# Patient Record
Sex: FEMALE | Race: WHITE | NOT HISPANIC OR LATINO | Employment: OTHER | URBAN - METROPOLITAN AREA
[De-identification: names, ages, dates, MRNs, and addresses within clinical notes are randomized per-mention and may not be internally consistent; named-entity substitution may affect disease eponyms.]

---

## 2017-05-23 ENCOUNTER — HOSPITAL ENCOUNTER (OUTPATIENT)
Dept: PHYSICAL THERAPY | Facility: HOSPITAL | Age: 77
Setting detail: RECURRING SERIES
Discharge: HOME | End: 2017-07-26
Attending: HOSPITALIST | Admitting: HOSPITALIST

## 2017-12-07 ENCOUNTER — HOSPITAL ENCOUNTER (OUTPATIENT)
Dept: FAMILY MEDICINE CLINIC | Facility: CLINIC | Age: 77
Setting detail: SPECIMEN
Discharge: HOME | End: 2017-12-07
Attending: INTERNAL MEDICINE | Admitting: INTERNAL MEDICINE

## 2017-12-07 LAB
ALBUMIN SERPL-MCNC: 3.9 G/DL (ref 3.5–4.8)
ALBUMIN/GLOB SERPL: 1.2 {RATIO} (ref 1–1.7)
ALP SERPL-CCNC: 67 IU/L (ref 32–91)
ALT SERPL-CCNC: 12 IU/L (ref 14–54)
ANION GAP SERPL CALC-SCNC: 14.9 MMOL/L (ref 10–20)
AST SERPL-CCNC: 17 IU/L (ref 15–41)
BASOPHILS # BLD AUTO: 0.1 10*3/UL (ref 0–0.2)
BASOPHILS NFR BLD AUTO: 1 % (ref 0–2)
BILIRUB SERPL-MCNC: 0.6 MG/DL (ref 0.3–1.2)
BUN SERPL-MCNC: 29 MG/DL (ref 8–20)
BUN/CREAT SERPL: 24.2 (ref 5.4–26.2)
CALCIUM SERPL-MCNC: 9.5 MG/DL (ref 8.9–10.3)
CHLORIDE SERPL-SCNC: 106 MMOL/L (ref 101–111)
CONV CO2: 22 MMOL/L (ref 22–32)
CONV TOTAL PROTEIN: 7.2 G/DL (ref 6.1–7.9)
CREAT UR-MCNC: 1.2 MG/DL (ref 0.4–1)
DIFFERENTIAL METHOD BLD: (no result)
EOSINOPHIL # BLD AUTO: 0.2 10*3/UL (ref 0–0.3)
EOSINOPHIL # BLD AUTO: 4 % (ref 0–3)
ERYTHROCYTE [DISTWIDTH] IN BLOOD BY AUTOMATED COUNT: 13.1 % (ref 11.5–14.5)
GLOBULIN UR ELPH-MCNC: 3.3 G/DL (ref 2.5–3.8)
GLUCOSE SERPL-MCNC: 93 MG/DL (ref 65–99)
HCT VFR BLD AUTO: 35.3 % (ref 35–49)
HGB BLD-MCNC: 11.8 G/DL (ref 12–15)
LYMPHOCYTES # BLD AUTO: 0.9 10*3/UL (ref 0.8–4.8)
LYMPHOCYTES NFR BLD AUTO: 19 % (ref 18–42)
MCH RBC QN AUTO: 30.3 PG (ref 26–32)
MCHC RBC AUTO-ENTMCNC: 33.4 G/DL (ref 32–36)
MCV RBC AUTO: 90.6 FL (ref 80–94)
MONOCYTES # BLD AUTO: 0.5 10*3/UL (ref 0.1–1.3)
MONOCYTES NFR BLD AUTO: 9 % (ref 2–11)
NEUTROPHILS # BLD AUTO: 3.4 10*3/UL (ref 2.3–8.6)
NEUTROPHILS NFR BLD AUTO: 67 % (ref 50–75)
NRBC BLD AUTO-RTO: 0 /100{WBCS}
NRBC/RBC NFR BLD MANUAL: 0 10*3/UL
PLATELET # BLD AUTO: 182 10*3/UL (ref 150–450)
PMV BLD AUTO: 8.3 FL (ref 7.4–10.4)
POTASSIUM SERPL-SCNC: 4.9 MMOL/L (ref 3.6–5.1)
RBC # BLD AUTO: 3.89 10*6/UL (ref 4–5.4)
SODIUM SERPL-SCNC: 138 MMOL/L (ref 136–144)
TSH SERPL-ACNC: 0.64 UIU/ML (ref 0.34–5.6)
WBC # BLD AUTO: 5 10*3/UL (ref 4.5–11.5)

## 2018-01-25 ENCOUNTER — HOSPITAL ENCOUNTER (OUTPATIENT)
Dept: FAMILY MEDICINE CLINIC | Facility: CLINIC | Age: 78
Setting detail: SPECIMEN
Discharge: HOME | End: 2018-01-25
Attending: HOSPITALIST | Admitting: HOSPITALIST

## 2018-01-25 LAB
ALBUMIN SERPL-MCNC: 4.1 G/DL (ref 3.5–4.8)
ALBUMIN/GLOB SERPL: 1.4 {RATIO} (ref 1–1.7)
ALP SERPL-CCNC: 63 IU/L (ref 32–91)
ALT SERPL-CCNC: 13 IU/L (ref 14–54)
ANION GAP SERPL CALC-SCNC: 14.2 MMOL/L (ref 10–20)
AST SERPL-CCNC: 21 IU/L (ref 15–41)
BILIRUB SERPL-MCNC: 1.2 MG/DL (ref 0.3–1.2)
BUN SERPL-MCNC: 21 MG/DL (ref 8–20)
BUN/CREAT SERPL: 21 (ref 5.4–26.2)
CALCIUM SERPL-MCNC: 9.7 MG/DL (ref 8.9–10.3)
CHLORIDE SERPL-SCNC: 103 MMOL/L (ref 101–111)
CHOLEST SERPL-MCNC: 211 MG/DL
CHOLEST/HDLC SERPL: 3.2 {RATIO}
CONV CO2: 23 MMOL/L (ref 22–32)
CONV LDL CHOLESTEROL DIRECT: 125 MG/DL (ref 0–100)
CONV TOTAL PROTEIN: 7.1 G/DL (ref 6.1–7.9)
CREAT UR-MCNC: 1 MG/DL (ref 0.4–1)
GLOBULIN UR ELPH-MCNC: 3 G/DL (ref 2.5–3.8)
GLUCOSE SERPL-MCNC: 105 MG/DL (ref 65–99)
HDLC SERPL-MCNC: 66 MG/DL
LDLC/HDLC SERPL: 1.9 {RATIO}
LIPID INTERPRETATION: ABNORMAL
POTASSIUM SERPL-SCNC: 5.2 MMOL/L (ref 3.6–5.1)
SODIUM SERPL-SCNC: 135 MMOL/L (ref 136–144)
TRIGL SERPL-MCNC: 61 MG/DL
VLDLC SERPL CALC-MCNC: 20.2 MG/DL

## 2018-01-31 ENCOUNTER — HOSPITAL ENCOUNTER (OUTPATIENT)
Dept: CARDIOLOGY | Facility: HOSPITAL | Age: 78
Discharge: HOME | End: 2018-01-31
Attending: HOSPITALIST | Admitting: HOSPITALIST

## 2018-06-12 ENCOUNTER — HOSPITAL ENCOUNTER (OUTPATIENT)
Dept: FAMILY MEDICINE CLINIC | Facility: CLINIC | Age: 78
Setting detail: SPECIMEN
Discharge: HOME | End: 2018-06-12
Attending: INTERNAL MEDICINE | Admitting: INTERNAL MEDICINE

## 2018-06-12 LAB
ALBUMIN SERPL-MCNC: 3.8 G/DL (ref 3.5–4.8)
ALBUMIN/GLOB SERPL: 1.1 {RATIO} (ref 1–1.7)
ALP SERPL-CCNC: 61 IU/L (ref 32–91)
ALT SERPL-CCNC: 11 IU/L (ref 14–54)
ANION GAP SERPL CALC-SCNC: 15.4 MMOL/L (ref 10–20)
AST SERPL-CCNC: 16 IU/L (ref 15–41)
BASOPHILS # BLD AUTO: 0.1 10*3/UL (ref 0–0.2)
BASOPHILS NFR BLD AUTO: 1 % (ref 0–2)
BILIRUB SERPL-MCNC: 1 MG/DL (ref 0.3–1.2)
BUN SERPL-MCNC: 20 MG/DL (ref 8–20)
BUN/CREAT SERPL: 22.2 (ref 5.4–26.2)
CALCIUM SERPL-MCNC: 9.2 MG/DL (ref 8.9–10.3)
CHLORIDE SERPL-SCNC: 103 MMOL/L (ref 101–111)
CONV CO2: 23 MMOL/L (ref 22–32)
CONV TOTAL PROTEIN: 7.2 G/DL (ref 6.1–7.9)
CREAT UR-MCNC: 0.9 MG/DL (ref 0.4–1)
DIFFERENTIAL METHOD BLD: (no result)
EOSINOPHIL # BLD AUTO: 0.2 10*3/UL (ref 0–0.3)
EOSINOPHIL # BLD AUTO: 6 % (ref 0–3)
ERYTHROCYTE [DISTWIDTH] IN BLOOD BY AUTOMATED COUNT: 13.3 % (ref 11.5–14.5)
GLOBULIN UR ELPH-MCNC: 3.4 G/DL (ref 2.5–3.8)
GLUCOSE SERPL-MCNC: 95 MG/DL (ref 65–99)
HCT VFR BLD AUTO: 37 % (ref 35–49)
HGB BLD-MCNC: 12.2 G/DL (ref 12–15)
IRON SATN MFR SERPL: 24 % (ref 15–50)
IRON SERPL-MCNC: 80 UG/DL (ref 28–170)
LYMPHOCYTES # BLD AUTO: 0.5 10*3/UL (ref 0.8–4.8)
LYMPHOCYTES NFR BLD AUTO: 13 % (ref 18–42)
MCH RBC QN AUTO: 29.3 PG (ref 26–32)
MCHC RBC AUTO-ENTMCNC: 33.1 G/DL (ref 32–36)
MCV RBC AUTO: 88.5 FL (ref 80–94)
MONOCYTES # BLD AUTO: 0.5 10*3/UL (ref 0.1–1.3)
MONOCYTES NFR BLD AUTO: 12 % (ref 2–11)
NEUTROPHILS # BLD AUTO: 2.8 10*3/UL (ref 2.3–8.6)
NEUTROPHILS NFR BLD AUTO: 68 % (ref 50–75)
NRBC BLD AUTO-RTO: 0 /100{WBCS}
NRBC/RBC NFR BLD MANUAL: 0 10*3/UL
PLATELET # BLD AUTO: 191 10*3/UL (ref 150–450)
PMV BLD AUTO: 8 FL (ref 7.4–10.4)
POTASSIUM SERPL-SCNC: 4.4 MMOL/L (ref 3.6–5.1)
RBC # BLD AUTO: 4.18 10*6/UL (ref 4–5.4)
SODIUM SERPL-SCNC: 137 MMOL/L (ref 136–144)
TIBC SERPL-MCNC: 337 UG/DL (ref 228–428)
WBC # BLD AUTO: 4.1 10*3/UL (ref 4.5–11.5)

## 2018-08-29 ENCOUNTER — HOSPITAL ENCOUNTER (OUTPATIENT)
Dept: NUCLEAR MEDICINE | Facility: HOSPITAL | Age: 78
Discharge: HOME | End: 2018-08-29
Attending: INTERNAL MEDICINE | Admitting: INTERNAL MEDICINE

## 2018-09-04 ENCOUNTER — HOSPITAL ENCOUNTER (OUTPATIENT)
Dept: CARDIOLOGY | Facility: HOSPITAL | Age: 78
Discharge: HOME | End: 2018-09-04
Attending: EMERGENCY MEDICINE | Admitting: EMERGENCY MEDICINE

## 2018-09-05 ENCOUNTER — HOSPITAL ENCOUNTER (OUTPATIENT)
Dept: FAMILY MEDICINE CLINIC | Facility: CLINIC | Age: 78
Setting detail: SPECIMEN
Discharge: HOME | End: 2018-09-05
Attending: HOSPITALIST | Admitting: HOSPITALIST

## 2018-09-05 LAB
ALBUMIN SERPL-MCNC: 3.7 G/DL (ref 3.5–4.8)
ALBUMIN/GLOB SERPL: 1.2 {RATIO} (ref 1–1.7)
ALP SERPL-CCNC: 59 IU/L (ref 32–91)
ALT SERPL-CCNC: 10 IU/L (ref 14–54)
ANION GAP SERPL CALC-SCNC: 14.6 MMOL/L (ref 10–20)
AST SERPL-CCNC: 16 IU/L (ref 15–41)
BILIRUB SERPL-MCNC: 0.6 MG/DL (ref 0.3–1.2)
BUN SERPL-MCNC: 26 MG/DL (ref 8–20)
BUN/CREAT SERPL: 23.6 (ref 5.4–26.2)
CALCIUM SERPL-MCNC: 8.8 MG/DL (ref 8.9–10.3)
CHLORIDE SERPL-SCNC: 105 MMOL/L (ref 101–111)
CONV CO2: 24 MMOL/L (ref 22–32)
CONV TOTAL PROTEIN: 6.7 G/DL (ref 6.1–7.9)
CREAT UR-MCNC: 1.1 MG/DL (ref 0.4–1)
GLOBULIN UR ELPH-MCNC: 3 G/DL (ref 2.5–3.8)
GLUCOSE SERPL-MCNC: 108 MG/DL (ref 65–99)
POTASSIUM SERPL-SCNC: 4.6 MMOL/L (ref 3.6–5.1)
SODIUM SERPL-SCNC: 139 MMOL/L (ref 136–144)

## 2018-10-08 ENCOUNTER — HOSPITAL ENCOUNTER (OUTPATIENT)
Dept: RESPIRATORY THERAPY | Facility: HOSPITAL | Age: 78
Discharge: HOME | End: 2018-10-08
Attending: HOSPITALIST | Admitting: HOSPITALIST

## 2019-02-22 ENCOUNTER — HOSPITAL ENCOUNTER (OUTPATIENT)
Dept: FAMILY MEDICINE CLINIC | Facility: CLINIC | Age: 79
Setting detail: SPECIMEN
Discharge: HOME | End: 2019-02-22
Attending: HOSPITALIST | Admitting: HOSPITALIST

## 2019-02-22 LAB
AZTREONAM SUSC ISLT: NORMAL
BACTERIA ISLT: NORMAL
BACTERIA SPEC AEROBE CULT: NORMAL
CEFEPIME SUSC ISLT: NORMAL
CEFTAZIDIME SUSC ISLT: NORMAL
CEFTRIAXONE SUSC ISLT: NORMAL
CIPROFLOXACIN SUSC ISLT: NORMAL
COLONY COUNT: NORMAL
ERTAPENEM SUSC ISLT: NORMAL
LEVOFLOXACIN SUSC ISLT: NORMAL
Lab: NORMAL
MEROPENEM SUSC ISLT: NORMAL
MICRO REPORT STATUS: NORMAL
NITROFURANTOIN SUSC ISLT: NORMAL
PIP+TAZO SUSC ISLT: NORMAL
SPECIMEN SOURCE: NORMAL
SUSC METH SPEC: NORMAL
TETRACYCLINE SUSC ISLT: NORMAL
TOBRAMYCIN SUSC ISLT: NORMAL
TRIMETHOPRIM/SULFA: NORMAL

## 2019-03-07 ENCOUNTER — HOSPITAL ENCOUNTER (OUTPATIENT)
Dept: FAMILY MEDICINE CLINIC | Facility: CLINIC | Age: 79
Setting detail: SPECIMEN
Discharge: HOME | End: 2019-03-07
Attending: HOSPITALIST | Admitting: HOSPITALIST

## 2019-03-07 LAB
BILIRUB UR QL STRIP: NEGATIVE MG/DL
CASTS URNS QL MICRO: ABNORMAL /[LPF]
COLOR UR: YELLOW
CONV BACTERIA IN URINE MICRO: NEGATIVE
CONV CLARITY OF URINE: ABNORMAL
CONV HYALINE CASTS IN URINE MICRO: 2 /[LPF] (ref 0–5)
CONV PROTEIN IN URINE BY AUTOMATED TEST STRIP: NEGATIVE MG/DL
CONV SMALL ROUND CELLS: ABNORMAL /[HPF]
CONV UROBILINOGEN IN URINE BY AUTOMATED TEST STRIP: 1 MG/DL
CULTURE INDICATED?: ABNORMAL
GLUCOSE UR QL: NEGATIVE MG/DL
HGB UR QL STRIP: NEGATIVE
KETONES UR QL STRIP: NEGATIVE MG/DL
LEUKOCYTE ESTERASE UR QL STRIP: ABNORMAL
NITRITE UR QL STRIP: NEGATIVE
PH UR STRIP.AUTO: 6.5 [PH] (ref 4.5–8)
RBC #/AREA URNS HPF: 2 /[HPF] (ref 0–3)
SP GR UR: 1.02 (ref 1–1.03)
SPERM URNS QL MICRO: ABNORMAL /[HPF]
SQUAMOUS SPT QL MICRO: 21 /[HPF] (ref 0–5)
UNIDENT CRYS URNS QL MICRO: ABNORMAL /[HPF]
WBC #/AREA URNS HPF: 2 /[HPF] (ref 0–5)
YEAST SPEC QL WET PREP: ABNORMAL /[HPF]

## 2019-03-19 ENCOUNTER — HOSPITAL ENCOUNTER (OUTPATIENT)
Dept: FAMILY MEDICINE CLINIC | Facility: CLINIC | Age: 79
Setting detail: SPECIMEN
Discharge: HOME | End: 2019-03-19
Attending: HOSPITALIST | Admitting: HOSPITALIST

## 2019-03-19 LAB
ALBUMIN SERPL-MCNC: 3.6 G/DL (ref 3.5–4.8)
ALBUMIN/GLOB SERPL: 1.3 {RATIO} (ref 1–1.7)
ALP SERPL-CCNC: 112 IU/L (ref 32–91)
ALT SERPL-CCNC: 21 IU/L (ref 14–54)
ANION GAP SERPL CALC-SCNC: 14.4 MMOL/L (ref 10–20)
AST SERPL-CCNC: 23 IU/L (ref 15–41)
BASOPHILS # BLD AUTO: 0 10*3/UL (ref 0–0.2)
BASOPHILS NFR BLD AUTO: 1 % (ref 0–2)
BILIRUB SERPL-MCNC: 0.9 MG/DL (ref 0.3–1.2)
BUN SERPL-MCNC: 24 MG/DL (ref 8–20)
BUN/CREAT SERPL: 20 (ref 5.4–26.2)
CALCIUM SERPL-MCNC: 8.6 MG/DL (ref 8.9–10.3)
CHLORIDE SERPL-SCNC: 101 MMOL/L (ref 101–111)
CONV CO2: 22 MMOL/L (ref 22–32)
CONV TOTAL PROTEIN: 6.4 G/DL (ref 6.1–7.9)
CREAT UR-MCNC: 1.2 MG/DL (ref 0.4–1)
DIFFERENTIAL METHOD BLD: (no result)
EOSINOPHIL # BLD AUTO: 0.2 10*3/UL (ref 0–0.3)
EOSINOPHIL # BLD AUTO: 6 % (ref 0–3)
ERYTHROCYTE [DISTWIDTH] IN BLOOD BY AUTOMATED COUNT: 17.4 % (ref 11.5–14.5)
GLOBULIN UR ELPH-MCNC: 2.8 G/DL (ref 2.5–3.8)
GLUCOSE SERPL-MCNC: 91 MG/DL (ref 65–99)
HCT VFR BLD AUTO: 30.4 % (ref 35–49)
HGB BLD-MCNC: 10.3 G/DL (ref 12–15)
LYMPHOCYTES # BLD AUTO: 0.5 10*3/UL (ref 0.8–4.8)
LYMPHOCYTES NFR BLD AUTO: 15 % (ref 18–42)
MCH RBC QN AUTO: 33.4 PG (ref 26–32)
MCHC RBC AUTO-ENTMCNC: 33.8 G/DL (ref 32–36)
MCV RBC AUTO: 99 FL (ref 80–94)
MONOCYTES # BLD AUTO: 0.3 10*3/UL (ref 0.1–1.3)
MONOCYTES NFR BLD AUTO: 9 % (ref 2–11)
NEUTROPHILS # BLD AUTO: 2.3 10*3/UL (ref 2.3–8.6)
NEUTROPHILS NFR BLD AUTO: 69 % (ref 50–75)
NRBC BLD AUTO-RTO: 0 /100{WBCS}
NRBC/RBC NFR BLD MANUAL: 0 10*3/UL
PLATELET # BLD AUTO: 227 10*3/UL (ref 150–450)
PMV BLD AUTO: 7.5 FL (ref 7.4–10.4)
POTASSIUM SERPL-SCNC: 4.4 MMOL/L (ref 3.6–5.1)
RBC # BLD AUTO: 3.07 10*6/UL (ref 4–5.4)
SODIUM SERPL-SCNC: 133 MMOL/L (ref 136–144)
WBC # BLD AUTO: 3.4 10*3/UL (ref 4.5–11.5)

## 2019-04-16 ENCOUNTER — HOSPITAL ENCOUNTER (OUTPATIENT)
Dept: FAMILY MEDICINE CLINIC | Facility: CLINIC | Age: 79
Setting detail: SPECIMEN
Discharge: HOME | End: 2019-04-16
Attending: HOSPITALIST | Admitting: HOSPITALIST

## 2019-04-16 LAB
ALBUMIN SERPL-MCNC: 3.9 G/DL (ref 3.5–4.8)
ALBUMIN/GLOB SERPL: 1.4 {RATIO} (ref 1–1.7)
ALP SERPL-CCNC: 65 IU/L (ref 32–91)
ALT SERPL-CCNC: 12 IU/L (ref 14–54)
ANION GAP SERPL CALC-SCNC: 16.5 MMOL/L (ref 10–20)
AST SERPL-CCNC: 20 IU/L (ref 15–41)
BASOPHILS # BLD AUTO: 0 10*3/UL (ref 0–0.2)
BASOPHILS NFR BLD AUTO: 0 % (ref 0–2)
BILIRUB SERPL-MCNC: 0.7 MG/DL (ref 0.3–1.2)
BUN SERPL-MCNC: 23 MG/DL (ref 8–20)
BUN/CREAT SERPL: 23 (ref 5.4–26.2)
CALCIUM SERPL-MCNC: 9.3 MG/DL (ref 8.9–10.3)
CHLORIDE SERPL-SCNC: 102 MMOL/L (ref 101–111)
CONV CO2: 21 MMOL/L (ref 22–32)
CONV TOTAL PROTEIN: 6.7 G/DL (ref 6.1–7.9)
CREAT UR-MCNC: 1 MG/DL (ref 0.4–1)
DIFFERENTIAL METHOD BLD: (no result)
EOSINOPHIL # BLD AUTO: 0.3 10*3/UL (ref 0–0.3)
EOSINOPHIL # BLD AUTO: 7 % (ref 0–3)
ERYTHROCYTE [DISTWIDTH] IN BLOOD BY AUTOMATED COUNT: 14.7 % (ref 11.5–14.5)
GLOBULIN UR ELPH-MCNC: 2.8 G/DL (ref 2.5–3.8)
GLUCOSE SERPL-MCNC: 98 MG/DL (ref 65–99)
HCT VFR BLD AUTO: 36.6 % (ref 35–49)
HGB BLD-MCNC: 11.9 G/DL (ref 12–15)
LYMPHOCYTES # BLD AUTO: 0.9 10*3/UL (ref 0.8–4.8)
LYMPHOCYTES NFR BLD AUTO: 20 % (ref 18–42)
MCH RBC QN AUTO: 31.9 PG (ref 26–32)
MCHC RBC AUTO-ENTMCNC: 32.5 G/DL (ref 32–36)
MCV RBC AUTO: 98.3 FL (ref 80–94)
MONOCYTES # BLD AUTO: 0.5 10*3/UL (ref 0.1–1.3)
MONOCYTES NFR BLD AUTO: 11 % (ref 2–11)
NEUTROPHILS # BLD AUTO: 2.7 10*3/UL (ref 2.3–8.6)
NEUTROPHILS NFR BLD AUTO: 62 % (ref 50–75)
NRBC BLD AUTO-RTO: 0 /100{WBCS}
NRBC/RBC NFR BLD MANUAL: 0 10*3/UL
PLATELET # BLD AUTO: 211 10*3/UL (ref 150–450)
PMV BLD AUTO: 8.7 FL (ref 7.4–10.4)
POTASSIUM SERPL-SCNC: 4.5 MMOL/L (ref 3.6–5.1)
RBC # BLD AUTO: (no result) 10*6/UL (ref 4–5.4)
SODIUM SERPL-SCNC: 135 MMOL/L (ref 136–144)
WBC # BLD AUTO: 4.4 10*3/UL (ref 4.5–11.5)

## 2019-06-04 ENCOUNTER — CONVERSION ENCOUNTER (OUTPATIENT)
Dept: FAMILY MEDICINE CLINIC | Facility: CLINIC | Age: 79
End: 2019-06-04

## 2019-06-04 ENCOUNTER — HOSPITAL ENCOUNTER (OUTPATIENT)
Dept: FAMILY MEDICINE CLINIC | Facility: CLINIC | Age: 79
Setting detail: SPECIMEN
Discharge: HOME | End: 2019-06-04
Attending: HOSPITALIST | Admitting: HOSPITALIST

## 2019-06-05 VITALS
DIASTOLIC BLOOD PRESSURE: 80 MMHG | BODY MASS INDEX: 20.6 KG/M2 | RESPIRATION RATE: 8 BRPM | SYSTOLIC BLOOD PRESSURE: 150 MMHG | HEART RATE: 80 BPM | WEIGHT: 120 LBS

## 2019-06-05 LAB
ALBUMIN SERPL-MCNC: 4 G/DL (ref 3.5–4.8)
ALBUMIN/GLOB SERPL: 1.4 {RATIO} (ref 1–1.7)
ALP SERPL-CCNC: 66 IU/L (ref 32–91)
ALT SERPL-CCNC: 15 IU/L (ref 14–54)
ANION GAP SERPL CALC-SCNC: 16.4 MMOL/L (ref 10–20)
AST SERPL-CCNC: 22 IU/L (ref 15–41)
BASOPHILS # BLD AUTO: 0.1 10*3/UL (ref 0–0.2)
BASOPHILS NFR BLD AUTO: 1 % (ref 0–2)
BILIRUB SERPL-MCNC: 0.6 MG/DL (ref 0.3–1.2)
BUN SERPL-MCNC: 27 MG/DL (ref 8–20)
BUN/CREAT SERPL: 27 (ref 5.4–26.2)
CALCIUM SERPL-MCNC: 9.2 MG/DL (ref 8.9–10.3)
CHLORIDE SERPL-SCNC: 104 MMOL/L (ref 101–111)
CONV ABS BANDS: 0.6 10*3/UL
CONV CO2: 21 MMOL/L (ref 22–32)
CONV TOTAL PROTEIN: 6.9 G/DL (ref 6.1–7.9)
CREAT UR-MCNC: 1 MG/DL (ref 0.4–1)
CRP SERPL-MCNC: 0.39 MG/DL (ref 0–0.7)
DIFFERENTIAL METHOD BLD: (no result)
EOSINOPHIL # BLD AUTO: 0.2 10*3/UL (ref 0–0.3)
EOSINOPHIL # BLD AUTO: 3 % (ref 0–3)
ERYTHROCYTE [DISTWIDTH] IN BLOOD BY AUTOMATED COUNT: 13.1 % (ref 11.5–14.5)
ERYTHROCYTE [SEDIMENTATION RATE] IN BLOOD BY WESTERGREN METHOD: 52 MM/HR (ref 0–30)
GLOBULIN UR ELPH-MCNC: 2.9 G/DL (ref 2.5–3.8)
GLUCOSE SERPL-MCNC: 101 MG/DL (ref 65–99)
HCT VFR BLD AUTO: 37.6 % (ref 35–49)
HGB BLD-MCNC: 12.3 G/DL (ref 12–15)
LYMPHOCYTES # BLD AUTO: 1.8 10*3/UL (ref 0.8–4.8)
LYMPHOCYTES NFR BLD AUTO: 35 % (ref 18–42)
MCH RBC QN AUTO: 30.5 PG (ref 26–32)
MCHC RBC AUTO-ENTMCNC: 32.7 G/DL (ref 32–36)
MCV RBC AUTO: 93.3 FL (ref 80–94)
MONOCYTES # BLD AUTO: 0.4 10*3/UL (ref 0.1–1.3)
MONOCYTES NFR BLD AUTO: 7 % (ref 2–11)
NEUTROPHILS # BLD AUTO: 2.1 10*3/UL (ref 2.3–8.6)
NEUTROPHILS NFR BLD AUTO: 42 % (ref 50–75)
NEUTS BAND NFR BLD MANUAL: 12 % (ref 0–5)
PLATELET # BLD AUTO: 206 10*3/UL (ref 150–450)
PMV BLD AUTO: 8.5 FL (ref 7.4–10.4)
POTASSIUM SERPL-SCNC: 4.4 MMOL/L (ref 3.6–5.1)
PREALB SERPL-MCNC: 25.1 MG/DL (ref 16–38)
RBC # BLD AUTO: 4.03 10*6/UL (ref 4–5.4)
SODIUM SERPL-SCNC: 137 MMOL/L (ref 136–144)
TSH SERPL-ACNC: 1.29 UIU/ML (ref 0.34–5.6)
VIT B12 SERPL-MCNC: 401 PG/ML (ref 180–914)
WBC # BLD AUTO: 5.2 10*3/UL (ref 4.5–11.5)

## 2019-06-06 NOTE — PROGRESS NOTES
Vital Signs:    Patient Profile:    78 Years Old Female  Height:     64 inches (162.56 cm)  Weight:     120 pounds  BMI:        20.60     Temp:       99.2 degrees F oral  Pulse rate: 80 / minute  Pulse rhythm:   regular  Resp:       8 per minute  BP Sittin / 80  (right arm)    Cuff size:  regular      Problems: Active problems were reviewed with the patient during this visit.  Medications: Medications were reviewed with the patient during this visit.  Allergies: Allergies were reviewed with the patient during this visit.        Vitals Entered By: [facility]      Visit Type:  Follow-up Visit  Referring Provider:  Shan Canseco MD  Primary Provider:  Shan Canseco MD      History of Present Illness:         The patient comes in today for a follow-up visit.  lots of fatigue and malaise.   Some chest pain and worse with exertion.  Been more frequent and nowwith more SOA.           When questioned about other issues, patient has concerns about nothing in particular.           The ROS is positive for chest pain, SOA and Dizzyness.  The ROS is negative for fever, chills, nausea and vomiting.        Past Medical History:     Reviewed history from 2019 and no changes required:        Health Maintenence: Colonoscopy (last done )        Hematologic / Lymphatic Hx: leukopenia        Degenerative disk disease        Degenerative joint disease        Hyperlipidemia        Hypothyroidism        Rheumatoid arthritis        Incontinence        White coat HTN        Staph infection                  [facility] 2018 PAF and HTN                 PAF  On bisoprolol and diltiazem  bradycardia symptomatic with dizziness.  Amiodarone alone 2018 and no episodes of erratic or fast heartbeat or lightheadedness.        HTN        hypertensive  cardiovascular disease        Rheumatoid arthritis        No DM or dyslipidemia        No cigarette or regular alcohol use        On thyroid replacement tsh 1.18  09/13/2018                Echo 09/04/2018  LVH EF 65%. RV OK.  LA probably mildly dilated.  AV OK.  MV OK.  Modest TR RVSP 26 or less        Nuclear MPI regadenoson 09/14/2018.  LV uniform myocardial uptake and wall motion EF 71%.         Holter 10/08/2018 SR 40-81 58. AF 2.7 hr episode  VR .  At termination AF 2 3.5-4.0 s pauses with patient possibly dizzy.  205 PAC 42 atrial pairs several SVT.  No ventricular ectopy.                Rheumatoid arthritis  Possible pulmonary  involvement        Creatinine 1.0  09/14/2018        Hgb 11.809/13/2018    Family History Summary:      Reviewed history Last on 04/16/2019 and no changes required:06/04/2019  Mother - Has FH Other Diseases - Osteoathritis - Entered On: 11/7/2015    General Comments - FH:  Family History of Rheumatoid disease      Social History:     Reviewed history from 08/29/2018 and no changes required:        Patient has never smoked.        Passive Smoke: N        Alcohol Use: Y        Drug Use: N        HIV/High Risk: N        Regular Exercise: N        Hx Domestic Abuse: N        Taoist Affecting Care: N        Passive Smoke: N        Pos flu vaccine 2017        Risk Factors:     Smoked Tobacco Use:  Never smoker  Smokeless Tobacco Use:  Never  Passive smoke exposure:  no  Drug use:  no  HIV high-risk behavior:  no  Caffeine use:  2 drinks per day  Alcohol use:  yes     Type:  wine     Drinks per day:  0  Exercise:  no  Seatbelt use:  100 %    Previous Tobacco Use: Signed On - 04/16/2019  Smoked Tobacco Use:  Never smoker  Smokeless Tobacco Use:  Never  Passive smoke exposure:  no  Drug use:  no  HIV high-risk behavior:  no  Caffeine use:  2 drinks per day    Previous Alcohol Use: Signed On - 04/16/2019  Alcohol use:  yes     Type:  wine     Drinks per day:  0  Exercise:  no  Seatbelt use:  100 %    Colonoscopy History:     Date of Last Colonoscopy:  01/08/2015    Mammogram History:     Date of Last Mammogram:  02/07/2018        Physical Exam    Weight:  121  BP:  150/80 mm HG    Medication List:  AVAPRO 150 MG ORAL TABLET (IRBESARTAN) take one daily  ELIQUIS 5 MG ORAL TABLET (APIXABAN) 1 tablet by mouth twice daily  SYNTHROID 125 MCG ORAL TABLET (LEVOTHYROXINE SODIUM) take one tablet daily  ARAVA 20 MG ORAL TABLET (LEFLUNOMIDE) take one tablet  daily      Surgical History   Partial hyster 1974  Appendectomy 1974  Back 2001, 2003,    Risk Factors  Tobacco Use: Never smoker  Passive smoke exposure: no  Exercise: no  Illicit Drug use: no    Advanced directives discussed:   yes  Advanced directives reviewed:  yes    Physical Examination   General Appearance   In no acute distress.  Alert & oriented.  Behavior and affect appropriate to situation  HEENT   PERRLA, EOMI, TM's normal.  Pharynx clear  Cardiovascular   Regular rate and rhythm  Lungs   Clear to auscultation        Impression & Recommendations:    Problem # 1:  Fatigue and malaise (ICD-780.79) (LJL82-X14.83)    Orders:  [facility] CBC W/DIFF; PATH REVIEW IF INDICATED (CBC)  [facility] COMPREHENSIVE METABOLIC PANEL (CMP) (MPC)  [facility] THYROID STIMULATING HORMONE (TSH) (TSH)  [facility] VITAMIN B12 (B12)  [facility] SEDIMENTATION RATE (ESR)  [facility] CRP C-REACTIVE PROTEIN INFLAMMATION (CRP)  [facility] SOFIA SCREEN W/REFL TO SOFIA PROFILE (SOFIA)      Problem # 2:  Lymphopenia (ICD-288.51) ([id number]-R81.810)    Orders:  [facility] CBC W/DIFF; PATH REVIEW IF INDICATED (CBC)  [facility] COMPREHENSIVE METABOLIC PANEL (CMP) (MPC)  [facility] THYROID STIMULATING HORMONE (TSH) (TSH)  [facility] VITAMIN B12 (B12)  [facility] SEDIMENTATION RATE (ESR)  [facility] CRP C-REACTIVE PROTEIN INFLAMMATION (CRP)  [facility] SOFIA SCREEN W/REFL TO SOFIA PROFILE (SOFIA)      Problem # 3:  Myalgia/myositis NOS (ICD-729.1) (QOO09-D92.9)    Orders:  [facility] CBC W/DIFF; PATH REVIEW IF INDICATED (CBC)  [facility] COMPREHENSIVE METABOLIC PANEL (CMP) (MPC)  [facility] THYROID STIMULATING HORMONE (TSH) (TSH)  [facility] VITAMIN B12 (B12)  [facility] SEDIMENTATION RATE (ESR)  [facility] CRP C-REACTIVE PROTEIN INFLAMMATION (CRP)  [facility] SOFIA SCREEN W/REFL TO SOFIA PROFILE  (SOFIA)      Other Orders:  [facility] PREALBUMIN (PAB)      Patient Instructions:  1)  During this office visit we discussed the pertinent aspects of the visit and the treatment recommendations.  Patient was given the opportunity to ask questions and discuss other concerns.  2)  Please schedule a follow-up appointment in 2 Months.                Medication Administration    Orders Added:  1)  Ofc Vst, Est Level III [69771]  2)  [facility] CBC W/DIFF; PATH REVIEW IF INDICATED [CBC]  3)  [facility] COMPREHENSIVE METABOLIC PANEL (CMP) [MPC]  4)  [facility] THYROID STIMULATING HORMONE (TSH) [TSH]  5)  [facility] VITAMIN B12 [B12]  6)  [facility] SEDIMENTATION RATE [ESR]  7)  [facility] CRP C-REACTIVE PROTEIN INFLAMMATION [CRP]  8)  [facility] SOFIA SCREEN W/REFL TO SOFIA PROFILE [SOFIA]  9)  [facility] PREALBUMIN [PAB]        Electronically signed by Shan Canseco MD on 06/04/2019 at 5:47 PM  ________________________________________________________________________       Disclaimer: Converted Note message may not contain all data elements that existed in the legacy source system. Please see [facility] for the original note details.

## 2019-06-07 LAB — ANA SER QL IA: NEGATIVE

## 2019-06-27 ENCOUNTER — TELEPHONE (OUTPATIENT)
Dept: CARDIOLOGY | Facility: CLINIC | Age: 79
End: 2019-06-27

## 2019-06-27 NOTE — TELEPHONE ENCOUNTER
Req for Eliquis 5 mg samples, takes twice daily.  Had been seeing Lasha, has not yet had appt with Sandella, has one in July.  Asking if she an  today.  Note also asking for samples for .

## 2019-07-11 ENCOUNTER — OFFICE VISIT (OUTPATIENT)
Dept: CARDIOLOGY | Facility: CLINIC | Age: 79
End: 2019-07-11

## 2019-07-11 VITALS
OXYGEN SATURATION: 96 % | DIASTOLIC BLOOD PRESSURE: 77 MMHG | WEIGHT: 120 LBS | HEIGHT: 63 IN | BODY MASS INDEX: 21.26 KG/M2 | SYSTOLIC BLOOD PRESSURE: 152 MMHG | HEART RATE: 79 BPM

## 2019-07-11 DIAGNOSIS — Z79.01 LONG TERM (CURRENT) USE OF ANTICOAGULANTS: ICD-10-CM

## 2019-07-11 DIAGNOSIS — R53.82 CHRONIC FATIGUE: ICD-10-CM

## 2019-07-11 DIAGNOSIS — I48.0 PAROXYSMAL ATRIAL FIBRILLATION (HCC): Primary | ICD-10-CM

## 2019-07-11 DIAGNOSIS — I10 ESSENTIAL HYPERTENSION: ICD-10-CM

## 2019-07-11 DIAGNOSIS — G47.33 OBSTRUCTIVE SLEEP APNEA: ICD-10-CM

## 2019-07-11 PROCEDURE — 93000 ELECTROCARDIOGRAM COMPLETE: CPT | Performed by: INTERNAL MEDICINE

## 2019-07-11 PROCEDURE — 99214 OFFICE O/P EST MOD 30 MIN: CPT | Performed by: INTERNAL MEDICINE

## 2019-07-11 RX ORDER — LEVOTHYROXINE SODIUM 0.12 MG/1
TABLET ORAL
COMMUNITY
Start: 2019-05-21 | End: 2020-03-09 | Stop reason: SDUPTHER

## 2019-07-11 RX ORDER — METOPROLOL SUCCINATE 25 MG/1
25 TABLET, EXTENDED RELEASE ORAL DAILY
Qty: 90 TABLET | Refills: 3 | Status: SHIPPED | OUTPATIENT
Start: 2019-07-11 | End: 2020-06-18

## 2019-07-11 RX ORDER — APIXABAN 5 MG/1
TABLET, FILM COATED ORAL 2 TIMES DAILY
COMMUNITY
Start: 2018-10-01 | End: 2019-08-13 | Stop reason: SDUPTHER

## 2019-07-11 RX ORDER — LEFLUNOMIDE 20 MG/1
TABLET ORAL
COMMUNITY
Start: 2019-05-20 | End: 2020-02-21 | Stop reason: SDUPTHER

## 2019-07-11 RX ORDER — LOSARTAN POTASSIUM 25 MG/1
TABLET ORAL
COMMUNITY
Start: 2019-07-03 | End: 2019-10-03 | Stop reason: SDUPTHER

## 2019-07-11 NOTE — PROGRESS NOTES
Subjective:     Encounter Date:07/11/2019      Patient ID: Jt Valdes is a 78 y.o. female.    Chief Complaint:  History of Present Illness      Past Medical History:  Past Medical History:   Diagnosis Date   • Degenerative joint disease    • H/O degenerative disc disease    • History of colonoscopy 2009    last done 2009   • History of echocardiogram 09/04/2018    LVH EF 65%. RV OK. LA probably mildly dilated. AV OK. MV OK. Modest TR RVSP 26 or less. Nuclear MPI regadenoson 9/14/2018. LV uniform myocardial uptake and wall motion EF 71%.    • History of Holter monitoring 10/08/2018    SR 40-81 58. AF 2.7 hr episode VR . At termination AF 2 3.5-4.0 s pauses with patient possibly dizzy. 205 PAC 42 atrial ashanti several SVT. No ventricular ectopy.    • HTN (hypertension)    • Hyperlipidemia    • Hypertensive cardiovascular disease    • Hypothyroidism    • Incontinence    • Leukopenia    • PAF (paroxysmal atrial fibrillation) (CMS/HCC) 09/2018    [facility] Sept 2018 PAF and HTN. PAFL On bisoprolol and diltiazem bradycardia symptomatic with dizziness. Amiodarone alone Oct 2018 and no episodes of erratic or fast heartbeat or lightheadedness.    • Rheumatoid arthritis (CMS/HCC)     Possible pulmonary involvement    • Staph infection    • White coat syndrome with hypertension      Past Surgical History:  Past Surgical History:   Procedure Laterality Date   • APPENDECTOMY  1974   • BACK SURGERY  2003 2001, 2003   • PARTIAL HYSTERECTOMY  1974      Allergies:  Allergies   Allergen Reactions   • Sulfa Antibiotics Unknown (See Comments)     Home Meds:  Current Meds:     Current Outpatient Medications:   •  apixaban (ELIQUIS) 5 MG tablet tablet, 2 (Two) Times a Day., Disp: , Rfl:   •  leflunomide (ARAVA) 20 MG tablet, , Disp: , Rfl:   •  levothyroxine (SYNTHROID, LEVOTHROID) 125 MCG tablet, , Disp: , Rfl:   •  losartan (COZAAR) 25 MG tablet, , Disp: , Rfl:   Social History:   Social History     Tobacco Use   •  "Smoking status: Never Smoker   • Smokeless tobacco: Never Used   Substance Use Topics   • Alcohol use: Yes      Family History:  Family History   Problem Relation Age of Onset   • Osteoarthritis Mother    • Other Other         Rheumatoid disease         The following portions of the patient's history were reviewed and updated as appropriate: allergies, current medications, past family history, past medical history, past social history, past surgical history and problem list.    Review of Systems   Constitution: Positive for weakness.   Cardiovascular: Negative for chest pain, leg swelling and palpitations.   Neurological: Negative for dizziness and numbness.       Procedures       Objective:     Physical Exam   /77 (BP Location: Left arm, Patient Position: Sitting, Cuff Size: Adult)   Pulse 79   Ht 160 cm (63\")   Wt 54.4 kg (120 lb)   SpO2 96%   BMI 21.26 kg/m²   General:  Appears in no acute distress  Eyes: Sclera is anicteric,  conjunctiva is clear   HEENT:  No JVD. Thyroid not visibly enlarged. No mucosal pallor or cyanosis  Respiratory: Respirations regular and unlabored at rest.  Bilaterally good breath sounds, with good air entry in all fields. No crackles, rubs or wheezes auscultated  Cardiovascular: S1,S2 Regular rate and rhythm. No murmur, rub or gallop auscultated. No carotid bruits. DP/PT pulses    . No pretibial pitting edema  Gastrointestinal: Abdomen soft, flat, non tender. Bowel sounds present. No hepatosplenomegaly. No ascites  Musculoskeletal:  No abnormal movements  Extremities: No digital clubbing or cyanosis  Skin: Color pink. Skin warm and dry to touch. No rashes  No xanthoma  Neuro: Alert and awake, no lateralizing deficits appreciated    Lab Review:       Assessment:         No diagnosis found.       Plan:                "

## 2019-07-11 NOTE — PROGRESS NOTES
Cardiology Progress Note    Patient Identification:  Name: Jt Valdes  Age: 78 y.o.  Sex: female  :  1940  MRN: [id number]                 Follow Up / Chief Complaint: Follow-up for A. fib  Chief Complaint   Patient presents with   • Fatigue   • Rapid Heart Rate       Interval History: Patient has SILVERIO but is noncompliant with CPAP.  Used to follow Dr. Colby wants to establish care with me.       Subjective: Is complaining of fatigue.  Denies any chest pain or shortness of breath      Objective: see below    Past Medical History:  Past Medical History:   Diagnosis Date   • Degenerative joint disease    • H/O degenerative disc disease    • History of colonoscopy 2009    last done    • History of echocardiogram 2018    LVH EF 65%. RV OK. LA probably mildly dilated. AV OK. MV OK. Modest TR RVSP 26 or less. Nuclear MPI regadenoson 2018. LV uniform myocardial uptake and wall motion EF 71%.    • History of Holter monitoring 10/08/2018    SR 40-81 58. AF 2.7 hr episode VR . At termination AF 2 3.5-4.0 s pauses with patient possibly dizzy. 205 PAC 42 atrial ashanti several SVT. No ventricular ectopy.    • HTN (hypertension)    • Hyperlipidemia    • Hypertensive cardiovascular disease    • Hypothyroidism    • Incontinence    • Leukopenia    • PAF (paroxysmal atrial fibrillation) (CMS/HCC) 2018    [facility] 2018 PAF and HTN. PAFL On bisoprolol and diltiazem bradycardia symptomatic with dizziness. Amiodarone alone Oct 2018 and no episodes of erratic or fast heartbeat or lightheadedness.    • Rheumatoid arthritis (CMS/HCC)     Possible pulmonary involvement    • Staph infection    • White coat syndrome with hypertension      Past Surgical History:  Past Surgical History:   Procedure Laterality Date   • APPENDECTOMY     • BACK SURGERY  2003,    • PARTIAL HYSTERECTOMY          Social History:   Social History     Tobacco Use   • Smoking status: Never Smoker   • Smokeless  "tobacco: Never Used   Substance Use Topics   • Alcohol use: Yes      Family History:  Family History   Problem Relation Age of Onset   • Osteoarthritis Mother    • Other Other         Rheumatoid disease           Allergies: NKDA  Scheduled Meds: Per mar  Eliquis, losartan, levothyroxine and leflunomide          INTAKE AND OUTPUT:  [unfilled]    Review of Systems:   Constitutional: C/Ofatigue, No fever, rigors, chills   Eyes: No vision changes, eye pain   ENT/oropharynx: No difficulty swallowing, sore throat, epistaxis, changes in hearing   Cardiovascular: No chest pain, chest tightness, palpitations, paroxysmal nocturnal dyspnea, orthopnea, diaphoresis, dizziness / syncopal episode   Respiratory: No shortness of breath, dyspnea on exertion, cough, wheezing hemoptysis   Gastrointestinal: No abdominal pain, nausea, vomiting, diarrhea, bloody stools   Genitourinary: No hematuria, dysuria   Neurological: No headache, tremors, numbness,  one-sided weakness    Musculoskeletal: No cramps, myalgias,  joint pain, joint swelling   Integument: No rash, edema         Constitutional:       Physical Exam   /77 (BP Location: Left arm, Patient Position: Sitting, Cuff Size: Adult)   Pulse 79   Ht 160 cm (63\")   Wt 54.4 kg (120 lb)   SpO2 96%   BMI 21.26 kg/m²   General:  Appears in no acute distress  Eyes: Sclera is anicteric,  conjunctiva is clear   HEENT:  No JVD. Thyroid not visibly enlarged. No mucosal pallor or cyanosis  Respiratory: Respirations regular and unlabored at rest.  Bilaterally good breath sounds, with good air entry in all fields. No crackles, rubs or wheezes auscultated  Cardiovascular: S1,S2 Regular rate and rhythm. No murmur, rub or gallop auscultated. No carotid bruits. DP/PT pulses    . No pretibial pitting edema  Gastrointestinal: Abdomen soft, flat, non tender. Bowel sounds present. No hepatosplenomegaly. No ascites  Musculoskeletal:  No abnormal movements  Extremities: No digital clubbing or " cyanosis  Skin: Color pink. Skin warm and dry to touch. No rashes  No xanthoma  Neuro: Alert and awake, no lateralizing deficits appreciated        Cardiographics  Telemetry:     ECG:     ECG 12 Lead  Date/Time: 2019 1:36 PM  Performed by: Jose Patiño MD  Authorized by: Jose Patiño MD   Comparison: compared with previous ECG   Comparison to previous ECG: EKG done today reviewed by me shows sinus rhythm with occasional PACs with LVH            I have personally reviewed EKG    Echocardiogram:   Results for orders placed during the hospital encounter of 18   Adult Transthoracic Echo Complete W/ Cont if Necessary Per Protocol    Narrative                           Adult Echocardiogram Report          [facility]  Cardiology Department  [address] [place] [address]      Name: Darren SEVILLA Date: 2018 03:01 PM           BP: 150/72 mmHg  MRN: [id number]       Patient Location:   : 1940      Gender: Female                            Height: 63 in  Age: 77 yrs          Account#: [id number]                     Weight: 125 lb  Reason For Study: PALPITATIONS, HYPERTENSION                   BSA: 1.6 m2  Ordering Physician:  SHAILESH FARRELL  Referring Physician:  TANA LOUIS  Performed By: SAUL      M-Mode/2-D Measurements:  LVIDd: 3.8 cm       (3.7-5.7) LVPWd: 1.2 cm        (0.8-1.2)  LVIDs: 3.0 cm       (2.3-3.9)  ACS: 1.9 cm         (1.6-3.7) IVSd: 1.2 cm         (0.7-1.2)  LA dimension: 3.2 cm(1.9-4.0) RVDd: 2.6 cm         (0.7-2.4)  FS: 20.1 %          (21-40%)  Ao root diam: 3.5 cm (2.0-3.7)    Comments  Normal LV systolic function EF 65%  RV Function is normal  Left atrium right atrium and aortic root diameter is normal  Diastolic LV dysfunction noted  Mitral aortic and tricuspid leaflets structurally appears normal except mild  thickening and sclerosis  Mild mitral annular calcification noted  Mild mitral and tricuspid  insufficiency estimate RV systolic pressure 35 mm Hg  Interatrial septum is intact  No intracardiac thrombus noted  No significant pericardial effusion noted  Pulmonary leaflets were not seen clearly.      Interpretation  Normal LV systolic function EF 65%  Diastolic LV dysfunction  No significant Doppler abnormalities    MMode/2D Measurements & Calculations  ESV(Teich): 35.7 ml  EF(Teich): 41.9 %                       Ao root area: 9.7 cm2  Asc Aorta Diam: 3.5 cm                  LVOT diam: 2.3 cm    EDV(MOD-sp4): 31.9 ml  ESV(MOD-sp4): 11.5 ml  EF(MOD-sp4): 63.9 %    Doppler Measurements & Calculations  MV E max bonnie: 65.3 cm/sec                 MV max PG: 3.6 mmHg  MV A max bonnie: 78.3 cm/sec                 MV mean P.3 mmHg  MV E/A: 0.83  MV dec slope: 269.0 cm/sec2               Ao V2 max: 114.6 cm/sec  MV dec time: 0.24 sec                     Ao max P.3 mmHg                                            Ao V2 mean: 84.2 cm/sec                                            Ao mean PG: 3.0 mmHg                                            Ao V2 VTI: 23.7 cm                                            RIGOBERTO(I,D): 3.8 cm2                                              RIGOBERTO(V,D): 4.0 cm2  LV V1 max P.5 mmHg                    PA max PG: 3.4 mmHg  LV V1 mean P.2 mmHg  LV V1 max: 106.6 cm/sec  LV V1 mean: 69.5 cm/sec  LV V1 VTI: 21.2 cm  TR max bonnie: 238.8 cm/sec  TR max P.9 mmHg  RVSP(TR): 32.9 mmHg    _______________________________________________________________________________      Electronically signed by: Amos To MD  on 2018 06:26 PM         Lab Review   I have reviewed the labs      RADIOLOGY:  Imaging Results (last 24 hours)     ** No results found for the last 24 hours. **          HPI :    This is a 78-year-old previous Dr. Colby patient with past medical history of    Hypertension/evidence cardia vascular disease  Paroxysmal atrial fibrillation on long-term Eliquis  Hyperlipidemia,  "hypothyroidism  Whitecoat hypertension  History of leukopenia, staph infection  Rheumatoid arthritis, possible pulmonary involved ,DJD, DDD  Partial hysterectomy, appendectomy, back surgery  Allergies/intolerance to sulfa  Non-smoker    Here to establish care.  Patient is to see Dr. Yannick Colby in the past.  Patient had history of sleep apnea is noncompliant with CPAP.  Is complaining of fatigue.  Denies any chest pain or shortness of breath.. EKG reveals sinus rhythm.  Tolerating anticoagulation without bleeding.  Had labs done which showed normal CBC CMP and TSH from 6/4/19         Reviewed previous records:            Echo 09/04/2018  LVH EF 65%. RV OK.  LA probably mildly dilated.  AV OK.  MV OK.  Modest TR RVSP 26 or less        Nuclear MPI regadenoson 09/14/2018.  LV uniform myocardial uptake and wall motion EF 71%.         Holter 10/08/2018 SR 40-81 58. AF 2.7 hr episode  VR .  At termination AF 2 3.5-4.0 s pauses with patient possibly dizzy.  205 PAC 42 atrial pairs several SVT.  No ventricular ectopy.        Creatinine 1.0  09/14/2018        Hgb 11.809/13/2018       Assessment:  Fatigue  Paroxysmal atrial fibrillation on long-term anti-coagulation  SILVERIO  Hypertension, hyper lipidemia    Plan:  Patient could be having intermittent A. fib with RVR we will start her on low-dose beta-blocker  Send her back to pulmonologist Dr. rebolledo for sleep apnea evaluation and treatment  Will continue medical management with Eliquis,  Check lipid profile before next visit        )7/22/2019  Jose Patiño MD      EMR MyTrainer/Transcription:   \"Dictated utilizing Dragon dictation\".   "

## 2019-08-13 ENCOUNTER — TELEPHONE (OUTPATIENT)
Dept: CARDIOLOGY | Facility: CLINIC | Age: 79
End: 2019-08-13

## 2019-08-27 ENCOUNTER — OFFICE VISIT (OUTPATIENT)
Dept: FAMILY MEDICINE CLINIC | Facility: CLINIC | Age: 79
End: 2019-08-27

## 2019-08-27 VITALS
TEMPERATURE: 97.2 F | HEIGHT: 63 IN | DIASTOLIC BLOOD PRESSURE: 82 MMHG | SYSTOLIC BLOOD PRESSURE: 154 MMHG | HEART RATE: 71 BPM | BODY MASS INDEX: 21.55 KG/M2 | OXYGEN SATURATION: 97 % | RESPIRATION RATE: 16 BRPM | WEIGHT: 121.6 LBS

## 2019-08-27 DIAGNOSIS — M05.79 RHEUMATOID ARTHRITIS INVOLVING MULTIPLE SITES WITH POSITIVE RHEUMATOID FACTOR (HCC): ICD-10-CM

## 2019-08-27 DIAGNOSIS — I48.0 PAROXYSMAL ATRIAL FIBRILLATION (HCC): Primary | ICD-10-CM

## 2019-08-27 DIAGNOSIS — E55.9 VITAMIN D DEFICIENCY: ICD-10-CM

## 2019-08-27 PROCEDURE — 99214 OFFICE O/P EST MOD 30 MIN: CPT | Performed by: INTERNAL MEDICINE

## 2019-09-08 PROBLEM — E78.5 HYPERLIPIDEMIA: Status: ACTIVE | Noted: 2019-09-08

## 2019-09-08 PROBLEM — M53.80 OTHER SPECIFIED DORSOPATHIES, SITE UNSPECIFIED: Status: ACTIVE | Noted: 2019-09-08

## 2019-09-08 PROBLEM — I11.9 HYPERTENSIVE CARDIOVASCULAR DISEASE: Status: ACTIVE | Noted: 2018-10-09

## 2019-09-08 PROBLEM — S16.1XXA CERVICAL STRAIN: Status: ACTIVE | Noted: 2019-09-08

## 2019-09-08 PROBLEM — E03.9 HYPOTHYROIDISM: Status: ACTIVE | Noted: 2019-09-08

## 2019-09-08 PROBLEM — I65.29 OCCLUSION AND STENOSIS OF UNSPECIFIED CAROTID ARTERY: Status: ACTIVE | Noted: 2018-01-25

## 2019-09-08 PROBLEM — M06.9 RHEUMATOID ARTHRITIS (HCC): Status: ACTIVE | Noted: 2019-09-08

## 2019-09-08 PROBLEM — I48.0 PAROXYSMAL ATRIAL FIBRILLATION: Status: ACTIVE | Noted: 2018-09-05

## 2019-10-03 RX ORDER — LOSARTAN POTASSIUM 25 MG/1
25 TABLET ORAL DAILY
Qty: 90 TABLET | Refills: 3 | Status: SHIPPED | OUTPATIENT
Start: 2019-10-03 | End: 2020-10-01 | Stop reason: SDUPTHER

## 2019-11-02 PROCEDURE — 87086 URINE CULTURE/COLONY COUNT: CPT | Performed by: FAMILY MEDICINE

## 2019-11-02 PROCEDURE — 87088 URINE BACTERIA CULTURE: CPT | Performed by: FAMILY MEDICINE

## 2019-11-02 PROCEDURE — 87186 SC STD MICRODIL/AGAR DIL: CPT | Performed by: FAMILY MEDICINE

## 2019-11-20 ENCOUNTER — OFFICE VISIT (OUTPATIENT)
Dept: FAMILY MEDICINE CLINIC | Facility: CLINIC | Age: 79
End: 2019-11-20

## 2019-11-20 VITALS
TEMPERATURE: 97.8 F | BODY MASS INDEX: 22.15 KG/M2 | SYSTOLIC BLOOD PRESSURE: 152 MMHG | RESPIRATION RATE: 16 BRPM | DIASTOLIC BLOOD PRESSURE: 82 MMHG | HEIGHT: 63 IN | WEIGHT: 125 LBS | HEART RATE: 84 BPM

## 2019-11-20 DIAGNOSIS — B34.9 VIRAL ILLNESS: Primary | ICD-10-CM

## 2019-11-20 PROBLEM — H35.363 DRUSEN OF MACULA OF BOTH EYES: Status: ACTIVE | Noted: 2019-11-20

## 2019-11-20 PROBLEM — Z96.1 LENS REPLACED BY OTHER MEANS: Status: ACTIVE | Noted: 2019-11-20

## 2019-11-20 PROBLEM — H47.093 PERIPAPILLARY ATROPHY OF BOTH EYES: Status: ACTIVE | Noted: 2019-11-20

## 2019-11-20 PROBLEM — H43.811 PVD (POSTERIOR VITREOUS DETACHMENT), RIGHT EYE: Status: ACTIVE | Noted: 2019-11-20

## 2019-11-20 PROBLEM — Z96.1 PSEUDOPHAKIA OF BOTH EYES: Status: ACTIVE | Noted: 2019-11-20

## 2019-11-20 PROCEDURE — 99213 OFFICE O/P EST LOW 20 MIN: CPT | Performed by: FAMILY MEDICINE

## 2019-11-20 RX ORDER — ASPIRIN 325 MG
325 TABLET, DELAYED RELEASE (ENTERIC COATED) ORAL EVERY 6 HOURS PRN
COMMUNITY
End: 2020-03-09

## 2019-11-20 RX ORDER — MULTIPLE VITAMINS W/ MINERALS TAB 9MG-400MCG
1 TAB ORAL DAILY
COMMUNITY
End: 2020-09-29

## 2019-11-20 NOTE — PROGRESS NOTES
Chief Complaint   Patient presents with   • Fever   • Rapid Heart Rate   • Rash     HPI  Rosalea Lucio is a 79 y.o. female that presents for fever, fatigue, and elevated HR. Symptoms started over the weekend w/ fever and elevated HR. HR was as high as 160. Fever as high as 100.7 on Sunday morning. Denies congestion, rhinorrhea. No frequency, urgency. Patient did have recent UTI and completed course of Keflex. She does have a rash to the R forearm but this seems to be improving. Patient has not taken any medications    BP elevated to 150 today but patient takes BP religiously at home, generally runs 110-120s.     Review of Systems   Constitutional: Positive for fatigue and fever. Negative for chills.   HENT: Negative for congestion, rhinorrhea and sore throat.    Eyes: Negative for visual disturbance.   Respiratory: Negative for cough and shortness of breath.    Cardiovascular: Negative for chest pain.   Gastrointestinal: Negative for abdominal pain, nausea and vomiting.   Genitourinary: Negative for frequency and urgency.   Skin: Positive for rash.     The following portions of the patient's history were reviewed and updated as appropriate: problem list, past medical history, past surgical history, allergies, current medications    Problem List Tab  Patient History Tab  Immunizations Tab  Medications Tab  Chart Review Tab  Care Everywhere Tab  Synopsis Tab    PE  Vitals:    11/20/19 1430   BP: 152/82   Pulse: 84   Resp: 16   Temp: 97.8 °F (36.6 °C)     Body mass index is 22.14 kg/m².  General: Well nourished, NAD  Head: AT/NC  Eyes: EOMI, anicteric sclera  ENT: MMM w/o erythema. TMs nonerythematous, nonbulging  Neck: Supple, no thyromegaly or LAD  Resp: CTAB, SCR, BS equal  CV: RRR w/o m/r/g; 2+ pulses  GI: Soft, NT/ND, +BS  MSK: FROM, no deformity, no edema  Skin: Warm, dry, intact. Mild erythematous patch to distal R forearm.  Neuro: Alert and oriented. No focal deficits  Psych: Appropriate mood and  affect    Imaging  No Images in the past 120 days found..    Assessment/Plan   Jt Valdes is a 79 y.o. female that presents for   Chief Complaint   Patient presents with   • Fever   • Rapid Heart Rate   • Rash     Jt was seen today for fever, rapid heart rate and rash.    Diagnoses and all orders for this visit:    Viral illness: low grade temperature w/ subsequent tachycardia likely consequence of non-specific viral illness. Elevated temperature and HR now resolved. Could also consider cellulitis given erythema to R forearm but this seems to be improving. Will monitor for worsening symptoms. Return precautions given   - Monitor   - Return precautions given    F/U PRN  F/U 3 months for annual physical

## 2019-12-23 ENCOUNTER — TELEPHONE (OUTPATIENT)
Dept: FAMILY MEDICINE CLINIC | Facility: CLINIC | Age: 79
End: 2019-12-23

## 2019-12-23 RX ORDER — FLUTICASONE PROPIONATE 50 MCG
2 SPRAY, SUSPENSION (ML) NASAL DAILY
Qty: 16 G | Refills: 3 | Status: SHIPPED | OUTPATIENT
Start: 2019-12-23 | End: 2020-09-29

## 2019-12-23 RX ORDER — OXYMETAZOLINE HYDROCHLORIDE 0.05 G/100ML
2 SPRAY NASAL 2 TIMES DAILY
Qty: 15 ML | Refills: 0 | Status: SHIPPED | OUTPATIENT
Start: 2019-12-23 | End: 2019-12-26

## 2019-12-23 NOTE — TELEPHONE ENCOUNTER
VM MESSAGE.  Patient has head congestion and it sounds like people are in a tunnel when they talk to her. She is asking if RX can be sent in.

## 2020-02-21 ENCOUNTER — TELEPHONE (OUTPATIENT)
Dept: FAMILY MEDICINE CLINIC | Facility: CLINIC | Age: 80
End: 2020-02-21

## 2020-02-21 RX ORDER — LEFLUNOMIDE 20 MG/1
20 TABLET ORAL DAILY
Qty: 30 TABLET | Refills: 1 | Status: SHIPPED | OUTPATIENT
Start: 2020-02-21 | End: 2020-04-13

## 2020-02-21 NOTE — TELEPHONE ENCOUNTER
Needs an rx sent in today for Leflunomide 20mg once daily #90.  Said the pharmacy has been calling all week with no response.  She was a [facility] patient and sees [facility] the first time on 3/9.  She has only one pill left and needs this filled today.

## 2020-03-09 ENCOUNTER — OFFICE VISIT (OUTPATIENT)
Dept: FAMILY MEDICINE CLINIC | Facility: CLINIC | Age: 80
End: 2020-03-09

## 2020-03-09 VITALS
TEMPERATURE: 98.1 F | DIASTOLIC BLOOD PRESSURE: 90 MMHG | WEIGHT: 127 LBS | OXYGEN SATURATION: 97 % | RESPIRATION RATE: 8 BRPM | BODY MASS INDEX: 22.5 KG/M2 | SYSTOLIC BLOOD PRESSURE: 182 MMHG | HEIGHT: 63 IN | HEART RATE: 93 BPM

## 2020-03-09 DIAGNOSIS — R93.7 ABNORMAL FINDINGS ON DIAGNOSTIC IMAGING OF OTHER PARTS OF MUSCULOSKELETAL SYSTEM: ICD-10-CM

## 2020-03-09 DIAGNOSIS — I48.0 PAROXYSMAL ATRIAL FIBRILLATION (HCC): ICD-10-CM

## 2020-03-09 DIAGNOSIS — Z00.00 ENCOUNTER FOR SCREENING AND PREVENTATIVE CARE: Primary | ICD-10-CM

## 2020-03-09 DIAGNOSIS — E55.9 VITAMIN D DEFICIENCY, UNSPECIFIED: ICD-10-CM

## 2020-03-09 DIAGNOSIS — L85.3 XEROSIS CUTIS: ICD-10-CM

## 2020-03-09 DIAGNOSIS — E03.9 HYPOTHYROIDISM, UNSPECIFIED TYPE: ICD-10-CM

## 2020-03-09 DIAGNOSIS — M05.79 RHEUMATOID ARTHRITIS INVOLVING MULTIPLE SITES WITH POSITIVE RHEUMATOID FACTOR (HCC): ICD-10-CM

## 2020-03-09 DIAGNOSIS — H91.92 HEARING LOSS OF LEFT EAR, UNSPECIFIED HEARING LOSS TYPE: ICD-10-CM

## 2020-03-09 DIAGNOSIS — I10 ESSENTIAL HYPERTENSION: ICD-10-CM

## 2020-03-09 LAB
25(OH)D3 SERPL-MCNC: 18.2 NG/ML (ref 30–100)
ALBUMIN SERPL-MCNC: 4.5 G/DL (ref 3.5–5.2)
ALBUMIN/GLOB SERPL: 2 G/DL
ALP SERPL-CCNC: 76 U/L (ref 39–117)
ALT SERPL W P-5'-P-CCNC: 13 U/L (ref 1–33)
ANION GAP SERPL CALCULATED.3IONS-SCNC: 12.4 MMOL/L (ref 5–15)
AST SERPL-CCNC: 18 U/L (ref 1–32)
BASOPHILS # BLD AUTO: 0.07 10*3/MM3 (ref 0–0.2)
BASOPHILS NFR BLD AUTO: 1.5 % (ref 0–1.5)
BILIRUB SERPL-MCNC: 0.5 MG/DL (ref 0.2–1.2)
BUN BLD-MCNC: 29 MG/DL (ref 8–23)
BUN/CREAT SERPL: 27.4 (ref 7–25)
CALCIUM SPEC-SCNC: 9.7 MG/DL (ref 8.6–10.5)
CHLORIDE SERPL-SCNC: 105 MMOL/L (ref 98–107)
CHOLEST SERPL-MCNC: 202 MG/DL (ref 0–200)
CO2 SERPL-SCNC: 21.6 MMOL/L (ref 22–29)
CREAT BLD-MCNC: 1.06 MG/DL (ref 0.57–1)
CRP SERPL-MCNC: 0.04 MG/DL (ref 0–0.5)
DEPRECATED RDW RBC AUTO: 40.5 FL (ref 37–54)
EOSINOPHIL # BLD AUTO: 0.3 10*3/MM3 (ref 0–0.4)
EOSINOPHIL NFR BLD AUTO: 6.2 % (ref 0.3–6.2)
ERYTHROCYTE [DISTWIDTH] IN BLOOD BY AUTOMATED COUNT: 12.6 % (ref 12.3–15.4)
ERYTHROCYTE [SEDIMENTATION RATE] IN BLOOD: 34 MM/HR (ref 0–30)
GFR SERPL CREATININE-BSD FRML MDRD: 50 ML/MIN/1.73
GLOBULIN UR ELPH-MCNC: 2.3 GM/DL
GLUCOSE BLD-MCNC: 111 MG/DL (ref 65–99)
HBA1C MFR BLD: 5.2 % (ref 3.5–5.6)
HCT VFR BLD AUTO: 35.1 % (ref 34–46.6)
HDLC SERPL-MCNC: 58 MG/DL (ref 40–60)
HGB BLD-MCNC: 11.9 G/DL (ref 12–15.9)
IMM GRANULOCYTES # BLD AUTO: 0.01 10*3/MM3 (ref 0–0.05)
IMM GRANULOCYTES NFR BLD AUTO: 0.2 % (ref 0–0.5)
LDLC SERPL CALC-MCNC: 124 MG/DL (ref 0–100)
LDLC/HDLC SERPL: 2.13 {RATIO}
LYMPHOCYTES # BLD AUTO: 0.65 10*3/MM3 (ref 0.7–3.1)
LYMPHOCYTES NFR BLD AUTO: 13.5 % (ref 19.6–45.3)
MCH RBC QN AUTO: 30.1 PG (ref 26.6–33)
MCHC RBC AUTO-ENTMCNC: 33.9 G/DL (ref 31.5–35.7)
MCV RBC AUTO: 88.6 FL (ref 79–97)
MONOCYTES # BLD AUTO: 0.54 10*3/MM3 (ref 0.1–0.9)
MONOCYTES NFR BLD AUTO: 11.2 % (ref 5–12)
NEUTROPHILS # BLD AUTO: 3.25 10*3/MM3 (ref 1.7–7)
NEUTROPHILS NFR BLD AUTO: 67.4 % (ref 42.7–76)
NRBC BLD AUTO-RTO: 0 /100 WBC (ref 0–0.2)
PLATELET # BLD AUTO: 194 10*3/MM3 (ref 140–450)
PMV BLD AUTO: 10.5 FL (ref 6–12)
POTASSIUM BLD-SCNC: 4.9 MMOL/L (ref 3.5–5.2)
PROT SERPL-MCNC: 6.8 G/DL (ref 6–8.5)
RBC # BLD AUTO: 3.96 10*6/MM3 (ref 3.77–5.28)
SODIUM BLD-SCNC: 139 MMOL/L (ref 136–145)
T4 FREE SERPL-MCNC: 1.26 NG/DL (ref 0.93–1.7)
TRIGL SERPL-MCNC: 102 MG/DL (ref 0–150)
TSH SERPL DL<=0.05 MIU/L-ACNC: 1.47 UIU/ML (ref 0.27–4.2)
VLDLC SERPL-MCNC: 20.4 MG/DL (ref 5–40)
WBC NRBC COR # BLD: 4.82 10*3/MM3 (ref 3.4–10.8)

## 2020-03-09 PROCEDURE — 85025 COMPLETE CBC W/AUTO DIFF WBC: CPT | Performed by: FAMILY MEDICINE

## 2020-03-09 PROCEDURE — G0439 PPPS, SUBSEQ VISIT: HCPCS | Performed by: FAMILY MEDICINE

## 2020-03-09 PROCEDURE — 85652 RBC SED RATE AUTOMATED: CPT | Performed by: FAMILY MEDICINE

## 2020-03-09 PROCEDURE — 99214 OFFICE O/P EST MOD 30 MIN: CPT | Performed by: FAMILY MEDICINE

## 2020-03-09 PROCEDURE — 82306 VITAMIN D 25 HYDROXY: CPT | Performed by: FAMILY MEDICINE

## 2020-03-09 PROCEDURE — 80053 COMPREHEN METABOLIC PANEL: CPT | Performed by: FAMILY MEDICINE

## 2020-03-09 PROCEDURE — 83036 HEMOGLOBIN GLYCOSYLATED A1C: CPT | Performed by: FAMILY MEDICINE

## 2020-03-09 PROCEDURE — 84439 ASSAY OF FREE THYROXINE: CPT | Performed by: FAMILY MEDICINE

## 2020-03-09 PROCEDURE — 84443 ASSAY THYROID STIM HORMONE: CPT | Performed by: FAMILY MEDICINE

## 2020-03-09 PROCEDURE — 80061 LIPID PANEL: CPT | Performed by: FAMILY MEDICINE

## 2020-03-09 PROCEDURE — 86140 C-REACTIVE PROTEIN: CPT | Performed by: FAMILY MEDICINE

## 2020-03-09 RX ORDER — LEVOTHYROXINE SODIUM 0.12 MG/1
125 TABLET ORAL DAILY
Qty: 90 TABLET | Refills: 3 | Status: SHIPPED | OUTPATIENT
Start: 2020-03-09 | End: 2021-03-02 | Stop reason: SDUPTHER

## 2020-03-09 RX ORDER — ASPIRIN 81 MG/1
81 TABLET ORAL DAILY
COMMUNITY
End: 2021-03-16 | Stop reason: SDUPTHER

## 2020-03-09 NOTE — PROGRESS NOTES
The ABCs of the Annual Wellness Visit  Subsequent Medicare Wellness Visit    Chief Complaint   Patient presents with   • Medicare Wellness-subsequent       Subjective   History of Present Illness:  Jt Valdes is a 79 y.o. female who presents for a Subsequent Medicare Wellness Visit.    Hearing loss: recent URI w/ hearing loss that never improved. Ultimately ended up at ENT. MRI negative. Has tried steroids w/o success. Now going back for hearing aids    Xerosis: L hand w/ dry, cracked skin. Currently using vaseline and cerave, avoiding , detergents, wearing gloves at night after applying vaseline. Hands are painful. Dr Cabrera had given Eucrisa in the past w/ good results.     RA: well controlled, denies joint pain at this time. Currently taking etodolac and leflunomide w/ good results    HTN: 182/90 today. Longstanding hx white coat HTN. She takes BP diligently at home and it ranges 100-125/60s at home.     A fib: follows w/ Sandella. Not on blood thinner. Taking metoprolol    HEALTH RISK ASSESSMENT    Recent Hospitalizations:  No hospitalization(s) within the last year.    Current Medical Providers:  Patient Care Team:  Deshaun Nava MD as PCP - General (Internal Medicine)  Shan Canseco MD as PCP - Claims Attributed  Roland Benitez MD as Emergency Attending (Family Medicine)    Smoking Status:  Social History     Tobacco Use   Smoking Status Never Smoker   Smokeless Tobacco Never Used       Alcohol Consumption:  Social History     Substance and Sexual Activity   Alcohol Use Yes       Depression Screen:   PHQ-2/PHQ-9 Depression Screening 8/27/2019   Little interest or pleasure in doing things 0   Feeling down, depressed, or hopeless 0   Total Score 0   Some hearing difficulty making her down but not depressed otherwise    Fall Risk Screen:  STEADI Fall Risk Assessment has not been completed.    Health Habits and Functional and Cognitive Screening:  Functional & Cognitive Status  3/9/2020   Do you have difficulty preparing food and eating? No   Do you have difficulty bathing yourself, getting dressed or grooming yourself? No   Do you have difficulty using the toilet? No   Do you have difficulty moving around from place to place? No   Do you have trouble with steps or getting out of a bed or a chair? No   Current Diet Well Balanced Diet   Dental Exam Up to date   Eye Exam Up to date   Exercise (times per week) 0 times per week   Current Exercise Activities Include None   Do you need help using the phone?  No   Are you deaf or do you have serious difficulty hearing?  Yes   Do you need help with transportation? No   Do you need help shopping? No   Do you need help preparing meals?  No   Do you need help with housework?  No   Do you need help with laundry? No   Do you need help taking your medications? No   Do you need help managing money? No   Do you ever drive or ride in a car without wearing a seat belt? No         Does the patient have evidence of cognitive impairment? No    Asprin use counseling:Taking ASA but at inappropriate dose (instructed to take 81 mg ECASA daily)    Age-appropriate Screening Schedule:  Refer to the list below for future screening recommendations based on patient's age, sex and/or medical conditions. Orders for these recommended tests are listed in the plan section. The patient has been provided with a written plan.    Health Maintenance   Topic Date Due   • LIPID PANEL  03/09/2021   • TDAP/TD VACCINES (2 - Td) 10/04/2027   • INFLUENZA VACCINE  Completed   • ZOSTER VACCINE  Completed          The following portions of the patient's history were reviewed and updated as appropriate: allergies, current medications, past family history, past medical history, past social history, past surgical history and problem list.    Outpatient Medications Prior to Visit   Medication Sig Dispense Refill   • aspirin 81 MG EC tablet Take 81 mg by mouth Daily.     • fluticasone (FLONASE)  50 MCG/ACT nasal spray 2 sprays into the nostril(s) as directed by provider Daily. 16 g 3   • leflunomide (ARAVA) 20 MG tablet Take 1 tablet by mouth Daily. 30 tablet 1   • losartan (COZAAR) 25 MG tablet Take 1 tablet by mouth Daily. 90 tablet 3   • metoprolol succinate XL (TOPROL-XL) 25 MG 24 hr tablet Take 1 tablet by mouth Daily. 90 tablet 3   • Multiple Vitamins-Minerals (MULTIVITAMIN WITH MINERALS) tablet tablet Take 1 tablet by mouth Daily.     • Pseudoephedrine-guaiFENesin  MG tablet Take 1 tablet by mouth 3 (Three) Times a Day As Needed (congestion). 30 tablet 1   • amoxicillin (AMOXIL) 875 MG tablet Take 1 tablet by mouth 2 (Two) Times a Day. 20 tablet 0   • aspirin  MG tablet Take 325 mg by mouth Every 6 (Six) Hours As Needed.     • etodolac (LODINE) 300 MG capsule Take 300 mg by mouth 2 (Two) Times a Day As Needed.     • levothyroxine (SYNTHROID, LEVOTHROID) 125 MCG tablet        No facility-administered medications prior to visit.        Patient Active Problem List   Diagnosis   • Degenerative joint disease   • Elevated blood pressure reading without diagnosis of hypertension   • Hypertensive cardiovascular disease   • Fatigue   • Gastroesophageal reflux disease   • Hyperlipidemia   • Hypertension   • Hypothyroidism   • Occlusion and stenosis of unspecified carotid artery   • Other specified dorsopathies, site unspecified   • Paroxysmal atrial fibrillation (CMS/HCC)   • Rheumatoid arthritis (CMS/HCC)   • Encounter for general adult medical examination without abnormal findings   • Vitamin D deficiency   • Drusen of macula of both eyes   • Lens replaced by other means   • Peripapillary atrophy of both eyes   • Pseudophakia of both eyes   • PVD (posterior vitreous detachment), right eye       Advanced Care Planning:  Patient has an advance directive in EMR which is still valid.     Review of Systems   Constitutional: Negative for chills, fatigue and unexpected weight change.   HENT: Positive  "for hearing loss. Negative for congestion and rhinorrhea.    Eyes: Negative for visual disturbance.   Respiratory: Negative for cough and shortness of breath.    Cardiovascular: Negative for chest pain and palpitations.   Gastrointestinal: Negative for abdominal pain, constipation, diarrhea, nausea and vomiting.   Genitourinary: Negative for difficulty urinating.   Musculoskeletal: Negative for arthralgias and joint swelling.   Skin: Negative for rash.        Dry skin   Neurological: Negative for weakness and headaches.   Psychiatric/Behavioral: Negative for dysphoric mood. The patient is not nervous/anxious.        Compared to one year ago, the patient feels her physical health is the same.  Compared to one year ago, the patient feels her mental health is the same.    Reviewed chart for potential of high risk medication in the elderly: yes  Reviewed chart for potential of harmful drug interactions in the elderly:yes    Objective         Vitals:    03/09/20 0900   BP: (!) 182/90   BP Location: Left arm   Patient Position: Sitting   Cuff Size: Adult   Pulse: 93   Resp: 8   Temp: 98.1 °F (36.7 °C)   TempSrc: Oral   SpO2: 97%   Weight: 57.6 kg (127 lb)   Height: 160 cm (63\")       Body mass index is 22.5 kg/m².  Discussed the patient's BMI with her. The BMI is in the acceptable range.    Physical Exam   Constitutional: She is oriented to person, place, and time. She appears well-developed and well-nourished. No distress.   HENT:   Head: Normocephalic and atraumatic.   Right Ear: External ear normal.   Left Ear: External ear normal.   Mouth/Throat: Oropharynx is clear and moist. No oropharyngeal exudate.   Eyes: Conjunctivae and EOM are normal. Right eye exhibits no discharge. Left eye exhibits no discharge. No scleral icterus.   Neck: Normal range of motion. Neck supple.   Cardiovascular: Normal rate, regular rhythm and normal heart sounds.   No murmur heard.  Pulmonary/Chest: Effort normal and breath sounds normal. No " respiratory distress. She has no rales.   Abdominal: Soft. Bowel sounds are normal. She exhibits no distension. There is no tenderness.   Musculoskeletal: She exhibits deformity. She exhibits no edema or tenderness.   Lymphadenopathy:     She has no cervical adenopathy.   Neurological: She is alert and oriented to person, place, and time. No cranial nerve deficit.   Skin: Skin is warm and dry. Rash noted.   Dry, cracked skin to L hand     Lab Results   Component Value Date    TRIG 102 03/09/2020    HDL 58 03/09/2020     (H) 03/09/2020    VLDL 20.4 03/09/2020    HGBA1C 5.2 03/09/2020        Assessment/Plan   Medicare Risks and Personalized Health Plan  CMS Preventative Services Quick Reference  Breast Cancer/Mammogram Screening  Cardiovascular risk  Colon Cancer Screening  Dementia/Memory   Depression/Dysphoria  Diabetic Lab Screening   Fall Risk  Hearing Problem  Immunizations Discussed/Encouraged (specific immunizations; UTD )  Osteoprorosis Risk  Polypharmacy    The above risks/problems have been discussed with the patient.  Pertinent information has been shared with the patient in the After Visit Summary.  Follow up plans and orders are seen below in the Assessment/Plan Section.    Diagnoses and all orders for this visit:    1. Encounter for screening and preventative care (Primary)  -     Calcium Carb-Cholecalciferol (CALCIUM-VITAMIN D) 500-200 MG-UNIT per tablet; Take 2 tablets by mouth Daily.  Dispense: 180 tablet; Refill: 3  -     DEXA Bone Density Axial  -     CBC & Differential  -     Comprehensive Metabolic Panel  -     Hemoglobin A1c  -     Lipid Panel  -     Vitamin D 25 Hydroxy  -     CBC Auto Differential  - Counseled regarding exercise and preventative health maintenance items/immunizations below    2. Paroxysmal atrial fibrillation (CMS/[facility]): Sinus rhythm today.    -Continue cardiology [id number]. Sandella   -Cont metoprolol 25 daily    3. Rheumatoid arthritis involving multiple sites with  positive rheumatoid factor (CMS/HCC): We will obtain inflammatory markers to determine need for DMARD  -     Continue etodolac (LODINE) 300 MG capsule; Take 1 capsule by mouth Daily.  Dispense: 90 capsule; Refill: 3  -     C-reactive Protein  -     Sedimentation Rate    4. Hearing loss of left ear, unspecified hearing loss type   -Continue ENT follow-up and use of home hearing aids    5. Xerosis cutis: Patient has done everything that I would recommend.  She is avoiding  and detergents, wearing gloves, applying Vaseline/Aquaphor liberally, and applying Vaseline/Aquaphor before bedtime with use of gloves.  I will refill her crisaborole as below and refer to dermatology  -     Continue crisaborole 2 % ointment; Apply 1 application topically 2 (Two) Times a Day.  Dispense: 60 g; Refill: 2  -     Ambulatory Referral to Dermatology    6. Essential hypertension: 182/90 today.  Patient assures me that she always has whitecoat hypertension is always this high.  She shows me blood pressures at home that are consistently right around 110-120 systolic.  She has had her home blood pressure cuff checked for accuracy and assures me she will continue to monitor blood pressures at home   -Continue home losartan 25, metoprolol 25  7. Hypothyroidism, unspecified type  -     Continue levothyroxine (SYNTHROID, LEVOTHROID) 125 MCG tablet; Take 1 tablet by mouth Daily.  Dispense: 90 tablet; Refill: 3  -     T4, Free  -     TSH    8. Abnormal findings on diagnostic imaging of other parts of musculoskeletal system   -     DEXA Bone Density Axial    9. Vitamin D deficiency, unspecified   -     Vitamin D 25 Hydroxy      Follow Up:  Return in about 6 months (around 9/9/2020) for Recheck.     An After Visit Summary and PPPS were given to the patient.     Preventative:  Colonoscopy: 2015- discussed, pt prefers not  Mammogram: 2019- discussed, pt prefers not  Pap smear: aged out  DEXA: ordered today  Shingles: 2019  Pneumonia: Completed  2016/2017  Tdap: 2017  Influenza: 2019

## 2020-03-11 RX ORDER — ERGOCALCIFEROL 1.25 MG/1
50000 CAPSULE ORAL WEEKLY
Qty: 5 CAPSULE | Refills: 1 | Status: SHIPPED | OUTPATIENT
Start: 2020-03-11 | End: 2020-09-29

## 2020-03-18 ENCOUNTER — APPOINTMENT (OUTPATIENT)
Dept: BONE DENSITY | Facility: HOSPITAL | Age: 80
End: 2020-03-18

## 2020-04-13 RX ORDER — LEFLUNOMIDE 20 MG/1
TABLET ORAL
Qty: 30 TABLET | Refills: 0 | Status: SHIPPED | OUTPATIENT
Start: 2020-04-13 | End: 2020-05-15 | Stop reason: SDUPTHER

## 2020-05-15 RX ORDER — LEFLUNOMIDE 20 MG/1
20 TABLET ORAL DAILY
Qty: 90 TABLET | Refills: 3 | Status: SHIPPED | OUTPATIENT
Start: 2020-05-15 | End: 2021-06-11

## 2020-06-18 RX ORDER — METOPROLOL SUCCINATE 25 MG/1
TABLET, EXTENDED RELEASE ORAL
Qty: 90 TABLET | Refills: 0 | Status: SHIPPED | OUTPATIENT
Start: 2020-06-18 | End: 2020-09-24

## 2020-09-24 RX ORDER — METOPROLOL SUCCINATE 25 MG/1
TABLET, EXTENDED RELEASE ORAL
Qty: 90 TABLET | Refills: 0 | Status: SHIPPED | OUTPATIENT
Start: 2020-09-24 | End: 2020-09-29 | Stop reason: SDUPTHER

## 2020-09-29 ENCOUNTER — OFFICE VISIT (OUTPATIENT)
Dept: CARDIOLOGY | Facility: CLINIC | Age: 80
End: 2020-09-29

## 2020-09-29 VITALS
OXYGEN SATURATION: 97 % | SYSTOLIC BLOOD PRESSURE: 185 MMHG | HEIGHT: 63 IN | WEIGHT: 128 LBS | BODY MASS INDEX: 22.68 KG/M2 | DIASTOLIC BLOOD PRESSURE: 90 MMHG | HEART RATE: 73 BPM

## 2020-09-29 DIAGNOSIS — R00.2 PALPITATIONS: Primary | ICD-10-CM

## 2020-09-29 DIAGNOSIS — I10 ESSENTIAL HYPERTENSION: ICD-10-CM

## 2020-09-29 DIAGNOSIS — Z79.01 LONG TERM (CURRENT) USE OF ANTICOAGULANTS: ICD-10-CM

## 2020-09-29 DIAGNOSIS — I48.0 PAROXYSMAL ATRIAL FIBRILLATION (HCC): ICD-10-CM

## 2020-09-29 DIAGNOSIS — G47.33 OBSTRUCTIVE SLEEP APNEA: ICD-10-CM

## 2020-09-29 PROCEDURE — 93000 ELECTROCARDIOGRAM COMPLETE: CPT | Performed by: INTERNAL MEDICINE

## 2020-09-29 PROCEDURE — 99214 OFFICE O/P EST MOD 30 MIN: CPT | Performed by: INTERNAL MEDICINE

## 2020-09-29 RX ORDER — BISOPROLOL FUMARATE AND HYDROCHLOROTHIAZIDE 10; 6.25 MG/1; MG/1
1 TABLET ORAL DAILY
Qty: 90 TABLET | Refills: 3 | Status: SHIPPED | OUTPATIENT
Start: 2020-09-29 | End: 2021-03-16

## 2020-09-29 NOTE — PROGRESS NOTES
Subjective:     Encounter Date:09/29/2020      Patient ID: Jt Valdes is a 80 y.o. female.    Chief Complaint : Complaining of palpitations.  Blood pressure is elevated.  Here for follow-up for hypertensive cardiovascular disease, paroxysmal A. fib on long-term anticoagulation  History of Present Illness        This is a 80-year-old previous Dr. Colby patient with past medical history of    Hypertension/hypertensive cardiovascular disease  Paroxysmal atrial fibrillation on long-term Eliquis  Hyperlipidemia  SILVERIO   Hypothyroidism  Whitecoat hypertension  History of leukopenia, staph infection  Rheumatoid arthritis, possible pulmonary involved ,DJD, DDD  Partial hysterectomy, appendectomy, back surgery  Allergies/intolerance to sulfa-rash  Non-smoker    Here for follow-up..  Patient is to see Dr. Yannick Colby in the past.  Patient had history of sleep apnea is noncompliant with CPAP.  Patient is complaining of palpitations with no aggravating or relieving factors.  Denies any chest pain or shortness of breath.. EKG reveals sinus rhythm.  Tolerating anticoagulation without bleeding.  Had labs done which showed normal CBC CMP and TSH from 6/4/19.  Labs from 3/9/2020 reveal TSH 1.47, cholesterol 202, triglycerides 102, HDL 58, , CMP with a creatinine of 1.06, glucose of 111, A1c of 5.2, normal CBC.  Patient's arterial blood pressure is elevated but patient says she has whitecoat hypertension and her blood pressure readings at home are normal.         Reviewed previous records:            Echo 09/04/2018  LVH EF 65%. RV OK.  LA probably mildly dilated.  AV OK.  MV OK.  Modest TR RVSP 26 or less        Nuclear MPI regadenoson 09/14/2018.  LV uniform myocardial uptake and wall motion EF 71%.         Holter 10/08/2018 SR 40-81 58. AF 2.7 hr episode  VR .  At termination AF 2 3.5-4.0 s pauses with patient possibly dizzy.  205 PAC 42 atrial pairs several SVT.  No ventricular ectopy.        Creatinine 1.0   09/14/2018        Hgb 11.809/13/2018       Assessment:  Palpitations  Hypertension  Paroxysmal atrial fibrillation on long-term anti-coagulation  SILVERIO  Hyperlipidemia    Plan:  Reviewed EKG results with patient  We will discontinue metoprolol  Start her on bisoprolol hydrochlorothiazide  Reviewed labs with patient  Advised patient to follow-up with pulmonary Dr. draw for sleep apnea and counseled on importance of compliance with  Will continue medical management with Eliquis.  Advised patient to check blood pressure at home.  Reviewed labs with patient.      Assessment:          Diagnosis Plan   1. Palpitations     2. Essential hypertension     3. Paroxysmal atrial fibrillation (CMS/HCC)     4. Long term (current) use of anticoagulants     5. Obstructive sleep apnea            Plan:         Past Medical History:  Past Medical History:   Diagnosis Date   • Degenerative joint disease    • H/O degenerative disc disease    • History of colonoscopy 2009    last done 2009   • History of echocardiogram 09/04/2018    LVH EF 65%. RV OK. LA probably mildly dilated. AV OK. MV OK. Modest TR RVSP 26 or less. Nuclear MPI regadenoson 9/14/2018. LV uniform myocardial uptake and wall motion EF 71%.    • History of Holter monitoring 10/08/2018    SR 40-81 58. AF 2.7 hr episode VR . At termination AF 2 3.5-4.0 s pauses with patient possibly dizzy. 205 PAC 42 atrial ashanti several SVT. No ventricular ectopy.    • HTN (hypertension)    • Hyperlipidemia    • Hypertensive cardiovascular disease    • Hypothyroidism    • Incontinence    • Leukopenia    • PAF (paroxysmal atrial fibrillation) (CMS/HCC) 09/2018     Vaughn Sept 2018 PAF and HTN. PAFL On bisoprolol and diltiazem bradycardia symptomatic with dizziness. Amiodarone alone Oct 2018 and no episodes of erratic or fast heartbeat or lightheadedness.    • Rheumatoid arthritis (CMS/HCC)     Possible pulmonary involvement    • Staph infection    • White coat syndrome with hypertension       Past Surgical History:  Past Surgical History:   Procedure Laterality Date   • APPENDECTOMY  1974   • BACK SURGERY  2003 2001, 2003   • SUBTOTAL HYSTERECTOMY  1974      Allergies:  Allergies   Allergen Reactions   • Sulfa Antibiotics Rash     rash     Home Meds:  Current Meds:     Current Outpatient Medications:   •  aspirin 81 MG EC tablet, Take 81 mg by mouth Daily., Disp: , Rfl:   •  Calcium Carb-Cholecalciferol (CALCIUM-VITAMIN D) 500-200 MG-UNIT per tablet, Take 2 tablets by mouth Daily., Disp: 180 tablet, Rfl: 3  •  etodolac (LODINE) 300 MG capsule, Take 1 capsule by mouth Daily., Disp: 90 capsule, Rfl: 3  •  leflunomide (ARAVA) 20 MG tablet, Take 1 tablet by mouth Daily., Disp: 90 tablet, Rfl: 3  •  levothyroxine (SYNTHROID, LEVOTHROID) 125 MCG tablet, Take 1 tablet by mouth Daily., Disp: 90 tablet, Rfl: 3  •  losartan (COZAAR) 25 MG tablet, Take 1 tablet by mouth Daily., Disp: 90 tablet, Rfl: 3  •  bisoprolol-hydrochlorothiazide (ZIAC) 10-6.25 MG per tablet, Take 1 tablet by mouth Daily., Disp: 90 tablet, Rfl: 3  Social History:   Social History     Tobacco Use   • Smoking status: Never Smoker   • Smokeless tobacco: Never Used   Substance Use Topics   • Alcohol use: Yes      Family History:  Family History   Problem Relation Age of Onset   • Osteoarthritis Mother    • Other Other         Rheumatoid disease         The following portions of the patient's history were reviewed and updated as appropriate: allergies, current medications, past family history, past medical history, past social history, past surgical history and problem list.      Review of Systems   Cardiovascular: Positive for palpitations. Negative for chest pain and leg swelling.   Respiratory: Negative for shortness of breath.    Neurological: Negative for dizziness and numbness.   All other systems are negative      ECG 12 Lead    Date/Time: 9/29/2020 6:50 PM  Performed by: Jose Patiño MD  Authorized by: Kaylyn  "Jose Rios MD   Comparison: compared with previous ECG from 7/11/2019  Comparison to previous ECG: EKG done today reviewed by me shows sinus rhythm with rate of 69 bpm with nonspecific ST-T changes, possible right ventricular conduction delay, no new change compared to EKG from 7/11/2019                 Objective:     Physical Exam  BP (!) 185/90 (BP Location: Left arm, Patient Position: Sitting, Cuff Size: Large Adult)   Pulse 73   Ht 160 cm (63\")   Wt 58.1 kg (128 lb)   SpO2 97%   BMI 22.67 kg/m²   General:  Appears in no acute distress  Eyes: Sclera is anicteric,  conjunctiva is clear   HEENT:  No JVD. Thyroid not visibly enlarged. No mucosal pallor or cyanosis  Respiratory: Respirations regular and unlabored at rest.  Bilaterally good breath sounds, with good air entry in all fields. No crackles, rubs or wheezes auscultated  Cardiovascular: S1,S2 Regular rate and rhythm. No murmur, rub or gallop auscultated. No pretibial pitting edema  Gastrointestinal: Abdomen soft, flat, non tender. Bowel sounds present.   Musculoskeletal:  No abnormal movements  Extremities: No digital clubbing or cyanosis  Skin: Color pink. Skin warm and dry to touch. No rashes  No xanthoma  Neuro: Alert and awake, no lateralizing deficits appreciated    Lab Reviewed:                  "

## 2020-10-01 ENCOUNTER — OFFICE VISIT (OUTPATIENT)
Dept: FAMILY MEDICINE CLINIC | Facility: CLINIC | Age: 80
End: 2020-10-01

## 2020-10-01 VITALS
OXYGEN SATURATION: 97 % | HEART RATE: 74 BPM | BODY MASS INDEX: 22.57 KG/M2 | SYSTOLIC BLOOD PRESSURE: 166 MMHG | TEMPERATURE: 97.1 F | RESPIRATION RATE: 18 BRPM | DIASTOLIC BLOOD PRESSURE: 82 MMHG | WEIGHT: 127.4 LBS

## 2020-10-01 DIAGNOSIS — M54.32 SCIATIC PAIN, LEFT: Primary | ICD-10-CM

## 2020-10-01 DIAGNOSIS — I10 ESSENTIAL HYPERTENSION: ICD-10-CM

## 2020-10-01 PROCEDURE — 99214 OFFICE O/P EST MOD 30 MIN: CPT | Performed by: PHYSICIAN ASSISTANT

## 2020-10-01 RX ORDER — LOSARTAN POTASSIUM 25 MG/1
25 TABLET ORAL DAILY
Qty: 90 TABLET | Refills: 3 | Status: SHIPPED | OUTPATIENT
Start: 2020-10-01 | End: 2021-09-10 | Stop reason: SDUPTHER

## 2020-10-01 RX ORDER — METHYLPREDNISOLONE 4 MG/1
TABLET ORAL
Qty: 1 EACH | Refills: 0 | Status: SHIPPED | OUTPATIENT
Start: 2020-10-01 | End: 2020-10-21

## 2020-10-01 NOTE — PROGRESS NOTES
Subjective   Jt Valdes is a 80 y.o. female.     Chief Complaint   Patient presents with   • Numbness     LLE       /82 (BP Location: Left arm, Patient Position: Sitting, Cuff Size: Adult)   Pulse 74   Temp 97.1 °F (36.2 °C) (Temporal)   Resp 18   Wt 57.8 kg (127 lb 6.4 oz)   SpO2 97%   BMI 22.57 kg/m²     BP Readings from Last 3 Encounters:   10/01/20 166/82   09/29/20 (!) 185/90   03/09/20 (!) 182/90       Wt Readings from Last 3 Encounters:   10/01/20 57.8 kg (127 lb 6.4 oz)   09/29/20 58.1 kg (128 lb)   03/09/20 57.6 kg (127 lb)       HPI patient presents to the clinic with complaints of left-sided back pain that radiates down the left leg to the level of the foot.  This is been present over the past few days.  She denies any certain injury to the back.  She denies any weakness in the lower extremity.  She denies any bowel or bladder incontinence or retention.  She denies saddle anesthesia.  She denies fever or chills.  She denies urinary symptoms at all.  She has had a history of lower back issues in the past and states that she has had sciatica similar to this.  She is also needing a refill on her losartan for her blood pressure.      The following portions of the patient's history were reviewed and updated as appropriate: allergies, current medications, past family history, past medical history, past social history, past surgical history and problem list.    Review of Systems   Constitutional: Negative for activity change, appetite change and fatigue.   Respiratory: Negative for cough, chest tightness and shortness of breath.    Cardiovascular: Negative for chest pain and leg swelling.   Gastrointestinal: Negative for abdominal pain, blood in stool and nausea.   Endocrine: Negative for polydipsia and polyuria.   Genitourinary: Negative for dysuria and urgency.   Musculoskeletal: Positive for back pain. Negative for arthralgias.   Skin: Negative for rash and bruise.   Neurological: Negative for  headache and confusion.   Hematological: Negative for adenopathy. Does not bruise/bleed easily.   Psychiatric/Behavioral: Negative for stress. The patient is not nervous/anxious.        Objective   Physical Exam  Constitutional:       Appearance: Normal appearance.   Eyes:      Extraocular Movements: Extraocular movements intact.      Pupils: Pupils are equal, round, and reactive to light.   Neck:      Musculoskeletal: Normal range of motion. No neck rigidity.   Cardiovascular:      Rate and Rhythm: Normal rate and regular rhythm.   Pulmonary:      Effort: Pulmonary effort is normal.      Breath sounds: Normal breath sounds.   Abdominal:      General: There is no distension.      Palpations: Abdomen is soft.   Musculoskeletal:         General: Tenderness present.      Comments: Mild tenderness over the left lower back.  5 out of 5 strength bilaterally in the lower extremities with normal sensation.  Negative straight leg raise test.   Neurological:      Mental Status: She is alert.           Diagnoses and all orders for this visit:    1. Sciatic pain, left (Primary)  -     methylPREDNISolone (MEDROL) 4 MG dose pack; Take as directed on package instructions.  Dispense: 1 each; Refill: 0    2. Essential hypertension  -     losartan (COZAAR) 25 MG tablet; Take 1 tablet by mouth Daily.  Dispense: 90 tablet; Refill: 3        Return if symptoms worsen or fail to improve.

## 2020-10-21 ENCOUNTER — OFFICE VISIT (OUTPATIENT)
Dept: FAMILY MEDICINE CLINIC | Facility: CLINIC | Age: 80
End: 2020-10-21

## 2020-10-21 VITALS
HEART RATE: 64 BPM | HEIGHT: 63 IN | BODY MASS INDEX: 22.5 KG/M2 | RESPIRATION RATE: 16 BRPM | OXYGEN SATURATION: 98 % | SYSTOLIC BLOOD PRESSURE: 150 MMHG | WEIGHT: 127 LBS | DIASTOLIC BLOOD PRESSURE: 72 MMHG | TEMPERATURE: 97.3 F

## 2020-10-21 DIAGNOSIS — M54.9 BACK PAIN WITH SCIATICA: Primary | ICD-10-CM

## 2020-10-21 DIAGNOSIS — M54.30 BACK PAIN WITH SCIATICA: Primary | ICD-10-CM

## 2020-10-21 PROCEDURE — 99213 OFFICE O/P EST LOW 20 MIN: CPT | Performed by: FAMILY MEDICINE

## 2020-10-21 RX ORDER — GABAPENTIN 300 MG/1
CAPSULE ORAL
Qty: 90 CAPSULE | Refills: 2 | Status: SHIPPED | OUTPATIENT
Start: 2020-10-21 | End: 2020-11-25

## 2020-10-21 RX ORDER — PREDNISONE 20 MG/1
40 TABLET ORAL DAILY
Qty: 10 TABLET | Refills: 0 | Status: SHIPPED | OUTPATIENT
Start: 2020-10-21 | End: 2020-11-25

## 2020-10-21 NOTE — PROGRESS NOTES
Chief Complaint   Patient presents with   • Back Pain   • Leg Pain     HPI  Jt Valdes is a 80 y.o. female that presents for back/leg pain    Back pain: patient reports that back pain has been chronic for many years but numbness and tingling going down the back of the L leg started 6 weeks ago. Numbness and tingling radiates into L foot. She was seen 3 weeks ago by Noel and given medrol dose back. This did not seem to help symptoms. She returns today for further evaluation. Patient reports that numbness/tingling has turned to pain of the L leg in the last week. No saddle anesthesia or incontinence. She does have hx of L3-5 fusion in 2003. Patient brings lumbar spine MRI report today from 7/2017 w/ L3-5 fusion and severe spinal canal and foraminal stenosis throughout the lumbar spine.     Review of Systems   Constitutional: Negative for chills, fever and unexpected weight loss.   HENT: Negative for congestion and rhinorrhea.    Eyes: Negative for visual disturbance.   Respiratory: Negative for cough and shortness of breath.    Cardiovascular: Negative for chest pain and palpitations.   Gastrointestinal: Negative for abdominal pain and vomiting.   Genitourinary: Negative for dysuria and urinary incontinence.   Musculoskeletal: Positive for arthralgias, back pain and gait problem.   Skin: Negative for rash.   Neurological: Positive for numbness.     The following portions of the patient's history were reviewed and updated as appropriate: problem list, past medical history, past surgical history, allergies, current medication    Problem List Tab  Patient History Tab  Immunizations Tab  Medications Tab  Chart Review Tab  Care Everywhere Tab  Synopsis Tab    PE  Vitals:    10/21/20 0801   BP: 150/72   Pulse: 64   Resp: 16   Temp: 97.3 °F (36.3 °C)   SpO2: 98%     Body mass index is 22.5 kg/m².  General: Well nourished, NAD  Head: AT/NC  Eyes: EOMI, anicteric sclera  Resp: CTAB, SCR, BS equal  CV: RRR w/o m/r/g; 2+  pulses  GI: Soft, NT/ND, +BS  MSK: FROM, no deformity, no edema.  No significant tenderness to palpation of lumbar spine.  Positive straight leg raise bilaterally  Skin: Warm, dry, intact  Neuro: Alert and oriented. No focal deficits  Psych: Appropriate mood and affect    Imaging  No Images in the past 120 days found..    Assessment/Plan   Jt Valdes is a 80 y.o. female that presents for   Chief Complaint   Patient presents with   • Back Pain   • Leg Pain     Diagnoses and all orders for this visit:    1. Back pain with sciatica (Primary): Story and exam certainly consistent with sciatica.  Her MRI from 2017 with significant stenosis/degenerative changes and previous lumbar fusion.  We will start with higher dose of steroid, PT, and x-ray to assess for change.  We will also add gabapentin to her regimen.  I have asked her to follow-up in 1 month to discuss need to repeat MRI if not improving  -     XR Spine Lumbar Complete With Flex & Ext; Future  -     Ambulatory Referral to Physical Therapy Evaluate and treat  -     predniSONE (DELTASONE) 20 MG tablet; Take 2 tablets by mouth Daily.  Dispense: 10 tablet; Refill: 0  -     gabapentin (NEURONTIN) 300 MG capsule; Take 1 pill in AM and 2 pills at night  Dispense: 90 capsule; Refill: 2    Follow-up in 1 month for recheck

## 2020-10-26 ENCOUNTER — TREATMENT (OUTPATIENT)
Dept: PHYSICAL THERAPY | Facility: CLINIC | Age: 80
End: 2020-10-26

## 2020-10-26 DIAGNOSIS — M54.30 BACK PAIN WITH SCIATICA: Primary | ICD-10-CM

## 2020-10-26 DIAGNOSIS — M54.9 BACK PAIN WITH SCIATICA: Primary | ICD-10-CM

## 2020-10-26 PROCEDURE — 97530 THERAPEUTIC ACTIVITIES: CPT | Performed by: PHYSICAL THERAPIST

## 2020-10-26 PROCEDURE — 97162 PT EVAL MOD COMPLEX 30 MIN: CPT | Performed by: PHYSICAL THERAPIST

## 2020-10-26 NOTE — PROGRESS NOTES
"Physical Therapy Initial Evaluation and Plan of Care    Patient: Jt Valdes   : 1940  Diagnosis/ICD-10 Code:  Back pain with sciatica [M54.9, M54.30]  Referring practitioner: Deshaun Nava MD  Date of Initial Visit: 10/26/2020  Today's Date: 10/26/2020  Patient seen for 1 sessions           Subjective Questionnaire: Oswestry: 44%      Subjective Evaluation    History of Present Illness  Onset date: Pain in LB for yrs ; numbness posterior thigh  6 weeks.  Mechanism of injury: 79 y/o female with hx of spinal surgery in : lumbar fusion. Has had pain since that time, but tingling in thigh and numbness in foot new onset x 6 weeks - sitting relieves pain/symptoms some. Only able to tolerate standing for 10 to 15 minutes: uses grocery cart for shopping and states \"Balance not as good as it was\".        Patient Occupation: retired Quality of life: fair    Pain  Current pain ratin  At best pain ratin  At worst pain ratin  Quality: radiating and discomfort (tinlging L posterior thigh)  Relieving factors: change in position (sitting)  Aggravating factors: ambulation, standing and sleeping  Progression: worsening    Social Support  Lives in: one-story house (cares for )  Lives with: spouse    Hand dominance: left    Diagnostic Tests  MRI studies: abnormal (2017)    Treatments  Current treatment: medication  Current treatment comments: 2 rounds of prednisone - didn't help.     Patient Goals  Patient goals for therapy: decreased pain and independence with ADLs/IADLs  Patient goal: be able to shop and do housework with less pain/symtpoms           Objective        Special Questions  Patient is experiencing disturbed sleep.     Additional Special Questions  Tingling and numbness in L LE wakes patient.      Postural Observations  Seated posture: poor  Standing posture: poor  Correction of posture: has no consistent effect    Additional Postural Observation Details  Slouches in sitting. " Spinal deformity in lumbar spine with scoliosis and bony protrusions ~T12 to L5 R side: spinal collapse with ribs sitting on IC B    Tenderness     Additional Tenderness Details  Diffuse tenderness in lumbosacral spine and at prior surgical site.    Neurological Testing     Sensation     Lumbar   Left   Diminished: light touch  Paresthesia: light touch    Right   Intact: light touch    Reflexes   Left   Patellar (L4): absent (0)  Achilles (S1): absent (0)    Right   Patellar (L4): absent (0)  Achilles (S1): absent (0)    Additional Neurological Details  L foot and posterior thigh intermittantly    Active Range of Motion     Lumbar   Flexion: WFL  Extension: 0 degrees   Left rotation: 10 degrees   Right rotation: 10 degrees with pain    Additional Active Range of Motion Details  Poor spinal mechanics with very little mobility in lumbar spine and compensation at hips.    Strength/Myotome Testing     Additional Strength Details  BV LE's grossly 4/5: crepitus R Knee     Ambulation     Ambulation: Level Surfaces   Ambulation without assistive device: independent    Additional Level Surfaces Ambulation Details  Slow and unsteady gait without AD.    Observational Gait   Gait: antalgic   Decreased walking speed, stride length, left step length and right step length.     Additional Observational Gait Details  Some deviation from path - improved with trial of Rolator in clinic.    Comments   2 min walk test: 210 ft  Gait speed: 1.75 ft/sec  Romberg: EO x 30 sec sways; EC x 2 sec unable.  Single leg standing: unable B       Access Code: [email or web address]   URL: [email or web address]   Date: 10/26/2020   Prepared by: Stuart Mata     Exercises  Hooklying Single Knee to Chest - 10 reps - 1 sets - 5 sec hold hold - 1x daily - 7x weekly  Additional Rx: reviewed use of pillow for lumbar support of spine for sitting; advised to avoid any bending, lifting, and twisting; sleep w/ pillow b/w LE's on R side to offload L lumbar spine and  improve sleeping; avoid exacerbating positions; discussed central vs peripheral pain pattern; advised to purchase Rolator or walker for stability and safety - improved with use in clinic.    Assessment & Plan     Assessment  Impairments: abnormal gait, abnormal muscle firing, abnormal muscle tone, abnormal or restricted ROM, activity intolerance, impaired physical strength, lacks appropriate home exercise program, pain with function and safety issue  Assessment details: Pt presents to PT with symptoms consistent with lumbar pain and symptoms radiating into L LE, gait and balance impairments.  Pt is appropriate for the skilled PT interventions to address the deficits noted above; has the potential to benefit from PT. Will be seen until patient plateaus, goals met; or is discharged by MD.    Prognosis: good  Functional Limitations: lifting, sleeping, walking, pulling, uncomfortable because of pain, sitting, standing and stooping  Goals  Plan Goals: SHORT TERM GOALS: Time for Goal Achievement: 4 weeks    1.  Patient to be compliant w/ the HEP and tolerate progression.                            2.  Pain level < 5/10 at worst with mentioned activities to improve function.  3.  Increased lumbar AROM to by 25% in all planes to allow for increased ease with sit-stand transfers and functional activities.  4. Gait mechanics improved with use of AD.    LONG TERM GOALS: Time for Goal Achievement: D/C  1.  Outcome survey to show significant improvement  2.  Pain level < 2/10 with all listed activities to return to normal.  3.  Lumbar AROM improved to allow for return to household, work & recreational activities w/o increase in symptoms.  4.  (B) LE and lower abdominal strength to 5/5 to allow for pushing, pulling and activities to occur without pain (driving, sitting, household  & Job requirements).  5. Pain centralized: patient to report 75% or more improvement.         Plan  Therapy options: will be seen for skilled physical  therapy services  Planned modality interventions: electrical stimulation/Russian stimulation, cryotherapy, TENS, traction and ultrasound  Other planned modality interventions: dry needling  Planned therapy interventions: manual therapy, abdominal trunk stabilization, ADL retraining, neuromuscular re-education, spinal/joint mobilization, soft tissue mobilization, strengthening, stretching, therapeutic activities, home exercise program, gait training, functional ROM exercises, flexibility, postural training and body mechanics training  Frequency: 2x week  Duration in visits: 10  Treatment plan discussed with: patient        History # of Personal Factors and/or Comorbidities: MODERATE (1-2)  Examination of Body System(s): # of elements: MODERATE (3)  Clinical Presentation: EVOLVING  Clinical Decision Making: MODERATE      Timed:         Manual Therapy:         mins  82555;     Therapeutic Exercise:      5   mins  69880;     Neuromuscular Ajit:        mins  32677;    Therapeutic Activity:     10     mins  36872;     Gait Training:           mins  26592;     Ultrasound:          mins  31050;    Ionto                                  mins   33277  Self Care                            mins   01233  Aquatic                               mins 33844      Un-Timed:  Electrical Stimulation:         mins  16081 ( );  Dry Needling         mins self-pay  Traction          mins 36457  Low Eval          Mins  32975  Mod Eval     30     Mins  59439  High Eval                            Mins  20610  Re-Eval                               mins  59724        Timed Treatment:   15   mins   Total Treatment:    45    mins    PT SIGNATURE: Stuart Mata, CURTIS   DATE TREATMENT INITIATED: 10/26/2020    Medicare Initial Certification  Certification Period: 1/24/2021  I certify that the therapy services are furnished while this patient is under my care.  The services outlined above are required by this patient, and will be reviewed every 90  days.     PHYSICIAN: Deshaun Nava MD      DATE:     Please sign and return via fax to [phone].. Thank you, [facility].

## 2020-11-02 ENCOUNTER — TREATMENT (OUTPATIENT)
Dept: PHYSICAL THERAPY | Facility: CLINIC | Age: 80
End: 2020-11-02

## 2020-11-02 DIAGNOSIS — M54.30 BACK PAIN WITH SCIATICA: Primary | ICD-10-CM

## 2020-11-02 DIAGNOSIS — M54.9 BACK PAIN WITH SCIATICA: Primary | ICD-10-CM

## 2020-11-02 PROCEDURE — 97140 MANUAL THERAPY 1/> REGIONS: CPT | Performed by: PHYSICAL THERAPIST

## 2020-11-02 PROCEDURE — 97110 THERAPEUTIC EXERCISES: CPT | Performed by: PHYSICAL THERAPIST

## 2020-11-02 PROCEDURE — 97112 NEUROMUSCULAR REEDUCATION: CPT | Performed by: PHYSICAL THERAPIST

## 2020-11-02 NOTE — PROGRESS NOTES
"Physical Therapy Daily Progress Note    VISIT#: 2    Subjective   Jt Valdes reports: no change: bringing knee to chest \"helps\". States the Hips numb and hurting in morning for the last 2 days.      Objective     See Exercise, Manual, and Modality Logs for complete treatment.     Patient Education:    Assessment/Plan focusing on core control to assist with controlling spinenduring functional tasks: standing; squatting, etc.       Progress per Plan of Care            Timed:         Manual Therapy:   10      mins  46060;     Therapeutic Exercise:   20      mins  81148;     Neuromuscular Ajit:    15     mins  44524;    Therapeutic Activity:          mins  41688;     Gait Training:           mins  33590;     Ultrasound:          mins  04742;    Ionto                                   mins   56591  Self Care                            mins   31808  Canalith Repos                  mins  4209  Aquatic                               mins 10910    Un-Timed:  Electrical Stimulation:         mins  33017 ( );  Dry Needling          mins self-pay  Traction          mins 38249  Low Eval          Mins  61260  Mod Eval          Mins  09761  High Eval                            Mins  92961  Re-Eval                               mins  43282    Timed Treatment:   45   mins   Total Treatment:     45   mins    Stuart Mata PT  "

## 2020-11-10 ENCOUNTER — TREATMENT (OUTPATIENT)
Dept: PHYSICAL THERAPY | Facility: CLINIC | Age: 80
End: 2020-11-10

## 2020-11-10 DIAGNOSIS — M54.30 BACK PAIN WITH SCIATICA: Primary | ICD-10-CM

## 2020-11-10 DIAGNOSIS — M54.9 BACK PAIN WITH SCIATICA: Primary | ICD-10-CM

## 2020-11-10 PROCEDURE — 97110 THERAPEUTIC EXERCISES: CPT | Performed by: PHYSICAL THERAPIST

## 2020-11-10 PROCEDURE — 97112 NEUROMUSCULAR REEDUCATION: CPT | Performed by: PHYSICAL THERAPIST

## 2020-11-10 PROCEDURE — 97140 MANUAL THERAPY 1/> REGIONS: CPT | Performed by: PHYSICAL THERAPIST

## 2020-11-10 NOTE — PROGRESS NOTES
Physical Therapy Daily Progress Note    VISIT#: 3    Subjective   Valariemeliton Valdes reports:  Less LE numbness but pain in hip same.       Objective     See Exercise, Manual, and Modality Logs for complete treatment.     Patient Education: reviewed log rolling - patient has habit of coming from supine to sit without rolling,.     Assessment/Plan - pain abolished with knee to chest and in supine; weight-bearing increases pain.    Progress per Plan of Care            Timed:         Manual Therapy:   10      mins  33117;     Therapeutic Exercise:   20      mins  74135;     Neuromuscular Ajit:    15     mins  77080;    Therapeutic Activity:          mins  35214;     Gait Training:           mins  52705;     Ultrasound:          mins  73812;    Ionto                                   mins   89259  Self Care                            mins   24304  Canalith Repos                  mins  4209  Aquatic                               mins 60282    Un-Timed:  Electrical Stimulation:         mins  22100 ( );  Dry Needling          mins self-pay  Traction          mins 59303  Low Eval          Mins  75022  Mod Eval          Mins  40346  High Eval                            Mins  79931  Re-Eval                               mins  77505    Timed Treatment:   45   mins   Total Treatment:     45   mins    Stuart Mata, PT

## 2020-11-12 ENCOUNTER — TREATMENT (OUTPATIENT)
Dept: PHYSICAL THERAPY | Facility: CLINIC | Age: 80
End: 2020-11-12

## 2020-11-12 DIAGNOSIS — M54.9 BACK PAIN WITH SCIATICA: Primary | ICD-10-CM

## 2020-11-12 DIAGNOSIS — M54.30 BACK PAIN WITH SCIATICA: Primary | ICD-10-CM

## 2020-11-12 PROCEDURE — 97112 NEUROMUSCULAR REEDUCATION: CPT | Performed by: PHYSICAL THERAPIST

## 2020-11-12 PROCEDURE — 97140 MANUAL THERAPY 1/> REGIONS: CPT | Performed by: PHYSICAL THERAPIST

## 2020-11-12 PROCEDURE — 97110 THERAPEUTIC EXERCISES: CPT | Performed by: PHYSICAL THERAPIST

## 2020-11-12 NOTE — PROGRESS NOTES
Physical Therapy Daily Progress Note    VISIT#: 4    Subjective   Jt Valdes reports: prolonged standing increases pain; less numbness down leg. Used Rolator for long distances with less pain.       Objective     See Exercise, Manual, and Modality Logs for complete treatment.     Patient Education: continued to emphasis TA contraction and core control    Assessment/Plan - pain abolished with exercises and manual. STG's progressing    Goals  Plan Goals: SHORT TERM GOALS: Time for Goal Achievement: 4 weeks    1.  Patient to be compliant w/ the HEP and tolerate progression.   MET                         2.  Pain level < 5/10 at worst with mentioned activities to improve function.  3.  Increased lumbar AROM to by 25% in all planes to allow for increased ease with sit-stand transfers and functional activities.  4. Gait mechanics improved with use of AD.    LONG TERM GOALS: Time for Goal Achievement: D/C  1.  Outcome survey to show significant improvement  2.  Pain level < 2/10 with all listed activities to return to normal.  3.  Lumbar AROM improved to allow for return to household, work & recreational activities w/o increase in symptoms.  4.  (B) LE and lower abdominal strength to 5/5 to allow for pushing, pulling and activities to occur without pain (driving, sitting, household  & Job requirements).  5. Pain centralized: patient to report 75% or more improvement.   Progress per Plan of Care            Timed:         Manual Therapy:   10      mins  41169;     Therapeutic Exercise:   20      mins  62041;     Neuromuscular Ajit:    15     mins  04560;    Therapeutic Activity:          mins  05855;     Gait Training:           mins  31365;     Ultrasound:          mins  41193;    Ionto                                   mins   39493  Self Care                            mins   07568  Canalith Repos                  mins  4209  Aquatic                               mins 43414    Un-Timed:  Electrical Stimulation:          mins  13081 ( );  Dry Needling          mins self-pay  Traction          mins 78071  Low Eval          Mins  27185  Mod Eval          Mins  34752  High Eval                            Mins  03356  Re-Eval                               mins  13906    Timed Treatment:   45   mins   Total Treatment:     45   mins    Stuart Mata, PT

## 2020-11-17 ENCOUNTER — TREATMENT (OUTPATIENT)
Dept: PHYSICAL THERAPY | Facility: CLINIC | Age: 80
End: 2020-11-17

## 2020-11-17 DIAGNOSIS — M54.9 BACK PAIN WITH SCIATICA: Primary | ICD-10-CM

## 2020-11-17 DIAGNOSIS — M54.30 BACK PAIN WITH SCIATICA: Primary | ICD-10-CM

## 2020-11-17 PROCEDURE — 97110 THERAPEUTIC EXERCISES: CPT | Performed by: PHYSICAL THERAPIST

## 2020-11-17 PROCEDURE — 97140 MANUAL THERAPY 1/> REGIONS: CPT | Performed by: PHYSICAL THERAPIST

## 2020-11-17 PROCEDURE — 97112 NEUROMUSCULAR REEDUCATION: CPT | Performed by: PHYSICAL THERAPIST

## 2020-11-17 NOTE — PROGRESS NOTES
Physical Therapy Daily Progress Note    VISIT#: 5    Subjective   Jt Valdes reports: Good day Sunday; painful yesterday and today. Less pain and numbness going down L Leg.    Objective     See Exercise, Manual, and Modality Logs for complete treatment.     Patient Education: continued to emphasis TA contraction and core control    Assessment/Plan - continued to emphasis core control; also added decompression in 90/90.    Goals  Plan Goals: SHORT TERM GOALS: Time for Goal Achievement: 4 weeks    1.  Patient to be compliant w/ the HEP and tolerate progression.   MET                         2.  Pain level < 5/10 at worst with mentioned activities to improve function.  3.  Increased lumbar AROM to by 25% in all planes to allow for increased ease with sit-stand transfers and functional activities.  4. Gait mechanics improved with use of AD.    LONG TERM GOALS: Time for Goal Achievement: D/C  1.  Outcome survey to show significant improvement  2.  Pain level < 2/10 with all listed activities to return to normal.  3.  Lumbar AROM improved to allow for return to household, work & recreational activities w/o increase in symptoms.  4.  (B) LE and lower abdominal strength to 5/5 to allow for pushing, pulling and activities to occur without pain (driving, sitting, household  & Job requirements).  5. Pain centralized: patient to report 75% or more improvement.     Progress per Plan of Care            Timed:         Manual Therapy:   10      mins  83319;     Therapeutic Exercise:   20      mins  67890;     Neuromuscular Ajit:    15     mins  77284;    Therapeutic Activity:          mins  66030;     Gait Training:           mins  92752;     Ultrasound:          mins  45208;    Ionto                                   mins   98384  Self Care                            mins   99903  Canalith Repos                  mins  4209  Aquatic                               mins 54088    Un-Timed:  Electrical Stimulation:         mins   88309 ( );  Dry Needling          mins self-pay  Traction          mins 63794  Low Eval          Mins  18443  Mod Eval          Mins  45984  High Eval                            Mins  80578  Re-Eval                               mins  61925    Timed Treatment:   45   mins   Total Treatment:     45   mins    Stuart Mata, PT

## 2020-11-20 ENCOUNTER — TREATMENT (OUTPATIENT)
Dept: PHYSICAL THERAPY | Facility: CLINIC | Age: 80
End: 2020-11-20

## 2020-11-20 DIAGNOSIS — M54.30 BACK PAIN WITH SCIATICA: Primary | ICD-10-CM

## 2020-11-20 DIAGNOSIS — M54.9 BACK PAIN WITH SCIATICA: Primary | ICD-10-CM

## 2020-11-20 PROCEDURE — 97110 THERAPEUTIC EXERCISES: CPT | Performed by: PHYSICAL THERAPIST

## 2020-11-20 PROCEDURE — G0283 ELEC STIM OTHER THAN WOUND: HCPCS | Performed by: PHYSICAL THERAPIST

## 2020-11-20 NOTE — PROGRESS NOTES
Physical Therapy Daily Progress Note    VISIT#: 6    Subjective   Jt Valdes reports: numbness less in LE, but pain has increased. To see Dr. Barakat's Wed. and discuss additional therapy and other options if necessary. States she was hurting more after leaving therapy on last visit.     Objective     See Exercise, Manual, and Modality Logs for complete treatment.     Patient Education: explained importance of symptoms centralizing.    Access Code: [email or web address]   URL: [email or web address]   Date: 11/20/2020   Prepared by: Roland Mata     Exercises  Hooklying Hamstring Stretch with Strap - 3 reps - 1 sets - 10 sec hold - 1x daily - 7x weekly  Supine Piriformis Stretch with Foot on Ground - 3 reps - 1 sets - 10 sec hold - 1x daily - 7x weekly    Assessment/Plan - Less pain after this visit: focused on stretching and alleviating LE pain/symtpoms. Pain in LE has increased slightly, but peripheral symptoms are less (not extending to foot).        Goals  Plan Goals: SHORT TERM GOALS: Time for Goal Achievement: 4 weeks    1.  Patient to be compliant w/ the HEP and tolerate progression.   MET                         2.  Pain level < 5/10 at worst with mentioned activities to improve function.  3.  Increased lumbar AROM to by 25% in all planes to allow for increased ease with sit-stand transfers and functional activities.  4. Gait mechanics improved with use of AD.    LONG TERM GOALS: Time for Goal Achievement: D/C  1.  Outcome survey to show significant improvement  2.  Pain level < 2/10 with all listed activities to return to normal.  3.  Lumbar AROM improved to allow for return to household, work & recreational activities w/o increase in symptoms.  4.  (B) LE and lower abdominal strength to 5/5 to allow for pushing, pulling and activities to occur without pain (driving, sitting, household  & Job requirements).  5. Pain centralized: patient to report 75% or more improvement.     Progress per Plan of Care - May  benefit from referral to pain management for assessment. Would recommend continuing PT (has 4 lest in POC): please advise. .              Timed:         Manual Therapy:         mins  28461;     Therapeutic Exercise:   30     mins  87659;     Neuromuscular Ajit:         mins  34459;    Therapeutic Activity:          mins  56111;     Gait Training:           mins  59910;     Ultrasound:          mins  03157;    Ionto                                   mins   01923  Self Care                            mins   31391  Canalith Repos                  mins  4209  Aquatic                               mins 22672    Un-Timed:  Electrical Stimulation:  15       mins  43758 ( );  Dry Needling          mins self-pay  Traction          mins 89950  Low Eval          Mins  55586  Mod Eval          Mins  75781  High Eval                            Mins  22618  Re-Eval                               mins  96209    Timed Treatment:   45   mins   Total Treatment:     45   mins    Stuart Mata PT

## 2020-11-25 ENCOUNTER — OFFICE VISIT (OUTPATIENT)
Dept: FAMILY MEDICINE CLINIC | Facility: CLINIC | Age: 80
End: 2020-11-25

## 2020-11-25 ENCOUNTER — TELEPHONE (OUTPATIENT)
Dept: FAMILY MEDICINE CLINIC | Facility: CLINIC | Age: 80
End: 2020-11-25

## 2020-11-25 VITALS
OXYGEN SATURATION: 97 % | HEIGHT: 63 IN | BODY MASS INDEX: 22.68 KG/M2 | RESPIRATION RATE: 16 BRPM | DIASTOLIC BLOOD PRESSURE: 62 MMHG | TEMPERATURE: 97.1 F | HEART RATE: 68 BPM | SYSTOLIC BLOOD PRESSURE: 122 MMHG | WEIGHT: 128 LBS

## 2020-11-25 DIAGNOSIS — M54.32 SCIATIC PAIN, LEFT: Primary | ICD-10-CM

## 2020-11-25 PROCEDURE — 99213 OFFICE O/P EST LOW 20 MIN: CPT | Performed by: FAMILY MEDICINE

## 2020-11-25 RX ORDER — GABAPENTIN 100 MG/1
CAPSULE ORAL
Qty: 90 CAPSULE | Refills: 2 | Status: SHIPPED | OUTPATIENT
Start: 2020-11-25 | End: 2021-03-02

## 2020-11-25 NOTE — PROGRESS NOTES
Chief Complaint   Patient presents with   • Back Pain     1mo     HPI  Jt Valdes is a 80 y.o. female that presents for   Chief Complaint   Patient presents with   • Back Pain     1mo     Back pain: patient was seen 10/21. Since that time, she took course of steroids, which did not help back pain. She has been seeing PT and has not gotten relief from that therapy. Patient  PT is recommending she use a rollator. Unable to tolerate gabapentin due to sedation. She has been using Tylenol w/ minimal relief.    Review of Systems   Constitutional: Negative for chills, fever and unexpected weight loss.   HENT: Negative for congestion and rhinorrhea.    Eyes: Negative for visual disturbance.   Respiratory: Negative for cough and shortness of breath.    Cardiovascular: Negative for chest pain and palpitations.   Gastrointestinal: Negative for abdominal pain.   Musculoskeletal: Positive for back pain, gait problem and myalgias.     The following portions of the patient's history were reviewed and updated as appropriate: problem list, past medical history, past surgical history, allergies, current medications    Problem List Tab  Patient History Tab  Immunizations Tab  Medications Tab  Chart Review Tab  Care Everywhere Tab  Synopsis Tab    PE  Vitals:    11/25/20 1122   BP: 122/62   Pulse: 68   Resp: 16   Temp: 97.1 °F (36.2 °C)   SpO2: 97%     Body mass index is 22.67 kg/m².  General: Well nourished, NAD  Head: AT/NC  Eyes: EOMI, anicteric sclera  Resp: CTAB, SCR, BS equal  CV: RRR w/o m/r/g; 2+ pulses  GI: Soft, NT/ND, +BS  MSK: FROM, no edema. Notable bony deformity of superior lumbar vertabrae  Skin: Warm, dry, intact  Neuro: Alert and oriented. No focal deficits  Psych: Appropriate mood and affect    Imaging  No Images in the past 120 days found..    Assessment/Plan   Jt Valdes is a 80 y.o. female that presents for   Chief Complaint   Patient presents with   • Back Pain     1mo     Diagnoses and all orders for  this visit:    1. Sciatic pain, left (Primary): Patient continues to complain of back pain that did not improve with prednisone and has not responded to physical therapy.  Back pain may certainly be multifactorial.  She has complaints consistent with sciatica but her exam raises concern for osteoporosis and scoliosis.  She also has history of spinal fusion.  Given this, would like to move forward with an MRI and referral to pain management.  I do not feel the patient is a good surgical candidate simply given her age but would be interested in what pain management may be able to offer with some good imaging.  -     gabapentin (NEURONTIN) 100 MG capsule; Take 1 in AM and 2 at night. Work up to this dose by starting w/ just 1 cap at night  Dispense: 90 capsule; Refill: 2  -     Ambulatory Referral to Pain Management  -     MRI Lumbar Spine Without Contrast; Future    Follow-up in 3 months for recheck

## 2020-11-25 NOTE — TELEPHONE ENCOUNTER
PT CALLED STATING SHE SPOKE TO THE RADIOLOGY OFFICE SHE WAS TOLD TO GO TO FOR HER BACK X-RAY AND WAS TOLD DR. BREWSTER HAS TO FAX OVER AN ORDER BEFORE SHE CAN GO IN.    PLEASE SEND OVER ASAP.    CALLBACK NUMBER: [phone]

## 2020-12-02 ENCOUNTER — TELEPHONE (OUTPATIENT)
Dept: FAMILY MEDICINE CLINIC | Facility: CLINIC | Age: 80
End: 2020-12-02

## 2020-12-07 ENCOUNTER — HOSPITAL ENCOUNTER (OUTPATIENT)
Dept: MRI IMAGING | Facility: HOSPITAL | Age: 80
Discharge: HOME | End: 2020-12-07
Admitting: FAMILY MEDICINE

## 2020-12-07 DIAGNOSIS — M54.32 SCIATIC PAIN, LEFT: ICD-10-CM

## 2020-12-07 PROCEDURE — 72148 MRI LUMBAR SPINE W/O DYE: CPT

## 2020-12-09 ENCOUNTER — OFFICE VISIT (OUTPATIENT)
Dept: PAIN MEDICINE | Facility: CLINIC | Age: 80
End: 2020-12-09

## 2020-12-09 VITALS
HEIGHT: 63 IN | TEMPERATURE: 97.1 F | DIASTOLIC BLOOD PRESSURE: 77 MMHG | OXYGEN SATURATION: 97 % | WEIGHT: 121 LBS | BODY MASS INDEX: 21.44 KG/M2 | SYSTOLIC BLOOD PRESSURE: 143 MMHG | RESPIRATION RATE: 16 BRPM | HEART RATE: 67 BPM

## 2020-12-09 DIAGNOSIS — G89.4 CHRONIC PAIN SYNDROME: ICD-10-CM

## 2020-12-09 DIAGNOSIS — M96.1 POSTLAMINECTOMY SYNDROME OF LUMBAR REGION: ICD-10-CM

## 2020-12-09 DIAGNOSIS — M47.817 LUMBOSACRAL SPONDYLOSIS WITHOUT MYELOPATHY: Primary | ICD-10-CM

## 2020-12-09 PROCEDURE — 99204 OFFICE O/P NEW MOD 45 MIN: CPT | Performed by: STUDENT IN AN ORGANIZED HEALTH CARE EDUCATION/TRAINING PROGRAM

## 2020-12-09 RX ORDER — TRAMADOL HYDROCHLORIDE 50 MG/1
50 TABLET ORAL 2 TIMES DAILY PRN
Qty: 60 TABLET | Refills: 0 | Status: SHIPPED | OUTPATIENT
Start: 2020-12-09 | End: 2021-03-02

## 2020-12-09 NOTE — PROGRESS NOTES
CHIEF COMPLAINT  Radicular low back pain    Subjective   History of Present Illness   Jt Valdes is a 80 y.o. female.   She presents to the office for evaluation of radicular low back pain. She was referred here by Deshaun Nava MD  .  She states that she had lumbar fusion approximately 20 years ago that was believed to be L3-L5.  She states that initially, the surgery resolved her pain she is doing very well.  She states that over the past several years, she has had a progressively worsening radicular type back pain with radiation into the anterior and posterior thighs bilaterally.  The pain is worse with prolonged walking and standing.  The pain is significant improved with rest.  She tried gabapentin 300 mg which is causing her to have significant side effects.  She is currently stable on gabapentin 100 mg.    Location: Low back with radiation into the legs bilaterally  Onset: Initially improved after surgery, now occurring  Duration: Progressively worsening  Timing: Constant throughout the day  Quality: Burning tingling numbness in the anterior thighs and sharp, stabbing in low back  Severity: Today: 8       Last Week: 8       Worst: 10  Modifying Factors: The pain is worse with walking and physical activity and movement.  The pain is significant improved with rest and sitting    Physical Therapy: yes      Current Outpatient Medications:   •  aspirin 81 MG EC tablet, Take 81 mg by mouth Daily., Disp: , Rfl:   •  bisoprolol-hydrochlorothiazide (ZIAC) 10-6.25 MG per tablet, Take 1 tablet by mouth Daily., Disp: 90 tablet, Rfl: 3  •  Calcium Carb-Cholecalciferol (CALCIUM-VITAMIN D) 500-200 MG-UNIT per tablet, Take 2 tablets by mouth Daily., Disp: 180 tablet, Rfl: 3  •  etodolac (LODINE) 300 MG capsule, Take 1 capsule by mouth Daily., Disp: 90 capsule, Rfl: 3  •  gabapentin (NEURONTIN) 100 MG capsule, Take 1 in AM and 2 at night. Work up to this dose by starting w/ just 1 cap at night, Disp: 90 capsule,  Rfl: 2  •  leflunomide (ARAVA) 20 MG tablet, Take 1 tablet by mouth Daily., Disp: 90 tablet, Rfl: 3  •  levothyroxine (SYNTHROID, LEVOTHROID) 125 MCG tablet, Take 1 tablet by mouth Daily., Disp: 90 tablet, Rfl: 3  •  losartan (COZAAR) 25 MG tablet, Take 1 tablet by mouth Daily., Disp: 90 tablet, Rfl: 3  •  traMADol (ULTRAM) 50 MG tablet, Take 1 tablet by mouth 2 (Two) Times a Day As Needed for Moderate Pain ., Disp: 60 tablet, Rfl: 0    The following portions of the patient's history were reviewed and updated as appropriate: allergies, current medications, past family history, past medical history, past social history, past surgical history and problem list.    Pain Medication Reviewed: yes      REVIEW OF PERTINENT MEDICAL DATA    Past Medical History:   Diagnosis Date   • Arthritis    • Degenerative joint disease    • Extremity pain     legs pain   • H/O degenerative disc disease    • History of colonoscopy 2009    last done 2009   • History of echocardiogram 09/04/2018    LVH EF 65%. RV OK. LA probably mildly dilated. AV OK. MV OK. Modest TR RVSP 26 or less. Nuclear MPI regadenoson 9/14/2018. LV uniform myocardial uptake and wall motion EF 71%.    • History of Holter monitoring 10/08/2018    SR 40-81 58. AF 2.7 hr episode VR . At termination AF 2 3.5-4.0 s pauses with patient possibly dizzy. 205 PAC 42 atrial ashanti several SVT. No ventricular ectopy.    • HTN (hypertension)    • Hyperlipidemia    • Hypertensive cardiovascular disease    • Hypothyroidism    • Incontinence    • Leukopenia    • Low back pain    • PAF (paroxysmal atrial fibrillation) (CMS/HCC) 09/2018     Vaughn Sept 2018 PAF and HTN. PAFL On bisoprolol and diltiazem bradycardia symptomatic with dizziness. Amiodarone alone Oct 2018 and no episodes of erratic or fast heartbeat or lightheadedness.    • Rheumatoid arthritis (CMS/HCC)     Possible pulmonary involvement    • Staph infection    • White coat syndrome with hypertension      Past  "Surgical History:   Procedure Laterality Date   • APPENDECTOMY  1974   • BACK SURGERY  2003 2001, 2003   • SUBTOTAL HYSTERECTOMY  1974     Family History   Problem Relation Age of Onset   • Osteoarthritis Mother    • Other Other         Rheumatoid disease      Social History     Socioeconomic History   • Marital status:      Spouse name: Not on file   • Number of children: Not on file   • Years of education: Not on file   • Highest education level: Not on file   Tobacco Use   • Smoking status: Never Smoker   • Smokeless tobacco: Never Used   Substance and Sexual Activity   • Alcohol use: Yes     Comment: Occ.   • Drug use: No   • Sexual activity: Defer     Allergies   Allergen Reactions   • Sulfa Antibiotics Rash           Review of Systems   Gastrointestinal: Negative for constipation.   Musculoskeletal: Positive for back pain and gait problem.   Neurological: Positive for weakness and numbness.   All other systems reviewed and are negative.      Objective   Vitals:    12/09/20 1500   BP: 143/77   Pulse: 67   Resp: 16   Temp: 97.1 °F (36.2 °C)   SpO2: 97%   Weight: 54.9 kg (121 lb)   Height: 160 cm (63\")   PainSc:   8     Physical Exam  Vitals signs and nursing note reviewed.   Constitutional:       General: She is not in acute distress.     Appearance: She is well-developed.   HENT:      Head: Normocephalic and atraumatic.   Eyes:      Conjunctiva/sclera: Conjunctivae normal.      Pupils: Pupils are equal, round, and reactive to light.   Neck:      Musculoskeletal: Normal range of motion and neck supple.      Trachea: No tracheal deviation.   Cardiovascular:      Comments: Well-Perfused  No Edema  Pulmonary:      Effort: Pulmonary effort is normal. No respiratory distress.   Abdominal:      Palpations: Abdomen is soft.      Tenderness: There is no abdominal tenderness. There is no guarding.   Musculoskeletal:      Comments: Lumbar Spine Exam:  Tender to palpation over the lumbar paraspinal musculature " No  Limited range of motion secondary to pain Yes  Facet loading positive: Weakly bilaterally  Facets tender to palpation: Negative  Straight leg raise test positive: left       Skin:     General: Skin is warm and dry.      Capillary Refill: Capillary refill takes less than 2 seconds.      Findings: No rash.   Neurological:      General: No focal deficit present.      Mental Status: She is oriented to person, place, and time.      Sensory: No sensory deficit.      Motor: Weakness present.      Comments: Weakness in the bilateral hip flexors   Psychiatric:         Behavior: Behavior normal.         Imaging:  Mri Lumbar Spine Without Contrast    Result Date: 12/7/2020  . IMPRESSION:  1. Postsurgical changes and degenerative changes of the lumbar spine with upper lumbar dextroscoliosis. Please refer to the body of the report for level by level findings.   Electronically Signed By-Alize Sanchez MD On:12/7/2020 2:01 PM This report was finalized on [id number] by  Alize Sanchez MD.       Assessment/Plan     Assessment: This is an 80-year-old female with a longstanding history of chronic low back pain in the distant history of lumbar fusion L3-L5.  She was initially pain-free following the surgery, however has began to develop recurrence of symptoms including radicular type features with prolonged movement suggestive of neurogenic claudication.  Her primary care started her on low-dose gabapentin and she was referred here for further interventional options.    Diagnosis/Plan:    1.  Postlaminectomy syndrome  2.  Lumbar degenerative disc disease  3.  Adjacent segment disease  4.  Lumbar spondylosis  5.  Neuroforaminal narrowing    PLAN:  1.  Her MRI shows significant degenerative changes as well as significant artifact from the implanted lumbar hardware.  While she likely has a component of adjacent segment disease and lumbar spondylosis, as she does also have radicular symptoms, I feel that the majority of her pain is  due to postlaminectomy syndrome as well as recurrence of stenosis and disc displacement.  2.  Given her worsening pain, I will start her on tramadol 50 mg twice daily as needed  3.  UDS and contract today  4.  I will schedule her next available for a caudal epidural steroid injection.  I explained to the patient that she may not get complete relief, but we may be able to somewhat decrease her radicular type pain.  5.  She likely also has adjacent segment disease, but medial branch block would be technically very difficult given her significant hardware.  If she does not get significant pain relief from the caudal epidural, will likely schedule lumbar medial branch blocks with sedation as the procedure would be expected to take longer than normal.  6.  Possibly be a candidate for spinal cord stim later in the future, however given her significant hardware, it would make access the epidural space difficult.  Can discuss this in the future.      --- Follow-up next available for caudal epidural steroid injection           INSPECT REPORT    As part of the patient's treatment plan, I may be prescribing controlled substances. The patient has been made aware of appropriate use of such medications, including potential risk of somnolence, limited ability to drive and/or work safely, and the potential for dependence or overdose. It has also been made clear that these medications are for use by this patient only, without concomitant use of alcohol or other substances unless prescribed.     Patient has completed prescribing agreement detailing terms of continued prescribing of controlled substances, including monitoring INSPECT reports, urine drug screening, and pill counts if necessary. The patient is aware that inappropriate use will results in cessation of prescribing such medications.    INSPECT report has been reviewed and scanned into the patient's chart.    As the clinician, I personally reviewed the INSPECT from  12/8/2020 while the patient was in the office today.    History and physical exam exhibit continued safe and appropriate use of controlled substances.         EMR Dragon/Transcription disclaimer:   Much of this encounter note is an electronic transcription/translation of spoken language to printed text. The electronic translation of spoken language may permit erroneous, or at times, nonsensical words or phrases to be inadvertently transcribed; Although I have reviewed the note for such errors, some may still exist.

## 2020-12-16 ENCOUNTER — HOSPITAL ENCOUNTER (OUTPATIENT)
Dept: PAIN MEDICINE | Facility: HOSPITAL | Age: 80
Discharge: HOME | End: 2020-12-16

## 2020-12-16 VITALS
HEART RATE: 58 BPM | DIASTOLIC BLOOD PRESSURE: 78 MMHG | RESPIRATION RATE: 16 BRPM | BODY MASS INDEX: 22.15 KG/M2 | SYSTOLIC BLOOD PRESSURE: 140 MMHG | WEIGHT: 125 LBS | TEMPERATURE: 97.1 F | HEIGHT: 63 IN | OXYGEN SATURATION: 97 %

## 2020-12-16 DIAGNOSIS — M96.1 POSTLAMINECTOMY SYNDROME OF LUMBAR REGION: Primary | ICD-10-CM

## 2020-12-16 DIAGNOSIS — R52 PAIN: ICD-10-CM

## 2020-12-16 PROCEDURE — 77003 FLUOROGUIDE FOR SPINE INJECT: CPT

## 2020-12-16 PROCEDURE — 62323 NJX INTERLAMINAR LMBR/SAC: CPT | Performed by: STUDENT IN AN ORGANIZED HEALTH CARE EDUCATION/TRAINING PROGRAM

## 2020-12-16 PROCEDURE — 0 IOPAMIDOL 41 % SOLUTION: Performed by: STUDENT IN AN ORGANIZED HEALTH CARE EDUCATION/TRAINING PROGRAM

## 2020-12-16 PROCEDURE — 25010000002 METHYLPREDNISOLONE PER 40 MG

## 2020-12-16 RX ORDER — METHYLPREDNISOLONE ACETATE 40 MG/ML
40 INJECTION, SUSPENSION INTRA-ARTICULAR; INTRALESIONAL; INTRAMUSCULAR; SOFT TISSUE ONCE
Status: COMPLETED | OUTPATIENT
Start: 2020-12-16 | End: 2020-12-16

## 2020-12-16 RX ORDER — BUPIVACAINE HYDROCHLORIDE 5 MG/ML
10 INJECTION, SOLUTION PERINEURAL ONCE
Status: COMPLETED | OUTPATIENT
Start: 2020-12-16 | End: 2020-12-16

## 2020-12-16 RX ORDER — BUPIVACAINE HYDROCHLORIDE 2.5 MG/ML
INJECTION, SOLUTION EPIDURAL; INFILTRATION; INTRACAUDAL
Status: DISPENSED
Start: 2020-12-16 | End: 2020-12-16

## 2020-12-16 RX ORDER — METHYLPREDNISOLONE ACETATE 40 MG/ML
INJECTION, SUSPENSION INTRA-ARTICULAR; INTRALESIONAL; INTRAMUSCULAR; SOFT TISSUE
Status: DISPENSED
Start: 2020-12-16 | End: 2020-12-16

## 2020-12-16 RX ADMIN — IOPAMIDOL 3 ML: 408 INJECTION, SOLUTION INTRATHECAL at 09:38

## 2020-12-16 RX ADMIN — METHYLPREDNISOLONE ACETATE 40 MG: 40 INJECTION, SUSPENSION INTRA-ARTICULAR; INTRALESIONAL; INTRAMUSCULAR; SOFT TISSUE at 10:02

## 2020-12-16 RX ADMIN — BUPIVACAINE HYDROCHLORIDE 10 ML: 5 INJECTION, SOLUTION PERINEURAL at 10:01

## 2020-12-16 NOTE — PROCEDURES
Caudal Epidural with catheter:  [facility]    PREOPERATIVE DIAGNOSIS: Lumbar Disc Displacement and Lumbar Postlaminectomy Syndrome    POSTOPERATIVE DIAGNOSIS: Same as preop diagnosis    PROCEDURE:    • Caudal Epidural Steroid Injection, Therapeutic, with fluoroscopic guidance    PRE-PROCEDURE DISCUSSION WITH PATIENT:    Risks and complications were discussed with the patient prior to starting the procedure and informed consent was obtained.  We discussed various topics including but not limited to bleeding & infection & injury & paralysis & coma & death & worsening of clinical picture & postprocedural soreness & lack of pain relief.    SURGEON:  Renard Nava MD    REASON FOR PROCEDURE: Degenerative changes are noted in the area., Radicular pain pattern seems consistent with this dermatome., Interlaminar approach is not possible or advised due to pathology and Chronic low back pain with sciatica    SEDATION:   Patient declined administration of moderate sedation    ANESTHETIC:  Marcaine 0.25%  STEROID:     Methylprednisolone (DEPO MEDROL) 40mg/ml  TOTAL VOLUME OF INJECTATE: 7 mL    DESCRIPTON OF PROCEDURE:  After obtaining informed consent, the patient taken to the operating room and was placed in the prone position.  An IV  was not  started in the preoperative area.  All pressure points were well padded.  EKG, blood pressure, and pulse oximetry were monitored.  All sedation that was administered was given by the RN under my direct supervision and guidance.      The lumbosacral area was prepped with Chloraprep and draped in a sterile fashion with a sterile drape.  The sacral hiatus was identified by palpation of the cornu on either side.  Under fluoroscopic guidance, the sacral hiatus was identified on lateral imaging. The overlying skin and subcutaneous tissue was then anesthetized with 1% lidocaine.  A 22-gauge needle was introduced under fluoroscopic guidance through the sacral hiatus into the epidural  space.  Aspiration was negative for blood or CSF.  After confirming the position of the needle with PA and lateral fluoroscopy, 2 mL of Omnipaque was injected.  Dye spread was limited to the S3 nerve roots after multiple attempts to reposition the needle tip.  The 22 gauge needle was removed and replaced with a 17G Tuohy needle.  A 19G epidural catheter was then advanced easily to the L5/S1 disc space.  Another 2cc of contrast was injected following negative aspiration, and after  viewing appropriate epidurogram, with dye spread up to the L5 level,  with lateral and PA views, and confirming a lack of vascular uptake and lack of evidence of intrathecal injection, the injectate noted above was injected slowly.    The needle and catheter were removed intact as a single unit.  Vital signs were stable throughout.        ESTIMATED BLOOD LOSS:  minimal  SPECIMENS:  none    COMPLICATIONS:   No complications were noted., There was no indication of vascular uptake on live injection of contrast dye., There was no indication of intrathecal uptake on live injection of contrast dye. and There was not any evidence of dural puncture.      TOLERANCE & DISCHARGE CONDITION:    The patient tolerated the procedure well.  The patient was transported to the recovery area without difficulties.  The patient was discharged to home under the care of family in stable and satisfactory condition.    PLAN OF CARE:  1. The patient was given our standard instruction sheet.  2. The patient will Return to clinic 3-4 wks  3. The patient will resume all medications as per the medication reconciliation sheet.

## 2021-01-13 ENCOUNTER — OFFICE VISIT (OUTPATIENT)
Dept: PAIN MEDICINE | Facility: CLINIC | Age: 81
End: 2021-01-13

## 2021-01-13 VITALS
DIASTOLIC BLOOD PRESSURE: 87 MMHG | OXYGEN SATURATION: 97 % | WEIGHT: 120 LBS | BODY MASS INDEX: 27.77 KG/M2 | HEART RATE: 60 BPM | TEMPERATURE: 97.1 F | RESPIRATION RATE: 16 BRPM | SYSTOLIC BLOOD PRESSURE: 145 MMHG | HEIGHT: 55 IN

## 2021-01-13 DIAGNOSIS — M47.817 LUMBOSACRAL SPONDYLOSIS WITHOUT MYELOPATHY: ICD-10-CM

## 2021-01-13 DIAGNOSIS — M96.1 POSTLAMINECTOMY SYNDROME OF LUMBAR REGION: Primary | ICD-10-CM

## 2021-01-13 DIAGNOSIS — G89.4 CHRONIC PAIN SYNDROME: ICD-10-CM

## 2021-01-13 PROCEDURE — 99213 OFFICE O/P EST LOW 20 MIN: CPT | Performed by: STUDENT IN AN ORGANIZED HEALTH CARE EDUCATION/TRAINING PROGRAM

## 2021-01-13 NOTE — PROGRESS NOTES
CHIEF COMPLAINT  Chief Complaint   Patient presents with   • Back Pain      Had Epidura-- not much help--sciatica down lt leg is better--- back  hurts with activity--  usually takes tylenol--- tramadol makes her feel funny-- tramadol LD 2weeks ago       Primary Care  Bickers, Deshaun Graves MD    Clyde Valdes is a 80 y.o. female  who presents for follow-up.  She states that she had lumbar fusion approximately 20 years ago that was believed to be L3-L5.  She states that initially, the surgery resolved her pain she is doing very well.  She states that over the past several years, she has had a progressively worsening radicular type back pain with radiation into the anterior and posterior thighs bilaterally.  The pain is worse with prolonged walking and standing.  The pain is significant improved with rest.  She tried gabapentin 300 mg which is causing her to have significant side effects.  She is currently stable on gabapentin 100 mg.    History of Present Illness     Location: Low back with radiation into the legs bilaterally  Onset: Initially improved after surgery, now occurring  Duration: Progressively worsening  Timing: Constant throughout the day  Quality: Burning tingling numbness in the anterior thighs and sharp, stabbing in low back  Severity: Today: 2       Last Week: 3       Worst: 10  Modifying Factors: The pain is worse with walking and physical activity and movement.  The pain is significant improved with rest and sitting     Physical Therapy: yes    Interval Update 01/13/2021: Much improved from the sciatic and leg standpoint.  Her radicular symptoms are essentially gone.  She continues to have axial type back pain.    The following portions of the patient's history were reviewed and updated as appropriate: allergies, current medications, past family history, past medical history, past social history, past surgical history and problem list.      Current Outpatient Medications:   •  aspirin  "81 MG EC tablet, Take 81 mg by mouth Daily., Disp: , Rfl:   •  bisoprolol-hydrochlorothiazide (ZIAC) 10-6.25 MG per tablet, Take 1 tablet by mouth Daily., Disp: 90 tablet, Rfl: 3  •  Calcium Carb-Cholecalciferol (CALCIUM-VITAMIN D) 500-200 MG-UNIT per tablet, Take 2 tablets by mouth Daily., Disp: 180 tablet, Rfl: 3  •  etodolac (LODINE) 300 MG capsule, Take 1 capsule by mouth Daily., Disp: 90 capsule, Rfl: 3  •  leflunomide (ARAVA) 20 MG tablet, Take 1 tablet by mouth Daily., Disp: 90 tablet, Rfl: 3  •  levothyroxine (SYNTHROID, LEVOTHROID) 125 MCG tablet, Take 1 tablet by mouth Daily., Disp: 90 tablet, Rfl: 3  •  losartan (COZAAR) 25 MG tablet, Take 1 tablet by mouth Daily., Disp: 90 tablet, Rfl: 3  •  gabapentin (NEURONTIN) 100 MG capsule, Take 1 in AM and 2 at night. Work up to this dose by starting w/ just 1 cap at night, Disp: 90 capsule, Rfl: 2  •  traMADol (ULTRAM) 50 MG tablet, Take 1 tablet by mouth 2 (Two) Times a Day As Needed for Moderate Pain ., Disp: 60 tablet, Rfl: 0    Review of Systems   Musculoskeletal: Positive for back pain and gait problem.   Neurological: Positive for numbness. Negative for weakness.       Vitals:    01/13/21 1408   BP: 145/87   Pulse: 60   Resp: 16   Temp: 97.1 °F (36.2 °C)   SpO2: 97%   Weight: 54.4 kg (120 lb)   Height: 63 cm (24.8\")   PainSc:   2       Urine Drug Screen:  12/9/20   Appropriate: yes    Objective   Physical Exam  Vitals signs and nursing note reviewed.   Musculoskeletal:      Comments: Lumbar spine exam:  1.  Tender to palpation bilateral lumbar paraspinal musculature  2.  Facet loading positive bilaterally  3.  Decreased lumbar range of motion secondary to pain   Neurological:      General: No focal deficit present.           Assessment/Plan   Problems Addressed this Visit     None      Visit Diagnoses     Postlaminectomy syndrome of lumbar region    -  Primary    Lumbosacral spondylosis without myelopathy        Chronic pain syndrome          Diagnoses      "  Codes Comments    Postlaminectomy syndrome of lumbar region    -  Primary ICD-10-CM: M96.1  ICD-9-CM: 722.83     Lumbosacral spondylosis without myelopathy     ICD-10-CM: M47.817  ICD-9-CM: 721.3     Chronic pain syndrome     ICD-10-CM: G89.4  ICD-9-CM: 338.4           Plan:  1.  Excellent relief from the caudal from the radicular pain standpoint  2.  She continues to have axial type back pain which I was initially afraid of given the amount of hardware that she has.  She likely has a significant component of adjacent segment disease.  3.  I will schedule her next available for bilateral three-level lumbar medial branch block with sedation as the procedure may be somewhat prolonged given the amount of hardware that she has  4.  She only occasionally takes her tramadol as she does feel that it makes her somewhat loopy.  She states she does well with Tylenol.    --- Follow-up next available for three-level bilateral lumbar medial branch block with sedation           INSPECT REPORT    As part of the patient's treatment plan, I may be prescribing controlled substances. The patient has been made aware of appropriate use of such medications, including potential risk of somnolence, limited ability to drive and/or work safely, and the potential for dependence or overdose. It has also bee made clear that these medications are for use by this patient only, without concomitant use of alcohol or other substances unless prescribed.     Patient has completed prescribing agreement detailing terms of continued prescribing of controlled substances, including monitoring EMERSON reports, urine drug screening, and pill counts if necessary. The patient is aware that inappropriate use will results in cessation of prescribing such medications.    INSPECT report has been reviewed and scanned into the patient's chart.    As the clinician, I personally reviewed the INSPECT from 1/11/2021    History and physical exam exhibit continued safe  and appropriate use of controlled substances.      EMR Dragon/Transcription disclaimer:   Much of this encounter note is an electronic transcription/translation of spoken language to printed text. The electronic translation of spoken language may permit erroneous, or at times, nonsensical words or phrases to be inadvertently transcribed; Although I have reviewed the note for such errors, some may still exist.

## 2021-02-09 ENCOUNTER — TELEPHONE (OUTPATIENT)
Dept: PAIN MEDICINE | Facility: HOSPITAL | Age: 81
End: 2021-02-09

## 2021-02-09 NOTE — TELEPHONE ENCOUNTER
Called patient to review conscious sedation information sheet.  Questions answered. Verbalized understanding. Pt to arrive by 0730am.

## 2021-02-10 ENCOUNTER — APPOINTMENT (OUTPATIENT)
Dept: PAIN MEDICINE | Facility: HOSPITAL | Age: 81
End: 2021-02-10

## 2021-02-10 ENCOUNTER — TELEPHONE (OUTPATIENT)
Dept: PAIN MEDICINE | Facility: CLINIC | Age: 81
End: 2021-02-10

## 2021-02-10 NOTE — TELEPHONE ENCOUNTER
Caller:STEFFI SEVILLA     Relationship to patient:SELF     Best call back number: [phone]    Chief complaint: BAD WEATHER    Type of visit: PROCEDURE BLOCK    Requested date: ???     If rescheduling, when is the original appointment: 02/10/2021     Additional notes:APPT WAS ACCIDENTALLY CANCELLED AT THE HUB. PATIENT WANTS TO RESCHEDULE

## 2021-02-15 ENCOUNTER — DOCUMENTATION (OUTPATIENT)
Dept: PHYSICAL THERAPY | Facility: CLINIC | Age: 81
End: 2021-02-15

## 2021-02-15 NOTE — PROGRESS NOTES
Discharge Summary  Discharge Summary from Physical Therapy Report    Patient: Jt Valdes   : 1940  Diagnosis/ICD-10 Code:  Back pain with sciatica [M54.9, M54.30]  Referring practitioner: Deshaun Nava MD  Date of Initial Visit: 10/26/2020      Dates  PT visit: 10/26/20 to 20  Number of Visits: 6    Discharge Status of Patient: See progress note for last update - no significant improvement noted.    Goals: Partially Met    Discharge Plan: Patient to return to referring/providing physician    Date of Discharge 2/15/21        Stuart Mata PT  Physical Therapist

## 2021-02-24 ENCOUNTER — HOSPITAL ENCOUNTER (OUTPATIENT)
Dept: PAIN MEDICINE | Facility: HOSPITAL | Age: 81
Discharge: HOME | End: 2021-02-24

## 2021-02-24 VITALS
DIASTOLIC BLOOD PRESSURE: 80 MMHG | RESPIRATION RATE: 15 BRPM | HEART RATE: 59 BPM | SYSTOLIC BLOOD PRESSURE: 169 MMHG | OXYGEN SATURATION: 98 % | TEMPERATURE: 96.9 F | HEIGHT: 63 IN | WEIGHT: 120 LBS | BODY MASS INDEX: 21.26 KG/M2

## 2021-02-24 DIAGNOSIS — M47.817 LUMBOSACRAL SPONDYLOSIS WITHOUT MYELOPATHY: Primary | ICD-10-CM

## 2021-02-24 DIAGNOSIS — R52 PAIN: ICD-10-CM

## 2021-02-24 PROCEDURE — 99152 MOD SED SAME PHYS/QHP 5/>YRS: CPT | Performed by: STUDENT IN AN ORGANIZED HEALTH CARE EDUCATION/TRAINING PROGRAM

## 2021-02-24 PROCEDURE — 25010000002 METHYLPREDNISOLONE PER 40 MG

## 2021-02-24 PROCEDURE — 64495 INJ PARAVERT F JNT L/S 3 LEV: CPT | Performed by: STUDENT IN AN ORGANIZED HEALTH CARE EDUCATION/TRAINING PROGRAM

## 2021-02-24 PROCEDURE — 25010000002 FENTANYL CITRATE (PF) 100 MCG/2ML SOLUTION

## 2021-02-24 PROCEDURE — 64494 INJ PARAVERT F JNT L/S 2 LEV: CPT | Performed by: STUDENT IN AN ORGANIZED HEALTH CARE EDUCATION/TRAINING PROGRAM

## 2021-02-24 PROCEDURE — 77003 FLUOROGUIDE FOR SPINE INJECT: CPT

## 2021-02-24 PROCEDURE — 64493 INJ PARAVERT F JNT L/S 1 LEV: CPT | Performed by: STUDENT IN AN ORGANIZED HEALTH CARE EDUCATION/TRAINING PROGRAM

## 2021-02-24 PROCEDURE — 99153 MOD SED SAME PHYS/QHP EA: CPT | Performed by: STUDENT IN AN ORGANIZED HEALTH CARE EDUCATION/TRAINING PROGRAM

## 2021-02-24 RX ORDER — BUPIVACAINE HYDROCHLORIDE 2.5 MG/ML
INJECTION, SOLUTION EPIDURAL; INFILTRATION; INTRACAUDAL
Status: DISPENSED
Start: 2021-02-24 | End: 2021-02-24

## 2021-02-24 RX ORDER — LIDOCAINE HYDROCHLORIDE 20 MG/ML
5 INJECTION, SOLUTION INFILTRATION; PERINEURAL ONCE
Status: DISCONTINUED | OUTPATIENT
Start: 2021-02-24 | End: 2021-02-24

## 2021-02-24 RX ORDER — BUPIVACAINE HYDROCHLORIDE 5 MG/ML
10 INJECTION, SOLUTION PERINEURAL ONCE
Status: COMPLETED | OUTPATIENT
Start: 2021-02-24 | End: 2021-02-24

## 2021-02-24 RX ORDER — METHYLPREDNISOLONE ACETATE 40 MG/ML
40 INJECTION, SUSPENSION INTRA-ARTICULAR; INTRALESIONAL; INTRAMUSCULAR; SOFT TISSUE ONCE
Status: COMPLETED | OUTPATIENT
Start: 2021-02-24 | End: 2021-02-24

## 2021-02-24 RX ORDER — METHYLPREDNISOLONE ACETATE 40 MG/ML
INJECTION, SUSPENSION INTRA-ARTICULAR; INTRALESIONAL; INTRAMUSCULAR; SOFT TISSUE
Status: DISPENSED
Start: 2021-02-24 | End: 2021-02-24

## 2021-02-24 RX ORDER — FENTANYL CITRATE 50 UG/ML
INJECTION, SOLUTION INTRAMUSCULAR; INTRAVENOUS
Status: DISPENSED
Start: 2021-02-24 | End: 2021-02-24

## 2021-02-24 RX ORDER — METHYLPREDNISOLONE ACETATE 40 MG/ML
40 INJECTION, SUSPENSION INTRA-ARTICULAR; INTRALESIONAL; INTRAMUSCULAR; SOFT TISSUE ONCE
Status: DISCONTINUED | OUTPATIENT
Start: 2021-02-24 | End: 2021-02-24

## 2021-02-24 RX ORDER — FENTANYL CITRATE 50 UG/ML
100 INJECTION, SOLUTION INTRAMUSCULAR; INTRAVENOUS
Status: DISCONTINUED | OUTPATIENT
Start: 2021-02-24 | End: 2021-02-24

## 2021-02-24 RX ORDER — FENTANYL CITRATE 50 UG/ML
100 INJECTION, SOLUTION INTRAMUSCULAR; INTRAVENOUS
Status: DISCONTINUED | OUTPATIENT
Start: 2021-02-24 | End: 2021-02-25 | Stop reason: HOSPADM

## 2021-02-24 RX ORDER — MIDAZOLAM HYDROCHLORIDE 1 MG/ML
2 INJECTION INTRAMUSCULAR; INTRAVENOUS ONCE
Status: DISCONTINUED | OUTPATIENT
Start: 2021-02-24 | End: 2021-02-24

## 2021-02-24 RX ADMIN — FENTANYL CITRATE 25 MCG: 50 INJECTION, SOLUTION INTRAMUSCULAR; INTRAVENOUS at 08:26

## 2021-02-24 RX ADMIN — METHYLPREDNISOLONE ACETATE 40 MG: 40 INJECTION, SUSPENSION INTRA-ARTICULAR; INTRALESIONAL; INTRAMUSCULAR; SOFT TISSUE at 08:39

## 2021-02-24 RX ADMIN — BUPIVACAINE HYDROCHLORIDE 10 ML: 5 INJECTION, SOLUTION PERINEURAL at 08:38

## 2021-02-24 NOTE — DISCHARGE INSTRUCTIONS
Moderate Conscious Sedation, Adult, Care After  These instructions provide you with information about caring for yourself after your procedure. Your health care provider may also give you more specific instructions. Your treatment has been planned according to current medical practices, but problems sometimes occur. Call your health care provider if you have any problems or questions after your procedure.  What can I expect after the procedure?  After your procedure, it is common:  To feel sleepy for several hours.  To feel clumsy and have poor balance for several hours.  To have poor judgment for several hours.  To vomit if you eat too soon.  Follow these instructions at home:  For at least 24 hours after the procedure:    Do not:  Participate in activities where you could fall or become injured.  Drive.  Use heavy machinery.  Drink alcohol.  Take sleeping pills or medicines that cause drowsiness.  Make important decisions or sign legal documents.  Take care of children on your own.  Rest.  Eating and drinking  Follow the diet recommended by your health care provider.  If you vomit:  Drink water, juice, or soup when you can drink without vomiting.  Make sure you have little or no nausea before eating solid foods.  General instructions  Have a responsible adult stay with you until you are awake and alert.  Take over-the-counter and prescription medicines only as told by your health care provider.  If you smoke, do not smoke without supervision.  Keep all follow-up visits as told by your health care provider. This is important.  Contact a health care provider if:  You keep feeling nauseous or you keep vomiting.  You feel light-headed.  You develop a rash.  You have a fever.  Get help right away if:  You have trouble breathing.  This information is not intended to replace advice given to you by your health care provider. Make sure you discuss any questions you have with your health care provider.  Document Revised:  11/30/2018 Document Reviewed: 04/08/2017  Elsevier Patient Education © 2020 [employer]  Medial Branch Nerve Block    Medial branch nerve block is a procedure to numb the nerves that supply the joints between your spinal bones (facet joints). The facet joints are located on the back of your spine. You may have the procedure on your neck or your upper, middle, or lower spine.  During this procedure, your health care provider will inject a numbing medicine (local anesthetic) around the medial nerves near the facet joint being treated. If more than one facet joint is causing pain, you may have more than one injection. In most cases, an anti-inflammatory medicine (steroid) will also be injected. You may need this procedure if:  · You have back pain from wear and tear (osteoarthritis) of your facet joint.  · You have an injury to a facet joint.  · Your health care provider wants to diagnose a facet joint as the cause of your pain. If the procedure relieves the pain, this indicates that the facet joint was the cause.  The local anesthetic will relieve pain for several days. The steroid may continue to relieve pain for several weeks. If your pain returns when the medicines wear off, this procedure may be repeated.  Tell a health care provider about:  · Any allergies you have.  · All medicines you are taking, including vitamins, herbs, eye drops, creams, and over-the-counter medicines.  · Any problems you or family members have had with anesthetic medicines.  · Any blood disorders you have.  · Any surgeries you have had.  · Any medical conditions you have.  · Whether you are pregnant or may be pregnant.  What are the risks?  Generally, this is a safe procedure. However, problems may occur, including:  · Infection.  · Bleeding.  · Allergic reactions to medicines or dyes.  · Damage to other structures or organs.  · Injection of the anesthetic into a blood vessel, which may decrease blood supply to your spinal cord and cause  damage.  · Spread of the anesthetic to nearby nerves, which may cause temporary weakness or numbness.  What happens before the procedure?  · Ask your health care provider about:  ? Changing or stopping your regular medicines. This is especially important if you are taking diabetes medicines or blood thinners.  ? Taking medicines such as aspirin and ibuprofen. These medicines can thin your blood. Do not take these medicines unless your health care provider tells you to take them.  ? Taking over-the-counter medicines, vitamins, herbs, and supplements.  · Plan to have someone take you home from the hospital or clinic.  · Follow instructions from your health care provider about any eating or drinking restrictions before the procedure.  · Ask your health care provider what steps will be taken to help prevent infection. These may include:  ? Removing hair at the injection site.  ? Washing skin with a germ-killing soap.  What happens during the procedure?  · An IV may be inserted into one of your veins.  · You will be given one or more of the following:  ? A medicine to help you relax (sedative).  ? A medicine to numb the area (local anesthetic). Your health care provider will feel for the facet joint or joints that are causing pain and inject a short-acting local anesthetic into the skin over the joint or joints.  · Your health care provider will then pass a needle into the area around the facet joint.  · Your health care provider may use a type of X-ray (fluoroscopy) to look at images of your spinal cord. If so, the health care provider will inject a small amount of dye into the facet joint area. The dye will show up on fluoroscopy and help locate the exact area to inject the long-acting anesthetic.  · The medicine will then be injected. Along with the long-acting anesthetic, a steroid medicine may also be injected.  · The needle will be removed, and a bandage will be placed over the injection site.  The procedure may  vary among health care providers and hospitals.  What can I expect after the procedure?  · Your blood pressure, heart rate, breathing rate, and blood oxygen level will be monitored until you leave the hospital or clinic.  · You should feel less pain in your back.  · You may have some soreness around the injection site.  Follow these instructions at home:  Injection site care  · Leave your bandage on for 24 hours.  · Do not take baths, swim, or use a hot tub until your health care provider approves.  · Check your injection site every day for signs of infection. Check for:  ? Redness, swelling, or pain.  ? Fluid or blood.  ? Warmth.  ? Pus or a bad smell.  · If directed, put ice on the injection area:  ? Put ice in a plastic bag.  ? Place a towel between your skin and the bag.  ? Leave the ice on for 20 minutes, 2-3 times a day.  General instructions  · Take over-the-counter and prescription medicines only as told by your health care provider.  · Do not drive for 24 hours if you were given a sedative during the procedure.  · Return to your normal activities as told by your health care provider. Ask your health care provider what activities are safe for you.  · Keep a log of your pain after the procedure. Keep track of how much pain you have and when you have it. This will help your health care provider plan your future treatment.  ? You should have relief of pain from the anesthetic for up to 3 days.  ? After that you may notice some pain again until the steroid starts to help. Pain relief from the steroid may last for a few weeks.  · Keep all follow-up visits as told by your health care provider. This is important.  Contact your health care provider if:  · Your pain is not relieved or gets worse at home.  · You have a fever or chills.  · You have any signs of infection.  · You develop any numbness or weakness.  Summary  · Medial branch nerve block is a procedure to numb the nerves that supply the joints between your  spinal bones (facet joint).  · You may have the procedure on your neck or your upper, middle, or lower spine.  · This procedure may be done both to diagnose and relieve facet joint pain.  · A long-acting local anesthetic is injected close to the nerve that supplies the facet joint. An anti-inflammatory medicine (steroid) will also be injected.  This information is not intended to replace advice given to you by your health care provider. Make sure you discuss any questions you have with your health care provider.  Document Revised: 04/10/2020 Document Reviewed: 08/22/2019  Elsevier Patient Education © 2020 [employer]

## 2021-02-24 NOTE — PROCEDURES
Bilateral L2-5 Lumbar Medial Branch Blockade  [facility]    PREOPERATIVE DIAGNOSIS:  Lumbar spondylosis without myelopathy    POSTOPERATIVE DIAGNOSIS:  Lumbar spondylosis without myelopathy    PROCEDURE:   Diagnostic Bilateral Lumbar Medial Branch Nerve Blockades, with fluoroscopy:  L2, L3, L4, and L5 nerves (at the L3, L4, L5 transverse processes and the sacral alar groove) to block facet joints L3-4, L4-5, and L5-S1  1. 03069-61 -- Bilateral Lumbar Facet blocks, 1st Level  2. 38158-89 -- Bilateral Lumbar Facet blocks, 2nd  Level  3. 64183-06 -- Bilateral Lumbar Facet blocks, 3rd Level    PRE-PROCEDURE DISCUSSION WITH PATIENT:    Risks and complications were discussed with the patient prior to starting the procedure and informed consent was obtained.      SURGEON:  George Nava MD    REASON FOR PROCEDURE:   The patient complains of pain that seems to have a significant axial component, Increased back pain on range of motion exams, Pain on extension of the lumbar spine and Positive lumbar facet loading maneuver    SEDATION:  Fentanyl 25mcg  ANESTHETIC:  Marcaine 0.25%  STEROID:  Methylprednisolone (DEPO MEDROL) 40mg/ml  TOTAL VOLUME OF SOLUTION: 8ml    DESCRIPTON OF PROCEDURE:  After obtaining informed consent, IV access was obtained in the preoperative area.   The patient was taken to the operating room.  The patient was placed in the prone position with a pillow under the abdomen. All pressure points were well padded.  EKG, blood pressure, and pulse oximeter were monitored.  The patient was monitored and sedated by the RN under my direction. The lumbosacral area was prepped with Chloraprep and draped in a sterile fashion. Under fluoroscopic guidance the transverse processes of the L3, L4, and L5 vertebrae at the junctions of the superior articular processes were identified on the right. Also identified was the groove between the ala and the superior articular process of the sacrum on the ipsilateral  side.  Skin and subcutaneous tissue were anesthetized with 1% lidocaine above each of these points. A 25-gauge spinal needle was introduced under fluoroscopic guidance at the above junctions. Aspiration was negative for blood and CSF.  After confirming the position of the needle with fluoroscope in all views, 0.25 mL of Omnipaque was injected, and after seeing the proper spread a total of 1 mL of the anesthetic solution noted above was injected at each of these points.  Needles were removed intact from each of the areas.  A similar procedure was repeated to block the L2, L3, L4, and L5 nerves on the contralateral side.   Onset of analgesia was noted.  Vital signs remained stable throughout.      ESTIMATED BLOOD LOSS:  <5 mL  SPECIMENS:  none    COMPLICATIONS:   No complications were noted.    TOLERANCE & DISCHARGE CONDITION:    The patient tolerated the procedure well.  The patient was transported to the recovery area without difficulties.  The patient was discharged to home under the care of family in stable and satisfactory condition.    PLAN OF CARE:  1. The patient was given our standard instruction sheet.  2. We discussed that Lumbar Medial Branch Blockade is a diagnostic procedure in consideration for radiofrequency ablation if two diagnostic procedures prove to be positive for significant benefit.  If sustained relief of 6 to eight weeks is obtained, then an alternative plan could be therapeutic lumbar branch blockades.  3. The patient is asked to keep a pain log each hour for 8 hours after the procedure today.  4. The patient will  Return to clinic 2 wks.  5. The patient will resume all medications as per the medication reconciliation sheet.

## 2021-03-02 ENCOUNTER — OFFICE VISIT (OUTPATIENT)
Dept: FAMILY MEDICINE CLINIC | Facility: CLINIC | Age: 81
End: 2021-03-02

## 2021-03-02 ENCOUNTER — LAB (OUTPATIENT)
Dept: FAMILY MEDICINE CLINIC | Facility: CLINIC | Age: 81
End: 2021-03-02

## 2021-03-02 VITALS
SYSTOLIC BLOOD PRESSURE: 132 MMHG | HEIGHT: 63 IN | TEMPERATURE: 97.1 F | DIASTOLIC BLOOD PRESSURE: 70 MMHG | RESPIRATION RATE: 16 BRPM | WEIGHT: 123 LBS | HEART RATE: 53 BPM | OXYGEN SATURATION: 93 % | BODY MASS INDEX: 21.79 KG/M2

## 2021-03-02 DIAGNOSIS — E03.9 HYPOTHYROIDISM, UNSPECIFIED TYPE: ICD-10-CM

## 2021-03-02 DIAGNOSIS — M05.79 RHEUMATOID ARTHRITIS INVOLVING MULTIPLE SITES WITH POSITIVE RHEUMATOID FACTOR (HCC): ICD-10-CM

## 2021-03-02 DIAGNOSIS — K21.9 GASTROESOPHAGEAL REFLUX DISEASE, UNSPECIFIED WHETHER ESOPHAGITIS PRESENT: ICD-10-CM

## 2021-03-02 DIAGNOSIS — M54.32 SCIATIC PAIN, LEFT: Primary | ICD-10-CM

## 2021-03-02 DIAGNOSIS — I48.0 PAROXYSMAL ATRIAL FIBRILLATION (HCC): ICD-10-CM

## 2021-03-02 DIAGNOSIS — R93.7 ABNORMAL FINDINGS ON DIAGNOSTIC IMAGING OF OTHER PARTS OF MUSCULOSKELETAL SYSTEM: ICD-10-CM

## 2021-03-02 DIAGNOSIS — Z00.00 PREVENTATIVE HEALTH CARE: ICD-10-CM

## 2021-03-02 PROCEDURE — 84443 ASSAY THYROID STIM HORMONE: CPT | Performed by: FAMILY MEDICINE

## 2021-03-02 PROCEDURE — 86140 C-REACTIVE PROTEIN: CPT | Performed by: FAMILY MEDICINE

## 2021-03-02 PROCEDURE — 85652 RBC SED RATE AUTOMATED: CPT | Performed by: FAMILY MEDICINE

## 2021-03-02 PROCEDURE — 36415 COLL VENOUS BLD VENIPUNCTURE: CPT

## 2021-03-02 PROCEDURE — 84439 ASSAY OF FREE THYROXINE: CPT | Performed by: FAMILY MEDICINE

## 2021-03-02 PROCEDURE — 80053 COMPREHEN METABOLIC PANEL: CPT | Performed by: FAMILY MEDICINE

## 2021-03-02 PROCEDURE — 99214 OFFICE O/P EST MOD 30 MIN: CPT | Performed by: FAMILY MEDICINE

## 2021-03-02 RX ORDER — LEVOTHYROXINE SODIUM 0.12 MG/1
125 TABLET ORAL DAILY
Qty: 90 TABLET | Refills: 3 | Status: SHIPPED | OUTPATIENT
Start: 2021-03-02 | End: 2021-12-22 | Stop reason: SDUPTHER

## 2021-03-02 RX ORDER — FAMOTIDINE 20 MG/1
20 TABLET, FILM COATED ORAL 2 TIMES DAILY PRN
Qty: 120 TABLET | Refills: 5 | Status: SHIPPED | OUTPATIENT
Start: 2021-03-02 | End: 2021-06-01

## 2021-03-02 NOTE — PROGRESS NOTES
Chief Complaint   Patient presents with   • sciatic pain     3mo fu     HPI  Jt Valdes is a 80 y.o. female that presents for   Chief Complaint   Patient presents with   • sciatic pain     3mo fu     Back pain: patient reports having epidural in December w/o significant improvement. She has had further injections since that time w/o significant improvement. Patient reports persistent numbness down the L leg but no significant pain in the leg. She does report notable back pain. Patient was tried on gabapentin at last visit w/o relief and made her too groggy. Pain mgmt gave her tramadol w/o success. She is maintained on etodolac 300 daily.     Hypothyroidism: maintained on levothyroxine 125 daily. No concerns at this time.    GERD: patient complains of heartburn 4-5 days/week that is controlled w/ TUMS. No dysphagia. Previously maintianed on Protonix but no longer    PAF: patient reports recent episode of heart racing/palpitations. She denies such symptoms since 2018. She does have a history of PAF and is followed by EVERARDO Patiño. His last note indicates that she should still be on Eliquis and I agree.     Review of Systems   Constitutional: Negative for chills, fever and unexpected weight loss.   HENT: Negative for congestion and trouble swallowing.    Eyes: Negative for visual disturbance.   Respiratory: Negative for cough and shortness of breath.    Cardiovascular: Positive for palpitations. Negative for chest pain.   Gastrointestinal: Positive for GERD. Negative for abdominal pain.   Musculoskeletal: Positive for arthralgias and gait problem.   Neurological: Positive for numbness.     The following portions of the patient's history were reviewed and updated as appropriate: problem list, past medical history, past surgical history, allergies, current medications    Problem List Tab  Patient History Tab  Immunizations Tab  Medications Tab  Chart Review Tab  Care Everywhere Tab  Synopsis Tab    PE  Vitals:     03/02/21 1357   BP: 132/70   Pulse: 53   Resp: 16   Temp: 97.1 °F (36.2 °C)   SpO2: 93%     Body mass index is 21.79 kg/m².  General: Well nourished, NAD  Head: AT/NC  Eyes: EOMI, anicteric sclera  Resp: CTAB, SCR, BS equal  CV: Bradycardic with RR w/o m/r/g; 2+ pulses  GI: Soft, NT/ND, +BS  MSK: FROM, no edema. [place] and [place] nodes  Skin: Warm, dry, intact  Neuro: Alert and oriented. No focal deficits  Psych: Appropriate mood and affect    Imaging  Mri Lumbar Spine Without Contrast    Result Date: 12/7/2020  . IMPRESSION:  1. Postsurgical changes and degenerative changes of the lumbar spine with upper lumbar dextroscoliosis. Please refer to the body of the report for level by level findings.   Electronically Signed By-Alize Sanchez MD On:12/7/2020 2:01 PM This report was finalized on [id number] by  Alize Sanchez MD.      Assessment/Plan   Jt Valdes is a 80 y.o. female that presents for   Chief Complaint   Patient presents with   • sciatic pain     3mo fu     Diagnoses and all orders for this visit:    1. Sciatic pain, left (Primary): Felt to be secondary to severe scoliosis with history of lumbar surgery. Gabapentin and tramadol have not proven to be helpful  -     Comprehensive Metabolic Panel  - Continue home etodolac 300 daily.  Consider increasing to twice daily pending renal function  - Lidocaine patch recommended today  - Continue pain management follow-up    2. Hypothyroidism, unspecified type  -     Continue levothyroxine (SYNTHROID, LEVOTHROID) 125 MCG tablet; Take 1 tablet by mouth Daily.  Dispense: 90 tablet; Refill: 3  -     TSH  -     T4, Free    3. Gastroesophageal reflux disease, unspecified whether esophagitis present  -     Start famotidine (Pepcid) 20 MG tablet; Take 1 tablet by mouth 2 (Two) Times a Day As Needed for Heartburn.  Dispense: 120 tablet; Refill: 5  -     Comprehensive Metabolic Panel    4. Paroxysmal atrial fibrillation (CMS/HCC): [id number] score of 4 (age x2,  female, hypertension).  Patient has discontinued her Eliquis due to cost.  It appears that Xarelto is on formulary with her insurance.  Will initiate today  -     Start rivaroxaban (XARELTO) 15 MG tablet; Take 1 tablet by mouth Daily.  Dispense: 90 tablet; Refill: 3  - Continue low-dose bisoprolol (mildly bradycardic today)  - Continue cardiology [id number]    5. Rheumatoid arthritis involving multiple sites with positive rheumatoid factor (CMS/[facility])  -     C-reactive Protein  -     Sedimentation Rate  - Continue home leflunomide and etodolac    6. Preventative health care  -     DEXA Bone Density Axial; Future    7. Abnormal findings on diagnostic imaging of other parts of musculoskeletal system   -     DEXA Bone Density Axial; Future     Return in about 4 months (around 7/2/2021) for Medicare Wellness.

## 2021-03-03 ENCOUNTER — TELEPHONE (OUTPATIENT)
Dept: FAMILY MEDICINE CLINIC | Facility: CLINIC | Age: 81
End: 2021-03-03

## 2021-03-03 DIAGNOSIS — N18.9 CHRONIC KIDNEY DISEASE, UNSPECIFIED CKD STAGE: Primary | ICD-10-CM

## 2021-03-03 LAB
ALBUMIN SERPL-MCNC: 4.5 G/DL (ref 3.5–5.2)
ALBUMIN/GLOB SERPL: 1.7 G/DL
ALP SERPL-CCNC: 48 U/L (ref 39–117)
ALT SERPL W P-5'-P-CCNC: 10 U/L (ref 1–33)
ANION GAP SERPL CALCULATED.3IONS-SCNC: 9.9 MMOL/L (ref 5–15)
AST SERPL-CCNC: 16 U/L (ref 1–32)
BILIRUB SERPL-MCNC: 0.5 MG/DL (ref 0–1.2)
BUN SERPL-MCNC: 43 MG/DL (ref 8–23)
BUN/CREAT SERPL: 32.6 (ref 7–25)
CALCIUM SPEC-SCNC: 10 MG/DL (ref 8.6–10.5)
CHLORIDE SERPL-SCNC: 100 MMOL/L (ref 98–107)
CO2 SERPL-SCNC: 26.1 MMOL/L (ref 22–29)
CREAT SERPL-MCNC: 1.32 MG/DL (ref 0.57–1)
CRP SERPL-MCNC: <0.3 MG/DL (ref 0–0.5)
ERYTHROCYTE [SEDIMENTATION RATE] IN BLOOD: 6 MM/HR (ref 0–30)
GFR SERPL CREATININE-BSD FRML MDRD: 39 ML/MIN/1.73
GLOBULIN UR ELPH-MCNC: 2.7 GM/DL
GLUCOSE SERPL-MCNC: 95 MG/DL (ref 65–99)
POTASSIUM SERPL-SCNC: 4.9 MMOL/L (ref 3.5–5.2)
PROT SERPL-MCNC: 7.2 G/DL (ref 6–8.5)
SODIUM SERPL-SCNC: 136 MMOL/L (ref 136–145)
T4 FREE SERPL-MCNC: 1.28 NG/DL (ref 0.93–1.7)
TSH SERPL DL<=0.05 MIU/L-ACNC: 2.75 UIU/ML (ref 0.27–4.2)

## 2021-03-03 NOTE — TELEPHONE ENCOUNTER
Caller: Jt Valdes    Relationship: Self     Best call back number: [phone]    What orders are you requesting (i.e. lab or imaging): BONE DENSITY TEST   In what timeframe would the patient need to come in: SOON    Where will you receive your lab/imaging services: [facility]      Additional notes: PATIENT STATED SHE CALLED TO MAKE AN APPOINTMENT AND [facility] TOLD HER THE DOCTOR HAD TO FAX OVER THE ORDERS. SHE WOULD LIKE TO KNOW IF THIS WAS DONE AND SHOULD SHE WAIT ON THEM TO CALL HER FOR AN APPOINTMENT?

## 2021-03-08 ENCOUNTER — OFFICE VISIT (OUTPATIENT)
Dept: PAIN MEDICINE | Facility: CLINIC | Age: 81
End: 2021-03-08

## 2021-03-08 ENCOUNTER — TELEPHONE (OUTPATIENT)
Dept: CARDIOLOGY | Facility: CLINIC | Age: 81
End: 2021-03-08

## 2021-03-08 VITALS
OXYGEN SATURATION: 99 % | SYSTOLIC BLOOD PRESSURE: 149 MMHG | RESPIRATION RATE: 16 BRPM | TEMPERATURE: 96.9 F | DIASTOLIC BLOOD PRESSURE: 75 MMHG | HEART RATE: 54 BPM | WEIGHT: 123 LBS | HEIGHT: 63 IN | BODY MASS INDEX: 21.79 KG/M2

## 2021-03-08 DIAGNOSIS — M96.1 POSTLAMINECTOMY SYNDROME OF LUMBAR REGION: ICD-10-CM

## 2021-03-08 DIAGNOSIS — M47.817 LUMBOSACRAL SPONDYLOSIS WITHOUT MYELOPATHY: Primary | ICD-10-CM

## 2021-03-08 DIAGNOSIS — G89.4 CHRONIC PAIN SYNDROME: ICD-10-CM

## 2021-03-08 PROCEDURE — 99214 OFFICE O/P EST MOD 30 MIN: CPT | Performed by: STUDENT IN AN ORGANIZED HEALTH CARE EDUCATION/TRAINING PROGRAM

## 2021-03-08 RX ORDER — ASPIRIN 325 MG
325 TABLET ORAL DAILY
COMMUNITY
End: 2021-04-08 | Stop reason: SDUPTHER

## 2021-03-08 RX ORDER — ACETAMINOPHEN AND CODEINE PHOSPHATE 300; 30 MG/1; MG/1
1 TABLET ORAL 2 TIMES DAILY PRN
Qty: 60 TABLET | Refills: 0 | Status: SHIPPED | OUTPATIENT
Start: 2021-03-08 | End: 2021-08-04

## 2021-03-08 NOTE — PROGRESS NOTES
CHIEF COMPLAINT  Chief Complaint   Patient presents with   • Back Pain     lower-- no narcotics-- ( med list the same pt stated)       Primary Care  Deshaun Nava MD    Subjective   Jt Valdes is a 80 y.o. female  who presents for follow-up.  She states that she had lumbar fusion approximately 20 years ago that was believed to be L3-L5.  She states that initially, the surgery resolved her pain she is doing very well.  She states that over the past several years, she has had a progressively worsening radicular type back pain with radiation into the anterior and posterior thighs bilaterally.  The pain is worse with prolonged walking and standing.  The pain is significant improved with rest.  She tried gabapentin 300 mg which is causing her to have significant side effects.  She is currently stable on gabapentin 100 mg.    Back Pain  Associated symptoms include numbness. Pertinent negatives include no weakness.        Location: Low back with radiation into the legs bilaterally  Onset: Initially improved after surgery, now occurring  Duration: Progressively worsening  Timing: Constant throughout the day  Quality: Burning tingling numbness in the anterior thighs and sharp, stabbing in low back  Severity: Today: 2       Last Week: 3       Worst: 10  Modifying Factors: The pain is worse with walking and physical activity and movement.  The pain is significant improved with rest and sitting     Physical Therapy: yes    Interval Update 03/08/2021: She is now status post both caudal epidural as well as lumbar medial branch blocks.  Initially, I feel that she did get significant relief from the caudal epidural, but now it is likely the pain has returned.  Additionally, it does not appear that she got any meaningful pain relief from the lumbar medial branch block.  She states that she was unable to tolerate tramadol due to side effects.  She also states that her PCP took her off of etodolac given her renal  "function.    The following portions of the patient's history were reviewed and updated as appropriate: allergies, current medications, past family history, past medical history, past social history, past surgical history and problem list.      Current Outpatient Medications:   •  aspirin 325 MG tablet, Take 325 mg by mouth Daily., Disp: , Rfl:   •  bisoprolol-hydrochlorothiazide (ZIAC) 10-6.25 MG per tablet, Take 1 tablet by mouth Daily., Disp: 90 tablet, Rfl: 3  •  Calcium Carb-Cholecalciferol (CALCIUM-VITAMIN D) 500-200 MG-UNIT per tablet, Take 2 tablets by mouth Daily., Disp: 180 tablet, Rfl: 3  •  Diclofenac Sodium (VOLTAREN) 1 % gel gel, Apply 4 g topically to the appropriate area as directed 4 (Four) Times a Day As Needed (pain)., Disp: 150 g, Rfl: 3  •  etodolac (LODINE) 300 MG capsule, Take 1 capsule by mouth Daily., Disp: 90 capsule, Rfl: 3  •  famotidine (Pepcid) 20 MG tablet, Take 1 tablet by mouth 2 (Two) Times a Day As Needed for Heartburn., Disp: 120 tablet, Rfl: 5  •  leflunomide (ARAVA) 20 MG tablet, Take 1 tablet by mouth Daily., Disp: 90 tablet, Rfl: 3  •  levothyroxine (SYNTHROID, LEVOTHROID) 125 MCG tablet, Take 1 tablet by mouth Daily., Disp: 90 tablet, Rfl: 3  •  losartan (COZAAR) 25 MG tablet, Take 1 tablet by mouth Daily., Disp: 90 tablet, Rfl: 3  •  acetaminophen-codeine (TYLENOL #3) 300-30 MG per tablet, Take 1 tablet by mouth 2 (Two) Times a Day As Needed for Moderate Pain ., Disp: 60 tablet, Rfl: 0  •  aspirin 81 MG EC tablet, Take 81 mg by mouth Daily., Disp: , Rfl:   •  rivaroxaban (XARELTO) 15 MG tablet, Take 1 tablet by mouth Daily., Disp: 90 tablet, Rfl: 3    Review of Systems   Musculoskeletal: Positive for back pain and gait problem.   Neurological: Positive for numbness. Negative for weakness.       Vitals:    03/08/21 0948   BP: 149/75   Pulse: 54   Resp: 16   Temp: 96.9 °F (36.1 °C)   SpO2: 99%   Weight: 55.8 kg (123 lb)   Height: 160 cm (63\")   PainSc:   6       Urine Drug " Screen:  12/9/20   Appropriate: yes    Objective   Physical Exam  Vitals and nursing note reviewed.   Musculoskeletal:      Comments: Lumbar spine exam:  1.  Tender to palpation bilateral lumbar paraspinal musculature  2.  Facet loading positive bilaterally  3.  Decreased lumbar range of motion secondary to pain   Neurological:      General: No focal deficit present.           Assessment/Plan   Problems Addressed this Visit     None      Visit Diagnoses     Lumbosacral spondylosis without myelopathy    -  Primary    Postlaminectomy syndrome of lumbar region        Chronic pain syndrome          Diagnoses       Codes Comments    Lumbosacral spondylosis without myelopathy    -  Primary ICD-10-CM: M47.817  ICD-9-CM: 721.3     Postlaminectomy syndrome of lumbar region     ICD-10-CM: M96.1  ICD-9-CM: 722.83     Chronic pain syndrome     ICD-10-CM: G89.4  ICD-9-CM: 338.4           Plan:  1.  She is now having return of symptoms from the radicular standpoint as before the caudal  2.  Unfortunately, she is not able to tolerate tramadol due to significant sedation  3.  I will try her on Tylenol #3 twice daily for breakthrough pain as she states she does get some benefit from Tylenol  4.  I asked her to try to get a repeat appointment with Dr. Vega with [facility] as he was the one that initially did her surgery approximately 20 years ago for a second opinion  5.  Given her relative failure of both caudal as well as medial branch blocks, I feel that likely her only options from interventional standpoint are either intrathecal pain pump versus spinal cord stimulator.  We will revisit the issue at her next follow-up  6.  She is also complaining of some unsteadiness in her gait.  She states that it is primarily with walking.  She also admitted that she was no longer taking her blood thinner as it was too expensive.  I encouraged her to discuss different options with her cardiologist as we discussed that one of the symptoms of small  TIAs could be gait unsteadiness.  She does have a significant history of paroxysmal A. fib.  At the time of exam, her rate was sinus rhythm at approximately 55 bpm.  Relative bradycardia could also be a potential cause of her unsteadiness of gait.    --- Follow-up 1 month           INSPECT REPORT    As part of the patient's treatment plan, I may be prescribing controlled substances. The patient has been made aware of appropriate use of such medications, including potential risk of somnolence, limited ability to drive and/or work safely, and the potential for dependence or overdose. It has also bee made clear that these medications are for use by this patient only, without concomitant use of alcohol or other substances unless prescribed.     Patient has completed prescribing agreement detailing terms of continued prescribing of controlled substances, including monitoring EMERSON reports, urine drug screening, and pill counts if necessary. The patient is aware that inappropriate use will results in cessation of prescribing such medications.    INSPECT report has been reviewed and scanned into the patient's chart.    As the clinician, I personally reviewed the INSPECT from 3/5/2021    History and physical exam exhibit continued safe and appropriate use of controlled substances.      EMR Dragon/Transcription disclaimer:   Much of this encounter note is an electronic transcription/translation of spoken language to printed text. The electronic translation of spoken language may permit erroneous, or at times, nonsensical words or phrases to be inadvertently transcribed; Although I have reviewed the note for such errors, some may still exist.

## 2021-03-08 NOTE — TELEPHONE ENCOUNTER
Pt ADRI, Has afib, another Dr wants to put her on an expensive blood thinner.     Has appt 4/2     Wants sooner appt.   Please call pt to get in sooner.

## 2021-03-16 ENCOUNTER — OFFICE VISIT (OUTPATIENT)
Dept: CARDIOLOGY | Facility: CLINIC | Age: 81
End: 2021-03-16

## 2021-03-16 VITALS
SYSTOLIC BLOOD PRESSURE: 175 MMHG | TEMPERATURE: 97.1 F | DIASTOLIC BLOOD PRESSURE: 77 MMHG | WEIGHT: 122 LBS | OXYGEN SATURATION: 94 % | HEIGHT: 63 IN | BODY MASS INDEX: 21.62 KG/M2 | HEART RATE: 55 BPM

## 2021-03-16 DIAGNOSIS — R00.2 PALPITATIONS: Primary | ICD-10-CM

## 2021-03-16 DIAGNOSIS — I10 ESSENTIAL HYPERTENSION: ICD-10-CM

## 2021-03-16 DIAGNOSIS — Z79.01 LONG TERM (CURRENT) USE OF ANTICOAGULANTS: ICD-10-CM

## 2021-03-16 DIAGNOSIS — I48.0 PAROXYSMAL ATRIAL FIBRILLATION (HCC): ICD-10-CM

## 2021-03-16 DIAGNOSIS — N18.32 STAGE 3B CHRONIC KIDNEY DISEASE (HCC): ICD-10-CM

## 2021-03-16 DIAGNOSIS — E78.5 DYSLIPIDEMIA: ICD-10-CM

## 2021-03-16 DIAGNOSIS — R07.2 PRECORDIAL PAIN: ICD-10-CM

## 2021-03-16 DIAGNOSIS — G47.33 OBSTRUCTIVE SLEEP APNEA: ICD-10-CM

## 2021-03-16 PROCEDURE — 93000 ELECTROCARDIOGRAM COMPLETE: CPT | Performed by: INTERNAL MEDICINE

## 2021-03-16 PROCEDURE — 99215 OFFICE O/P EST HI 40 MIN: CPT | Performed by: INTERNAL MEDICINE

## 2021-03-16 RX ORDER — METOPROLOL SUCCINATE 50 MG/1
50 TABLET, EXTENDED RELEASE ORAL DAILY
Qty: 90 TABLET | Refills: 3 | Status: SHIPPED | OUTPATIENT
Start: 2021-03-16 | End: 2021-03-16

## 2021-03-16 RX ORDER — WARFARIN SODIUM 2.5 MG/1
2.5 TABLET ORAL DAILY
Qty: 60 TABLET | Refills: 11 | Status: SHIPPED | OUTPATIENT
Start: 2021-03-16 | End: 2021-04-29 | Stop reason: SDUPTHER

## 2021-03-16 RX ORDER — METOPROLOL SUCCINATE 50 MG/1
50 TABLET, EXTENDED RELEASE ORAL DAILY
Qty: 90 TABLET | Refills: 3 | Status: SHIPPED | OUTPATIENT
Start: 2021-03-16 | End: 2021-04-29 | Stop reason: SDUPTHER

## 2021-03-16 NOTE — PROGRESS NOTES
Subjective:     Encounter Date:03/16/2021      Patient ID: Jt Valdes is a 80 y.o. female.    Chief Complaint : Complaining of chest pain.  Follow-up for hypertensive cardiovascular disease, A. fib, long-term anticoagulation, hyperlipidemia, whitecoat hypertension.  History of Present Illness      This is a 80-year-old previous Dr. Colby patient with past medical history of    Hypertension/hypertensive cardiovascular disease  Paroxysmal atrial fibrillation, noncompliant on Xarelto is  Hyperlipidemia  SILVERIO   Hypothyroidism  Whitecoat hypertension  History of leukopenia, staph infection  Rheumatoid arthritis, possible pulmonary involved ,DJD, DDD  Partial hysterectomy, appendectomy, back surgery  Allergies/intolerance to sulfa-rash  Non-smoker    Here for follow-up..  Patient is to see Dr. Yannick Colby in the past.  Patient had history of sleep apnea is noncompliant with CPAP.  Patient is complaining of intermittent chest pain with no aggravating or relieving factors.  Palpitations patient thought she was in A. fib 2 weeks ago heart rate went up to 120 bpm.  Denies any lightheadedness dizziness loss of consciousness.  Has fatigue.  No aggravating or relieving factors.  Patient was started on Xarelto last visit did not started.    Patient had labs done which showed normal CBC CMP and TSH from 6/4/19.  Labs from 3/9/2020 reveal TSH 1.47, cholesterol 202, triglycerides 102, HDL 58, , CMP with a creatinine of 1.06, glucose of 111, A1c of 5.2, normal CBC.  Lipid profile 3/9/2020 with cholesterol 202 triglycerides 102 HDL 58 , CMP with a BUN/creatinine of 21 INR 1.06 and GFR 50.  Hemoglobin A1c 5.2  Labs from 3-2-21 revealed normal CRP TSH and free T4.  CMP with BUN/creatinine of 43/1.32 and GFR of 39.  Patient's arterial blood pressure is one 174/77, heart rate 55, O2 sat of 94% on room air.  Patient states her blood pressure at home was 105/60.  She states that she has whitecoat  hypertension      Reviewed previous records:            Echo 09/04/2018  LVH EF 65%. RV OK.  LA probably mildly dilated.  AV OK.  MV OK.  Modest TR RVSP 26 or less        Nuclear MPI regadenoson 09/14/2018.  LV uniform myocardial uptake and wall motion EF 71%.         Holter 10/08/2018 SR 40-81 58. AF 2.7 hr episode  VR .  At termination AF 2 3.5-4.0 s pauses with patient possibly dizzy.  205 PAC 42 atrial pairs several SVT.  No ventricular ectopy.        Creatinine 1.0  09/14/2018        Hgb 11.809/13/2018           Assessment:  Palpitations, paroxysmal A. Fib  Chest pain  Noncompliant on anticoagulation  Whitecoat hypertension  Elevated BUN/creatinine possible acute kidney injury/CKD 3B  Hypertension  Paroxysmal atrial fibrillation on long-term anti-coagulation  SILVERIO  Hyperlipidemia    Plan:  Reviewed EKG results with patient  We will discontinue  bisoprolol hydrochlorothiazide and start her on metoprolol to help with CKD and blood pressure and CAD.    Reviewed labs with patient  Advised her to follow-up with nephrology.  Advised her to hold NSAIDs.  Patient has high CIH5CK1-NMJs score due to female gender, age of 80, hypertension making it for would benefit from long-term anticoagulation, patient does not want to go on Xarelto or Eliquis due to cost we will start her on warfarin and enroll in pro time clinic to keep INR between 2 and 3 risk-benefit alternatives explained and discussed about anticoagulation with warfarin and dietary restrictions and interaction with other medications at length with patient for over 45 minutes about warfarin itself and interaction and dietary requirements and risk benefits alternatives..  Advised patient to check blood pressure at home.   Continue losartan and Toprol for blood pressure and CAD  Continue aspirin for CAD.  Patient is complaining of chest pain will schedule a stress test patient says she cannot walk due to back pain and sciatica.  Will do Lexiscan  Cardiolite.  Advised statins.  Patient wants to think about it.  Patient's fatigue could be from SILVERIO advised her to follow-up with pulmonary and comply with CPAP.  Follow-up with PMD for hypothyroidism.      Assessment:         MDM     Diagnosis Plan   1. Palpitations  Stress Test With Myocardial Perfusion One Day   2. Precordial pain  Stress Test With Myocardial Perfusion One Day   3. Paroxysmal atrial fibrillation (CMS/HCC)  Stress Test With Myocardial Perfusion One Day   4. Essential hypertension  Stress Test With Myocardial Perfusion One Day   5. Long term (current) use of anticoagulants  Stress Test With Myocardial Perfusion One Day   6. Obstructive sleep apnea  Stress Test With Myocardial Perfusion One Day   7. Stage 3b chronic kidney disease (CMS/HCC)  Stress Test With Myocardial Perfusion One Day   8. Dyslipidemia            Plan:         Past Medical History:  Past Medical History:   Diagnosis Date   • Arthritis    • Degenerative joint disease    • Extremity pain     legs pain   • H/O degenerative disc disease    • History of colonoscopy 2009    last done 2009   • History of echocardiogram 09/04/2018    LVH EF 65%. RV OK. LA probably mildly dilated. AV OK. MV OK. Modest TR RVSP 26 or less. Nuclear MPI regadenoson 9/14/2018. LV uniform myocardial uptake and wall motion EF 71%.    • History of Holter monitoring 10/08/2018    SR 40-81 58. AF 2.7 hr episode VR . At termination AF 2 3.5-4.0 s pauses with patient possibly dizzy. 205 PAC 42 atrial ashanti several SVT. No ventricular ectopy.    • HTN (hypertension)    • Hyperlipidemia    • Hypertensive cardiovascular disease    • Hypothyroidism    • Incontinence    • Leukopenia    • Low back pain    • PAF (paroxysmal atrial fibrillation) (CMS/HCC) 09/2018     [facility] Sept 2018 PAF and HTN. PAFL On bisoprolol and diltiazem bradycardia symptomatic with dizziness. Amiodarone alone Oct 2018 and no episodes of erratic or fast heartbeat or lightheadedness.    •  Rheumatoid arthritis (CMS/HCC)     Possible pulmonary involvement    • Staph infection    • White coat syndrome with hypertension      Past Surgical History:  Past Surgical History:   Procedure Laterality Date   • APPENDECTOMY  1974   • BACK SURGERY  2003 2001, 2003   • SUBTOTAL HYSTERECTOMY  1974      Allergies:  Allergies   Allergen Reactions   • Sulfa Antibiotics Rash     Home Meds:  Current Meds:     Current Outpatient Medications:   •  acetaminophen-codeine (TYLENOL #3) 300-30 MG per tablet, Take 1 tablet by mouth 2 (Two) Times a Day As Needed for Moderate Pain ., Disp: 60 tablet, Rfl: 0  •  aspirin 325 MG tablet, Take 325 mg by mouth Daily., Disp: , Rfl:   •  Calcium Carb-Cholecalciferol (CALCIUM-VITAMIN D) 500-200 MG-UNIT per tablet, Take 2 tablets by mouth Daily., Disp: 180 tablet, Rfl: 3  •  Diclofenac Sodium (VOLTAREN) 1 % gel gel, Apply 4 g topically to the appropriate area as directed 4 (Four) Times a Day As Needed (pain)., Disp: 150 g, Rfl: 3  •  famotidine (Pepcid) 20 MG tablet, Take 1 tablet by mouth 2 (Two) Times a Day As Needed for Heartburn., Disp: 120 tablet, Rfl: 5  •  leflunomide (ARAVA) 20 MG tablet, Take 1 tablet by mouth Daily., Disp: 90 tablet, Rfl: 3  •  levothyroxine (SYNTHROID, LEVOTHROID) 125 MCG tablet, Take 1 tablet by mouth Daily., Disp: 90 tablet, Rfl: 3  •  losartan (COZAAR) 25 MG tablet, Take 1 tablet by mouth Daily., Disp: 90 tablet, Rfl: 3  •  metoprolol succinate XL (TOPROL-XL) 50 MG 24 hr tablet, Take 1 tablet by mouth Daily., Disp: 90 tablet, Rfl: 3  •  warfarin (COUMADIN) 2.5 MG tablet, Take 1 tablet by mouth Daily., Disp: 60 tablet, Rfl: 11  Social History:   Social History     Tobacco Use   • Smoking status: Never Smoker   • Smokeless tobacco: Never Used   Substance Use Topics   • Alcohol use: Yes     Comment: Occ.      Family History:  Family History   Problem Relation Age of Onset   • Osteoarthritis Mother    • Other Other         Rheumatoid disease         The  "following portions of the patient's history were reviewed and updated as appropriate: allergies, current medications, past family history, past medical history, past social history, past surgical history and problem list.      Review of Systems   Constitutional: Positive for malaise/fatigue.   Cardiovascular: Positive for chest pain. Negative for leg swelling and palpitations.   Respiratory: Negative for shortness of breath.    Skin: Negative for rash.   Neurological: Negative for dizziness, light-headedness and numbness.     All other systems are negative      ECG 12 Lead    Date/Time: 3/16/2021 12:03 PM  Performed by: Jose Patiño MD  Authorized by: Jose Patiño MD   Comparison: compared with previous ECG from 9/29/2020  Comparison to previous ECG: EKG done today reviewed by me shows sinus bradycardia at the rate of 52 bpm with moderate ST segment depression T wave inversion in V4 V5 V6, slightly worse than EKG from 9/29/2020                 Objective:     Physical Exam  /77   Pulse 55   Temp 97.1 °F (36.2 °C)   Ht 160 cm (63\")   Wt 55.3 kg (122 lb)   SpO2 94%   BMI 21.61 kg/m²   General:  Appears in no acute distress  Eyes: Sclera is anicteric,  conjunctiva is clear   HEENT:  No JVD. Thyroid not visibly enlarged. No mucosal pallor or cyanosis  Respiratory: Respirations regular and unlabored at rest.  Clear to auscultation  Cardiovascular: S1,S2 Regular rate and rhythm. No murmur, rub or gallop auscultated. No pretibial pitting edema  Gastrointestinal: Abdomen nondistended, soft  Musculoskeletal:  No abnormal movements  Extremities: No digital clubbing or cyanosis  Skin: Color pink. Skin warm and dry to touch. No rashes  No xanthoma  Neuro: Alert and awake, no lateralizing deficits appreciated    Lab Reviewed:                  "

## 2021-03-17 ENCOUNTER — LAB (OUTPATIENT)
Dept: FAMILY MEDICINE CLINIC | Facility: CLINIC | Age: 81
End: 2021-03-17

## 2021-03-17 DIAGNOSIS — N18.9 CHRONIC KIDNEY DISEASE, UNSPECIFIED CKD STAGE: ICD-10-CM

## 2021-03-17 LAB
ANION GAP SERPL CALCULATED.3IONS-SCNC: 10.6 MMOL/L (ref 5–15)
BUN SERPL-MCNC: 34 MG/DL (ref 8–23)
BUN/CREAT SERPL: 26.4 (ref 7–25)
CALCIUM SPEC-SCNC: 9.6 MG/DL (ref 8.6–10.5)
CHLORIDE SERPL-SCNC: 103 MMOL/L (ref 98–107)
CO2 SERPL-SCNC: 25.4 MMOL/L (ref 22–29)
CREAT SERPL-MCNC: 1.29 MG/DL (ref 0.57–1)
GFR SERPL CREATININE-BSD FRML MDRD: 40 ML/MIN/1.73
GLUCOSE SERPL-MCNC: 104 MG/DL (ref 65–99)
POTASSIUM SERPL-SCNC: 4.8 MMOL/L (ref 3.5–5.2)
SODIUM SERPL-SCNC: 139 MMOL/L (ref 136–145)

## 2021-03-17 PROCEDURE — 80048 BASIC METABOLIC PNL TOTAL CA: CPT | Performed by: FAMILY MEDICINE

## 2021-03-22 ENCOUNTER — ANTICOAGULATION VISIT (OUTPATIENT)
Dept: CARDIOLOGY | Facility: CLINIC | Age: 81
End: 2021-03-22

## 2021-03-22 ENCOUNTER — TELEPHONE (OUTPATIENT)
Dept: CARDIOLOGY | Facility: CLINIC | Age: 81
End: 2021-03-22

## 2021-03-22 VITALS
DIASTOLIC BLOOD PRESSURE: 88 MMHG | HEART RATE: 148 BPM | TEMPERATURE: 97.3 F | SYSTOLIC BLOOD PRESSURE: 156 MMHG | WEIGHT: 123 LBS | BODY MASS INDEX: 21.79 KG/M2

## 2021-03-22 DIAGNOSIS — Z79.01 LONG TERM (CURRENT) USE OF ANTICOAGULANTS: ICD-10-CM

## 2021-03-22 DIAGNOSIS — I48.0 PAROXYSMAL ATRIAL FIBRILLATION (HCC): ICD-10-CM

## 2021-03-22 LAB — INR PPP: 1.2 (ref 0.9–1.1)

## 2021-03-22 PROCEDURE — 36416 COLLJ CAPILLARY BLOOD SPEC: CPT | Performed by: INTERNAL MEDICINE

## 2021-03-22 PROCEDURE — 85610 PROTHROMBIN TIME: CPT | Performed by: INTERNAL MEDICINE

## 2021-03-22 NOTE — TELEPHONE ENCOUNTER
Pt  in office today b/p 156/88. Per Dr. Patiño patient to increase Metoprolol to 100mg daily. Pt verbalizes understanding apLPN

## 2021-03-30 ENCOUNTER — ANTICOAGULATION VISIT (OUTPATIENT)
Dept: CARDIOLOGY | Facility: CLINIC | Age: 81
End: 2021-03-30

## 2021-03-30 VITALS
HEART RATE: 64 BPM | DIASTOLIC BLOOD PRESSURE: 69 MMHG | TEMPERATURE: 97.7 F | BODY MASS INDEX: 21.79 KG/M2 | WEIGHT: 123 LBS | SYSTOLIC BLOOD PRESSURE: 160 MMHG

## 2021-03-30 DIAGNOSIS — I48.0 PAROXYSMAL ATRIAL FIBRILLATION (HCC): ICD-10-CM

## 2021-03-30 DIAGNOSIS — Z79.01 LONG TERM (CURRENT) USE OF ANTICOAGULANTS: ICD-10-CM

## 2021-03-30 DIAGNOSIS — R93.7 ABNORMAL FINDINGS ON DIAGNOSTIC IMAGING OF OTHER PARTS OF MUSCULOSKELETAL SYSTEM: ICD-10-CM

## 2021-03-30 DIAGNOSIS — Z00.00 PREVENTATIVE HEALTH CARE: ICD-10-CM

## 2021-03-30 LAB — INR PPP: 1.7 (ref 0.9–1.1)

## 2021-03-30 PROCEDURE — 36416 COLLJ CAPILLARY BLOOD SPEC: CPT | Performed by: INTERNAL MEDICINE

## 2021-03-30 PROCEDURE — 85610 PROTHROMBIN TIME: CPT | Performed by: INTERNAL MEDICINE

## 2021-04-07 ENCOUNTER — APPOINTMENT (OUTPATIENT)
Dept: PAIN MEDICINE | Facility: CLINIC | Age: 81
End: 2021-04-07

## 2021-04-08 ENCOUNTER — HOSPITAL ENCOUNTER (OUTPATIENT)
Dept: CARDIOLOGY | Facility: HOSPITAL | Age: 81
Discharge: HOME | End: 2021-04-08

## 2021-04-08 ENCOUNTER — OFFICE VISIT (OUTPATIENT)
Dept: CARDIOLOGY | Facility: CLINIC | Age: 81
End: 2021-04-08

## 2021-04-08 VITALS
HEART RATE: 76 BPM | HEIGHT: 63 IN | TEMPERATURE: 97.3 F | SYSTOLIC BLOOD PRESSURE: 142 MMHG | BODY MASS INDEX: 21.62 KG/M2 | DIASTOLIC BLOOD PRESSURE: 88 MMHG | WEIGHT: 122 LBS

## 2021-04-08 DIAGNOSIS — R00.2 PALPITATIONS: ICD-10-CM

## 2021-04-08 DIAGNOSIS — I10 ESSENTIAL HYPERTENSION: ICD-10-CM

## 2021-04-08 DIAGNOSIS — G47.33 OBSTRUCTIVE SLEEP APNEA: ICD-10-CM

## 2021-04-08 DIAGNOSIS — I48.0 PAROXYSMAL ATRIAL FIBRILLATION (HCC): ICD-10-CM

## 2021-04-08 DIAGNOSIS — R07.2 PRECORDIAL PAIN: ICD-10-CM

## 2021-04-08 DIAGNOSIS — Z01.810 PREOPERATIVE CARDIOVASCULAR EXAMINATION: Primary | ICD-10-CM

## 2021-04-08 DIAGNOSIS — N18.32 STAGE 3B CHRONIC KIDNEY DISEASE (HCC): ICD-10-CM

## 2021-04-08 DIAGNOSIS — E78.5 DYSLIPIDEMIA: ICD-10-CM

## 2021-04-08 DIAGNOSIS — Z79.01 LONG TERM (CURRENT) USE OF ANTICOAGULANTS: ICD-10-CM

## 2021-04-08 LAB
BH CV REST NUCLEAR ISOTOPE DOSE: 10.9 MCI
BH CV STRESS COMMENTS STAGE 1: NORMAL
BH CV STRESS DOSE REGADENOSON STAGE 1: 0.4
BH CV STRESS DURATION MIN STAGE 1: 0
BH CV STRESS DURATION SEC STAGE 1: 10
BH CV STRESS NUCLEAR ISOTOPE DOSE: 32.8 MCI
BH CV STRESS PROTOCOL 1: NORMAL
BH CV STRESS RECOVERY BP: NORMAL MMHG
BH CV STRESS RECOVERY HR: 104 BPM
BH CV STRESS STAGE 1: 1
MAXIMAL PREDICTED HEART RATE: 139 BPM
STRESS BASELINE BP: NORMAL MMHG
STRESS BASELINE HR: 74 BPM
STRESS TARGET HR: 118 BPM

## 2021-04-08 PROCEDURE — 25010000002 REGADENOSON 0.4 MG/5ML SOLUTION: Performed by: INTERNAL MEDICINE

## 2021-04-08 PROCEDURE — 78452 HT MUSCLE IMAGE SPECT MULT: CPT

## 2021-04-08 PROCEDURE — 0 TECHNETIUM SESTAMIBI: Performed by: INTERNAL MEDICINE

## 2021-04-08 PROCEDURE — 99214 OFFICE O/P EST MOD 30 MIN: CPT | Performed by: INTERNAL MEDICINE

## 2021-04-08 PROCEDURE — 93018 CV STRESS TEST I&R ONLY: CPT | Performed by: INTERNAL MEDICINE

## 2021-04-08 PROCEDURE — A9500 TC99M SESTAMIBI: HCPCS | Performed by: INTERNAL MEDICINE

## 2021-04-08 PROCEDURE — 93017 CV STRESS TEST TRACING ONLY: CPT

## 2021-04-08 PROCEDURE — 78452 HT MUSCLE IMAGE SPECT MULT: CPT | Performed by: INTERNAL MEDICINE

## 2021-04-08 RX ADMIN — REGADENOSON 0.4 MG: 0.08 INJECTION, SOLUTION INTRAVENOUS at 13:57

## 2021-04-08 RX ADMIN — TECHNETIUM TC 99M SESTAMIBI 1 DOSE: 1 INJECTION INTRAVENOUS at 12:20

## 2021-04-08 RX ADMIN — TECHNETIUM TC 99M SESTAMIBI 1 DOSE: 1 INJECTION INTRAVENOUS at 13:57

## 2021-04-08 NOTE — PROGRESS NOTES
Subjective:     Encounter Date:04/08/2021      Patient ID: Jt Valdes is a 80 y.o. female.    Chief Complaint : Preop for possible back surgery.  Here for follow-up for hypertensive cardiovascular disease, MATT moon with long-term anticoagulation, hyperlipidemia marked, whitecoat hypertension, stress test follow-up.  History of Present Illness      This is a 80-year-old previous Dr. Colby patient with past medical history of    Hypertension/hypertensive cardiovascular disease  Paroxysmal atrial fibrillation, noncompliant on Xarelto is  Hyperlipidemia  SILVERIO, noncompliant on CPAP  Hypothyroidism  Whitecoat hypertension  History of leukopenia, staph infection  Rheumatoid arthritis, possible pulmonary involved ,DJD, DDD  Partial hysterectomy, appendectomy, back surgery  Allergies/intolerance to sulfa-rash  Non-smoker  Patient is to see Dr. Yannick Colby in the past.    Here for follow-up..   Patient was seen recently with MATT moon with RVR and chest pain underwent his Lexiscan Cardiolite today which was negative for any ischemia is here for follow-up.  Patient is having back issues and is supposed to have a myelogram and possible surgery.    Patient had labs done which showed normal CBC CMP and TSH from 6/4/19.  Labs from 3/9/2020 reveal TSH 1.47, cholesterol 202, triglycerides 102, HDL 58, , CMP with a creatinine of 1.06, glucose of 111, A1c of 5.2, normal CBC.  Lipid profile 3/9/2020 with cholesterol 202 triglycerides 102 HDL 58 , CMP with a BUN/creatinine of 21 INR 1.06 and GFR 50.  Hemoglobin A1c 5.2  Labs from 3-2-21 revealed normal CRP TSH and free T4.  CMP with BUN/creatinine of 43/1.32 and GFR of 39.  Patient's arterial blood pressure is one 174/77, heart rate 55, O2 sat of 94% on room air.  Patient states her blood pressure at home was 105/60.  She states that she has whitecoat hypertension      Reviewed previous records:            Echo 09/04/2018  LVH EF 65%. RV OK.  LA probably mildly  dilated.  AV OK.  MV OK.  Modest TR RVSP 26 or less        Nuclear MPI regadenoson 09/14/2018.  LV uniform myocardial uptake and wall motion EF 71%.         Holter 10/08/2018 SR 40-81 58. AF 2.7 hr episode  VR .  At termination AF 2 3.5-4.0 s pauses with patient possibly dizzy.  205 PAC 42 atrial pairs several SVT.  No ventricular ectopy.        Creatinine 1.0  09/14/2018        Hgb 11.809/13/2018           Assessment:  Reoperative cardiovascular evaluation for back surgery   paroxysmal A. Fib, long-term anticoagulation with warfarin  Whitecoat hypertension  CKD 3B  Hypertension  Paroxysmal atrial fibrillation on long-term anti-coagulation  SILVERIO  Hyperlipidemia    Plan:  Reviewed stress test results with patient  Patient had renal dysfunction with BUN/creatinine of 43/1 0.3-1 CMP from 3/2/21.  Lisinopril hydrochlorothiazide was discontinued patient was started on metoprolol and repeat labs on 3/17/2021 reveal BUN/creatinine of 34/1.28 with GFR of 40.  Reviewed labs with patient  Advised her to follow-up with nephrology.  Patient was to hold off on seeing nephrology at the current time.  Advised her to hold NSAIDs.  Patient has high [id number] score due to female gender, age of 80, hypertension making it for would benefit from long-term anticoagulation, patient does not want to go on Xarelto or Eliquis due to cost we will start her on warfarin and enroll in pro time clinic to keep INR between 2 and 3 risk-benefit alternatives explained and discussed about anticoagulation with warfarin and dietary restrictions and interaction with other medications at length with patient.  Patient's INR 3/30/2021 was 1.7.  Advised patient to check blood pressure at home.   Continue losartan and Toprol for blood pressure and CAD  Continue aspirin for CAD.  Patient is not compliant with aspirin.  Advised statins.  Patient wants to think about it.  Patient's fatigue could be from SILVERIO advised her to follow-up with pulmonary and  comply with CPAP.  Follow-up with PMD for hypothyroidism.          Assessment:         MDM     Diagnosis Plan   1. Preoperative cardiovascular examination     2. Paroxysmal atrial fibrillation (CMS/HCC)     3. Essential hypertension     4. Long term (current) use of anticoagulants     5. Obstructive sleep apnea     6. Stage 3b chronic kidney disease (CMS/HCC)     7. Dyslipidemia            Plan:         Past Medical History:  Past Medical History:   Diagnosis Date   • Arthritis    • Degenerative joint disease    • Extremity pain     legs pain   • H/O degenerative disc disease    • History of colonoscopy 2009    last done 2009   • History of echocardiogram 09/04/2018    LVH EF 65%. RV OK. LA probably mildly dilated. AV OK. MV OK. Modest TR RVSP 26 or less. Nuclear MPI regadenoson 9/14/2018. LV uniform myocardial uptake and wall motion EF 71%.    • History of Holter monitoring 10/08/2018    SR 40-81 58. AF 2.7 hr episode VR . At termination AF 2 3.5-4.0 s pauses with patient possibly dizzy. 205 PAC 42 atrial ashanti several SVT. No ventricular ectopy.    • HTN (hypertension)    • Hyperlipidemia    • Hypertensive cardiovascular disease    • Hypothyroidism    • Incontinence    • Leukopenia    • Low back pain    • PAF (paroxysmal atrial fibrillation) (CMS/HCC) 09/2018    [facility] Sept 2018 PAF and HTN. PAFL On bisoprolol and diltiazem bradycardia symptomatic with dizziness. Amiodarone alone Oct 2018 and no episodes of erratic or fast heartbeat or lightheadedness.    • Rheumatoid arthritis (CMS/HCC)     Possible pulmonary involvement    • Staph infection    • White coat syndrome with hypertension      Past Surgical History:  Past Surgical History:   Procedure Laterality Date   • APPENDECTOMY  1974   • BACK SURGERY  2003 2001, 2003   • SUBTOTAL HYSTERECTOMY  1974      Allergies:  Allergies   Allergen Reactions   • Sulfa Antibiotics Rash     Home Meds:  Current Meds:     Current Outpatient Medications:   •   acetaminophen-codeine (TYLENOL #3) 300-30 MG per tablet, Take 1 tablet by mouth 2 (Two) Times a Day As Needed for Moderate Pain ., Disp: 60 tablet, Rfl: 0  •  Calcium Carb-Cholecalciferol (CALCIUM-VITAMIN D) 500-200 MG-UNIT per tablet, Take 2 tablets by mouth Daily., Disp: 180 tablet, Rfl: 3  •  Diclofenac Sodium (VOLTAREN) 1 % gel gel, Apply 4 g topically to the appropriate area as directed 4 (Four) Times a Day As Needed (pain)., Disp: 150 g, Rfl: 3  •  famotidine (Pepcid) 20 MG tablet, Take 1 tablet by mouth 2 (Two) Times a Day As Needed for Heartburn., Disp: 120 tablet, Rfl: 5  •  leflunomide (ARAVA) 20 MG tablet, Take 1 tablet by mouth Daily., Disp: 90 tablet, Rfl: 3  •  levothyroxine (SYNTHROID, LEVOTHROID) 125 MCG tablet, Take 1 tablet by mouth Daily., Disp: 90 tablet, Rfl: 3  •  losartan (COZAAR) 25 MG tablet, Take 1 tablet by mouth Daily., Disp: 90 tablet, Rfl: 3  •  metoprolol succinate XL (TOPROL-XL) 50 MG 24 hr tablet, Take 1 tablet by mouth Daily., Disp: 90 tablet, Rfl: 3  •  warfarin (COUMADIN) 2.5 MG tablet, Take 1 tablet by mouth Daily., Disp: 60 tablet, Rfl: 11  No current facility-administered medications for this visit.  Social History:   Social History     Tobacco Use   • Smoking status: Never Smoker   • Smokeless tobacco: Never Used   Substance Use Topics   • Alcohol use: Yes     Comment: Occ.      Family History:  Family History   Problem Relation Age of Onset   • Osteoarthritis Mother    • Other Other         Rheumatoid disease         The following portions of the patient's history were reviewed and updated as appropriate: allergies, current medications, past family history, past medical history, past social history, past surgical history and problem list.      Review of Systems   Cardiovascular: Positive for palpitations. Negative for chest pain and leg swelling.   Respiratory: Positive for shortness of breath.    Neurological: Negative for dizziness and numbness.     All other systems are  "negative    Procedures       Objective:     Physical Exam  /88 (BP Location: Left arm, Patient Position: Sitting, Cuff Size: Adult)   Pulse 76   Temp 97.3 °F (36.3 °C)   Ht 160 cm (63\")   Wt 55.3 kg (122 lb)   BMI 21.61 kg/m²   General:  Appears in no acute distress  Eyes: Sclera is anicteric,  conjunctiva is clear   HEENT:  No JVD.  No carotid bruits  Respiratory: Respirations regular and unlabored at rest.  Clear to auscultation  Cardiovascular: S1,S2 Regular rate and rhythm. No murmur, rub or gallop auscultated.   Gastrointestinal: Abdomen nondistended, soft  Extremities: No digital clubbing or cyanosis, no edema  Skin: Color pink. Skin warm and dry to touch. No rashes  No xanthoma  Neuro: Alert and awake, no lateralizing deficits appreciated    Lab Reviewed:                  "

## 2021-04-12 ENCOUNTER — ANTICOAGULATION VISIT (OUTPATIENT)
Dept: CARDIOLOGY | Facility: CLINIC | Age: 81
End: 2021-04-12

## 2021-04-12 VITALS — DIASTOLIC BLOOD PRESSURE: 65 MMHG | SYSTOLIC BLOOD PRESSURE: 184 MMHG | HEART RATE: 63 BPM | TEMPERATURE: 96.6 F

## 2021-04-12 DIAGNOSIS — I48.0 PAROXYSMAL ATRIAL FIBRILLATION (HCC): ICD-10-CM

## 2021-04-12 DIAGNOSIS — Z79.01 LONG TERM (CURRENT) USE OF ANTICOAGULANTS: ICD-10-CM

## 2021-04-12 LAB — INR PPP: 1.7 (ref 0.9–1.1)

## 2021-04-12 PROCEDURE — 36416 COLLJ CAPILLARY BLOOD SPEC: CPT | Performed by: INTERNAL MEDICINE

## 2021-04-12 PROCEDURE — 85610 PROTHROMBIN TIME: CPT | Performed by: INTERNAL MEDICINE

## 2021-04-14 ENCOUNTER — APPOINTMENT (OUTPATIENT)
Dept: PAIN MEDICINE | Facility: CLINIC | Age: 81
End: 2021-04-14

## 2021-04-28 ENCOUNTER — ANTICOAGULATION VISIT (OUTPATIENT)
Dept: CARDIOLOGY | Facility: CLINIC | Age: 81
End: 2021-04-28

## 2021-04-28 VITALS
SYSTOLIC BLOOD PRESSURE: 173 MMHG | WEIGHT: 124 LBS | DIASTOLIC BLOOD PRESSURE: 76 MMHG | HEART RATE: 65 BPM | BODY MASS INDEX: 21.97 KG/M2 | TEMPERATURE: 96.8 F

## 2021-04-28 DIAGNOSIS — I48.0 PAROXYSMAL ATRIAL FIBRILLATION (HCC): Primary | ICD-10-CM

## 2021-04-28 DIAGNOSIS — Z79.01 LONG TERM (CURRENT) USE OF ANTICOAGULANTS: ICD-10-CM

## 2021-04-28 LAB — INR PPP: 1.7 (ref 0.9–1.1)

## 2021-04-28 PROCEDURE — 85610 PROTHROMBIN TIME: CPT | Performed by: INTERNAL MEDICINE

## 2021-04-28 PROCEDURE — 36416 COLLJ CAPILLARY BLOOD SPEC: CPT | Performed by: INTERNAL MEDICINE

## 2021-04-29 DIAGNOSIS — Z79.01 LONG TERM (CURRENT) USE OF ANTICOAGULANTS: ICD-10-CM

## 2021-04-29 DIAGNOSIS — I48.0 PAROXYSMAL ATRIAL FIBRILLATION (HCC): Primary | ICD-10-CM

## 2021-04-29 RX ORDER — WARFARIN SODIUM 2.5 MG/1
TABLET ORAL
Qty: 180 TABLET | Refills: 0 | Status: SHIPPED | OUTPATIENT
Start: 2021-04-29 | End: 2021-08-02

## 2021-04-29 RX ORDER — METOPROLOL SUCCINATE 100 MG/1
100 TABLET, EXTENDED RELEASE ORAL DAILY
Qty: 90 TABLET | Refills: 2 | Status: SHIPPED | OUTPATIENT
Start: 2021-04-29 | End: 2022-01-18

## 2021-04-29 NOTE — TELEPHONE ENCOUNTER
PT states she was switched to metoprolol 100mg daily. She has been using her 50mg until she ran out. RX changed to reflect this. LOV 4/8/21      INR UTD RX UTD please sign encounter to send RX     Thanks

## 2021-05-18 ENCOUNTER — ANTICOAGULATION VISIT (OUTPATIENT)
Dept: CARDIOLOGY | Facility: CLINIC | Age: 81
End: 2021-05-18

## 2021-05-18 VITALS
BODY MASS INDEX: 21.97 KG/M2 | HEART RATE: 66 BPM | DIASTOLIC BLOOD PRESSURE: 75 MMHG | WEIGHT: 124 LBS | SYSTOLIC BLOOD PRESSURE: 179 MMHG

## 2021-05-18 DIAGNOSIS — I48.0 PAROXYSMAL ATRIAL FIBRILLATION (HCC): Primary | ICD-10-CM

## 2021-05-18 DIAGNOSIS — Z79.01 LONG TERM (CURRENT) USE OF ANTICOAGULANTS: ICD-10-CM

## 2021-05-18 LAB — INR PPP: 1.8 (ref 0.9–1.1)

## 2021-05-18 PROCEDURE — 85610 PROTHROMBIN TIME: CPT | Performed by: INTERNAL MEDICINE

## 2021-05-18 PROCEDURE — 36416 COLLJ CAPILLARY BLOOD SPEC: CPT | Performed by: INTERNAL MEDICINE

## 2021-06-01 ENCOUNTER — LAB (OUTPATIENT)
Dept: FAMILY MEDICINE CLINIC | Facility: CLINIC | Age: 81
End: 2021-06-01

## 2021-06-01 ENCOUNTER — OFFICE VISIT (OUTPATIENT)
Dept: FAMILY MEDICINE CLINIC | Facility: CLINIC | Age: 81
End: 2021-06-01

## 2021-06-01 VITALS
HEIGHT: 63 IN | DIASTOLIC BLOOD PRESSURE: 88 MMHG | WEIGHT: 122.5 LBS | RESPIRATION RATE: 16 BRPM | SYSTOLIC BLOOD PRESSURE: 142 MMHG | BODY MASS INDEX: 21.71 KG/M2 | HEART RATE: 71 BPM | OXYGEN SATURATION: 97 %

## 2021-06-01 DIAGNOSIS — R53.83 FATIGUE, UNSPECIFIED TYPE: Primary | ICD-10-CM

## 2021-06-01 DIAGNOSIS — M05.79 RHEUMATOID ARTHRITIS INVOLVING MULTIPLE SITES WITH POSITIVE RHEUMATOID FACTOR (HCC): ICD-10-CM

## 2021-06-01 DIAGNOSIS — K21.9 GASTROESOPHAGEAL REFLUX DISEASE, UNSPECIFIED WHETHER ESOPHAGITIS PRESENT: ICD-10-CM

## 2021-06-01 DIAGNOSIS — R53.83 FATIGUE, UNSPECIFIED TYPE: ICD-10-CM

## 2021-06-01 DIAGNOSIS — M47.819 OSTEOARTHRITIS OF SPINE WITHOUT MYELOPATHY OR RADICULOPATHY, UNSPECIFIED SPINAL REGION: ICD-10-CM

## 2021-06-01 DIAGNOSIS — E55.9 VITAMIN D DEFICIENCY: ICD-10-CM

## 2021-06-01 PROBLEM — M53.80 OTHER SPECIFIED DORSOPATHIES, SITE UNSPECIFIED: Status: RESOLVED | Noted: 2019-09-08 | Resolved: 2021-06-01

## 2021-06-01 PROBLEM — Z96.1 LENS REPLACED BY OTHER MEANS: Status: RESOLVED | Noted: 2019-11-20 | Resolved: 2021-06-01

## 2021-06-01 PROBLEM — M54.32 SCIATIC PAIN, LEFT: Status: RESOLVED | Noted: 2020-10-01 | Resolved: 2021-06-01

## 2021-06-01 PROCEDURE — 82306 VITAMIN D 25 HYDROXY: CPT | Performed by: PHYSICIAN ASSISTANT

## 2021-06-01 PROCEDURE — 36415 COLL VENOUS BLD VENIPUNCTURE: CPT

## 2021-06-01 PROCEDURE — 85025 COMPLETE CBC W/AUTO DIFF WBC: CPT | Performed by: PHYSICIAN ASSISTANT

## 2021-06-01 PROCEDURE — 82607 VITAMIN B-12: CPT | Performed by: PHYSICIAN ASSISTANT

## 2021-06-01 PROCEDURE — 99214 OFFICE O/P EST MOD 30 MIN: CPT | Performed by: PHYSICIAN ASSISTANT

## 2021-06-01 PROCEDURE — 87186 SC STD MICRODIL/AGAR DIL: CPT

## 2021-06-01 PROCEDURE — 81001 URINALYSIS AUTO W/SCOPE: CPT

## 2021-06-01 PROCEDURE — 80053 COMPREHEN METABOLIC PANEL: CPT | Performed by: PHYSICIAN ASSISTANT

## 2021-06-01 PROCEDURE — 87086 URINE CULTURE/COLONY COUNT: CPT

## 2021-06-01 PROCEDURE — 87077 CULTURE AEROBIC IDENTIFY: CPT

## 2021-06-01 RX ORDER — MULTIPLE VITAMINS W/ MINERALS TAB 9MG-400MCG
1 TAB ORAL DAILY
COMMUNITY

## 2021-06-01 RX ORDER — PANTOPRAZOLE SODIUM 40 MG/1
40 TABLET, DELAYED RELEASE ORAL DAILY
Qty: 60 TABLET | Refills: 0 | Status: SHIPPED | OUTPATIENT
Start: 2021-06-01 | End: 2022-07-26

## 2021-06-01 NOTE — PROGRESS NOTES
"Clyde Valdes is a 80 y.o. female.     Chief Complaint   Patient presents with   • Fatigue       /88 (BP Location: Left arm, Patient Position: Sitting, Cuff Size: Adult)   Pulse 71   Resp 16   Ht 160 cm (63\")   Wt 55.6 kg (122 lb 8 oz)   SpO2 97%   BMI 21.70 kg/m²     BP Readings from Last 3 Encounters:   06/01/21 142/88   05/18/21 179/75   04/28/21 173/76       Wt Readings from Last 3 Encounters:   06/01/21 55.6 kg (122 lb 8 oz)   05/18/21 56.2 kg (124 lb)   04/28/21 56.2 kg (124 lb)       HPI Patient presents to the clinic with complaints of fatigue and feeling very cold for the past 3-4 months. She feels like this may be worsening. She does complain of worsening joint pain and back pain after being taken off your antiinflammatory. The pain is worse in her left wrist and her knees. She has had no relief with the voltaren gel. She was prescribed tylenol #3 by  with pain management. She was reluctant to take this because she was afraid it would affect her kidneys. Has had worsening GERD and pepcid has not helped. Saw  for her back and was told she needed surgery which she is not interested in at this time.     The following portions of the patient's history were reviewed and updated as appropriate: allergies, current medications, past family history, past medical history, past social history, past surgical history and problem list.    Review of Systems    Objective   Physical Exam  Constitutional:       Appearance: She is well-developed.   HENT:      Head: Normocephalic and atraumatic.   Eyes:      Conjunctiva/sclera: Conjunctivae normal.      Pupils: Pupils are equal, round, and reactive to light.   Cardiovascular:      Rate and Rhythm: Normal rate and regular rhythm.      Heart sounds: No murmur heard.     Pulmonary:      Effort: Pulmonary effort is normal.      Breath sounds: Normal breath sounds.   Abdominal:      General: Bowel sounds are normal.      Palpations: " Abdomen is soft.      Tenderness: There is no abdominal tenderness.   Musculoskeletal:         General: No deformity. Normal range of motion.      Cervical back: Normal range of motion and neck supple.   Lymphadenopathy:      Cervical: No cervical adenopathy.   Skin:     General: Skin is warm and dry.      Capillary Refill: Capillary refill takes less than 2 seconds.      Findings: No rash.   Neurological:      Mental Status: She is alert and oriented to person, place, and time.      Cranial Nerves: No cranial nerve deficit.   Psychiatric:         Behavior: Behavior normal.         Thought Content: Thought content normal.         Judgment: Judgment normal.           Diagnoses and all orders for this visit:    1. Fatigue, unspecified type (Primary)  -     Comprehensive metabolic panel; Future  -     Urinalysis With Culture If Indicated -; Future  -     Vitamin B12; Future  -     CBC w AUTO Differential; Future    2. Rheumatoid arthritis involving multiple sites with positive rheumatoid factor (CMS/[facility])    3. 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 Gastroesophageal reflux disease, unspecified whether esophagitis present    5. Vitamin D deficiency  -     Vitamin D 25 hydroxy; Future    Other orders  -     pantoprazole (PROTONIX) 40 MG EC tablet; Take 1 tablet by mouth Daily.  Dispense: 60 tablet; Refill: 0    TSH was just checked and this was appropriate. She can try to the tylenol 3 rx by elliott weber for her chronic pain and if this helps we can continue. Start protonix for gerd. Discussed this can potentially interfere with kidney function but we will keep an eye on this and we will try to do the shortest course of medicine we can. Pepcid has not helped. Follow up with  at next appointment.     Return if symptoms worsen or fail to improve.

## 2021-06-02 LAB
25(OH)D3 SERPL-MCNC: 29 NG/ML (ref 30–100)
ALBUMIN SERPL-MCNC: 4.5 G/DL (ref 3.5–5.2)
ALBUMIN/GLOB SERPL: 2.1 G/DL
ALP SERPL-CCNC: 54 U/L (ref 39–117)
ALT SERPL W P-5'-P-CCNC: 7 U/L (ref 1–33)
ANION GAP SERPL CALCULATED.3IONS-SCNC: 11.9 MMOL/L (ref 5–15)
AST SERPL-CCNC: 16 U/L (ref 1–32)
BACTERIA UR QL AUTO: ABNORMAL /HPF
BASOPHILS # BLD AUTO: 0.06 10*3/MM3 (ref 0–0.2)
BASOPHILS NFR BLD AUTO: 1.2 % (ref 0–1.5)
BILIRUB SERPL-MCNC: 0.4 MG/DL (ref 0–1.2)
BILIRUB UR QL STRIP: NEGATIVE
BUN SERPL-MCNC: 34 MG/DL (ref 8–23)
BUN/CREAT SERPL: 31.5 (ref 7–25)
CALCIUM SPEC-SCNC: 9.7 MG/DL (ref 8.6–10.5)
CHLORIDE SERPL-SCNC: 102 MMOL/L (ref 98–107)
CLARITY UR: ABNORMAL
CO2 SERPL-SCNC: 26.1 MMOL/L (ref 22–29)
COLOR UR: YELLOW
CREAT SERPL-MCNC: 1.08 MG/DL (ref 0.57–1)
DEPRECATED RDW RBC AUTO: 40 FL (ref 37–54)
EOSINOPHIL # BLD AUTO: 0.13 10*3/MM3 (ref 0–0.4)
EOSINOPHIL NFR BLD AUTO: 2.7 % (ref 0.3–6.2)
ERYTHROCYTE [DISTWIDTH] IN BLOOD BY AUTOMATED COUNT: 11.8 % (ref 12.3–15.4)
GFR SERPL CREATININE-BSD FRML MDRD: 49 ML/MIN/1.73
GLOBULIN UR ELPH-MCNC: 2.1 GM/DL
GLUCOSE SERPL-MCNC: 150 MG/DL (ref 65–99)
GLUCOSE UR STRIP-MCNC: NEGATIVE MG/DL
HCT VFR BLD AUTO: 35.8 % (ref 34–46.6)
HGB BLD-MCNC: 11.9 G/DL (ref 12–15.9)
HGB UR QL STRIP.AUTO: NEGATIVE
HYALINE CASTS UR QL AUTO: ABNORMAL /LPF
IMM GRANULOCYTES # BLD AUTO: 0.01 10*3/MM3 (ref 0–0.05)
IMM GRANULOCYTES NFR BLD AUTO: 0.2 % (ref 0–0.5)
KETONES UR QL STRIP: NEGATIVE
LEUKOCYTE ESTERASE UR QL STRIP.AUTO: ABNORMAL
LYMPHOCYTES # BLD AUTO: 0.99 10*3/MM3 (ref 0.7–3.1)
LYMPHOCYTES NFR BLD AUTO: 20.3 % (ref 19.6–45.3)
MCH RBC QN AUTO: 30.6 PG (ref 26.6–33)
MCHC RBC AUTO-ENTMCNC: 33.2 G/DL (ref 31.5–35.7)
MCV RBC AUTO: 92 FL (ref 79–97)
MONOCYTES # BLD AUTO: 0.57 10*3/MM3 (ref 0.1–0.9)
MONOCYTES NFR BLD AUTO: 11.7 % (ref 5–12)
NEUTROPHILS NFR BLD AUTO: 3.12 10*3/MM3 (ref 1.7–7)
NEUTROPHILS NFR BLD AUTO: 63.9 % (ref 42.7–76)
NITRITE UR QL STRIP: NEGATIVE
NRBC BLD AUTO-RTO: 0 /100 WBC (ref 0–0.2)
PH UR STRIP.AUTO: 6 [PH] (ref 5–8)
PLATELET # BLD AUTO: 175 10*3/MM3 (ref 140–450)
PMV BLD AUTO: 11 FL (ref 6–12)
POTASSIUM SERPL-SCNC: 4.4 MMOL/L (ref 3.5–5.2)
PROT SERPL-MCNC: 6.6 G/DL (ref 6–8.5)
PROT UR QL STRIP: NEGATIVE
RBC # BLD AUTO: 3.89 10*6/MM3 (ref 3.77–5.28)
RBC # UR: ABNORMAL /HPF
REF LAB TEST METHOD: ABNORMAL
SODIUM SERPL-SCNC: 140 MMOL/L (ref 136–145)
SP GR UR STRIP: 1.02 (ref 1–1.03)
SQUAMOUS #/AREA URNS HPF: ABNORMAL /HPF
UROBILINOGEN UR QL STRIP: ABNORMAL
VIT B12 BLD-MCNC: 518 PG/ML (ref 211–946)
WBC # BLD AUTO: 4.88 10*3/MM3 (ref 3.4–10.8)
WBC UR QL AUTO: ABNORMAL /HPF

## 2021-06-03 RX ORDER — CEFDINIR 300 MG/1
300 CAPSULE ORAL 2 TIMES DAILY
Qty: 10 CAPSULE | Refills: 0 | Status: SHIPPED | OUTPATIENT
Start: 2021-06-03 | End: 2021-06-08

## 2021-06-04 LAB — BACTERIA SPEC AEROBE CULT: ABNORMAL

## 2021-06-08 ENCOUNTER — ANTICOAGULATION VISIT (OUTPATIENT)
Dept: CARDIOLOGY | Facility: CLINIC | Age: 81
End: 2021-06-08

## 2021-06-08 VITALS
BODY MASS INDEX: 21.79 KG/M2 | SYSTOLIC BLOOD PRESSURE: 169 MMHG | WEIGHT: 123 LBS | DIASTOLIC BLOOD PRESSURE: 76 MMHG | HEART RATE: 74 BPM

## 2021-06-08 DIAGNOSIS — I48.0 PAROXYSMAL ATRIAL FIBRILLATION (HCC): Primary | ICD-10-CM

## 2021-06-08 DIAGNOSIS — Z79.01 LONG TERM (CURRENT) USE OF ANTICOAGULANTS: ICD-10-CM

## 2021-06-08 LAB — INR PPP: 2 (ref 0.9–1.1)

## 2021-06-08 PROCEDURE — 85610 PROTHROMBIN TIME: CPT | Performed by: INTERNAL MEDICINE

## 2021-06-08 PROCEDURE — 36416 COLLJ CAPILLARY BLOOD SPEC: CPT | Performed by: INTERNAL MEDICINE

## 2021-06-11 RX ORDER — LEFLUNOMIDE 20 MG/1
TABLET ORAL
Qty: 90 TABLET | Refills: 2 | Status: SHIPPED | OUTPATIENT
Start: 2021-06-11 | End: 2022-02-18 | Stop reason: SDUPTHER

## 2021-07-08 ENCOUNTER — ANTICOAGULATION VISIT (OUTPATIENT)
Dept: CARDIOLOGY | Facility: CLINIC | Age: 81
End: 2021-07-08

## 2021-07-08 VITALS
WEIGHT: 125 LBS | SYSTOLIC BLOOD PRESSURE: 180 MMHG | HEART RATE: 72 BPM | BODY MASS INDEX: 22.14 KG/M2 | DIASTOLIC BLOOD PRESSURE: 75 MMHG

## 2021-07-08 DIAGNOSIS — I48.0 PAROXYSMAL ATRIAL FIBRILLATION (HCC): Primary | ICD-10-CM

## 2021-07-08 DIAGNOSIS — Z79.01 LONG TERM (CURRENT) USE OF ANTICOAGULANTS: ICD-10-CM

## 2021-07-08 LAB — INR PPP: 2.1 (ref 0.9–1.1)

## 2021-07-08 PROCEDURE — 36416 COLLJ CAPILLARY BLOOD SPEC: CPT | Performed by: INTERNAL MEDICINE

## 2021-07-08 PROCEDURE — 85610 PROTHROMBIN TIME: CPT | Performed by: INTERNAL MEDICINE

## 2021-07-08 NOTE — TELEPHONE ENCOUNTER
----- Message from Daniela Samaniego CMA sent at 7/8/2021 11:51 AM EDT -----  Regarding: FW: Non-Urgent Medical Question  Contact: [phone]    ----- Message -----  From: Valariemeliton Valdes  Sent: 7/8/2021   9:03 AM EDT  To: Deshaun Nava MD  Subject: Non-Urgent Medical Question                      I think I need to get back on my Etodolac 300mg. In the last two weeks I have had several flare ups with arthritis. Both hands and also knees. I still have some of the medication but need a new prescription if I get back on the medication. I won't start it till I hear back from you . Thank you!

## 2021-07-08 NOTE — TELEPHONE ENCOUNTER
Dr. Nava, I am assuming that she was taken off of the etodolac due to her abnormal kidney function.Please advise thanks

## 2021-07-09 NOTE — TELEPHONE ENCOUNTER
She can try half tab as it was discontinued due to kidney function, yes. Will need BMP in 1 month if she does this

## 2021-07-12 RX ORDER — ETODOLAC 200 MG/1
200 CAPSULE ORAL DAILY
Qty: 30 CAPSULE | Refills: 0 | Status: SHIPPED | OUTPATIENT
Start: 2021-07-12 | End: 2021-08-08 | Stop reason: SDUPTHER

## 2021-07-15 ENCOUNTER — TELEPHONE (OUTPATIENT)
Dept: CARDIOLOGY | Facility: CLINIC | Age: 81
End: 2021-07-15

## 2021-07-15 NOTE — TELEPHONE ENCOUNTER
Patient started anti-inflammatory drug LODINE  on Tuesday. Offered appt for INR tomorrow. Patient cannot make it in. Patient scheduled for Monday apLPN

## 2021-07-19 ENCOUNTER — ANTICOAGULATION VISIT (OUTPATIENT)
Dept: CARDIOLOGY | Facility: CLINIC | Age: 81
End: 2021-07-19

## 2021-07-19 VITALS
DIASTOLIC BLOOD PRESSURE: 77 MMHG | HEART RATE: 69 BPM | BODY MASS INDEX: 21.7 KG/M2 | WEIGHT: 122.5 LBS | SYSTOLIC BLOOD PRESSURE: 196 MMHG

## 2021-07-19 DIAGNOSIS — Z79.01 LONG TERM (CURRENT) USE OF ANTICOAGULANTS: ICD-10-CM

## 2021-07-19 DIAGNOSIS — I48.0 PAROXYSMAL ATRIAL FIBRILLATION (HCC): Primary | ICD-10-CM

## 2021-07-19 LAB — INR PPP: 3.3 (ref 0.9–1.1)

## 2021-07-19 PROCEDURE — 85610 PROTHROMBIN TIME: CPT | Performed by: INTERNAL MEDICINE

## 2021-07-19 PROCEDURE — 36416 COLLJ CAPILLARY BLOOD SPEC: CPT | Performed by: INTERNAL MEDICINE

## 2021-08-02 DIAGNOSIS — I48.0 PAROXYSMAL ATRIAL FIBRILLATION (HCC): ICD-10-CM

## 2021-08-02 DIAGNOSIS — Z79.01 LONG TERM (CURRENT) USE OF ANTICOAGULANTS: ICD-10-CM

## 2021-08-02 RX ORDER — WARFARIN SODIUM 2.5 MG/1
TABLET ORAL
Qty: 180 TABLET | Refills: 0 | Status: SHIPPED | OUTPATIENT
Start: 2021-08-02 | End: 2021-11-11 | Stop reason: SDUPTHER

## 2021-08-04 ENCOUNTER — OFFICE VISIT (OUTPATIENT)
Dept: FAMILY MEDICINE CLINIC | Facility: CLINIC | Age: 81
End: 2021-08-04

## 2021-08-04 VITALS
SYSTOLIC BLOOD PRESSURE: 197 MMHG | TEMPERATURE: 97.8 F | DIASTOLIC BLOOD PRESSURE: 83 MMHG | OXYGEN SATURATION: 97 % | BODY MASS INDEX: 21.67 KG/M2 | WEIGHT: 122.3 LBS | HEART RATE: 67 BPM | HEIGHT: 63 IN

## 2021-08-04 DIAGNOSIS — I48.0 PAROXYSMAL ATRIAL FIBRILLATION (HCC): ICD-10-CM

## 2021-08-04 DIAGNOSIS — R29.898 WEAKNESS OF BOTH LOWER EXTREMITIES: ICD-10-CM

## 2021-08-04 DIAGNOSIS — R26.89 BALANCE PROBLEM: Primary | ICD-10-CM

## 2021-08-04 DIAGNOSIS — M05.79 RHEUMATOID ARTHRITIS INVOLVING MULTIPLE SITES WITH POSITIVE RHEUMATOID FACTOR (HCC): ICD-10-CM

## 2021-08-04 PROCEDURE — 99213 OFFICE O/P EST LOW 20 MIN: CPT | Performed by: FAMILY MEDICINE

## 2021-08-04 NOTE — PROGRESS NOTES
Subjective   Jt Valdes is a 80 y.o. female.     She comes in as a new patient to get established  She has a history of rheumatoid arthritis but says that her medications were stopped secondary to her kidney function being elevated  These were restarted a few months later but at a lower dose  She relates that she is having balance issues  She has paroxysmal atrial fibrillation and is on warfarin   cardiology follows that  Etodolac was stopped earlier this year sec to renal insuf  Restarted at lower dose  Feels unsteady at times  No falls  Uses furniture or rollator when needed  Chronic back problems  Sees Dr. Malone  Needs surgery but she refuses sec to her age  Has done prev PT but does not feel it helped  Has seen pain management  If she kneels she cannot stand back up  Lives with          The following portions of the patient's history were reviewed and updated as appropriate: allergies, current medications, past family history, past medical history, past social history, past surgical history, and problem list.  Past Medical History:   Diagnosis Date   • Arthritis    • Cataract     Cataracts surgery   • Degenerative joint disease    • Extremity pain     legs pain   • H/O degenerative disc disease    • History of colonoscopy 2009    last done 2009   • History of echocardiogram 09/04/2018    LVH EF 65%. RV OK. LA probably mildly dilated. AV OK. MV OK. Modest TR RVSP 26 or less. Nuclear MPI regadenoson 9/14/2018. LV uniform myocardial uptake and wall motion EF 71%.    • History of Holter monitoring 10/08/2018    SR 40-81 58. AF 2.7 hr episode VR . At termination AF 2 3.5-4.0 s pauses with patient possibly dizzy. 205 PAC 42 atrial ashanti several SVT. No ventricular ectopy.    • HL (hearing loss) 2020    Tried hearing aids twice . Didnt help   • HTN (hypertension)    • Hyperlipidemia    • Hypertensive cardiovascular disease    • Hypothyroidism    • Incontinence    • Leukopenia    • Low back pain    •  PAF (paroxysmal atrial fibrillation) (CMS/HCC) 09/2018    [facility] Sept 2018 PAF and HTN. PAFL On bisoprolol and diltiazem bradycardia symptomatic with dizziness. Amiodarone alone Oct 2018 and no episodes of erratic or fast heartbeat or lightheadedness.    • Rheumatoid arthritis (CMS/HCC)     Possible pulmonary involvement    • Staph infection    • White coat syndrome with hypertension      Past Surgical History:   Procedure Laterality Date   • APPENDECTOMY  1974   • BACK SURGERY  2003 2001, 2003   • SUBTOTAL HYSTERECTOMY  1974     Family History   Problem Relation Age of Onset   • Osteoarthritis Mother    • Arthritis Mother    • Cancer Mother    • Other Other         Rheumatoid disease    • Cancer Father    • Cancer Sister      Social History     Socioeconomic History   • Marital status:      Spouse name: Not on file   • Number of children: Not on file   • Years of education: Not on file   • Highest education level: Not on file   Tobacco Use   • Smoking status: Never Smoker   • Smokeless tobacco: Never Used   Vaping Use   • Vaping Use: Never used   Substance and Sexual Activity   • Alcohol use: Yes     Alcohol/week: 1.0 standard drinks     Types: 1 Glasses of wine per week     Comment: Occ.   • Drug use: No   • Sexual activity: Not Currently         Current Outpatient Medications:   •  Diclofenac Sodium (VOLTAREN) 1 % gel gel, Apply 4 g topically to the appropriate area as directed 4 (Four) Times a Day As Needed (pain)., Disp: 150 g, Rfl: 3  •  etodolac (LODINE) 200 MG capsule, Take 1 capsule by mouth Daily., Disp: 30 capsule, Rfl: 0  •  leflunomide (ARAVA) 20 MG tablet, TAKE ONE TABLET BY MOUTH DAILY, Disp: 90 tablet, Rfl: 2  •  levothyroxine (SYNTHROID, LEVOTHROID) 125 MCG tablet, Take 1 tablet by mouth Daily., Disp: 90 tablet, Rfl: 3  •  losartan (COZAAR) 25 MG tablet, Take 1 tablet by mouth Daily., Disp: 90 tablet, Rfl: 3  •  metoprolol succinate XL (TOPROL-XL) 100 MG 24 hr tablet, Take 1 tablet by  "mouth Daily., Disp: 90 tablet, Rfl: 2  •  multivitamin with minerals (EYE VITAMINS & MINERALS PO), Take 1 tablet by mouth Daily., Disp: , Rfl:   •  pantoprazole (PROTONIX) 40 MG EC tablet, Take 1 tablet by mouth Daily., Disp: 60 tablet, Rfl: 0  •  warfarin (COUMADIN) 2.5 MG tablet, TAKE ONE TABLET BY MOUTH DAILY ON Monday and Friday and TAKE TWO TABLETS BY MOUTH DAILY ON ALL OTHER DAYS OR AS DIRECTED, Disp: 180 tablet, Rfl: 0  •  calcium-vitamin D (OSCAL-500) 500-200 MG-UNIT per tablet, Take 1 tablet by mouth Daily., Disp: 90 tablet, Rfl: 3    Review of Systems   Respiratory: Negative.    Cardiovascular: Negative.    Musculoskeletal: Positive for arthralgias, back pain and joint swelling.   Neurological: Positive for weakness. Negative for dizziness, syncope, light-headedness and headache.     BP (!) 197/83 (BP Location: Left arm, Patient Position: Sitting, Cuff Size: Adult)   Pulse 67   Temp 97.8 °F (36.6 °C) (Temporal)   Ht 160 cm (63\")   Wt 55.5 kg (122 lb 4.8 oz)   SpO2 97%   Breastfeeding No   BMI 21.66 kg/m²       Objective   Physical Exam  Vitals and nursing note reviewed.   Constitutional:       General: She is not in acute distress.     Appearance: Normal appearance. She is well-developed and normal weight.   HENT:      Head: Normocephalic and atraumatic.   Neck:      Thyroid: No thyromegaly.   Cardiovascular:      Rate and Rhythm: Normal rate and regular rhythm.      Heart sounds: Normal heart sounds. No murmur heard.   No friction rub. No gallop.    Pulmonary:      Effort: Pulmonary effort is normal. No respiratory distress.      Breath sounds: Normal breath sounds. No wheezing or rales.   Musculoskeletal:      Cervical back: Neck supple.      Right lower leg: No edema.      Left lower leg: No edema.   Lymphadenopathy:      Cervical: No cervical adenopathy.   Skin:     General: Skin is warm and dry.   Neurological:      Mental Status: She is alert.      Comments: LE strength 4/5 bilaterally  Using " rollator   Psychiatric:         Attention and Perception: Attention normal.         Mood and Affect: Mood normal.           Assessment/Plan   Problems Addressed this Visit        Cardiac and Vasculature    Paroxysmal atrial fibrillation (CMS/HCC)       Musculoskeletal and Injuries    Rheumatoid arthritis (CMS/[facility])      Other Visit Diagnoses     Balance problem    -  Primary    Relevant Orders    Ambulatory Referral to Physical Therapy Evaluate and treat    Weakness of both lower extremities        Relevant Orders    Ambulatory Referral to Physical Therapy Evaluate and treat      Diagnoses       Codes Comments    Balance problem    -  Primary ICD-10-CM: R26.89  ICD-9-CM: 781.99     Weakness of both lower extremities     ICD-10-CM: R29.898  ICD-9-CM: 729.89     Paroxysmal atrial fibrillation (CMS/HCC)     ICD-10-CM: I48.0  ICD-9-CM: 427.31     Rheumatoid arthritis involving multiple sites with positive rheumatoid factor (CMS/HCC)     ICD-10-CM: M05.79  ICD-9-CM: 714.0         She will continue to see Dr. Patiño for her PAF and warfarin checks  Will refer to PT for eval and tx of her LE weakness and balance issues  She will continue her current medications  I will see her back in 6 mo  She will use her rollator and avoid moving in a hurry

## 2021-08-08 ENCOUNTER — TELEPHONE (OUTPATIENT)
Dept: FAMILY MEDICINE CLINIC | Facility: CLINIC | Age: 81
End: 2021-08-08

## 2021-08-08 RX ORDER — ETODOLAC 200 MG/1
200 CAPSULE ORAL DAILY
Qty: 90 CAPSULE | Refills: 0 | Status: SHIPPED | OUTPATIENT
Start: 2021-08-08 | End: 2021-12-03

## 2021-08-09 NOTE — TELEPHONE ENCOUNTER
----- Message from Jt Valdes sent at 8/8/2021 10:25 AM EDT -----  Regarding: Prescription Question  Contact: [phone]  I need a prescription for 200 MG of etodolac . Have filled at [facility] on [address] . 90 day supply. My birthday 1940.   Thanks

## 2021-08-11 ENCOUNTER — ANTICOAGULATION VISIT (OUTPATIENT)
Dept: CARDIOLOGY | Facility: CLINIC | Age: 81
End: 2021-08-11

## 2021-08-11 VITALS
SYSTOLIC BLOOD PRESSURE: 185 MMHG | DIASTOLIC BLOOD PRESSURE: 76 MMHG | BODY MASS INDEX: 21.61 KG/M2 | WEIGHT: 122 LBS | HEART RATE: 63 BPM

## 2021-08-11 DIAGNOSIS — I48.0 PAROXYSMAL ATRIAL FIBRILLATION (HCC): Primary | ICD-10-CM

## 2021-08-11 DIAGNOSIS — Z79.01 LONG TERM (CURRENT) USE OF ANTICOAGULANTS: ICD-10-CM

## 2021-08-11 LAB — INR PPP: 2.3 (ref 0.9–1.1)

## 2021-08-11 PROCEDURE — 85610 PROTHROMBIN TIME: CPT | Performed by: INTERNAL MEDICINE

## 2021-08-11 PROCEDURE — 36416 COLLJ CAPILLARY BLOOD SPEC: CPT | Performed by: INTERNAL MEDICINE

## 2021-08-16 ENCOUNTER — TREATMENT (OUTPATIENT)
Dept: PHYSICAL THERAPY | Facility: CLINIC | Age: 81
End: 2021-08-16

## 2021-08-16 DIAGNOSIS — R26.89 BALANCE PROBLEM: Primary | ICD-10-CM

## 2021-08-16 DIAGNOSIS — R29.898 WEAKNESS OF BOTH LOWER EXTREMITIES: ICD-10-CM

## 2021-08-16 PROCEDURE — 97110 THERAPEUTIC EXERCISES: CPT | Performed by: PHYSICAL THERAPIST

## 2021-08-16 PROCEDURE — 97162 PT EVAL MOD COMPLEX 30 MIN: CPT | Performed by: PHYSICAL THERAPIST

## 2021-08-16 NOTE — PROGRESS NOTES
Physical Therapy Initial Evaluation and Plan of Care    Patient: Jt Valdes   : 1940  Diagnosis/ICD-10 Code:  Balance problem [R26.89]  Referring practitioner: Dilcia Perry*  Date of Initial Visit: 2021  Today's Date: 2021  Patient seen for 1 sessions           Subjective Questionnaire: ABC: 36%    Pts main complaint at this time is balance and LE strength. She feels that within the past 2-3 months she has has more difficulty getting up from floor, from chair/commode/couch. And balance is not as good. She has chronic low back issues with fusion  and . She has significant scoliosis, tried therapy and pain management in the past didn't help, also got surgeon opinion - she is choosing not to get surgery at this time. She reports difficulty standing up straight, tendency to lean over. Better with sitting. She is able to walk as long as she holds on. Uses rollator for long distances. Holds on to furniture or husbands elbow for shorter distances. No falls, tends to wobble. Uses cart to push at grocery store. Difficulty walking to bathroom at night. She is independent with ADLs including laundry, cooking, cleaning. Has had to modify as needed and take sitting rest breaks as needed. She avoids stairs to basement, holds on to asc/desc curb.3 steps from garage, no railing, holds onto fridge. Standing limited to 15-20 mins due to back pain. Leans on counter while cooking to relieve pressure. Some difficulty squatting and bending. Dressing from seated poisition, getting in/out of bed and in/out of car is ok. She reports pain across low back, used to have N/T in R LE but not recently. Lost 3 inches in height over past few years. Has stationary bike at home, not currently using.    Medical hx: Macular degeneration, cataract surgery wears glasses, RA, OA knees, [place]. Chronic low back pain, scoliosis, lumbar fusion  and . Denies neuropathy      Subjective Evaluation    History of  Present Illness      Patient Occupation: retired Social Support  Lives with: spouse    Diagnostic Tests  X-ray: abnormal  MRI studies: abnormal    Treatments  Previous treatment: physical therapy and injection treatment  Patient Goals  Patient goals for therapy: decreased pain, improved balance, increased motion, increased strength and independence with ADLs/IADLs             Objective          Postural Observations  Seated posture: fair  Standing posture: fair    Additional Postural Observation Details  Sitting: legs crossed, lean to R  Standing: flexed hips and knees, R side rib hump, scoliosis, mild lateral lean. Possible leg length difference.    Sit to stand: independent with UE assist  Ambulation: independent without AD 20 ft, flexed posture, lateral sway. Independent 150 ft with rollator, longer stride length, scissoring gait, mildly flexed posture, ER R >L LE, Navicular drop R>L and hip drop in stance phase    Bed mobility: independent with low back pain R.      Palpation   Left   Hypertonic in the lumbar paraspinals and quadratus lumborum.     Right   Hypertonic in the lumbar paraspinals and quadratus lumborum.     Tenderness     Left Hip   Tenderness in the PSIS.     Right Hip   Tenderness in the PSIS.     Additional Tenderness Details  Lumbosacral spine    Neurological Testing     Sensation     Lumbar   Left   Intact: light touch    Right   Intact: light touch    Active Range of Motion     Additional Active Range of Motion Details  Functional ER sitting: independent with tightness and UE assist, mild posterior lean    Lumbar AROM not tested this date.      Passive Range of Motion     Additional Passive Range of Motion Details  Hip abd 3-/5 R hip ext unable    L LE: hip flex better low back WFL, ER 55, IR WFL, sidelying hip ext lacking 30 degrees from neutral, knee flex 120, ext -15 with hip flexor tightness  R LE: hip flex better low back WFL, ER 35, IR WFL, sidelying hip ext unable, knee flex 132, ext  "-15 with hip flexor tightness.    Ankle mild gastroc restriction bilat.    Strength/Myotome Testing     Left Hip   Planes of Motion   Flexion: 4-  External rotation: 4-  Internal rotation: 4-    Right Hip   Planes of Motion   Flexion: 3+  External rotation: 4-  Internal rotation: 4-    Left Knee   Flexion: 4  Extension: 4-    Right Knee   Flexion: 4  Extension: 4-    Left Ankle/Foot   Dorsiflexion: 4  Plantar flexion: 4  Inversion: 4  Eversion: 3+  Great toe extension: 4    Right Ankle/Foot   Dorsiflexion: 4  Plantar flexion: 4  Inversion: 4  Eversion: 4  Great toe extension: 4    Additional Strength Details  LE strength sitting as above.   Sidelying hip abd R 3-/5, L 3+/5 with TFL compensation. Weak bridge.                   Muscle Activation     Additional Muscle Activation Details  Weak core, gluts    Lumbar Flexibility Comments:   Hip flexor mod restriction bilat, quad and hamstrings not tested     General Comments     Lumbar Comments  Balance:   Narrow base EO 30\"  Narrow base EC 5\" with increased sway  Modified tandem R in front EO 2\"  Modified tandem L in front EO 8\"   Single leg balance unable         Assessment & Plan     Assessment  Impairments: abnormal gait, abnormal or restricted ROM, activity intolerance, impaired balance, impaired physical strength, lacks appropriate home exercise program, pain with function and safety issue  Assessment details: Pt to benefit from skilled PT to address LE strength and balance deficits. She also has some ROM and flexibility restrictions that contribute to limited function, balance and gait. In addition, she would benefit from education re: posture correction, body mechanics, activity modification techniques, and safety/fall risk reduction.  Barriers to therapy: Macular degeneration, cataract surgery wears glasses, RA, OA knees, [place]. Chronic low back pain, scoliosis, lumbar fusion 2001 and 2003.  Prognosis: fair  Functional Limitations: carrying objects, walking, moving " "in bed, standing and stooping  Goals  Plan Goals: STG  1. Score ABC and STANFORD next visit  2. Pt independent with HEP in 2 visits  3. Pt independent with ambulation with straight cane 180 ft in 3 weeks  4.  Pt to demonstrate knee ext ROM to 0 for upright standing and stride in 3 weeks  5. Pt to demonstrate hip ext ROM to 0 bilat for upright standing and stride in 3 weeks  6. Pt to demonstrate hip abd strength 4/5 or better bilat for stance stability in 4 weeks    LTG  1. Pt to demonstrate sit to stand from low surface with min UE assist at D/C  2. Pt to get up from floor with min UE assist at D/C  3. Pt to demonstrate low fall risk per STANFORD  4. Pt to voice increased balance confidence for decreased fall risk by D/C  5. Pt to demonstrate modified tandem bilat 20 sec on ground by D/C  6. Pt to demonstrate single leg balance 3 sec on ground by D/C  7. Pt to demonstrate narrow base EC 60\" on ground by D/C    Plan  Therapy options: will be seen for skilled physical therapy services  Planned modality interventions: ultrasound, traction, thermotherapy (hydrocollator packs), cryotherapy, electrical stimulation/Russian stimulation and dry needling  Planned therapy interventions: therapeutic activities, stretching, strengthening, spinal/joint mobilization, postural training, neuromuscular re-education, manual therapy, abdominal trunk stabilization, balance/weight-bearing training, body mechanics training, flexibility, functional ROM exercises, gait training, home exercise program and joint mobilization  Other planned therapy interventions: aquatic as needed  Frequency: 2x week  Duration in visits: 10  Treatment plan discussed with: patient        Access Code: [email or web address]  URL: [email or web address]  Date: 08/17/2021  Prepared by: Shwetha Gomes    Exercises  Clamshell - 1 x daily - 7 x weekly - 1 sets - 10 reps  Supine Bridge - 1 x daily - 7 x weekly - 1 sets - 10 reps  Sit to Stand elevated - 1 x daily - 7 x weekly - 1 sets - " 10 reps  Stride Stance Weight Shift at counter 1 x daily - 7 x weekly - 1 sets - 15 reps        History # of Personal Factors and/or Comorbidities: MODERATE (1-2)  Examination of Body System(s): # of elements: MODERATE (3)  Clinical Presentation: EVOLVING  Clinical Decision Making: MODERATE    Timed:         Manual Therapy:         mins  30429;     Therapeutic Exercise:    10     mins  70851;     Neuromuscular Ajit:        mins  54395;    Therapeutic Activity:          mins  65781;     Gait Training:           mins  74445;     Ultrasound:          mins  40286;    Ionto                                   mins   72432  Self Care                            mins   44505  Aquatic                               mins 20189      Un-Timed:  Electrical Stimulation:         mins  63522 ( );  Dry Needling          mins self-pay  Traction          mins 50690  Low Eval          Mins  20029  Mod Eval     30     Mins  67893  High Eval                            Mins  64380  Re-Eval                               mins  61384        Timed Treatment:   40   mins   Total Treatment:     40   mins    PT SIGNATURE: Shwetha Gomes, PT   DATE TREATMENT INITIATED: 8/17/2021    Medicare Initial Certification  Certification Period: 11/15/2021  I certify that the therapy services are furnished while this patient is under my care.  The services outlined above are required by this patient, and will be reviewed every 90 days.     PHYSICIAN: Dilcia Trinidad MD      DATE:     Please sign and return via fax to [phone].. Thank you, [facility].

## 2021-08-24 ENCOUNTER — TREATMENT (OUTPATIENT)
Dept: PHYSICAL THERAPY | Facility: CLINIC | Age: 81
End: 2021-08-24

## 2021-08-24 DIAGNOSIS — R29.898 WEAKNESS OF BOTH LOWER EXTREMITIES: ICD-10-CM

## 2021-08-24 DIAGNOSIS — R26.89 BALANCE PROBLEM: Primary | ICD-10-CM

## 2021-08-24 PROCEDURE — 97116 GAIT TRAINING THERAPY: CPT | Performed by: PHYSICAL THERAPIST

## 2021-08-24 PROCEDURE — 97110 THERAPEUTIC EXERCISES: CPT | Performed by: PHYSICAL THERAPIST

## 2021-08-24 NOTE — PROGRESS NOTES
Physical Therapy Daily Progress Note    VISIT#: 2     Diagnosis Plan   1. Balance problem     2. Weakness of both lower extremities       Subjective   Jt Valdes reports: brought cane with her today, not sure how to use.  Clamshell ok, sit to stand on bed is ok, bridge too painful, didn't try diagonal weight shift. Past few days riding stationary bike ~3 min has back support. L ankle turns out, pt feels like more recently.     Objective     See Exercise, Manual, and Modality Logs for complete treatment.     Patient Education: gait training with cane. Adjusted to proper height, instructed in step to pattern and progressed to step through pattern. Instructed curb, stair with practice, changing directions.    Discussed fall risk reduction strategies amb to bathroom at night , use cane, she has light on at night.   Discussed shoe wear with better arch support, avoid barefoot.   Genu valgus L>R, with navicular drop and eversion, bunion present.    Assessment/Plan Good initial tolerance to todays treatment. Requires frequent seated rest breaks. Cues to avoid medial collapse knees       Progress per Plan of Care Monitor and progress as tolerated.            Timed:         Manual Therapy:         mins  78483;     Therapeutic Exercise:    25     mins  78329;     Neuromuscular Ajit:        mins  61936;    Therapeutic Activity:     5     mins  99987;     Gait Training:      10     mins  90146;     Ultrasound:          mins  97589;    Ionto                                   mins   12058  Self Care                            mins   82927  Canalith Repos                   mins  4209  Aquatic                               mins 48888    Un-Timed:  Electrical Stimulation:         mins  93063 ( );  Dry Needling          mins self-pay  Traction          mins 46660  Low Eval          Mins  24587  Mod Eval          Mins  38752  High Eval                            Mins  93865  Re-Eval                               mins   94526    Timed Treatment:   40   mins   Total Treatment:     40   mins    Shwetha Gomes, PT

## 2021-08-26 ENCOUNTER — TREATMENT (OUTPATIENT)
Dept: PHYSICAL THERAPY | Facility: CLINIC | Age: 81
End: 2021-08-26

## 2021-08-26 DIAGNOSIS — R26.89 BALANCE PROBLEM: Primary | ICD-10-CM

## 2021-08-26 DIAGNOSIS — R29.898 WEAKNESS OF BOTH LOWER EXTREMITIES: ICD-10-CM

## 2021-08-26 PROCEDURE — 97110 THERAPEUTIC EXERCISES: CPT | Performed by: PHYSICAL THERAPIST

## 2021-08-26 NOTE — PROGRESS NOTES
Physical Therapy Daily Progress Note    VISIT#: 3     Diagnosis Plan   1. Balance problem     2. Weakness of both lower extremities       Subjective   Jt Valdes reports: did fine last visit a little tired next day but not too much. Back sore today after doing chores. Difficulty with curb yesterday, didn't have cane, no railing, was afraid was going to fall, carrying pizza box.    Objective     See Exercise, Manual, and Modality Logs for complete treatment.     Patient Education: avoid locking knees in standing. Avoid medial collapse with sit to stand, nustep etc.      Pt able to ambulate with cane with step through pattern. Reviewed cane asc/desc curb with UE assist at pole.  Assessment/Plan Focused on seated strengthening today due to back soreness, good tolerance.      Progress per Plan of Care Monitior and progress as tolerated. Gastroc/soleus stretch.            Timed:         Manual Therapy:         mins  21323;     Therapeutic Exercise:     40    mins  20494;     Neuromuscular Ajit:        mins  37339;    Therapeutic Activity:     4     mins  96669;     Gait Training:           mins  55615;     Ultrasound:          mins  41420;    Ionto                                   mins   75823  Self Care                            mins   82324  Canalith Repos                   mins  4209  Aquatic                               mins 99472    Un-Timed:  Electrical Stimulation:         mins  80438 ( );  Dry Needling          mins self-pay  Traction          mins 72150  Low Eval          Mins  12903  Mod Eval          Mins  01060  High Eval                            Mins  56341  Re-Eval                               mins  64553    Timed Treatment:   44   mins   Total Treatment:     44   mins    Shwetha Gomes, PT

## 2021-08-30 ENCOUNTER — TREATMENT (OUTPATIENT)
Dept: PHYSICAL THERAPY | Facility: CLINIC | Age: 81
End: 2021-08-30

## 2021-08-30 DIAGNOSIS — R26.89 BALANCE PROBLEM: Primary | ICD-10-CM

## 2021-08-30 DIAGNOSIS — R29.898 WEAKNESS OF BOTH LOWER EXTREMITIES: ICD-10-CM

## 2021-08-30 PROCEDURE — 97110 THERAPEUTIC EXERCISES: CPT | Performed by: PHYSICAL THERAPIST

## 2021-08-30 PROCEDURE — 97116 GAIT TRAINING THERAPY: CPT | Performed by: PHYSICAL THERAPIST

## 2021-08-30 NOTE — PROGRESS NOTES
Physical Therapy Daily Progress Note    VISIT#: 4     Diagnosis Plan   1. Balance problem     2. Weakness of both lower extremities       Subjective   Jt Valdes reports: stiff today. Would like HEP progressed. Riding recumbent bike at home, 1 min at a time. Holds on to things when getting ready in AM and doing work in the kitchen.      Objective     See Exercise, Manual, and Modality Logs for complete treatment.     Patient Education: review used of cane at home rather than furniture walking    Access Code: [email or web address]  URL: [email or web address]  Date: 2021  Prepared by: Shwetha Gomes    Exercises  Clamshell - 1 x daily - 7 x weekly - 1 sets - 10 reps  Sit to Stand - 1 x daily - 7 x weekly - 1 sets - 10 reps  Stride Stance Weight Shift - 1 x daily - 7 x weekly - 1 sets - 15 reps  Side Stepping with Counter Support - 1 x daily - 7 x weekly - 3 sets - 10 reps  Seated Hip Abduction with Resistance - 1 x daily - 4 x weekly - 2 sets - 10 reps  Seated Long Arc Quad - 1 x daily - 4 x weekly - 2 sets - 10 reps  Seated Hamstring Curl with Anchored Resistance - 1 x daily - 4 x weekly - 2 sets - 10 reps  Seated Hamstring Stretch - 2 x daily - 7 x weekly - 3 sets - 1 reps - 20 sec hold      Assessment/Plan All standing activities with quick fatigue and tendency to forward lean, requires 1-2 UE assist. Frequent sitting rest breaks.      Progress per Plan of Care Continue LE strengthening and balance            Timed:         Manual Therapy:   30     mins  33525;     Therapeutic Exercise:         mins  99302;     Neuromuscular Ajit:  5      mins  58195;    Therapeutic Activity:          mins  46662;     Gait Trainin     mins  11881;     Ultrasound:          mins  60369;    Ionto                                   mins   58838  Self Care                            mins   55637  Canalith Repos                   mins  4209  Aquatic                               mins 98192    Un-Timed:  Electrical Stimulation:          mins  51093 ( );  Dry Needling          mins self-pay  Traction          mins 18306  Low Eval          Mins  24017  Mod Eval          Mins  28078  High Eval                            Mins  13198  Re-Eval                               mins  60296    Timed Treatment:   43   mins   Total Treatment:     43   mins    Shwetha Gomes, PT

## 2021-09-01 ENCOUNTER — TREATMENT (OUTPATIENT)
Dept: PHYSICAL THERAPY | Facility: CLINIC | Age: 81
End: 2021-09-01

## 2021-09-01 DIAGNOSIS — R29.898 WEAKNESS OF BOTH LOWER EXTREMITIES: ICD-10-CM

## 2021-09-01 DIAGNOSIS — M54.9 BACK PAIN WITH SCIATICA: ICD-10-CM

## 2021-09-01 DIAGNOSIS — M54.30 BACK PAIN WITH SCIATICA: ICD-10-CM

## 2021-09-01 DIAGNOSIS — R26.89 BALANCE PROBLEM: Primary | ICD-10-CM

## 2021-09-01 PROCEDURE — 97116 GAIT TRAINING THERAPY: CPT | Performed by: PHYSICAL THERAPIST

## 2021-09-01 PROCEDURE — 97110 THERAPEUTIC EXERCISES: CPT | Performed by: PHYSICAL THERAPIST

## 2021-09-01 NOTE — PROGRESS NOTES
Physical Therapy Daily Progress Note    VISIT#: 5     Diagnosis Plan   1. Balance problem     2. Weakness of both lower extremities     3. Back pain with sciatica       Subjective   Jt Valdes reports: was tired but not too much last visit. Back was sore yesterday, not sure if due to weather or not.      Objective     See Exercise, Manual, and Modality Logs for complete treatment.       Assessment/Plan 1 episode loss of balance, misjudge chair, min A to correct. Frequent sitting rest breaks as needed. Improved foot clearance during gait today. Quick fatigue LE strengtheing. Cues to avoid medial knee collapse.      Progress per Plan of Care            Timed:         Manual Therapy:         mins  35608;     Therapeutic Exercise:    35     mins  88637;     Neuromuscular Ajit:        mins  64616;    Therapeutic Activity:          mins  39015;     Gait Training:      10     mins  53077;     Ultrasound:          mins  64838;    Ionto                                   mins   88874  Self Care                            mins   01538  Canalith Repos                   mins  4209  Aquatic                               mins 51331    Un-Timed:  Electrical Stimulation:         mins  22553 ( );  Dry Needling          mins self-pay  Traction          mins 03497  Low Eval          Mins  48212  Mod Eval          Mins  53203  High Eval                            Mins  45855  Re-Eval                               mins  78884    Timed Treatment:   45   mins   Total Treatment:     45   mins    Shwetha Gomes, PT

## 2021-09-09 ENCOUNTER — TREATMENT (OUTPATIENT)
Dept: PHYSICAL THERAPY | Facility: CLINIC | Age: 81
End: 2021-09-09

## 2021-09-09 DIAGNOSIS — R26.89 BALANCE PROBLEM: Primary | ICD-10-CM

## 2021-09-09 DIAGNOSIS — R29.898 WEAKNESS OF BOTH LOWER EXTREMITIES: ICD-10-CM

## 2021-09-09 PROCEDURE — 97110 THERAPEUTIC EXERCISES: CPT | Performed by: PHYSICAL THERAPIST

## 2021-09-09 PROCEDURE — 97530 THERAPEUTIC ACTIVITIES: CPT | Performed by: PHYSICAL THERAPIST

## 2021-09-09 NOTE — PROGRESS NOTES
Physical Therapy Daily Progress Note    VISIT#: 6     Diagnosis Plan   1. Balance problem     2. Weakness of both lower extremities       Subjective   Jt Valdes reports: rode bicycle 1 mile yesterday. Harder getting R leg up to stool in bathroom than L, uses arms. R side of low back sore. Was pretty tired after las visit. Going to [employer] today for new shoes. Sit to stand from bed is easy, still cant get up from dining room chair.    Objective     See Exercise, Manual, and Modality Logs for complete treatment.       Assessment/Plan Pt able to progress balance to no handhold with some exercises. Continues to fatigue quickly and requires frequent sitting rest breaks.    Progress per Plan of Care Continue to progress as tolerated.            Timed:         Manual Therapy:         mins  84717;     Therapeutic Exercise:   31      mins  78451;     Neuromuscular Ajit:        mins  63644;    Therapeutic Activity:    10      mins  92746;     Gait Training:           mins  98250;     Ultrasound:          mins  43013;    Ionto                                   mins   03821  Self Care                            mins   70745  Canalith Repos                   mins  4209  Aquatic                               mins 93437    Un-Timed:  Electrical Stimulation:         mins  12065 ( );  Dry Needling          mins self-pay  Traction          mins 83312  Low Eval          Mins  16530  Mod Eval          Mins  97156  High Eval                            Mins  67496  Re-Eval                               mins  53079    Timed Treatment:   41   mins   Total Treatment:     41   mins    Shwetha Gomes, PT

## 2021-09-10 ENCOUNTER — TELEPHONE (OUTPATIENT)
Dept: FAMILY MEDICINE CLINIC | Facility: CLINIC | Age: 81
End: 2021-09-10

## 2021-09-10 DIAGNOSIS — I10 ESSENTIAL HYPERTENSION: ICD-10-CM

## 2021-09-10 RX ORDER — LOSARTAN POTASSIUM 25 MG/1
25 TABLET ORAL DAILY
Qty: 90 TABLET | Refills: 3 | Status: SHIPPED | OUTPATIENT
Start: 2021-09-10 | End: 2022-10-02

## 2021-09-10 NOTE — TELEPHONE ENCOUNTER
----- Message from Kamlesh Vanessa MA sent at 9/10/2021 11:07 AM EDT -----  Regarding: FW: Prescription Question  Contact: [phone]    ----- Message -----  From: Jt Valdes  Sent: 9/10/2021  11:06 AM EDT  To: Kimberly Tejada [facility]  Subject: Prescription Question                            I need a new refill of losartan potassium 25 mg  for 90 days called in to [facility] . Jt Valdes  birthday 1940.       Thank you .

## 2021-09-13 ENCOUNTER — TREATMENT (OUTPATIENT)
Dept: PHYSICAL THERAPY | Facility: CLINIC | Age: 81
End: 2021-09-13

## 2021-09-13 DIAGNOSIS — M54.9 BACK PAIN WITH SCIATICA: ICD-10-CM

## 2021-09-13 DIAGNOSIS — R26.89 BALANCE PROBLEM: Primary | ICD-10-CM

## 2021-09-13 DIAGNOSIS — R29.898 WEAKNESS OF BOTH LOWER EXTREMITIES: ICD-10-CM

## 2021-09-13 DIAGNOSIS — M54.30 BACK PAIN WITH SCIATICA: ICD-10-CM

## 2021-09-13 PROCEDURE — 97110 THERAPEUTIC EXERCISES: CPT | Performed by: PHYSICAL THERAPIST

## 2021-09-13 PROCEDURE — 97116 GAIT TRAINING THERAPY: CPT | Performed by: PHYSICAL THERAPIST

## 2021-09-13 PROCEDURE — 97530 THERAPEUTIC ACTIVITIES: CPT | Performed by: PHYSICAL THERAPIST

## 2021-09-13 NOTE — PROGRESS NOTES
Physical Therapy Daily Progress Note    VISIT#: 7/10     Diagnosis Plan   1. Balance problem     2. Weakness of both lower extremities     3. Back pain with sciatica       Subjective   Jt Valdes reports: went to [employer], got fit for shoes and inserts but too heavy, going to take them back and talk to them. Tired, did a lot of work around the house this AM. Noticing a little bit of improvement. Continues to do HEP and ride RCB.     Objective     See Exercise, Manual, and Modality Logs for complete treatment.     Walking and standing usually limited by back at this time, mild LE fatigue and SOB, but mostly back.    Assessment/Plan Pt continues to be more confident with dynamic balance activities. Longer stride length. 1 episode of LOB backwards, self corrected.       Progress per Plan of Care            Timed:         Manual Therapy:         mins  37960;     Therapeutic Exercise:   20     mins  39077;     Neuromuscular Ajit:        mins  12408;    Therapeutic Activity:     10     mins  94194;     Gait Training:      15     mins  54620;     Ultrasound:          mins  59549;    Ionto                                   mins   84559  Self Care                            mins   13432  Canalith Repos                   mins  4209  Aquatic                               mins 95789    Un-Timed:  Electrical Stimulation:         mins  32962 ( );  Dry Needling          mins self-pay  Traction          mins 50501  Low Eval          Mins  99129  Mod Eval          Mins  11798  High Eval                            Mins  79192  Re-Eval                               mins  34952    Timed Treatment:   45   mins   Total Treatment:     45   mins    Shwetha Gomes, PT

## 2021-09-15 ENCOUNTER — ANTICOAGULATION VISIT (OUTPATIENT)
Dept: CARDIOLOGY | Facility: CLINIC | Age: 81
End: 2021-09-15

## 2021-09-15 VITALS
DIASTOLIC BLOOD PRESSURE: 65 MMHG | BODY MASS INDEX: 22.14 KG/M2 | HEART RATE: 69 BPM | WEIGHT: 125 LBS | SYSTOLIC BLOOD PRESSURE: 185 MMHG

## 2021-09-15 DIAGNOSIS — Z79.01 LONG TERM (CURRENT) USE OF ANTICOAGULANTS: ICD-10-CM

## 2021-09-15 DIAGNOSIS — I48.0 PAROXYSMAL ATRIAL FIBRILLATION (HCC): Primary | ICD-10-CM

## 2021-09-15 LAB — INR PPP: 2.3 (ref 0.9–1.1)

## 2021-09-15 PROCEDURE — 85610 PROTHROMBIN TIME: CPT | Performed by: INTERNAL MEDICINE

## 2021-09-15 PROCEDURE — 36416 COLLJ CAPILLARY BLOOD SPEC: CPT | Performed by: INTERNAL MEDICINE

## 2021-09-16 ENCOUNTER — TELEPHONE (OUTPATIENT)
Dept: CARDIOLOGY | Facility: CLINIC | Age: 81
End: 2021-09-16

## 2021-09-16 DIAGNOSIS — I48.0 PAROXYSMAL ATRIAL FIBRILLATION (HCC): ICD-10-CM

## 2021-09-16 DIAGNOSIS — I10 ESSENTIAL HYPERTENSION: Primary | ICD-10-CM

## 2021-09-16 NOTE — TELEPHONE ENCOUNTER
Pt called, her HR is normally in the 60's but starting in the middle of the night it went up and down from 50 to 160.   Still fluctuating.     Pt denies any recent med change, no CP, no dizziness or soa.   /72    Confirmed meds with pt.   Please advise.

## 2021-09-24 ENCOUNTER — TREATMENT (OUTPATIENT)
Dept: PHYSICAL THERAPY | Facility: CLINIC | Age: 81
End: 2021-09-24

## 2021-09-24 DIAGNOSIS — R26.89 BALANCE PROBLEM: Primary | ICD-10-CM

## 2021-09-24 DIAGNOSIS — M54.9 BACK PAIN WITH SCIATICA: ICD-10-CM

## 2021-09-24 DIAGNOSIS — M54.30 BACK PAIN WITH SCIATICA: ICD-10-CM

## 2021-09-24 DIAGNOSIS — R29.898 WEAKNESS OF BOTH LOWER EXTREMITIES: ICD-10-CM

## 2021-09-24 PROCEDURE — 97530 THERAPEUTIC ACTIVITIES: CPT | Performed by: PHYSICAL THERAPIST

## 2021-09-24 PROCEDURE — 97110 THERAPEUTIC EXERCISES: CPT | Performed by: PHYSICAL THERAPIST

## 2021-09-24 NOTE — PROGRESS NOTES
Physical Therapy Daily Progress Note    VISIT#: 8    Subjective   Jt Valdes reports that she is doing okay today with no new complaints.       Objective     See Exercise, Manual, and Modality Logs for complete treatment.         Assessment/Plan   Pt continues to progress functional strengthening and activity tolerance with decreased rest breaks required throughout the session.     Plan  Progress per Plan of Care and Progress strengthening /stabilization /functional activity            Timed:         Manual Therapy:         mins  13492;     Therapeutic Exercise:    30     mins  01775;     Neuromuscular Ajit:        mins  34237;    Therapeutic Activity:     10     mins  86889;     Gait Training:           mins  88229;     Ultrasound:          mins  08739;    Ionto                                   mins   88643  Self Care                            mins   23148    Un-Timed:  Electrical Stimulation:         mins  36741 ( );  Dry Needling          mins self-pay  Traction          mins 71598  Low Eval          Mins  93274  Mod Eval          Mins  93091  High Eval                            Mins  06524  Re-Eval                               mins  34893    Timed Treatment:   40   mins   Total Treatment:     40   mins    Shara Hart PT, DPT  Physical Therapist  
EKG/Labs/Imaging Studies

## 2021-09-28 ENCOUNTER — TREATMENT (OUTPATIENT)
Dept: PHYSICAL THERAPY | Facility: CLINIC | Age: 81
End: 2021-09-28

## 2021-09-28 DIAGNOSIS — R26.89 BALANCE PROBLEM: Primary | ICD-10-CM

## 2021-09-28 DIAGNOSIS — M54.30 BACK PAIN WITH SCIATICA: ICD-10-CM

## 2021-09-28 DIAGNOSIS — M54.9 BACK PAIN WITH SCIATICA: ICD-10-CM

## 2021-09-28 DIAGNOSIS — R29.898 WEAKNESS OF BOTH LOWER EXTREMITIES: ICD-10-CM

## 2021-09-28 PROCEDURE — 97530 THERAPEUTIC ACTIVITIES: CPT | Performed by: PHYSICAL THERAPIST

## 2021-09-28 PROCEDURE — 97110 THERAPEUTIC EXERCISES: CPT | Performed by: PHYSICAL THERAPIST

## 2021-09-28 PROCEDURE — 97112 NEUROMUSCULAR REEDUCATION: CPT | Performed by: PHYSICAL THERAPIST

## 2021-09-28 NOTE — PROGRESS NOTES
Physical Therapy Daily Progress Note    VISIT#: 9/10     Diagnosis Plan   1. Balance problem     2. Weakness of both lower extremities     3. Back pain with sciatica       Subjective   Jt Lucio reports: Got new shoes, working better. Traveled to visit family, made her very tired. Recently wore heart monitor to check for afib; will follow up with cardiologist. R leg still weaker than L but slowly getting stronger.       Objective     See Exercise, Manual, and Modality Logs for complete treatment.     Cues to avoid medial collapse knees with sit to stand.     Added hip flexor @ stairs stretch to HEP.     Assessment/Plan Continue to progress as tolerated. Occasional sitting rest breaks. Able to move further to edge of CHAU, less hesitation, mild improvement in upright posture.      Progress per Plan of Care Reassess next visit, pt reports likely will want to D/C to HEP and RCB program.            Timed:         Manual Therapy:        mins  53767;     Therapeutic Exercise:  20   mins  78127;     Neuromuscular Ajit:    10    mins  83033;    Therapeutic Activity:     15     mins  27346;     Gait Training:          mins  11493;     Ultrasound:          mins  95381;    Ionto                                   mins   17414  Self Care                            mins   50873  Canalith Repos                   mins  4209  Aquatic                               mins 54186    Un-Timed:  Electrical Stimulation:         mins  37445 ( );  Dry Needling          mins self-pay  Traction          mins 65317  Low Eval          Mins  91651  Mod Eval          Mins  70391  High Eval                            Mins  36423  Re-Eval                               mins  67589    Timed Treatment:   45   mins   Total Treatment:     45   mins    Shwetha Gomes, PT

## 2021-10-13 ENCOUNTER — TREATMENT (OUTPATIENT)
Dept: PHYSICAL THERAPY | Facility: CLINIC | Age: 81
End: 2021-10-13

## 2021-10-13 DIAGNOSIS — R26.89 BALANCE PROBLEM: Primary | ICD-10-CM

## 2021-10-13 DIAGNOSIS — R29.898 WEAKNESS OF BOTH LOWER EXTREMITIES: ICD-10-CM

## 2021-10-13 PROCEDURE — 97110 THERAPEUTIC EXERCISES: CPT | Performed by: PHYSICAL THERAPIST

## 2021-10-13 PROCEDURE — 97112 NEUROMUSCULAR REEDUCATION: CPT | Performed by: PHYSICAL THERAPIST

## 2021-10-13 NOTE — PROGRESS NOTES
Anticipated Discharge Disposition: TBD    Action: LSW spoke with patient and wife at bedside to complete assessment. Patient is independent with ADLs. Patient does use a walker and cane when needed, is on 02 at night and owns a concentrator. Patient has no history at SNF or home health.     Barriers to Discharge: tbd    Plan: LSW to follow regarding d/c needs    Care Transition Team Assessment    Information Source  Orientation : Oriented x 4  Information Given By: Patient  Who is responsible for making decisions for patient? : Patient    Readmission Evaluation  Is this a readmission?: Yes - unplanned readmission    Elopement Risk  Legal Hold: No  Ambulatory or Self Mobile in Wheelchair: Yes  Disoriented: No  Psychiatric Symptoms: None  History of Wandering: No  Elopement this Admit: No  Vocalizing Wanting to Leave: No  Displays Behaviors, Body Language Wanting to Leave: No-Not at Risk for Elopement  Elopement Risk: Not at Risk for Elopement  Wanderguard On: Unavailable  Personal Belongings: Hospital Clothing Only    Interdisciplinary Discharge Planning  Lives with - Patient's Self Care Capacity: Spouse  Patient or legal guardian wants to designate a caregiver (see row info): No  Housing / Facility: [address]    Discharge Preparedness  What is your plan after discharge?: Uncertain - pending medical team collaboration  What are your discharge supports?: Spouse  Prior Functional Level: Independent with Activities of Daily Living    Functional Assesment  Prior Functional Level: Independent with Activities of Daily Living    Finances  Financial Barriers to Discharge: No  Prescription Coverage: Yes    Vision / Hearing Impairment  Vision Impairment : Yes  Right Eye Vision: Wears Glasses  Left Eye Vision: Wears Glasses  Hearing Impairment : No         Advance Directive  Advance Directive?: None  Advance Directive offered?: AD Booklet refused    Domestic Abuse  Have you ever been the victim of abuse or violence?:  Discharge Physical Therapy        Patient: Jt Valdes   : 1940  Diagnosis/ICD-10 Code:  Balance problem [R26.89]  Referring practitioner: Dilcia Perry*  Date of Initial Visit: Type: THERAPY  Noted: 2021  Today's Date: 10/13/2021  Patient seen for 10 sessions      Subjective:   Jt Valdes reports: balance is better, can go up/down hallway without holding on. Getting up/down a little easier. Able to get up off the ground yesterday, was surprised that she could do it. Wants to get legs stronger. She has recumbent bicycle and rides ~ 1 mile a day. She will continue at home, wants to try on her own for a while. Will get new MD order if wants to do more therapy in the future    Subjective Questionnaire: ABC: 49%  Clinical Progress: improved  Home Program Compliance: Yes  Treatment has included: therapeutic exercise, neuromuscular re-education, manual therapy and gait training    Subjective   Objective          Postural Observations  Seated posture: good  Standing posture: fair    Additional Postural Observation Details  Sitting: legs crossed  Standing: R side rib hump, scoliosis, mild lateral lean. Possible leg length difference. Mild knee flexion, less hip flexion than initial.    Sit to stand: independent with UE assist from standard chair, adduction and IR LE. Independent without UE from mildly elevated chair.  Ambulation: independent without  ft, flexed posture, lateral sway. Independent 200 ft with straight cane. Longer stride length, scissoring gait, mildly flexed posture, ER R >L LE, Navicular drop R>L and hip drop in stance phase, better push off    Bed mobility: not tested this date.    Palpation   Left   Hypertonic in the lumbar paraspinals and quadratus lumborum.     Right   Hypertonic in the lumbar paraspinals and quadratus lumborum.     Tenderness     Left Hip   Tenderness in the PSIS.     Right Hip   Tenderness in the PSIS.     Additional Tenderness Details  Lumbosacral  "spine    Neurological Testing     Sensation     Lumbar   Left   Intact: light touch    Right   Intact: light touch    Active Range of Motion     Additional Active Range of Motion Details  Functional ER sitting: independent Wfl with tightness    Lumbar AROM not tested this date.      Strength/Myotome Testing     Left Hip   Planes of Motion   Flexion: 4-  External rotation: 4  Internal rotation: 4    Right Hip   Planes of Motion   Flexion: 4-  External rotation: 4  Internal rotation: 4-    Left Knee   Flexion: 4  Extension: 4    Right Knee   Flexion: 4  Extension: 4    Left Ankle/Foot   Dorsiflexion: 4  Plantar flexion: 4  Inversion: 4  Eversion: 4-  Great toe extension: 4    Right Ankle/Foot   Dorsiflexion: 4  Plantar flexion: 4  Inversion: 4  Eversion: 4  Great toe extension: 4    Additional Strength Details  LE strength sitting as above.   Sidelying hip abd not tested.      Muscle Activation     Additional Muscle Activation Details  Weak core, gluts    Lumbar Flexibility Comments:   Hip flexor mod restriction bilat, quad not tested, hamstrings mod restriction, gastroc min-mod restriction     General Comments     Lumbar Comments  Balance:   Narrow base EO 60\"  Narrow base EC 60\"   Modified tandem R in front EO 10\"  Modified tandem L in front EO 12\"   Single leg balance 1-2\" bilat with navicular drop    STANFORD low fall risk        ER functional bilat  Strength is good   Assessment & Plan     Assessment  Assessment details: Pt demonstrates improvements in gait, standing tolerance, LE strength, LE flexibility, posture, static and dynamic balance. Mild improvements in balance confidence, ABC score and is now in the high end of the low fall risk category on STANFORD balance score. Recommended to continue to use cane. She still has limited endurance, difficulty getting up from low surface due to LE weakness/limited power, and reports chronic low back pain that limits function. Recommend heel lift to correct leg length " No  Physical Abuse or Sexual Abuse: No  Verbal Abuse or Emotional Abuse: No  Possible Abuse Reported to:: Not Applicable    Psychological Assessment  History of Substance Abuse: None  History of Psychiatric Problems: No  Non-compliant with Treatment: No    Discharge Risks or Barriers  Discharge risks or barriers?: No    Anticipated Discharge Information  Anticipated discharge disposition: Discharge needs currently unknown  Discharge Address:  ([address] [place] [address] [place] [address])  Discharge Contact Phone Number:  ([phone])         "discrepancy. She would benefit from continued skilled PT, but wishes to D/C to HEP at this time.     She demonstrates significant inflammation L ulnar wrist, pt reports chronic inflammatory arthritis; advised to follow up with PCP if does not improve over next few days.      Progress toward previous goals: Partially Met    1. Score ABC and STANFORD next visit - MET  2. Pt independent with HEP in 2 visits - MTE  3. Pt independent with ambulation with straight cane 180 ft in 3 weeks - MET  4.  Pt to demonstrate knee ext ROM to 0 for upright standing and stride in 3 weeks - PARTIALLYMET  5. Pt to demonstrate hip ext ROM to 0 bilat for upright standing and stride in 3 weeks - PARTIALLY MET  6. Pt to demonstrate hip abd strength 4/5 or better bilat for stance stability in 4 weeks - PARTIALLYMET    LTG  1. Pt to demonstrate sit to stand from low surface with min UE assist at D/C - PARTIALLY MET  2. Pt to get up from floor with min UE assist at D/C - MET  3. Pt to demonstrate low fall risk per STANFORD -MET  4. Pt to voice increased balance confidence for decreased fall risk by D/C - MET  5. Pt to demonstrate modified tandem bilat 20 sec on ground by D/C - PARTIALLY MET  6. Pt to demonstrate single leg balance 3 sec on ground by D/C - PARTIALLY MET  7. Pt to demonstrate narrow base EC 60\" on ground by D/C- MET      Access Code: [email or web address]  URL: [email or web address]  Date: 10/13/2021  Prepared by: Shwetha Gomes    Exercises  Clamshell - 1 x daily - 7 x weekly - 1 sets - 10 reps  Sit to Stand - 1 x daily - 7 x weekly - 1 sets - 10 reps  Stride Stance Weight Shift - 1 x daily - 7 x weekly - 1 sets - 15 reps  Side Stepping with Counter Support - 1 x daily - 7 x weekly - 3 sets - 10 reps  Seated Long Arc Quad - 1 x daily - 7 x weekly - 2 sets - 10 reps  Seated Hamstring Stretch - 2 x daily - 7 x weekly - 3 sets - 1 reps - 20 sec hold  Gastroc Stretch on Wall - 2 x daily - 7 x weekly - 3 sets - 1 reps - 20 sec hold  Standing Tandem " Balance with Counter Support - 1 x daily - 7 x weekly - 3 sets - 1 reps - 30 sec hold  Romberg Stance - 1 x daily - 7 x weekly - 3 sets - 1 reps - 30 sec hold  Single Leg Stance with Support - 1 x daily - 7 x weekly - 3 sets - 1 reps - 10 ec hold  Standing Hamstring Curl with Chair Support - 1 x daily - 7 x weekly - 2 sets - 10 reps  Standing Marching - 1 x daily - 7 x weekly - 2 sets - 10 reps      Recommendations: Discharge      PT Signature: Shwetha Gomes, PT      Signature: __________________________________  Dilcia Trinidad MD    Timed:         Manual Therapy:        mins  27785;     Therapeutic Exercise:    25     mins  76410;     Neuromuscular Ajit:    15    mins  42628;    Therapeutic Activity:          mins  52503;     Gait Training:           mins  29994;     Ultrasound:          mins  56823;    Ionto                                   mins   43296  Self Care                            mins   09106  Aquatic                               mins 87065      Un-Timed:  Electrical Stimulation:         mins  53697 ( );  Dry Needling          mins self-pay  Traction          mins 63284  Low Eval          Mins  28015  Mod Eval          Mins  44398  High Eval                            Mins  43846  Re-Eval                               mins  02254      Timed Treatment:   40   mins   Total Treatment:     40   mins

## 2021-10-18 ENCOUNTER — OFFICE VISIT (OUTPATIENT)
Dept: CARDIOLOGY | Facility: CLINIC | Age: 81
End: 2021-10-18

## 2021-10-18 ENCOUNTER — ANTICOAGULATION VISIT (OUTPATIENT)
Dept: CARDIOLOGY | Facility: CLINIC | Age: 81
End: 2021-10-18

## 2021-10-18 VITALS — DIASTOLIC BLOOD PRESSURE: 72 MMHG | HEART RATE: 72 BPM | SYSTOLIC BLOOD PRESSURE: 150 MMHG

## 2021-10-18 VITALS
HEART RATE: 72 BPM | BODY MASS INDEX: 21.62 KG/M2 | SYSTOLIC BLOOD PRESSURE: 150 MMHG | HEIGHT: 63 IN | WEIGHT: 122 LBS | DIASTOLIC BLOOD PRESSURE: 72 MMHG

## 2021-10-18 DIAGNOSIS — I10 ESSENTIAL HYPERTENSION: ICD-10-CM

## 2021-10-18 DIAGNOSIS — E78.5 DYSLIPIDEMIA: ICD-10-CM

## 2021-10-18 DIAGNOSIS — I48.0 PAROXYSMAL ATRIAL FIBRILLATION (HCC): ICD-10-CM

## 2021-10-18 DIAGNOSIS — Z79.01 LONG TERM (CURRENT) USE OF ANTICOAGULANTS: ICD-10-CM

## 2021-10-18 DIAGNOSIS — I48.0 PAROXYSMAL ATRIAL FIBRILLATION (HCC): Primary | ICD-10-CM

## 2021-10-18 DIAGNOSIS — N18.32 STAGE 3B CHRONIC KIDNEY DISEASE (HCC): ICD-10-CM

## 2021-10-18 DIAGNOSIS — R00.2 PALPITATIONS: Primary | ICD-10-CM

## 2021-10-18 LAB — INR PPP: 1.6 (ref 0.9–1.1)

## 2021-10-18 PROCEDURE — 93000 ELECTROCARDIOGRAM COMPLETE: CPT | Performed by: INTERNAL MEDICINE

## 2021-10-18 PROCEDURE — 99214 OFFICE O/P EST MOD 30 MIN: CPT | Performed by: INTERNAL MEDICINE

## 2021-10-18 PROCEDURE — 85610 PROTHROMBIN TIME: CPT | Performed by: INTERNAL MEDICINE

## 2021-10-18 PROCEDURE — 36416 COLLJ CAPILLARY BLOOD SPEC: CPT | Performed by: INTERNAL MEDICINE

## 2021-11-03 ENCOUNTER — TELEPHONE (OUTPATIENT)
Dept: CARDIOLOGY | Facility: CLINIC | Age: 81
End: 2021-11-03

## 2021-11-03 NOTE — TELEPHONE ENCOUNTER
Pt called she is showing symptoms of Covid she cancelled our appt. Advised she needs to contact PCP office and make them aware of her Covid sypmtoms. She is concerned because her spouse tested positive at [facility] ER and she is now showing symptoms. Her main concern is that her spouse is on heparin treatment and she is concerned if she should be. Advised patient she is on warfarin. Patient has pending Covid test advised we would need to reschedule her INR for 2 weeks after her initial symptoms. pt verbalizes understanding apLPN

## 2021-11-04 ENCOUNTER — TELEPHONE (OUTPATIENT)
Dept: FAMILY MEDICINE CLINIC | Facility: CLINIC | Age: 81
End: 2021-11-04

## 2021-11-04 NOTE — TELEPHONE ENCOUNTER
Tested positive today. Symptoms started Monday. Pt agreeable to infusion. Faxed to ambulatory care. Pt  has been readmitted to the hospital. Was taken by ambulance today.

## 2021-11-04 NOTE — TELEPHONE ENCOUNTER
----- Message from Diane Palm MA sent at 11/4/2021  8:29 AM EDT -----  Regarding: FW: Covid    ----- Message -----  From: Jt Valdes  Sent: 11/4/2021   8:27 AM EDT  To: [facility]  Subject: Covid                                            Last Friday my  was admitted to [facility] ,thinking he may have pneumonia and dehydration.  As soon as he got there he was tested for COVID and was positive. He remained in the hospital until Monday nov 1. They didn’t do anything to treat the COVID! Long story short ,Monday I started having symptoms,was tested Tuesday nov 2, and Tested positive!  .Is there anything for the covid ,to keep me from having it as bad as he had it ? Right now I just feel like I have a head cold. Thanks.   Birthday 1940, @

## 2021-11-04 NOTE — TELEPHONE ENCOUNTER
Call patient about her positive Covid  I need to know what day her symptoms started  What day she had the positive Covid test and where  Talk to her about the monoclonal antibodies, risks etc. so that we can get  those ordered for her if she is interested

## 2021-11-05 PROBLEM — U07.1 COVID: Status: ACTIVE | Noted: 2021-11-05

## 2021-11-05 RX ORDER — SODIUM CHLORIDE 9 MG/ML
30 INJECTION, SOLUTION INTRAVENOUS ONCE
Status: CANCELLED | OUTPATIENT
Start: 2021-11-07

## 2021-11-05 RX ORDER — METHYLPREDNISOLONE SODIUM SUCCINATE 125 MG/2ML
125 INJECTION, POWDER, LYOPHILIZED, FOR SOLUTION INTRAMUSCULAR; INTRAVENOUS AS NEEDED
Status: CANCELLED | OUTPATIENT
Start: 2021-11-07

## 2021-11-05 RX ORDER — DIPHENHYDRAMINE HYDROCHLORIDE 50 MG/ML
50 INJECTION INTRAMUSCULAR; INTRAVENOUS ONCE AS NEEDED
Status: CANCELLED | OUTPATIENT
Start: 2021-11-07

## 2021-11-05 RX ORDER — DIPHENHYDRAMINE HCL 25 MG
50 TABLET ORAL ONCE AS NEEDED
Status: CANCELLED | OUTPATIENT
Start: 2021-11-07

## 2021-11-05 RX ORDER — EPINEPHRINE 1 MG/ML
0.3 INJECTION, SOLUTION INTRAMUSCULAR; SUBCUTANEOUS AS NEEDED
Status: CANCELLED | OUTPATIENT
Start: 2021-11-07

## 2021-11-07 ENCOUNTER — HOSPITAL ENCOUNTER (OUTPATIENT)
Dept: INFUSION THERAPY | Facility: HOSPITAL | Age: 81
Discharge: HOME | End: 2021-11-07
Admitting: FAMILY MEDICINE

## 2021-11-07 VITALS
SYSTOLIC BLOOD PRESSURE: 139 MMHG | HEART RATE: 68 BPM | RESPIRATION RATE: 16 BRPM | OXYGEN SATURATION: 97 % | TEMPERATURE: 97.1 F | DIASTOLIC BLOOD PRESSURE: 65 MMHG

## 2021-11-07 DIAGNOSIS — U07.1 COVID: Primary | ICD-10-CM

## 2021-11-07 PROCEDURE — 25010000002 INJECTION, CASIRIVIMAB AND IMDEVIMAB, 1200 MG: Performed by: FAMILY MEDICINE

## 2021-11-07 PROCEDURE — 96365 THER/PROPH/DIAG IV INF INIT: CPT

## 2021-11-07 PROCEDURE — 96360 HYDRATION IV INFUSION INIT: CPT

## 2021-11-07 PROCEDURE — M0243 CASIRIVI AND IMDEVI INFUSION: HCPCS | Performed by: FAMILY MEDICINE

## 2021-11-07 RX ORDER — EPINEPHRINE 1 MG/ML
0.3 INJECTION, SOLUTION, CONCENTRATE INTRAVENOUS AS NEEDED
Status: CANCELLED | OUTPATIENT
Start: 2021-11-07

## 2021-11-07 RX ORDER — METHYLPREDNISOLONE SODIUM SUCCINATE 125 MG/2ML
125 INJECTION, POWDER, LYOPHILIZED, FOR SOLUTION INTRAMUSCULAR; INTRAVENOUS AS NEEDED
Status: CANCELLED | OUTPATIENT
Start: 2021-11-07

## 2021-11-07 RX ORDER — EPINEPHRINE 1 MG/ML
0.3 INJECTION, SOLUTION, CONCENTRATE INTRAVENOUS AS NEEDED
Status: DISCONTINUED | OUTPATIENT
Start: 2021-11-07 | End: 2021-11-09 | Stop reason: HOSPADM

## 2021-11-07 RX ORDER — DIPHENHYDRAMINE HYDROCHLORIDE 50 MG/ML
50 INJECTION INTRAMUSCULAR; INTRAVENOUS ONCE AS NEEDED
Status: DISCONTINUED | OUTPATIENT
Start: 2021-11-07 | End: 2021-11-09 | Stop reason: HOSPADM

## 2021-11-07 RX ORDER — SODIUM CHLORIDE 9 MG/ML
30 INJECTION, SOLUTION INTRAVENOUS ONCE
Status: COMPLETED | OUTPATIENT
Start: 2021-11-07 | End: 2021-11-07

## 2021-11-07 RX ORDER — DIPHENHYDRAMINE HYDROCHLORIDE 50 MG/ML
50 INJECTION INTRAMUSCULAR; INTRAVENOUS ONCE AS NEEDED
Status: CANCELLED | OUTPATIENT
Start: 2021-11-07

## 2021-11-07 RX ORDER — METHYLPREDNISOLONE SODIUM SUCCINATE 125 MG/2ML
125 INJECTION, POWDER, LYOPHILIZED, FOR SOLUTION INTRAMUSCULAR; INTRAVENOUS AS NEEDED
Status: DISCONTINUED | OUTPATIENT
Start: 2021-11-07 | End: 2021-11-09 | Stop reason: HOSPADM

## 2021-11-07 RX ORDER — DIPHENHYDRAMINE HCL 25 MG
50 CAPSULE ORAL ONCE AS NEEDED
Status: DISCONTINUED | OUTPATIENT
Start: 2021-11-07 | End: 2021-11-09 | Stop reason: HOSPADM

## 2021-11-07 RX ORDER — DIPHENHYDRAMINE HCL 25 MG
50 CAPSULE ORAL ONCE AS NEEDED
Status: CANCELLED | OUTPATIENT
Start: 2021-11-07

## 2021-11-07 RX ORDER — SODIUM CHLORIDE 9 MG/ML
30 INJECTION, SOLUTION INTRAVENOUS ONCE
Status: CANCELLED | OUTPATIENT
Start: 2021-11-07

## 2021-11-07 RX ADMIN — CASIRIVIMAB AND IMDEVIMAB: 600; 600 INJECTION, SOLUTION, CONCENTRATE INTRAVENOUS at 10:13

## 2021-11-07 RX ADMIN — SODIUM CHLORIDE 100 ML: 9 INJECTION, SOLUTION INTRAVENOUS at 10:36

## 2021-11-11 ENCOUNTER — TELEPHONE (OUTPATIENT)
Dept: CARDIOLOGY | Facility: CLINIC | Age: 81
End: 2021-11-11

## 2021-11-11 DIAGNOSIS — I48.0 PAROXYSMAL ATRIAL FIBRILLATION (HCC): ICD-10-CM

## 2021-11-11 DIAGNOSIS — Z79.01 LONG TERM (CURRENT) USE OF ANTICOAGULANTS: ICD-10-CM

## 2021-11-11 RX ORDER — WARFARIN SODIUM 2.5 MG/1
TABLET ORAL
Qty: 180 TABLET | Refills: 0 | Status: SHIPPED | OUTPATIENT
Start: 2021-11-11 | End: 2022-02-14

## 2021-11-11 NOTE — TELEPHONE ENCOUNTER
Rx Refill Note  Requested Prescriptions     Pending Prescriptions Disp Refills   • warfarin (COUMADIN) 2.5 MG tablet 180 tablet 0     Sig: TAKE ONE TABLET BY MOUTH DAILY ON Monday and Friday and TAKE TWO TABLETS BY MOUTH DAILY ON ALL OTHER DAYS OR AS DIRECTED      Last office visit with prescribing clinician: 10/18/2021      Next office visit with prescribing clinician: 4/25/2022              Appointment (11/11/2021)      Tory Khan LPN  11/11/21, 13:58 EST

## 2021-11-11 NOTE — TELEPHONE ENCOUNTER
Incoming Refill Request      Medication requested (name and dose): WARFARIN 2.5 MG    Pharmacy where request should be sent: [facility]    Additional details provided by patient:     Best call back number: [phone]    Does the patient have less than a 3 day supply:  [] Yes  [x] No    Anum Reynaga Rep  11/11/21, 09:38 EST

## 2021-11-18 ENCOUNTER — ANTICOAGULATION VISIT (OUTPATIENT)
Dept: CARDIOLOGY | Facility: CLINIC | Age: 81
End: 2021-11-18

## 2021-11-18 VITALS
SYSTOLIC BLOOD PRESSURE: 171 MMHG | DIASTOLIC BLOOD PRESSURE: 79 MMHG | HEART RATE: 72 BPM | BODY MASS INDEX: 21.97 KG/M2 | WEIGHT: 124 LBS

## 2021-11-18 DIAGNOSIS — Z79.01 LONG TERM (CURRENT) USE OF ANTICOAGULANTS: ICD-10-CM

## 2021-11-18 DIAGNOSIS — I48.0 PAROXYSMAL ATRIAL FIBRILLATION (HCC): Primary | ICD-10-CM

## 2021-11-18 LAB — INR PPP: 2.5 (ref 0.9–1.1)

## 2021-11-18 PROCEDURE — 85610 PROTHROMBIN TIME: CPT | Performed by: INTERNAL MEDICINE

## 2021-11-18 PROCEDURE — 36416 COLLJ CAPILLARY BLOOD SPEC: CPT | Performed by: INTERNAL MEDICINE

## 2021-12-03 RX ORDER — ETODOLAC 200 MG/1
CAPSULE ORAL
Qty: 90 CAPSULE | Refills: 0 | Status: SHIPPED | OUTPATIENT
Start: 2021-12-03 | End: 2022-02-18

## 2021-12-16 ENCOUNTER — ANTICOAGULATION VISIT (OUTPATIENT)
Dept: CARDIOLOGY | Facility: CLINIC | Age: 81
End: 2021-12-16

## 2021-12-16 VITALS
HEART RATE: 66 BPM | SYSTOLIC BLOOD PRESSURE: 188 MMHG | WEIGHT: 121 LBS | DIASTOLIC BLOOD PRESSURE: 68 MMHG | BODY MASS INDEX: 21.43 KG/M2

## 2021-12-16 DIAGNOSIS — Z79.01 LONG TERM (CURRENT) USE OF ANTICOAGULANTS: ICD-10-CM

## 2021-12-16 DIAGNOSIS — I48.0 PAROXYSMAL ATRIAL FIBRILLATION (HCC): Primary | ICD-10-CM

## 2021-12-16 LAB — INR PPP: 1.4 (ref 0.9–1.1)

## 2021-12-16 PROCEDURE — 85610 PROTHROMBIN TIME: CPT | Performed by: INTERNAL MEDICINE

## 2021-12-16 PROCEDURE — 36416 COLLJ CAPILLARY BLOOD SPEC: CPT | Performed by: INTERNAL MEDICINE

## 2021-12-16 NOTE — PROGRESS NOTES
12/16/21 pt has been eating cauliflower/ [place] blend veggies 3 times a week. She wants to decrease her Vit K in her diet and recheck. Advised take 7.5mg today then resume and recheck in 2 weeks Suman

## 2021-12-19 NOTE — PROGRESS NOTES
Subjective   Jt Valdes is a 81 y.o. female.     Pt presents with pain in right side and back x 3-4 months that has been intermittent but last week it became constant.  It now is back to being intermittent and mostly worse with certain positions. She denies any issues with urinating.  She has hx of lower back pain and has been told she needs back surgery but doesn't want to do that at her age. She denies any bowel issues other than intermittent constipation.  She denies any fevers or rash.  She denies any radiating pain into legs.  Her blood pressure is elevated today but she states she has white coat syndrome and it is usually fine at home.  She hasn't check it recently at home though.  She denies any chest pain, shortness of breath or edema. Denies any headaches, numbness, weakness.    She is under more stress due to  dying .  He had health problems and  after having COVID19.  She has been having to deal with a lot of paperwork regarding this.       The following portions of the patient's history were reviewed and updated as appropriate: allergies, current medications, past family history, past medical history, past social history, past surgical history and problem list.  Past Medical History:   Diagnosis Date   • Arthritis    • Cataract     Cataracts surgery   • COVID-19    • Degenerative joint disease    • Extremity pain     legs pain   • H/O degenerative disc disease    • History of colonoscopy     last done    • History of echocardiogram 2018    LVH EF 65%. RV OK. LA probably mildly dilated. AV OK. MV OK. Modest TR RVSP 26 or less. Nuclear MPI regadenoson 2018. LV uniform myocardial uptake and wall motion EF 71%.    • History of Holter monitoring 10/08/2018    SR 40-81 58. AF 2.7 hr episode VR . At termination AF 2 3.5-4.0 s pauses with patient possibly dizzy. 205 PAC 42 atrial ashanti several SVT. No ventricular ectopy.    • HL (hearing loss)     Tried  hearing aids twice . Didnt help   • HTN (hypertension)    • Hyperlipidemia    • Hypertensive cardiovascular disease    • Hypothyroidism    • Incontinence    • Leukopenia    • Low back pain    • PAF (paroxysmal atrial fibrillation) (HCC) 09/2018    [facility] Sept 2018 PAF and HTN. PAFL On bisoprolol and diltiazem bradycardia symptomatic with dizziness. Amiodarone alone Oct 2018 and no episodes of erratic or fast heartbeat or lightheadedness.    • Rheumatoid arthritis (HCC)     Possible pulmonary involvement    • Staph infection    • White coat syndrome with hypertension      Past Surgical History:   Procedure Laterality Date   • APPENDECTOMY  1974   • BACK SURGERY  2003 2001, 2003   • SUBTOTAL HYSTERECTOMY  1974     Family History   Problem Relation Age of Onset   • Osteoarthritis Mother    • Arthritis Mother    • Cancer Mother    • Other Other         Rheumatoid disease    • Cancer Father    • Cancer Sister      Social History     Socioeconomic History   • Marital status:    Tobacco Use   • Smoking status: Never Smoker   • Smokeless tobacco: Never Used   Vaping Use   • Vaping Use: Never used   Substance and Sexual Activity   • Alcohol use: Yes     Alcohol/week: 1.0 standard drink     Types: 1 Glasses of wine per week     Comment: Occ.   • Drug use: No   • Sexual activity: Not Currently         Current Outpatient Medications:   •  calcium-vitamin D (OSCAL-500) 500-200 MG-UNIT per tablet, Take 1 tablet by mouth Daily., Disp: 90 tablet, Rfl: 3  •  Diclofenac Sodium (VOLTAREN) 1 % gel gel, Apply 4 g topically to the appropriate area as directed 4 (Four) Times a Day As Needed (pain)., Disp: 150 g, Rfl: 3  •  etodolac (LODINE) 200 MG capsule, TAKE ONE CAPSULE BY MOUTH DAILY, Disp: 90 capsule, Rfl: 0  •  leflunomide (ARAVA) 20 MG tablet, TAKE ONE TABLET BY MOUTH DAILY, Disp: 90 tablet, Rfl: 2  •  levothyroxine (SYNTHROID, LEVOTHROID) 125 MCG tablet, Take 1 tablet by mouth Daily., Disp: 90 tablet, Rfl: 3  •   "losartan (COZAAR) 25 MG tablet, Take 1 tablet by mouth Daily., Disp: 90 tablet, Rfl: 3  •  metoprolol succinate XL (TOPROL-XL) 100 MG 24 hr tablet, Take 1 tablet by mouth Daily., Disp: 90 tablet, Rfl: 2  •  multivitamin with minerals (EYE VITAMINS & MINERALS PO), Take 1 tablet by mouth Daily., Disp: , Rfl:   •  pantoprazole (PROTONIX) 40 MG EC tablet, Take 1 tablet by mouth Daily., Disp: 60 tablet, Rfl: 0  •  warfarin (COUMADIN) 2.5 MG tablet, TAKE ONE TABLET BY MOUTH DAILY ON Monday and Friday and TAKE TWO TABLETS BY MOUTH DAILY ON ALL OTHER DAYS OR AS DIRECTED, Disp: 180 tablet, Rfl: 0    Review of Systems   Constitutional: Positive for appetite change and fatigue. Negative for activity change, chills, diaphoresis, fever, unexpected weight gain and unexpected weight loss.   Eyes: Negative for blurred vision, double vision and visual disturbance.   Respiratory: Negative for chest tightness and shortness of breath.    Cardiovascular: Negative for chest pain, palpitations and leg swelling.   Gastrointestinal: Positive for constipation. Negative for abdominal pain, diarrhea, nausea and vomiting.   Genitourinary: Negative for decreased urine volume, difficulty urinating, dysuria, frequency and urinary incontinence.   Musculoskeletal: Positive for back pain. Negative for gait problem.   Neurological: Negative for dizziness, tremors, seizures, syncope, facial asymmetry, speech difficulty, weakness, light-headedness, numbness, headache, memory problem and confusion.   Psychiatric/Behavioral: Positive for sleep disturbance and stress. Negative for depressed mood. The patient is not nervous/anxious.      BP (!) 181/79 (BP Location: Left arm, Patient Position: Sitting, Cuff Size: Adult)   Pulse 67   Temp 97.7 °F (36.5 °C) (Temporal)   Resp 16   Ht 160 cm (63\")   Wt 54.9 kg (121 lb)   SpO2 100%   BMI 21.43 kg/m²       Objective   Physical Exam  Vitals and nursing note reviewed.   Constitutional:       Appearance: " Normal appearance. She is normal weight.   HENT:      Head: Normocephalic and atraumatic.   Eyes:      Pupils: Pupils are equal, round, and reactive to light.   Neck:      Vascular: No carotid bruit.   Cardiovascular:      Rate and Rhythm: Normal rate and regular rhythm.      Heart sounds: Normal heart sounds.   Pulmonary:      Effort: Pulmonary effort is normal.      Breath sounds: Normal breath sounds.   Abdominal:      General: Abdomen is flat. Bowel sounds are normal.      Palpations: Abdomen is soft.   Musculoskeletal:      Cervical back: Normal range of motion and neck supple.      Lumbar back: No tenderness or bony tenderness. Decreased range of motion. Negative right straight leg raise test and negative left straight leg raise test.      Right lower leg: No edema.      Left lower leg: No edema.   Skin:     General: Skin is warm and dry.   Neurological:      General: No focal deficit present.      Mental Status: She is alert and oriented to person, place, and time.   Psychiatric:         Mood and Affect: Mood normal.         Behavior: Behavior normal.         Thought Content: Thought content normal.         Judgment: Judgment normal.         Procedures     Assessment/Plan   Diagnoses and all orders for this visit:    1. Chronic right-sided low back pain without sciatica (Primary)  Comments:  No new injury.  Will check urine and renal function to ensure kidneys are fine but discussed most likely this is due to her chronic back issues.  Pt to let me know if not improving.  Her back pain is back to being intermittent like it had been for past few months.  She did see specialist and had MRI in past 6 months.  Orders:  -     POCT urinalysis dipstick, automated  -     Basic metabolic panel  -     CBC & Differential    2. Primary hypertension  Comments:  Elevated today but pt states she has white coat syndrome.  Pt to monitor at home and let me know if still elevated since under more stress lately.  Orders:  -      CBC & Differential    3. Elevated serum creatinine  Comments:  will recheck labs.  Orders:  -     Basic metabolic panel  -     CBC & Differential    4. Vitamin D deficiency  Comments:  will recheck levels.  Orders:  -     Vitamin D 25 hydroxy    5. Hypothyroidism, unspecified type  Comments:  will recheck labs.  Orders:  -     TSH    6. Abnormal urine finding  Comments:  trace leukocytes and bilirubin in urine. Will send for urinalysis at lab and urine culture.  Orders:  -     Urinalysis With Microscopic - Urine, Clean Catch  -     Urine Culture - Urine, Urine, Clean Catch    I spent 30 minutes of patient care including reviewing pt's previous hx, reviewing current symptoms, performing exam, ordering tests, discussing treatment plan and completing my note.

## 2021-12-20 ENCOUNTER — OFFICE VISIT (OUTPATIENT)
Dept: FAMILY MEDICINE CLINIC | Facility: CLINIC | Age: 81
End: 2021-12-20

## 2021-12-20 ENCOUNTER — LAB (OUTPATIENT)
Dept: FAMILY MEDICINE CLINIC | Facility: CLINIC | Age: 81
End: 2021-12-20

## 2021-12-20 VITALS
OXYGEN SATURATION: 100 % | HEIGHT: 63 IN | SYSTOLIC BLOOD PRESSURE: 181 MMHG | HEART RATE: 67 BPM | BODY MASS INDEX: 21.44 KG/M2 | WEIGHT: 121 LBS | RESPIRATION RATE: 16 BRPM | TEMPERATURE: 97.7 F | DIASTOLIC BLOOD PRESSURE: 79 MMHG

## 2021-12-20 DIAGNOSIS — E03.9 HYPOTHYROIDISM, UNSPECIFIED TYPE: ICD-10-CM

## 2021-12-20 DIAGNOSIS — I10 PRIMARY HYPERTENSION: ICD-10-CM

## 2021-12-20 DIAGNOSIS — E55.9 VITAMIN D DEFICIENCY: ICD-10-CM

## 2021-12-20 DIAGNOSIS — R79.89 ELEVATED SERUM CREATININE: ICD-10-CM

## 2021-12-20 DIAGNOSIS — M54.50 CHRONIC RIGHT-SIDED LOW BACK PAIN WITHOUT SCIATICA: Primary | ICD-10-CM

## 2021-12-20 DIAGNOSIS — G89.29 CHRONIC RIGHT-SIDED LOW BACK PAIN WITHOUT SCIATICA: Primary | ICD-10-CM

## 2021-12-20 DIAGNOSIS — R82.90 ABNORMAL URINE FINDING: ICD-10-CM

## 2021-12-20 LAB
25(OH)D3 SERPL-MCNC: 31 NG/ML
ANION GAP SERPL CALCULATED.3IONS-SCNC: 8.1 MMOL/L (ref 5–15)
BACTERIA UR QL AUTO: ABNORMAL /HPF
BASOPHILS # BLD AUTO: 0.06 10*3/MM3 (ref 0–0.2)
BASOPHILS NFR BLD AUTO: 1.2 % (ref 0–1.5)
BILIRUB BLD-MCNC: ABNORMAL MG/DL
BILIRUB UR QL STRIP: NEGATIVE
BUN SERPL-MCNC: 26 MG/DL (ref 8–23)
BUN/CREAT SERPL: 28.3 (ref 7–25)
CALCIUM SPEC-SCNC: 10.1 MG/DL (ref 8.6–10.5)
CHLORIDE SERPL-SCNC: 100 MMOL/L (ref 98–107)
CLARITY UR: CLEAR
CLARITY, POC: CLEAR
CO2 SERPL-SCNC: 27.9 MMOL/L (ref 22–29)
COLOR UR: YELLOW
COLOR UR: YELLOW
CREAT SERPL-MCNC: 0.92 MG/DL (ref 0.57–1)
DEPRECATED RDW RBC AUTO: 40.7 FL (ref 37–54)
EOSINOPHIL # BLD AUTO: 0.12 10*3/MM3 (ref 0–0.4)
EOSINOPHIL NFR BLD AUTO: 2.5 % (ref 0.3–6.2)
ERYTHROCYTE [DISTWIDTH] IN BLOOD BY AUTOMATED COUNT: 12.6 % (ref 12.3–15.4)
EXPIRATION DATE: ABNORMAL
GFR SERPL CREATININE-BSD FRML MDRD: 58 ML/MIN/1.73
GLUCOSE SERPL-MCNC: 89 MG/DL (ref 65–99)
GLUCOSE UR STRIP-MCNC: NEGATIVE MG/DL
GLUCOSE UR STRIP-MCNC: NEGATIVE MG/DL
HCT VFR BLD AUTO: 35.2 % (ref 34–46.6)
HGB BLD-MCNC: 11.7 G/DL (ref 12–15.9)
HGB UR QL STRIP.AUTO: NEGATIVE
HYALINE CASTS UR QL AUTO: ABNORMAL /LPF
IMM GRANULOCYTES # BLD AUTO: 0.01 10*3/MM3 (ref 0–0.05)
IMM GRANULOCYTES NFR BLD AUTO: 0.2 % (ref 0–0.5)
KETONES UR QL STRIP: NEGATIVE
KETONES UR QL: NEGATIVE
LEUKOCYTE EST, POC: ABNORMAL
LEUKOCYTE ESTERASE UR QL STRIP.AUTO: ABNORMAL
LYMPHOCYTES # BLD AUTO: 0.84 10*3/MM3 (ref 0.7–3.1)
LYMPHOCYTES NFR BLD AUTO: 17.3 % (ref 19.6–45.3)
Lab: ABNORMAL
MCH RBC QN AUTO: 29.5 PG (ref 26.6–33)
MCHC RBC AUTO-ENTMCNC: 33.2 G/DL (ref 31.5–35.7)
MCV RBC AUTO: 88.9 FL (ref 79–97)
MONOCYTES # BLD AUTO: 0.57 10*3/MM3 (ref 0.1–0.9)
MONOCYTES NFR BLD AUTO: 11.8 % (ref 5–12)
NEUTROPHILS NFR BLD AUTO: 3.25 10*3/MM3 (ref 1.7–7)
NEUTROPHILS NFR BLD AUTO: 67 % (ref 42.7–76)
NITRITE UR QL STRIP: NEGATIVE
NITRITE UR-MCNC: NEGATIVE MG/ML
NRBC BLD AUTO-RTO: 0 /100 WBC (ref 0–0.2)
PH UR STRIP.AUTO: 6 [PH] (ref 5–8)
PH UR: 5.5 [PH] (ref 5–8)
PLATELET # BLD AUTO: 183 10*3/MM3 (ref 140–450)
PMV BLD AUTO: 11.4 FL (ref 6–12)
POTASSIUM SERPL-SCNC: 4.6 MMOL/L (ref 3.5–5.2)
PROT UR QL STRIP: NEGATIVE
PROT UR STRIP-MCNC: NEGATIVE MG/DL
RBC # BLD AUTO: 3.96 10*6/MM3 (ref 3.77–5.28)
RBC # UR STRIP: ABNORMAL /HPF
RBC # UR STRIP: NEGATIVE /UL
REF LAB TEST METHOD: ABNORMAL
SODIUM SERPL-SCNC: 136 MMOL/L (ref 136–145)
SP GR UR STRIP: 1.01 (ref 1–1.03)
SP GR UR: 1 (ref 1–1.03)
SQUAMOUS #/AREA URNS HPF: ABNORMAL /HPF
TSH SERPL DL<=0.05 MIU/L-ACNC: 10.4 UIU/ML (ref 0.27–4.2)
UROBILINOGEN UR QL STRIP: ABNORMAL
UROBILINOGEN UR QL: NORMAL
WBC # UR STRIP: ABNORMAL /HPF
WBC NRBC COR # BLD: 4.85 10*3/MM3 (ref 3.4–10.8)

## 2021-12-20 PROCEDURE — 85025 COMPLETE CBC W/AUTO DIFF WBC: CPT | Performed by: PHYSICIAN ASSISTANT

## 2021-12-20 PROCEDURE — 36415 COLL VENOUS BLD VENIPUNCTURE: CPT | Performed by: PHYSICIAN ASSISTANT

## 2021-12-20 PROCEDURE — 84443 ASSAY THYROID STIM HORMONE: CPT | Performed by: PHYSICIAN ASSISTANT

## 2021-12-20 PROCEDURE — 99214 OFFICE O/P EST MOD 30 MIN: CPT | Performed by: PHYSICIAN ASSISTANT

## 2021-12-20 PROCEDURE — 82306 VITAMIN D 25 HYDROXY: CPT | Performed by: PHYSICIAN ASSISTANT

## 2021-12-20 PROCEDURE — 87086 URINE CULTURE/COLONY COUNT: CPT | Performed by: PHYSICIAN ASSISTANT

## 2021-12-20 PROCEDURE — 81001 URINALYSIS AUTO W/SCOPE: CPT | Performed by: PHYSICIAN ASSISTANT

## 2021-12-20 PROCEDURE — 80048 BASIC METABOLIC PNL TOTAL CA: CPT | Performed by: PHYSICIAN ASSISTANT

## 2021-12-20 PROCEDURE — 81003 URINALYSIS AUTO W/O SCOPE: CPT | Performed by: PHYSICIAN ASSISTANT

## 2021-12-22 DIAGNOSIS — E03.9 HYPOTHYROIDISM, UNSPECIFIED TYPE: ICD-10-CM

## 2021-12-22 LAB
BACTERIA UR CULT: NORMAL
BACTERIA UR CULT: NORMAL

## 2021-12-22 RX ORDER — LEVOTHYROXINE SODIUM 137 UG/1
125 TABLET ORAL
Qty: 30 TABLET | Refills: 2 | Status: SHIPPED | OUTPATIENT
Start: 2021-12-22 | End: 2022-02-21 | Stop reason: DRUGHIGH

## 2021-12-29 ENCOUNTER — ANTICOAGULATION VISIT (OUTPATIENT)
Dept: CARDIOLOGY | Facility: CLINIC | Age: 81
End: 2021-12-29

## 2021-12-29 VITALS
BODY MASS INDEX: 21.26 KG/M2 | SYSTOLIC BLOOD PRESSURE: 174 MMHG | WEIGHT: 120 LBS | HEART RATE: 65 BPM | DIASTOLIC BLOOD PRESSURE: 71 MMHG

## 2021-12-29 DIAGNOSIS — Z79.01 LONG TERM (CURRENT) USE OF ANTICOAGULANTS: ICD-10-CM

## 2021-12-29 DIAGNOSIS — I48.0 PAROXYSMAL ATRIAL FIBRILLATION (HCC): Primary | ICD-10-CM

## 2021-12-29 LAB — INR PPP: 1.6 (ref 0.9–1.1)

## 2021-12-29 PROCEDURE — 36416 COLLJ CAPILLARY BLOOD SPEC: CPT | Performed by: INTERNAL MEDICINE

## 2021-12-29 PROCEDURE — 85610 PROTHROMBIN TIME: CPT | Performed by: INTERNAL MEDICINE

## 2022-01-11 ENCOUNTER — ANTICOAGULATION VISIT (OUTPATIENT)
Dept: CARDIOLOGY | Facility: CLINIC | Age: 82
End: 2022-01-11

## 2022-01-11 VITALS
BODY MASS INDEX: 21.26 KG/M2 | HEART RATE: 63 BPM | WEIGHT: 120 LBS | SYSTOLIC BLOOD PRESSURE: 183 MMHG | DIASTOLIC BLOOD PRESSURE: 74 MMHG

## 2022-01-11 DIAGNOSIS — I48.0 PAROXYSMAL ATRIAL FIBRILLATION: Primary | ICD-10-CM

## 2022-01-11 DIAGNOSIS — Z79.01 LONG TERM (CURRENT) USE OF ANTICOAGULANTS: ICD-10-CM

## 2022-01-11 LAB — INR PPP: 1.6 (ref 0.9–1.1)

## 2022-01-11 PROCEDURE — 36416 COLLJ CAPILLARY BLOOD SPEC: CPT | Performed by: INTERNAL MEDICINE

## 2022-01-11 PROCEDURE — 85610 PROTHROMBIN TIME: CPT | Performed by: INTERNAL MEDICINE

## 2022-01-18 DIAGNOSIS — I48.0 PAROXYSMAL ATRIAL FIBRILLATION: ICD-10-CM

## 2022-01-18 DIAGNOSIS — Z79.01 LONG TERM (CURRENT) USE OF ANTICOAGULANTS: ICD-10-CM

## 2022-01-18 RX ORDER — METOPROLOL SUCCINATE 100 MG/1
TABLET, EXTENDED RELEASE ORAL
Qty: 90 TABLET | Refills: 2 | Status: SHIPPED | OUTPATIENT
Start: 2022-01-18 | End: 2022-11-08

## 2022-01-18 NOTE — TELEPHONE ENCOUNTER
Rx Refill Note  Requested Prescriptions     Pending Prescriptions Disp Refills   • metoprolol succinate XL (TOPROL-XL) 100 MG 24 hr tablet [Pharmacy Med Name: METOPROLOL SUCC  MG TAB] 90 tablet 2     Sig: TAKE ONE TABLET BY MOUTH DAILY      Last office visit with prescribing clinician: 10/18/2021      Next office visit with prescribing clinician: 4/25/2022            Irma Mclean MA  01/18/22, 13:39 EST

## 2022-01-27 ENCOUNTER — ANTICOAGULATION VISIT (OUTPATIENT)
Dept: CARDIOLOGY | Facility: CLINIC | Age: 82
End: 2022-01-27

## 2022-01-27 VITALS
BODY MASS INDEX: 21.08 KG/M2 | WEIGHT: 119 LBS | HEART RATE: 69 BPM | DIASTOLIC BLOOD PRESSURE: 68 MMHG | SYSTOLIC BLOOD PRESSURE: 168 MMHG

## 2022-01-27 DIAGNOSIS — I48.0 PAROXYSMAL ATRIAL FIBRILLATION: Primary | ICD-10-CM

## 2022-01-27 DIAGNOSIS — Z79.01 LONG TERM (CURRENT) USE OF ANTICOAGULANTS: ICD-10-CM

## 2022-01-27 LAB — INR PPP: 2.1 (ref 0.9–1.1)

## 2022-01-27 PROCEDURE — 36416 COLLJ CAPILLARY BLOOD SPEC: CPT | Performed by: INTERNAL MEDICINE

## 2022-01-27 PROCEDURE — 85610 PROTHROMBIN TIME: CPT | Performed by: INTERNAL MEDICINE

## 2022-02-14 DIAGNOSIS — Z79.01 LONG TERM (CURRENT) USE OF ANTICOAGULANTS: ICD-10-CM

## 2022-02-14 DIAGNOSIS — I48.0 PAROXYSMAL ATRIAL FIBRILLATION: ICD-10-CM

## 2022-02-14 RX ORDER — WARFARIN SODIUM 2.5 MG/1
TABLET ORAL
Qty: 180 TABLET | Refills: 0 | Status: SHIPPED | OUTPATIENT
Start: 2022-02-14 | End: 2022-05-17

## 2022-02-14 RX ORDER — LEFLUNOMIDE 20 MG/1
TABLET ORAL
Qty: 90 TABLET | Refills: 2 | OUTPATIENT
Start: 2022-02-14

## 2022-02-14 NOTE — TELEPHONE ENCOUNTER
Rx Refill Note  Requested Prescriptions     Pending Prescriptions Disp Refills   • warfarin (COUMADIN) 2.5 MG tablet [Pharmacy Med Name: WARFARIN SODIUM 2.5 MG TABLET] 180 tablet 0     Sig: TAKE ONE TABLET BY MOUTH DAILY ON MONDAY AND FRIDAY AND TAKE TWO TABLETS BY MOUTH DAILY ON ALL OTHER DAYS      Last office visit with prescribing clinician: 10/18/2021      Next office visit with prescribing clinician: 4/25/2022   Anticoagulation Visit 1/27/22 INR 2.10           Bree Finn RN  02/14/22, 10:58 EST

## 2022-02-17 RX ORDER — ETODOLAC 200 MG/1
CAPSULE ORAL
Qty: 90 CAPSULE | Refills: 0 | OUTPATIENT
Start: 2022-02-17

## 2022-02-18 ENCOUNTER — LAB (OUTPATIENT)
Dept: FAMILY MEDICINE CLINIC | Facility: CLINIC | Age: 82
End: 2022-02-18

## 2022-02-18 ENCOUNTER — OFFICE VISIT (OUTPATIENT)
Dept: FAMILY MEDICINE CLINIC | Facility: CLINIC | Age: 82
End: 2022-02-18

## 2022-02-18 VITALS
HEIGHT: 63 IN | BODY MASS INDEX: 21.44 KG/M2 | TEMPERATURE: 97.1 F | WEIGHT: 121 LBS | OXYGEN SATURATION: 100 % | DIASTOLIC BLOOD PRESSURE: 70 MMHG | HEART RATE: 64 BPM | SYSTOLIC BLOOD PRESSURE: 150 MMHG

## 2022-02-18 DIAGNOSIS — M05.79 RHEUMATOID ARTHRITIS INVOLVING MULTIPLE SITES WITH POSITIVE RHEUMATOID FACTOR: ICD-10-CM

## 2022-02-18 DIAGNOSIS — I10 PRIMARY HYPERTENSION: ICD-10-CM

## 2022-02-18 DIAGNOSIS — Z00.00 ENCOUNTER FOR GENERAL ADULT MEDICAL EXAMINATION WITHOUT ABNORMAL FINDINGS: Primary | ICD-10-CM

## 2022-02-18 DIAGNOSIS — E03.9 HYPOTHYROIDISM, UNSPECIFIED TYPE: ICD-10-CM

## 2022-02-18 LAB — TSH SERPL DL<=0.05 MIU/L-ACNC: 7.83 UIU/ML (ref 0.27–4.2)

## 2022-02-18 PROCEDURE — G0439 PPPS, SUBSEQ VISIT: HCPCS | Performed by: FAMILY MEDICINE

## 2022-02-18 PROCEDURE — 99213 OFFICE O/P EST LOW 20 MIN: CPT | Performed by: FAMILY MEDICINE

## 2022-02-18 PROCEDURE — 1170F FXNL STATUS ASSESSED: CPT | Performed by: FAMILY MEDICINE

## 2022-02-18 PROCEDURE — 1160F RVW MEDS BY RX/DR IN RCRD: CPT | Performed by: FAMILY MEDICINE

## 2022-02-18 PROCEDURE — 84481 FREE ASSAY (FT-3): CPT | Performed by: PHYSICIAN ASSISTANT

## 2022-02-18 PROCEDURE — 36415 COLL VENOUS BLD VENIPUNCTURE: CPT

## 2022-02-18 PROCEDURE — 84443 ASSAY THYROID STIM HORMONE: CPT | Performed by: PHYSICIAN ASSISTANT

## 2022-02-18 PROCEDURE — 84439 ASSAY OF FREE THYROXINE: CPT | Performed by: PHYSICIAN ASSISTANT

## 2022-02-18 RX ORDER — LEFLUNOMIDE 20 MG/1
20 TABLET ORAL DAILY
Qty: 90 TABLET | Refills: 0 | Status: SHIPPED | OUTPATIENT
Start: 2022-02-18

## 2022-02-18 NOTE — PROGRESS NOTES
The ABCs of the Annual Wellness Visit  Subsequent Medicare Wellness Visit    Chief Complaint   Patient presents with   • Medicare Wellness-subsequent     wants 90 day supply on all meds. says that her arava and lodine were both denied?   • Hypothyroidism   • Hypertension      Subjective    History of Present Illness:  Jt Valdes is a 81 y.o. female who presents for a Subsequent Medicare Wellness Visit.  She was seen in December and her labs drawn.  At that time her TSH was a little off and her medication was adjusted.  While she is here that level needs to be rechecked.  Blood pressure at home runs 110 systolic  She is still little sad following the death of her  in November  She has not been to see a rheumatologist in quite some time    The following portions of the patient's history were reviewed and   updated as appropriate: allergies, current medications, past family history, past medical history, past social history, past surgical history and problem list.    Compared to one year ago, the patient feels her physical   health is the same.    Compared to one year ago, the patient feels her mental   health is worse.    Recent Hospitalizations:  She was not admitted to the hospital during the last year.       Current Medical Providers:  Patient Care Team:  Dilcia Trinidad MD as PCP - General (Family Medicine)  Roland Benitez MD as Emergency Attending (Family Medicine)  Jose Patiño MD as Consulting Physician (Cardiology)    Outpatient Medications Prior to Visit   Medication Sig Dispense Refill   • calcium-vitamin D (OSCAL-500) 500-200 MG-UNIT per tablet Take 1 tablet by mouth Daily. 90 tablet 3   • Diclofenac Sodium (VOLTAREN) 1 % gel gel Apply 4 g topically to the appropriate area as directed 4 (Four) Times a Day As Needed (pain). 150 g 3   • levothyroxine (SYNTHROID, LEVOTHROID) 137 MCG tablet Take 1 tablet by mouth Every Morning. 30 tablet 2   • losartan (COZAAR) 25 MG tablet  Take 1 tablet by mouth Daily. 90 tablet 3   • metoprolol succinate XL (TOPROL-XL) 100 MG 24 hr tablet TAKE ONE TABLET BY MOUTH DAILY 90 tablet 2   • multivitamin with minerals (EYE VITAMINS & MINERALS PO) Take 1 tablet by mouth Daily.     • pantoprazole (PROTONIX) 40 MG EC tablet Take 1 tablet by mouth Daily. 60 tablet 0   • warfarin (COUMADIN) 2.5 MG tablet TAKE TWO TABLETS BY MOUTH DAILY 180 tablet 0   • etodolac (LODINE) 200 MG capsule TAKE ONE CAPSULE BY MOUTH DAILY 90 capsule 0   • leflunomide (ARAVA) 20 MG tablet TAKE ONE TABLET BY MOUTH DAILY 90 tablet 2     No facility-administered medications prior to visit.       No opioid medication identified on active medication list. I have reviewed chart for other potential  high risk medication/s and harmful drug interactions in the elderly.          Aspirin is not on active medication list.  Aspirin use is not indicated based on review of current medical condition/s. Risk of harm outweighs potential benefits.  .    Patient Active Problem List   Diagnosis   • Degenerative joint disease   • Elevated blood pressure reading without diagnosis of hypertension   • Hypertensive cardiovascular disease   • Fatigue   • Gastroesophageal reflux disease   • Hyperlipidemia   • Hypertension   • Hypothyroidism   • Occlusion and stenosis of unspecified carotid artery   • Paroxysmal atrial fibrillation (HCC)   • Rheumatoid arthritis (HCC)   • Encounter for general adult medical examination without abnormal findings   • Vitamin D deficiency   • Drusen of macula of both eyes   • Peripapillary atrophy of both eyes   • Pseudophakia of both eyes   • PVD (posterior vitreous detachment), right eye   • Long term (current) use of anticoagulants   • COVID     Advance Care Planning  Advance Directive is on file.  ACP discussion was held with the patient during this visit. Patient has an advance directive in EMR which is still valid.     Review of Systems   Constitutional: Negative.   "  Respiratory: Negative.    Cardiovascular: Negative.    Gastrointestinal: Negative for nausea and vomiting.   Endocrine: Negative.    Musculoskeletal: Positive for arthralgias.   Neurological: Negative.    Psychiatric/Behavioral: Negative.         Objective    Vitals:    02/18/22 1025 02/18/22 1053   BP: 172/83 150/70   BP Location: Right arm    Patient Position: Sitting    Cuff Size: Adult    Pulse: 64    Temp: 97.1 °F (36.2 °C)    TempSrc: Temporal    SpO2: 100%    Weight: 54.9 kg (121 lb)    Height: 160 cm (63\")    PainSc: 0-No pain      BMI Readings from Last 1 Encounters:   02/18/22 21.43 kg/m²   BMI is within normal parameters. No follow-up required.    Does the patient have evidence of cognitive impairment? No   MMSE done 30/30    Physical Exam  Vitals and nursing note reviewed.   Constitutional:       General: She is not in acute distress.     Appearance: Normal appearance. She is well-developed and normal weight.   HENT:      Head: Normocephalic and atraumatic.   Neck:      Thyroid: No thyromegaly.   Cardiovascular:      Rate and Rhythm: Normal rate and regular rhythm.      Heart sounds: Normal heart sounds. No murmur heard.  No friction rub. No gallop.    Pulmonary:      Effort: Pulmonary effort is normal. No respiratory distress.      Breath sounds: Normal breath sounds. No wheezing or rales.   Musculoskeletal:      Cervical back: Neck supple.      Right lower leg: No edema.      Left lower leg: No edema.   Lymphadenopathy:      Cervical: No cervical adenopathy.   Skin:     General: Skin is warm and dry.   Neurological:      Mental Status: She is alert and oriented to person, place, and time.      Comments: Uses rollator   Psychiatric:         Mood and Affect: Mood normal.             Lab Results   Component Value Date    TSH 10.400 (H) 12/20/2021     Lab Results   Component Value Date    WBC 4.85 12/20/2021    HGB 11.7 (L) 12/20/2021    HCT 35.2 12/20/2021    MCV 88.9 12/20/2021     12/20/2021 "     Lab Results   Component Value Date    GLUCOSE 89 12/20/2021    CALCIUM 10.1 12/20/2021     12/20/2021    K 4.6 12/20/2021    CO2 27.9 12/20/2021     12/20/2021    BUN 26 (H) 12/20/2021    CREATININE 0.92 12/20/2021    EGFRIFAFRI 57 (L) 04/27/2017    EGFRIFNONA 58 (L) 12/20/2021    BCR 28.3 (H) 12/20/2021    ANIONGAP 8.1 12/20/2021         HEALTH RISK ASSESSMENT    Smoking Status:  Social History     Tobacco Use   Smoking Status Never Smoker   Smokeless Tobacco Never Used     Alcohol Consumption:  Social History     Substance and Sexual Activity   Alcohol Use Yes   • Alcohol/week: 1.0 standard drink   • Types: 1 Glasses of wine per week    Comment: Occ.     Fall Risk Screen:    HERMILA Fall Risk Assessment was completed, and patient is at LOW risk for falls.Assessment completed on:2/18/2022    Depression Screening:  PHQ-2/PHQ-9 Depression Screening 2/18/2022   Little interest or pleasure in doing things 0   Feeling down, depressed, or hopeless 1   Trouble falling or staying asleep, or sleeping too much -   Feeling tired or having little energy -   Poor appetite or overeating -   Feeling bad about yourself - or that you are a failure or have let yourself or your family down -   Trouble concentrating on things, such as reading the newspaper or watching television -   Moving or speaking so slowly that other people could have noticed. Or the opposite - being so fidgety or restless that you have been moving around a lot more than usual -   Thoughts that you would be better off dead, or of hurting yourself in some way -   Total Score 1   If you checked off any problems, how difficult have these problems made it for you to do your work, take care of things at home, or get along with other people? -       Health Habits and Functional and Cognitive Screening:  Functional & Cognitive Status 2/18/2022   Do you have difficulty preparing food and eating? No   Do you have difficulty bathing yourself, getting dressed  or grooming yourself? No   Do you have difficulty using the toilet? No   Do you have difficulty moving around from place to place? Yes   Do you have trouble with steps or getting out of a bed or a chair? No   Current Diet Well Balanced Diet   Dental Exam Up to date   Eye Exam Not up to date   Exercise (times per week) 2 times per week   Current Exercises Include Stationary Bicycling/Spin Class   Current Exercise Activities Include -   Do you need help using the phone?  No   Are you deaf or do you have serious difficulty hearing?  No   Do you need help with transportation? No   Do you need help shopping? No   Do you need help preparing meals?  No   Do you need help with housework?  No   Do you need help with laundry? No   Do you need help taking your medications? No   Do you need help managing money? No   Do you ever drive or ride in a car without wearing a seat belt? No   Have you felt unusual stress, anger or loneliness in the last month? Yes   Who do you live with? Alone   If you need help, do you have trouble finding someone available to you? No   Have you been bothered in the last four weeks by sexual problems? No   Do you have difficulty concentrating, remembering or making decisions? No       Age-appropriate Screening Schedule:  Refer to the list below for future screening recommendations based on patient's age, sex and/or medical conditions. Orders for these recommended tests are listed in the plan section. The patient has been provided with a written plan.    Health Maintenance   Topic Date Due   • LIPID PANEL  03/09/2021   • DXA SCAN  03/29/2023   • TDAP/TD VACCINES (2 - Td or Tdap) 10/04/2027   • INFLUENZA VACCINE  Completed   • ZOSTER VACCINE  Completed                  Assessment/Plan   CMS Preventative Services Quick Reference  Risk Factors Identified During Encounter  rollator  The above risks/problems have been discussed with the patient.  Follow up actions/plans if indicated are seen below in the  Assessment/Plan Section.  Pertinent information has been shared with the patient in the After Visit Summary.    Diagnoses and all orders for this visit:    1. Encounter for general adult medical examination without abnormal findings (Primary)    2. Hypothyroidism, unspecified type    3. Rheumatoid arthritis involving multiple sites with positive rheumatoid factor (HCC)  -     Ambulatory Referral to Rheumatology    4. Primary hypertension    Other orders  -     leflunomide (ARAVA) 20 MG tablet; Take 1 tablet by mouth Daily.  Dispense: 90 tablet; Refill: 0        Follow Up:   Return in about 6 months (around 8/18/2022) for Recheck.     An After Visit Summary and PPPS were made available to the patient.                 Overall she is doing very well  She is counseled on the need to continue using her Rollator for safety  She will get repeat TSH today for follow-up on her hypothyroidism after the dose was adjusted  If she stays on that dose she will need a 90-day prescription she says  For her rheumatoid arthritis I have explained to her that she cannot take etodolac and to be on warfarin secondary to the risk of bleeding  After much time and talking she finally voiced understanding  She needs a refill on her Arava.  I explained to her that I do not like writing that medication that I would feel more comfortable if she was seeing rheumatology.  She is agreeable and I have put in a referral for her  I will see her back in 6 months

## 2022-02-20 DIAGNOSIS — E03.9 HYPOTHYROIDISM, UNSPECIFIED TYPE: Primary | ICD-10-CM

## 2022-02-20 LAB
T3FREE SERPL-MCNC: 2.1 PG/ML (ref 2–4.4)
T4 FREE SERPL-MCNC: 1.38 NG/DL (ref 0.93–1.7)

## 2022-02-21 ENCOUNTER — TELEPHONE (OUTPATIENT)
Dept: FAMILY MEDICINE CLINIC | Facility: CLINIC | Age: 82
End: 2022-02-21

## 2022-02-21 DIAGNOSIS — E03.9 HYPOTHYROIDISM, UNSPECIFIED TYPE: Primary | ICD-10-CM

## 2022-02-21 RX ORDER — LEVOTHYROXINE SODIUM 0.15 MG/1
150 TABLET ORAL
Qty: 30 TABLET | Refills: 1 | Status: SHIPPED | OUTPATIENT
Start: 2022-02-21 | End: 2022-04-07 | Stop reason: DRUGHIGH

## 2022-02-21 NOTE — TELEPHONE ENCOUNTER
----- Message from Krei Michael MA sent at 2/21/2022 11:48 AM EST -----  Regarding: FW: Question regarding TSH    ----- Message -----  From: Jt Valdes  Sent: 2/21/2022  11:33 AM EST  To: [facility]  Subject: Question regarding TSH                           I need a prescription  of levothyroxine ordered from [address] , whatever mg I need to be on. Thank you ! Natty Valdes [birth year]

## 2022-03-04 ENCOUNTER — TELEPHONE (OUTPATIENT)
Dept: FAMILY MEDICINE CLINIC | Facility: CLINIC | Age: 82
End: 2022-03-04

## 2022-03-04 NOTE — TELEPHONE ENCOUNTER
PATIENT CALLED TO SCHEDULE A LAB APPT PER DOCTOR    NOT ABLE TO REACH ANYONE AT THE OFFICE    PLEASE CALL BACK    [phone]

## 2022-03-10 ENCOUNTER — ANTICOAGULATION VISIT (OUTPATIENT)
Dept: CARDIOLOGY | Facility: CLINIC | Age: 82
End: 2022-03-10

## 2022-03-10 VITALS
DIASTOLIC BLOOD PRESSURE: 84 MMHG | WEIGHT: 121 LBS | SYSTOLIC BLOOD PRESSURE: 179 MMHG | HEART RATE: 72 BPM | BODY MASS INDEX: 21.43 KG/M2

## 2022-03-10 DIAGNOSIS — Z79.01 LONG TERM (CURRENT) USE OF ANTICOAGULANTS: ICD-10-CM

## 2022-03-10 DIAGNOSIS — I48.0 PAROXYSMAL ATRIAL FIBRILLATION: Primary | ICD-10-CM

## 2022-03-10 LAB — INR PPP: 2.1 (ref 0.9–1.1)

## 2022-03-10 PROCEDURE — 85610 PROTHROMBIN TIME: CPT | Performed by: INTERNAL MEDICINE

## 2022-03-10 PROCEDURE — 36416 COLLJ CAPILLARY BLOOD SPEC: CPT | Performed by: INTERNAL MEDICINE

## 2022-03-14 ENCOUNTER — TELEPHONE (OUTPATIENT)
Dept: CARDIOLOGY | Facility: CLINIC | Age: 82
End: 2022-03-14

## 2022-03-18 ENCOUNTER — TELEPHONE (OUTPATIENT)
Dept: CARDIOLOGY | Facility: CLINIC | Age: 82
End: 2022-03-18

## 2022-03-18 ENCOUNTER — OFFICE VISIT (OUTPATIENT)
Dept: CARDIOLOGY | Facility: CLINIC | Age: 82
End: 2022-03-18

## 2022-03-18 VITALS
HEIGHT: 63 IN | BODY MASS INDEX: 21.26 KG/M2 | WEIGHT: 120 LBS | OXYGEN SATURATION: 99 % | DIASTOLIC BLOOD PRESSURE: 90 MMHG | SYSTOLIC BLOOD PRESSURE: 178 MMHG | HEART RATE: 67 BPM

## 2022-03-18 DIAGNOSIS — I10 ESSENTIAL HYPERTENSION: ICD-10-CM

## 2022-03-18 DIAGNOSIS — Z79.01 LONG TERM (CURRENT) USE OF ANTICOAGULANTS: ICD-10-CM

## 2022-03-18 DIAGNOSIS — E78.5 DYSLIPIDEMIA: ICD-10-CM

## 2022-03-18 DIAGNOSIS — R00.2 PALPITATIONS: Primary | ICD-10-CM

## 2022-03-18 DIAGNOSIS — N18.32 STAGE 3B CHRONIC KIDNEY DISEASE: ICD-10-CM

## 2022-03-18 DIAGNOSIS — I48.0 PAROXYSMAL ATRIAL FIBRILLATION: ICD-10-CM

## 2022-03-18 PROCEDURE — 99214 OFFICE O/P EST MOD 30 MIN: CPT | Performed by: INTERNAL MEDICINE

## 2022-03-18 RX ORDER — DILTIAZEM HYDROCHLORIDE 120 MG/1
120 CAPSULE, COATED, EXTENDED RELEASE ORAL DAILY
Qty: 90 CAPSULE | Refills: 3 | Status: SHIPPED | OUTPATIENT
Start: 2022-03-18 | End: 2022-07-26 | Stop reason: SDUPTHER

## 2022-03-18 NOTE — PROGRESS NOTES
Subjective:     Encounter Date:03/18/2022      Patient ID: Jt Valdes is a 81 y.o. female.    Chief Complaint : Complaining of palpitations, here for follow-up for A. fib, long-term anticoagulation, whitecoat hypertension, dyslipidemia  History of Present Illness      Ms. Jt Valdes has PMH of     Hypertension/hypertensive cardiovascular disease  Paroxysmal atrial fibrillation, noncompliant on Xarelto, now on coumadin   Hyperlipidemia  SILVERIO, noncompliant on CPAP  Hypothyroidism  Whitecoat hypertension  History of leukopenia, staph infection  Rheumatoid arthritis, possible pulmonary involved ,DJD, DDD  Partial hysterectomy, appendectomy, back surgery  Allergies/intolerance to sulfa-rash  Non-smoker      Here for follow-up.   Patient is complaining of occasional palpitations previously used to take extra metoprolol which used to help her is not helping anymore.  Patient has difficulty with balance and has ataxia walks with a cane.  Patient's arterial blood pressure is 178/90, heart rate 67, O2 sat of 99% on room air.    Patient had labs done 6/4/2019 which showed normal CBC CMP and TSH.  Labs from 3/9/2020 reveal TSH 1.47, cholesterol 202, triglycerides 102, HDL 58, , CMP with a cr 1.06, glucose of 111, A1c of 5.2, normal CBC.  Lipid profile 3/9/2020 with cholesterol 202 triglycerides 102 HDL 58 , CMP with a BUN/creatinine of 21 INR 1.06 and GFR 50.  Hemoglobin A1c 5.2  Labs from 3-2-21 revealed normal CRP TSH and free T4.  CMP with BUN/creatinine of 43/1.32 and GFR of 39.  Labs from 6-21 reveal BUN/creatinine of 34/1.0 EGFR 49, glucose 150.  Labs from 2/18/2022 revealed TSH of 7.8, normal FT BN 4.  Labs from 3/10/2020 reveal INR of 2.1    Reviewed previous records:    Echo 09/04/2018  LVH EF 65%. RV OK.  LA probably mildly dilated.  AV OK.  MV OK.  Modest TR RVSP 26 or less.Holter monitor 9/17-9/20/2021 was unremarkable.  Lexiscan Cardiolite 4/8/2020 were negative for ischemia               Assessment:  Palpitations   paroxysmal A. Fib, long-term anticoagulation with warfarin  Whitecoat hypertension  CKD 3B  Hypertension  Paroxysmal atrial fibrillation on long-term anti-coagulation  SILVERIO  Hyperlipidemia    Plan:  Reviewed EKG results with patient.  Patient is having intermittent A. fib with RVR.  Extra dose of metoprolol is not helping.  We will start her on Cardizem  on top of her metoprolol.  We will check a Holter monitor before next visit.  We will check echocardiogram.  Patient has high [id number] score due to female gender, age >75,, hypertension making it 4, would benefit from long-term anticoagulation, patient could not afford NOACs is on warfarin.  We will follow-up in INR clinic to keep PT/INR between 2 and 3.    Continue losartan and Toprol for blood pressure and CAD  Continue aspirin for CAD.    Advised statins.  Patient wants to think about it.  Patient's fatigue could be from SILVERIO advised her to follow-up with pulmonary and comply with CPAP.  Follow-up with PMD for hypothyroidism.      Procedures EKG done 10/18/2021 reviewed/interpreted by me reveals sinus rhythm with rate of 71 bpm with LVH and repole    The following portions of the patient's history were reviewed and updated as appropriate: allergies, current medications, past family history, past medical history, past social history, past surgical history and problem list.    Assessment:         MDM     Diagnosis Plan   1. Palpitations  Adult Transthoracic Echo Complete W/ Cont if Necessary Per Protocol   2. Paroxysmal atrial fibrillation (HCC)  Adult Transthoracic Echo Complete W/ Cont if Necessary Per Protocol   3. Long term (current) use of anticoagulants  Adult Transthoracic Echo Complete W/ Cont if Necessary Per Protocol   4. Essential hypertension  Adult Transthoracic Echo Complete W/ Cont if Necessary Per Protocol   5. Dyslipidemia  Adult Transthoracic Echo Complete W/ Cont if Necessary Per Protocol   6. Stage 3b  chronic kidney disease (HCC)  Adult Transthoracic Echo Complete W/ Cont if Necessary Per Protocol          Plan:               Past Medical History:  Past Medical History:   Diagnosis Date   • Arthritis    • Cataract     Cataracts surgery   • COVID-19    • Degenerative joint disease    • Extremity pain     legs pain   • H/O degenerative disc disease    • History of colonoscopy 2009    last done 2009   • History of echocardiogram 09/04/2018    LVH EF 65%. RV OK. LA probably mildly dilated. AV OK. MV OK. Modest TR RVSP 26 or less. Nuclear MPI regadenoson 9/14/2018. LV uniform myocardial uptake and wall motion EF 71%.    • History of Holter monitoring 10/08/2018    SR 40-81 58. AF 2.7 hr episode VR . At termination AF 2 3.5-4.0 s pauses with patient possibly dizzy. 205 PAC 42 atrial ashanti several SVT. No ventricular ectopy.    • HL (hearing loss) 2020    Tried hearing aids twice . Didnt help   • HTN (hypertension)    • Hyperlipidemia    • Hypertensive cardiovascular disease    • Hypothyroidism    • Incontinence    • Leukopenia    • Low back pain    • PAF (paroxysmal atrial fibrillation) (HCC) 09/2018     Vaughn Sept 2018 PAF and HTN. PAFL On bisoprolol and diltiazem bradycardia symptomatic with dizziness. Amiodarone alone Oct 2018 and no episodes of erratic or fast heartbeat or lightheadedness.    • Rheumatoid arthritis ([facility])     Possible pulmonary involvement    • Staph infection    • White coat syndrome with hypertension      Past Surgical History:  Past Surgical History:   Procedure Laterality Date   • APPENDECTOMY  1974   • BACK SURGERY  2003 2001, 2003   • SUBTOTAL HYSTERECTOMY  1974      Allergies:  Allergies   Allergen Reactions   • Sulfa Antibiotics Rash     Home Meds:  Current Meds:     Current Outpatient Medications:   •  calcium-vitamin D (OSCAL-500) 500-200 MG-UNIT per tablet, Take 1 tablet by mouth Daily., Disp: 90 tablet, Rfl: 3  •  leflunomide (ARAVA) 20 MG tablet, Take 1 tablet by mouth  "Daily., Disp: 90 tablet, Rfl: 0  •  levothyroxine (Synthroid) 150 MCG tablet, Take 1 tablet by mouth Every Morning., Disp: 30 tablet, Rfl: 1  •  losartan (COZAAR) 25 MG tablet, Take 1 tablet by mouth Daily., Disp: 90 tablet, Rfl: 3  •  metoprolol succinate XL (TOPROL-XL) 100 MG 24 hr tablet, TAKE ONE TABLET BY MOUTH DAILY, Disp: 90 tablet, Rfl: 2  •  multivitamin with minerals tablet tablet, Take 1 tablet by mouth Daily., Disp: , Rfl:   •  warfarin (COUMADIN) 2.5 MG tablet, TAKE TWO TABLETS BY MOUTH DAILY, Disp: 180 tablet, Rfl: 0  •  Diclofenac Sodium (VOLTAREN) 1 % gel gel, Apply 4 g topically to the appropriate area as directed 4 (Four) Times a Day As Needed (pain)., Disp: 150 g, Rfl: 3  •  dilTIAZem CD (CARDIZEM CD) 120 MG 24 hr capsule, Take 1 capsule by mouth Daily., Disp: 90 capsule, Rfl: 3  •  pantoprazole (PROTONIX) 40 MG EC tablet, Take 1 tablet by mouth Daily., Disp: 60 tablet, Rfl: 0  Social History:   Social History     Tobacco Use   • Smoking status: Never Smoker   • Smokeless tobacco: Never Used   Substance Use Topics   • Alcohol use: Yes     Alcohol/week: 1.0 standard drink     Types: 1 Glasses of wine per week     Comment: Occ.      Family History:  Family History   Problem Relation Age of Onset   • Osteoarthritis Mother    • Arthritis Mother    • Cancer Mother    • Other Other         Rheumatoid disease    • Cancer Father    • Cancer Sister               Review of Systems   Constitutional: Positive for malaise/fatigue.   Cardiovascular: Positive for palpitations. Negative for chest pain and leg swelling.   Respiratory: Positive for shortness of breath.    Skin: Negative for rash.   Neurological: Negative for dizziness, light-headedness and numbness.     All other systems are negative         Objective:     Physical Exam  /90   Pulse 67   Ht 160 cm (63\")   Wt 54.4 kg (120 lb)   SpO2 99%   BMI 21.26 kg/m²   General:  Appears in no acute distress  Eyes: Sclera is anicteric,  conjunctiva is " clear   HEENT:  No JVD.  No carotid bruits  Respiratory: Respirations regular and unlabored at rest.  Clear to auscultation  Cardiovascular: S1,S2 Regular rate and rhythm. No murmur, rub or gallop auscultated.   Extremities: No digital clubbing or cyanosis, no edema  Skin: Color pink. Skin warm and dry to touch. No rashes  No xanthoma  Neuro: Alert and awake.    Lab Reviewed:         Jose Patiño MD  3/18/2022 12:28 EDT      Much of the above report is an electronic transcription/translation of the spoken language to printed text using Dragon Software. As such, the subtleties and finesse of the spoken language may permit erroneous, or at times, nonsensical words or phrases to be inadvertently transcribed; thus changes may be made at a later date to rectify these errors.

## 2022-03-18 NOTE — TELEPHONE ENCOUNTER
Order for echo and holter was placed but it was not written on the check out page to schedule. Scheduled 6 month follow up w/ labs that was written on the page. Do the echo and holter need to be scheduled or cancelled? Also lab orders still need to be entered. Thank you!

## 2022-04-01 ENCOUNTER — LAB (OUTPATIENT)
Dept: FAMILY MEDICINE CLINIC | Facility: CLINIC | Age: 82
End: 2022-04-01

## 2022-04-01 ENCOUNTER — ANTICOAGULATION VISIT (OUTPATIENT)
Dept: CARDIOLOGY | Facility: CLINIC | Age: 82
End: 2022-04-01

## 2022-04-01 VITALS
DIASTOLIC BLOOD PRESSURE: 61 MMHG | HEART RATE: 63 BPM | BODY MASS INDEX: 20.9 KG/M2 | WEIGHT: 118 LBS | SYSTOLIC BLOOD PRESSURE: 168 MMHG

## 2022-04-01 DIAGNOSIS — I48.0 PAROXYSMAL ATRIAL FIBRILLATION: Primary | ICD-10-CM

## 2022-04-01 DIAGNOSIS — E03.9 HYPOTHYROIDISM, UNSPECIFIED TYPE: ICD-10-CM

## 2022-04-01 DIAGNOSIS — Z79.01 LONG TERM (CURRENT) USE OF ANTICOAGULANTS: ICD-10-CM

## 2022-04-01 LAB
INR PPP: 2.7 (ref 0.9–1.1)
TSH SERPL DL<=0.05 MIU/L-ACNC: 0.12 UIU/ML (ref 0.27–4.2)

## 2022-04-01 PROCEDURE — 85610 PROTHROMBIN TIME: CPT | Performed by: INTERNAL MEDICINE

## 2022-04-01 PROCEDURE — 84443 ASSAY THYROID STIM HORMONE: CPT | Performed by: FAMILY MEDICINE

## 2022-04-01 PROCEDURE — 36415 COLL VENOUS BLD VENIPUNCTURE: CPT

## 2022-04-01 PROCEDURE — 36416 COLLJ CAPILLARY BLOOD SPEC: CPT | Performed by: INTERNAL MEDICINE

## 2022-04-07 ENCOUNTER — TELEPHONE (OUTPATIENT)
Dept: FAMILY MEDICINE CLINIC | Facility: CLINIC | Age: 82
End: 2022-04-07

## 2022-04-07 RX ORDER — LEVOTHYROXINE SODIUM 137 UG/1
137 TABLET ORAL
Qty: 90 TABLET | Refills: 3 | Status: SHIPPED | OUTPATIENT
Start: 2022-04-07 | End: 2022-06-14

## 2022-04-07 NOTE — TELEPHONE ENCOUNTER
Caller: Jt Valdes    Relationship: Self    Best call back number: [phone]     What is the best time to reach you: ANYTIME     Who are you requesting to speak with (clinical staff, provider,  specific staff member): CLINICAL    What was the call regarding: PATIENT STATES THAT DR. FRANCO TOLD HER TO CHANGE THE DOSAGE OF HER LEVOTHYROXINE.   PATIENT STATES SHE HAS BEEN OUT OF TOWN, BUT THAT SHE NEEDS TO HAVE THIS MEDICATION WITH THE CHANGED DOSAGE CALLED IN HER PHARMACY,  [facility] [place], [place] [address]  AT [facility] - [phone]  - [phone] FX    Do you require a callback:  ONLY IF NECESSARY.

## 2022-04-14 ENCOUNTER — TELEPHONE (OUTPATIENT)
Dept: CARDIOLOGY | Facility: CLINIC | Age: 82
End: 2022-04-14

## 2022-04-14 NOTE — TELEPHONE ENCOUNTER
IN THE LAST WEEK SHE HAS HAD TWO EPISODES OF AFIB. ONE DAY IT LASTED 4 HRS, THE OTHER TIME 3 HRS. YESTERDAY  HEART RATE WAS 48 FOR ABOUT AN HOUR.

## 2022-04-14 NOTE — TELEPHONE ENCOUNTER
Seen recently,  Echo and holter scheduled for Sept.     Called spoke to pt, she takes Metoprolol 100mg daily, when palps last longer than 30 minutes she takes an extra half a pill.     She is more concerned about low HR. Please advise.

## 2022-04-14 NOTE — TELEPHONE ENCOUNTER
LOV says the extra Metoprolol is not helping  Is pt taking Cardizem  ?   Continue taking Metoprolol daily per Dr Patiño

## 2022-04-29 ENCOUNTER — ANTICOAGULATION VISIT (OUTPATIENT)
Dept: CARDIOLOGY | Facility: CLINIC | Age: 82
End: 2022-04-29

## 2022-04-29 VITALS
DIASTOLIC BLOOD PRESSURE: 67 MMHG | HEART RATE: 65 BPM | BODY MASS INDEX: 20.55 KG/M2 | WEIGHT: 116 LBS | SYSTOLIC BLOOD PRESSURE: 164 MMHG

## 2022-04-29 DIAGNOSIS — Z79.01 LONG TERM (CURRENT) USE OF ANTICOAGULANTS: ICD-10-CM

## 2022-04-29 DIAGNOSIS — I48.0 PAROXYSMAL ATRIAL FIBRILLATION: Primary | ICD-10-CM

## 2022-04-29 LAB — INR PPP: 3.2 (ref 0.9–1.1)

## 2022-04-29 PROCEDURE — 36416 COLLJ CAPILLARY BLOOD SPEC: CPT | Performed by: INTERNAL MEDICINE

## 2022-04-29 PROCEDURE — 85610 PROTHROMBIN TIME: CPT | Performed by: INTERNAL MEDICINE

## 2022-05-11 ENCOUNTER — TELEPHONE (OUTPATIENT)
Dept: CARDIOLOGY | Facility: CLINIC | Age: 82
End: 2022-05-11

## 2022-05-11 NOTE — TELEPHONE ENCOUNTER
Called spoke to pt. She said she had palps this morning for the first time in a month. She feels fine now.     I confirmed pt's meds.  She will call us back if it happens again

## 2022-05-16 DIAGNOSIS — I48.0 PAROXYSMAL ATRIAL FIBRILLATION: ICD-10-CM

## 2022-05-16 DIAGNOSIS — Z79.01 LONG TERM (CURRENT) USE OF ANTICOAGULANTS: ICD-10-CM

## 2022-05-17 RX ORDER — WARFARIN SODIUM 2.5 MG/1
TABLET ORAL
Qty: 180 TABLET | Refills: 0 | Status: SHIPPED | OUTPATIENT
Start: 2022-05-17 | End: 2022-08-08

## 2022-05-17 NOTE — TELEPHONE ENCOUNTER
Rx Refill Note  Requested Prescriptions     Pending Prescriptions Disp Refills   • warfarin (COUMADIN) 2.5 MG tablet [Pharmacy Med Name: WARFARIN SODIUM 2.5 MG TABLET] 180 tablet 0     Sig: TAKE TWO TABLETS BY MOUTH DAILY      Last office visit with prescribing clinician: 3/18/2022      Next office visit with prescribing clinician: 9/20/2022   Anticoagulation Visit 4/29/22 INR 3.2           Bree Finn RN  05/17/22, 13:04 EDT

## 2022-06-02 ENCOUNTER — TELEPHONE (OUTPATIENT)
Dept: CARDIOLOGY | Facility: CLINIC | Age: 82
End: 2022-06-02

## 2022-06-02 NOTE — TELEPHONE ENCOUNTER
Next OV  9/2022, Holter and echo ordered for that visit.      Pt taking Warfarin and Metoprolol. Diltiazem added in April.       Abnormal TSH on 4/1/22. TSH (04/01/2022 10:56)

## 2022-06-03 NOTE — TELEPHONE ENCOUNTER
Contact PCP regarding TSH level, this could be a cause of her symptoms. Per Dr. Patiño.     Patient made aware and voiced understanding.

## 2022-06-07 ENCOUNTER — ANTICOAGULATION VISIT (OUTPATIENT)
Dept: CARDIOLOGY | Facility: CLINIC | Age: 82
End: 2022-06-07

## 2022-06-07 VITALS
DIASTOLIC BLOOD PRESSURE: 77 MMHG | WEIGHT: 113 LBS | BODY MASS INDEX: 20.02 KG/M2 | HEART RATE: 63 BPM | SYSTOLIC BLOOD PRESSURE: 185 MMHG

## 2022-06-07 DIAGNOSIS — Z79.01 LONG TERM (CURRENT) USE OF ANTICOAGULANTS: ICD-10-CM

## 2022-06-07 DIAGNOSIS — I48.0 PAROXYSMAL ATRIAL FIBRILLATION: Primary | ICD-10-CM

## 2022-06-07 LAB — INR PPP: 2.4 (ref 0.9–1.1)

## 2022-06-07 PROCEDURE — 85610 PROTHROMBIN TIME: CPT | Performed by: INTERNAL MEDICINE

## 2022-06-07 PROCEDURE — 36416 COLLJ CAPILLARY BLOOD SPEC: CPT | Performed by: INTERNAL MEDICINE

## 2022-06-14 ENCOUNTER — OFFICE VISIT (OUTPATIENT)
Dept: FAMILY MEDICINE CLINIC | Facility: CLINIC | Age: 82
End: 2022-06-14

## 2022-06-14 ENCOUNTER — LAB (OUTPATIENT)
Dept: FAMILY MEDICINE CLINIC | Facility: CLINIC | Age: 82
End: 2022-06-14

## 2022-06-14 VITALS
TEMPERATURE: 97.7 F | RESPIRATION RATE: 16 BRPM | BODY MASS INDEX: 20.55 KG/M2 | HEART RATE: 66 BPM | SYSTOLIC BLOOD PRESSURE: 196 MMHG | DIASTOLIC BLOOD PRESSURE: 81 MMHG | OXYGEN SATURATION: 97 % | WEIGHT: 116 LBS | HEIGHT: 63 IN

## 2022-06-14 DIAGNOSIS — R10.9 ABDOMINAL CRAMPING: ICD-10-CM

## 2022-06-14 DIAGNOSIS — E55.9 VITAMIN D DEFICIENCY: ICD-10-CM

## 2022-06-14 DIAGNOSIS — E78.5 HYPERLIPIDEMIA, UNSPECIFIED HYPERLIPIDEMIA TYPE: ICD-10-CM

## 2022-06-14 DIAGNOSIS — I48.0 PAROXYSMAL ATRIAL FIBRILLATION: ICD-10-CM

## 2022-06-14 DIAGNOSIS — E03.9 HYPOTHYROIDISM, UNSPECIFIED TYPE: Primary | ICD-10-CM

## 2022-06-14 DIAGNOSIS — M54.50 CHRONIC RIGHT-SIDED LOW BACK PAIN WITHOUT SCIATICA: ICD-10-CM

## 2022-06-14 DIAGNOSIS — G89.29 CHRONIC RIGHT-SIDED LOW BACK PAIN WITHOUT SCIATICA: ICD-10-CM

## 2022-06-14 DIAGNOSIS — D64.9 ANEMIA, UNSPECIFIED TYPE: ICD-10-CM

## 2022-06-14 DIAGNOSIS — E03.9 HYPOTHYROIDISM, UNSPECIFIED TYPE: ICD-10-CM

## 2022-06-14 DIAGNOSIS — R53.83 FATIGUE, UNSPECIFIED TYPE: Primary | ICD-10-CM

## 2022-06-14 LAB
25(OH)D3 SERPL-MCNC: 39.9 NG/ML (ref 30–100)
ALBUMIN SERPL-MCNC: 4.3 G/DL (ref 3.5–5.2)
ALBUMIN/GLOB SERPL: 1.9 G/DL
ALP SERPL-CCNC: 62 U/L (ref 39–117)
ALT SERPL W P-5'-P-CCNC: 11 U/L (ref 1–33)
ANION GAP SERPL CALCULATED.3IONS-SCNC: 13 MMOL/L (ref 5–15)
AST SERPL-CCNC: 20 U/L (ref 1–32)
BASOPHILS # BLD AUTO: 0.06 10*3/MM3 (ref 0–0.2)
BASOPHILS NFR BLD AUTO: 1.2 % (ref 0–1.5)
BILIRUB SERPL-MCNC: 0.5 MG/DL (ref 0–1.2)
BUN SERPL-MCNC: 21 MG/DL (ref 8–23)
BUN/CREAT SERPL: 22.3 (ref 7–25)
CALCIUM SPEC-SCNC: 9.7 MG/DL (ref 8.6–10.5)
CHLORIDE SERPL-SCNC: 103 MMOL/L (ref 98–107)
CHOLEST SERPL-MCNC: 174 MG/DL (ref 0–200)
CO2 SERPL-SCNC: 24 MMOL/L (ref 22–29)
CREAT SERPL-MCNC: 0.94 MG/DL (ref 0.57–1)
DEPRECATED RDW RBC AUTO: 37.9 FL (ref 37–54)
EGFRCR SERPLBLD CKD-EPI 2021: 60.7 ML/MIN/1.73
EOSINOPHIL # BLD AUTO: 0.1 10*3/MM3 (ref 0–0.4)
EOSINOPHIL NFR BLD AUTO: 1.9 % (ref 0.3–6.2)
ERYTHROCYTE [DISTWIDTH] IN BLOOD BY AUTOMATED COUNT: 12 % (ref 12.3–15.4)
FERRITIN SERPL-MCNC: 405 NG/ML (ref 13–150)
FOLATE SERPL-MCNC: >20 NG/ML (ref 4.78–24.2)
GLOBULIN UR ELPH-MCNC: 2.3 GM/DL
GLUCOSE SERPL-MCNC: 88 MG/DL (ref 65–99)
HCT VFR BLD AUTO: 34.1 % (ref 34–46.6)
HDLC SERPL-MCNC: 59 MG/DL (ref 40–60)
HGB BLD-MCNC: 11.2 G/DL (ref 12–15.9)
IMM GRANULOCYTES # BLD AUTO: 0.02 10*3/MM3 (ref 0–0.05)
IMM GRANULOCYTES NFR BLD AUTO: 0.4 % (ref 0–0.5)
IRON 24H UR-MRATE: 109 MCG/DL (ref 37–145)
LDLC SERPL CALC-MCNC: 95 MG/DL (ref 0–100)
LDLC/HDLC SERPL: 1.58 {RATIO}
LYMPHOCYTES # BLD AUTO: 0.82 10*3/MM3 (ref 0.7–3.1)
LYMPHOCYTES NFR BLD AUTO: 15.7 % (ref 19.6–45.3)
MAGNESIUM SERPL-MCNC: 1.6 MG/DL (ref 1.6–2.4)
MCH RBC QN AUTO: 29.2 PG (ref 26.6–33)
MCHC RBC AUTO-ENTMCNC: 32.8 G/DL (ref 31.5–35.7)
MCV RBC AUTO: 88.8 FL (ref 79–97)
MONOCYTES # BLD AUTO: 0.57 10*3/MM3 (ref 0.1–0.9)
MONOCYTES NFR BLD AUTO: 10.9 % (ref 5–12)
NEUTROPHILS NFR BLD AUTO: 3.64 10*3/MM3 (ref 1.7–7)
NEUTROPHILS NFR BLD AUTO: 69.9 % (ref 42.7–76)
NRBC BLD AUTO-RTO: 0 /100 WBC (ref 0–0.2)
PLATELET # BLD AUTO: 187 10*3/MM3 (ref 140–450)
PMV BLD AUTO: 10.5 FL (ref 6–12)
POTASSIUM SERPL-SCNC: 4.3 MMOL/L (ref 3.5–5.2)
PROT SERPL-MCNC: 6.6 G/DL (ref 6–8.5)
RBC # BLD AUTO: 3.84 10*6/MM3 (ref 3.77–5.28)
SODIUM SERPL-SCNC: 140 MMOL/L (ref 136–145)
T3FREE SERPL-MCNC: 2.49 PG/ML (ref 2–4.4)
T4 FREE SERPL-MCNC: 1.7 NG/DL (ref 0.93–1.7)
TRIGL SERPL-MCNC: 109 MG/DL (ref 0–150)
TSH SERPL DL<=0.05 MIU/L-ACNC: 0.14 UIU/ML (ref 0.27–4.2)
VIT B12 BLD-MCNC: 581 PG/ML (ref 211–946)
VLDLC SERPL-MCNC: 20 MG/DL (ref 5–40)
WBC NRBC COR # BLD: 5.21 10*3/MM3 (ref 3.4–10.8)

## 2022-06-14 PROCEDURE — 82306 VITAMIN D 25 HYDROXY: CPT | Performed by: PHYSICIAN ASSISTANT

## 2022-06-14 PROCEDURE — 84481 FREE ASSAY (FT-3): CPT | Performed by: PHYSICIAN ASSISTANT

## 2022-06-14 PROCEDURE — 82607 VITAMIN B-12: CPT | Performed by: PHYSICIAN ASSISTANT

## 2022-06-14 PROCEDURE — 84439 ASSAY OF FREE THYROXINE: CPT | Performed by: PHYSICIAN ASSISTANT

## 2022-06-14 PROCEDURE — 80053 COMPREHEN METABOLIC PANEL: CPT | Performed by: PHYSICIAN ASSISTANT

## 2022-06-14 PROCEDURE — 83735 ASSAY OF MAGNESIUM: CPT | Performed by: PHYSICIAN ASSISTANT

## 2022-06-14 PROCEDURE — 82746 ASSAY OF FOLIC ACID SERUM: CPT | Performed by: PHYSICIAN ASSISTANT

## 2022-06-14 PROCEDURE — 84443 ASSAY THYROID STIM HORMONE: CPT | Performed by: PHYSICIAN ASSISTANT

## 2022-06-14 PROCEDURE — 99214 OFFICE O/P EST MOD 30 MIN: CPT | Performed by: PHYSICIAN ASSISTANT

## 2022-06-14 PROCEDURE — 83540 ASSAY OF IRON: CPT | Performed by: PHYSICIAN ASSISTANT

## 2022-06-14 PROCEDURE — 82728 ASSAY OF FERRITIN: CPT | Performed by: PHYSICIAN ASSISTANT

## 2022-06-14 PROCEDURE — 80061 LIPID PANEL: CPT | Performed by: PHYSICIAN ASSISTANT

## 2022-06-14 PROCEDURE — 85025 COMPLETE CBC W/AUTO DIFF WBC: CPT | Performed by: PHYSICIAN ASSISTANT

## 2022-06-14 PROCEDURE — 36415 COLL VENOUS BLD VENIPUNCTURE: CPT | Performed by: PHYSICIAN ASSISTANT

## 2022-06-14 RX ORDER — LEVOTHYROXINE SODIUM 0.12 MG/1
137 TABLET ORAL
Qty: 30 TABLET | Refills: 2 | Status: SHIPPED | OUTPATIENT
Start: 2022-06-14 | End: 2022-07-14

## 2022-06-14 RX ORDER — DULOXETIN HYDROCHLORIDE 20 MG/1
CAPSULE, DELAYED RELEASE ORAL
COMMUNITY
Start: 2022-04-20 | End: 2022-07-26

## 2022-06-14 NOTE — PROGRESS NOTES
Answers for HPI/ROS submitted by the patient on 6/7/2022  Please describe your symptoms.: Fatigue, dr. Patiño suggested to get thyroid check .  Have you had these symptoms before?: Yes  How long have you been having these symptoms?: Greater than 2 weeks  Please list any medications you are currently taking for this condition.: In the last few months my prescription for thyroid has changed three or four times.  What is the primary reason for your visit?: Other    Subjective   Jt Valdes is a 81 y.o. female.     Pt presents to follow up from recent visit with cardiologist.  Dr. Patiño recommended that she get her thyroid checked. She has been checking her blood pressure at home and it is usually in the 140s/60s at home.  She denies any current symptoms with her elevated blood pressure.  She does complain of fatigue so her cardiologist wanted her to get thyroid labs rechecked.  She also complains of some right upper abdomen pain at times but denies any current issues.  She does have follow up with GI in August.       The following portions of the patient's history were reviewed and updated as appropriate: allergies, current medications, past family history, past medical history, past social history, past surgical history and problem list.  Past Medical History:   Diagnosis Date   • Arthritis    • Cataract     Cataracts surgery   • COVID-19    • Degenerative joint disease    • Extremity pain     legs pain   • H/O degenerative disc disease    • History of colonoscopy 2009    last done 2009   • History of echocardiogram 09/04/2018    LVH EF 65%. RV OK. LA probably mildly dilated. AV OK. MV OK. Modest TR RVSP 26 or less. Nuclear MPI regadenoson 9/14/2018. LV uniform myocardial uptake and wall motion EF 71%.    • History of Holter monitoring 10/08/2018    SR 40-81 58. AF 2.7 hr episode VR . At termination AF 2 3.5-4.0 s pauses with patient possibly dizzy. 205 PAC 42 atrial ashanti several SVT. No ventricular  ectopy.    • HL (hearing loss) 2020    Tried hearing aids twice . Didnt help   • HTN (hypertension)    • Hyperlipidemia    • Hypertensive cardiovascular disease    • Hypothyroidism    • Incontinence    • Leukopenia    • Low back pain    • PAF (paroxysmal atrial fibrillation) (HCC) 09/2018    [facility] Sept 2018 PAF and HTN. PAFL On bisoprolol and diltiazem bradycardia symptomatic with dizziness. Amiodarone alone Oct 2018 and no episodes of erratic or fast heartbeat or lightheadedness.    • Rheumatoid arthritis (HCC)     Possible pulmonary involvement    • Staph infection    • White coat syndrome with hypertension      Past Surgical History:   Procedure Laterality Date   • APPENDECTOMY  1974   • BACK SURGERY  2003 2001, 2003   • SUBTOTAL HYSTERECTOMY  1974     Family History   Problem Relation Age of Onset   • Osteoarthritis Mother    • Arthritis Mother    • Cancer Mother    • Other Other         Rheumatoid disease    • Cancer Father    • Cancer Sister      Social History     Socioeconomic History   • Marital status:    Tobacco Use   • Smoking status: Never Smoker   • Smokeless tobacco: Never Used   Vaping Use   • Vaping Use: Never used   Substance and Sexual Activity   • Alcohol use: Yes     Alcohol/week: 1.0 standard drink     Types: 1 Glasses of wine per week     Comment: Occ.   • Drug use: No   • Sexual activity: Not Currently         Current Outpatient Medications:   •  calcium-vitamin D (OSCAL-500) 500-200 MG-UNIT per tablet, Take 1 tablet by mouth Daily., Disp: 90 tablet, Rfl: 3  •  Diclofenac Sodium (VOLTAREN) 1 % gel gel, Apply 4 g topically to the appropriate area as directed 4 (Four) Times a Day As Needed (pain)., Disp: 150 g, Rfl: 3  •  dilTIAZem CD (CARDIZEM CD) 120 MG 24 hr capsule, Take 1 capsule by mouth Daily., Disp: 90 capsule, Rfl: 3  •  leflunomide (ARAVA) 20 MG tablet, Take 1 tablet by mouth Daily., Disp: 90 tablet, Rfl: 0  •  levothyroxine (Synthroid) 137 MCG tablet, Take 1 tablet by  "mouth Every Morning., Disp: 90 tablet, Rfl: 3  •  losartan (COZAAR) 25 MG tablet, Take 1 tablet by mouth Daily., Disp: 90 tablet, Rfl: 3  •  metoprolol succinate XL (TOPROL-XL) 100 MG 24 hr tablet, TAKE ONE TABLET BY MOUTH DAILY, Disp: 90 tablet, Rfl: 2  •  multivitamin with minerals tablet tablet, Take 1 tablet by mouth Daily., Disp: , Rfl:   •  warfarin (COUMADIN) 2.5 MG tablet, TAKE TWO TABLETS BY MOUTH DAILY, Disp: 180 tablet, Rfl: 0  •  DULoxetine (CYMBALTA) 20 MG capsule, , Disp: , Rfl:   •  pantoprazole (PROTONIX) 40 MG EC tablet, Take 1 tablet by mouth Daily., Disp: 60 tablet, Rfl: 0    Review of Systems   Constitutional: Positive for fatigue. Negative for activity change, appetite change, chills, diaphoresis, fever, unexpected weight gain and unexpected weight loss.   Respiratory: Negative for chest tightness and shortness of breath.    Cardiovascular: Negative for chest pain, palpitations and leg swelling.   Gastrointestinal: Positive for abdominal pain. Negative for constipation, diarrhea, nausea and vomiting.   Musculoskeletal: Negative for gait problem.   Neurological: Negative for dizziness, weakness, light-headedness, headache and confusion.   Psychiatric/Behavioral: Negative for sleep disturbance and depressed mood. The patient is not nervous/anxious.      BP (!) 196/81 (BP Location: Left arm, Patient Position: Sitting, Cuff Size: Adult)   Pulse 66   Temp 97.7 °F (36.5 °C) (Temporal)   Resp 16   Ht 160 cm (63\")   Wt 52.6 kg (116 lb)   SpO2 97%   BMI 20.55 kg/m²       Objective   Physical Exam  Vitals and nursing note reviewed.   Constitutional:       Appearance: Normal appearance.   Neck:      Thyroid: No thyroid mass, thyromegaly or thyroid tenderness.      Vascular: No carotid bruit.   Cardiovascular:      Rate and Rhythm: Normal rate and regular rhythm.      Heart sounds: Normal heart sounds.   Pulmonary:      Effort: Pulmonary effort is normal.      Breath sounds: Normal breath sounds. "   Abdominal:      General: Abdomen is flat. Bowel sounds are normal.      Palpations: Abdomen is soft.   Musculoskeletal:      Cervical back: Normal range of motion and neck supple.      Right lower leg: No edema.      Left lower leg: No edema.   Skin:     General: Skin is warm and dry.   Neurological:      General: No focal deficit present.      Mental Status: She is alert and oriented to person, place, and time.   Psychiatric:         Mood and Affect: Mood normal.         Behavior: Behavior normal.         Thought Content: Thought content normal.         Procedures       Diagnoses and all orders for this visit:    1. Fatigue, unspecified type (Primary)  Comments:  Will check labs and notify pt with results.  Orders:  -     CBC & Differential  -     Magnesium    2. Hypothyroidism, unspecified type  Comments:  Will recheck labs and notify with results.  Orders:  -     TSH  -     T4, free  -     T3, free    3. Vitamin D deficiency  Comments:  Will check labs.  Orders:  -     Vitamin D 25 hydroxy    4. Paroxysmal atrial fibrillation (HCC)  Comments:  stable.    Orders:  -     Magnesium    5. Abdominal cramping  Comments:  No current sypmtoms or tenderness in office today.  Pt to continue to monitor and let us know if symptoms return.  She also has f/u with GI in August.  Orders:  -     Comprehensive metabolic panel    6. Anemia, unspecified type  Comments:  Will recheck labs to see if this is contributing to her fatigue.  Orders:  -     CBC & Differential  -     Iron level  -     Ferritin  -     Vitamin B12  -     Folate    7. Hyperlipidemia, unspecified hyperlipidemia type  Comments:  Pt due for recheck of cholesterol.   Orders:  -     Lipid panel    8. Chronic right-sided low back pain without sciatica  Comments:  Pt requests to see specialist at [facility] that was recommended to her.  Referral entered.  Orders:  -     Ambulatory Referral to Orthopedic Surgery    I spent 30 minutes of patient care including reviewing  pt's previous hx, reviewing current symptoms, performing exam, ordering tests, discussing treatment plan and completing my note.

## 2022-06-16 ENCOUNTER — TELEPHONE (OUTPATIENT)
Dept: FAMILY MEDICINE CLINIC | Facility: CLINIC | Age: 82
End: 2022-06-16

## 2022-06-16 DIAGNOSIS — R79.89 ELEVATED FERRITIN LEVEL: Primary | ICD-10-CM

## 2022-06-16 NOTE — TELEPHONE ENCOUNTER
Her most recent magnesium level from a few days ago was in normal range. We often see an elevated ferritin with  rheumatoid arthritis. It acts as an inflammatory marker at times. We can repeat this level in 1 mo to see if there is any change.  It can be affected by a variety of things, such as iron overload, but her iron was normal.

## 2022-06-16 NOTE — TELEPHONE ENCOUNTER
Caller: Jt Valdes    Relationship: Self    Best call back number: [phone]    Caller requesting test results: SELF    What test was performed: BLOODWORK     When was the test performed: 06/14    Where was the test performed: OFFICE    Additional notes: PATIENT CONCERNED ABOUT MAGNESIUM LEVELS BEING LOW AND ALSO CONCERNED ABOUT HER FERRITIN LEVELS BEING HIGH. WOULD LIKE FOR SOMEONE TO CALL HER BACK TO GO OVER HER RESULTS WITH HER.

## 2022-07-05 ENCOUNTER — TELEPHONE (OUTPATIENT)
Dept: CARDIOLOGY | Facility: CLINIC | Age: 82
End: 2022-07-05

## 2022-07-05 NOTE — TELEPHONE ENCOUNTER
Fluttering and rapid heartbeat, had 5 episodes in past month, on Sunday it lasted 7 hours.  Had bad dizzy spell and maybe blacked out for a few seconds.  Does she need to come in?  (if you don't call back until tomorrow, Wednesday, use her Cell, [phone]).

## 2022-07-05 NOTE — TELEPHONE ENCOUNTER
Called pt, advised ER if dizziness continues or if she faints, blacks out     Pt said she is going out of town tomorrow. There is an order for a holter but not scheduled till Sept.   Pt would like to come in this Friday the 8th at 2:00 for the holter.  Please put on holter schedule.

## 2022-07-12 ENCOUNTER — TELEPHONE (OUTPATIENT)
Dept: CARDIOLOGY | Facility: CLINIC | Age: 82
End: 2022-07-12

## 2022-07-12 ENCOUNTER — LAB (OUTPATIENT)
Dept: FAMILY MEDICINE CLINIC | Facility: CLINIC | Age: 82
End: 2022-07-12

## 2022-07-12 ENCOUNTER — ANTICOAGULATION VISIT (OUTPATIENT)
Dept: CARDIOLOGY | Facility: CLINIC | Age: 82
End: 2022-07-12

## 2022-07-12 VITALS
BODY MASS INDEX: 20.19 KG/M2 | WEIGHT: 114 LBS | SYSTOLIC BLOOD PRESSURE: 181 MMHG | HEART RATE: 65 BPM | DIASTOLIC BLOOD PRESSURE: 65 MMHG

## 2022-07-12 DIAGNOSIS — E03.9 HYPOTHYROIDISM, UNSPECIFIED TYPE: ICD-10-CM

## 2022-07-12 DIAGNOSIS — Z79.01 LONG TERM (CURRENT) USE OF ANTICOAGULANTS: ICD-10-CM

## 2022-07-12 DIAGNOSIS — I48.0 PAROXYSMAL ATRIAL FIBRILLATION: Primary | ICD-10-CM

## 2022-07-12 DIAGNOSIS — R79.89 ELEVATED FERRITIN LEVEL: ICD-10-CM

## 2022-07-12 LAB
FERRITIN SERPL-MCNC: 389 NG/ML (ref 13–150)
INR PPP: 2.2 (ref 0.9–1.1)
T3FREE SERPL-MCNC: 2.26 PG/ML (ref 2–4.4)
T4 FREE SERPL-MCNC: 1.77 NG/DL (ref 0.93–1.7)
TSH SERPL DL<=0.05 MIU/L-ACNC: 0.23 UIU/ML (ref 0.27–4.2)

## 2022-07-12 PROCEDURE — 84439 ASSAY OF FREE THYROXINE: CPT | Performed by: PHYSICIAN ASSISTANT

## 2022-07-12 PROCEDURE — 36416 COLLJ CAPILLARY BLOOD SPEC: CPT | Performed by: INTERNAL MEDICINE

## 2022-07-12 PROCEDURE — 36415 COLL VENOUS BLD VENIPUNCTURE: CPT

## 2022-07-12 PROCEDURE — 84443 ASSAY THYROID STIM HORMONE: CPT | Performed by: PHYSICIAN ASSISTANT

## 2022-07-12 PROCEDURE — 84481 FREE ASSAY (FT-3): CPT | Performed by: PHYSICIAN ASSISTANT

## 2022-07-12 PROCEDURE — 85610 PROTHROMBIN TIME: CPT | Performed by: INTERNAL MEDICINE

## 2022-07-12 PROCEDURE — 82728 ASSAY OF FERRITIN: CPT | Performed by: PHYSICIAN ASSISTANT

## 2022-07-12 NOTE — TELEPHONE ENCOUNTER
Pt came in to get holter put on and dropped off recent bp readings with pulse     Put in box to be scanned    Any med changes needed ?

## 2022-07-14 DIAGNOSIS — E03.9 HYPOTHYROIDISM, UNSPECIFIED TYPE: ICD-10-CM

## 2022-07-14 RX ORDER — LEVOTHYROXINE SODIUM 0.1 MG/1
100 TABLET ORAL
Qty: 30 TABLET | Refills: 2 | Status: SHIPPED | OUTPATIENT
Start: 2022-07-14 | End: 2022-10-07 | Stop reason: SDUPTHER

## 2022-07-15 NOTE — TELEPHONE ENCOUNTER
Patient advised. She turned in her holter monitor today advised we will call with those results once they are uploaded and read by the physician pt verbalizes understanding

## 2022-07-22 ENCOUNTER — TELEPHONE (OUTPATIENT)
Dept: CARDIOLOGY | Facility: CLINIC | Age: 82
End: 2022-07-22

## 2022-07-22 NOTE — TELEPHONE ENCOUNTER
Pt called requesting holter results. HOLTER MONITOR - SCAN - 3 DAY HOLTER MONITOR REPORT (07/22/2022)

## 2022-07-22 NOTE — TELEPHONE ENCOUNTER
Per Dr. Patiño, [facility], needs an appointment. Pt aware and scheduled.    TSH level has been abnormal. PCP follows thyroid, patient has an appointment on the 18th to discuss.

## 2022-07-26 ENCOUNTER — OFFICE VISIT (OUTPATIENT)
Dept: CARDIOLOGY | Facility: CLINIC | Age: 82
End: 2022-07-26

## 2022-07-26 VITALS
DIASTOLIC BLOOD PRESSURE: 87 MMHG | HEART RATE: 68 BPM | HEIGHT: 63 IN | OXYGEN SATURATION: 95 % | WEIGHT: 115 LBS | SYSTOLIC BLOOD PRESSURE: 188 MMHG | BODY MASS INDEX: 20.38 KG/M2

## 2022-07-26 DIAGNOSIS — R00.2 PALPITATIONS: Primary | ICD-10-CM

## 2022-07-26 DIAGNOSIS — I48.0 PAROXYSMAL ATRIAL FIBRILLATION: ICD-10-CM

## 2022-07-26 DIAGNOSIS — E78.5 DYSLIPIDEMIA: ICD-10-CM

## 2022-07-26 DIAGNOSIS — Z79.01 LONG TERM (CURRENT) USE OF ANTICOAGULANTS: ICD-10-CM

## 2022-07-26 DIAGNOSIS — E03.9 HYPOTHYROIDISM (ACQUIRED): ICD-10-CM

## 2022-07-26 DIAGNOSIS — I10 ESSENTIAL HYPERTENSION: ICD-10-CM

## 2022-07-26 PROCEDURE — 99214 OFFICE O/P EST MOD 30 MIN: CPT | Performed by: INTERNAL MEDICINE

## 2022-07-26 RX ORDER — DILTIAZEM HYDROCHLORIDE 120 MG/1
240 CAPSULE, COATED, EXTENDED RELEASE ORAL DAILY
Qty: 90 CAPSULE | Refills: 3 | Status: SHIPPED | OUTPATIENT
Start: 2022-07-26 | End: 2022-11-21 | Stop reason: SDUPTHER

## 2022-07-26 NOTE — PROGRESS NOTES
Subjective:     Encounter Date:07/26/2022      Patient ID: Jt Valdes is a 81 y.o. female.    Chief Complaint :    History of Present Illness      Complaining of palpitations, here for follow-up for A. fib, long-term anticoagulation, whitecoat hypertension, dyslipidemia  History of Present Illness      Ms. Jt Valdes has PMH of     Hypertension/hypertensive cardiovascular disease  Paroxysmal atrial fibrillation, noncompliant on Xarelto, now on coumadin   Hyperlipidemia  SILVERIO, noncompliant on CPAP  Hypothyroidism  Whitecoat hypertension  History of leukopenia, staph infection  Rheumatoid arthritis, possible pulmonary involved ,DJD, DDD  Partial hysterectomy, appendectomy, back surgery  Allergies/intolerance to sulfa-rash  Non-smoker      Here for follow-up.   Patient is complaining of occasional palpitations previously used to take extra metoprolol which used to help her is not helping anymore.  Patient has difficulty with balance and has ataxia walks with a cane.    Patient's arterial blood pressure is is 188/87, heart rate 68, O2 sat of 95% on room air.  Patient has whitecoat hypertension and blood pressure runs in the low 100s at home.    Patient had labs done 6/4/2019 which showed normal CBC CMP and TSH.  Labs from 3/9/2020 reveal TSH 1.47, cholesterol 202, triglycerides 102, HDL 58, , CMP with a cr 1.06, glucose of 111, A1c of 5.2, normal CBC.  Lipid profile 3/9/2020 with cholesterol 202 triglycerides 102 HDL 58 , CMP with a BUN/creatinine of 21 INR 1.06 and GFR 50.  Hemoglobin A1c 5.2  Labs from 3-2-21 revealed normal CRP TSH and free T4.  CMP with BUN/creatinine of 43/1.32 and GFR of 39.  Labs from 6-21 reveal BUN/creatinine of 34/1.0 EGFR 49, glucose 150.  Labs from 2/18/2022 revealed TSH of 7.8, normal FT BN 4.  Labs from 3/10/2020 reveal INR of 2.1.  Labs from 7/12/2022 revealed low TSH at 0.235 and elevated FT4 at 1.77.    Reviewed previous records:    Echo 09/04/2018  LVH EF 65%.  RV OK.  LA probably mildly dilated.  AV OK.  MV OK.  Modest TR RVSP 26 or less.Holter monitor 9/17-9/20/2021 was unremarkable.  Lexiscan Cardiolite 4/8/2020 were negative for ischemia           Assessment:    Palpitations   Paroxysmal A. Fib, long-term anticoagulation with warfarin  Whitecoat hypertension  CKD 3B  Hypertension  SILVERIO  Hyperlipidemia  Hypothyroidism    Plan:    Reviewed Holter results with patient.  Patient is having intermittent A. fib with RVR.  Extra dose of metoprolol is not helping.  We will increase her on Cardizem  on top of her metoprolol.  I will send her to endocrinology, patient's tachycardias might be related to low TSH and high FT4.  We will check echocardiogram.  Patient has high FJC2EE3-LURc score due to female gender, age >75,, hypertension making it 4, would benefit from long-term anticoagulation, patient could not afford NOACs is on warfarin.  We will follow-up in INR clinic to keep PT/INR between 2 and 3.    Continue losartan and Toprol for blood pressure and CAD   Continue aspirin for CAD.    Advised statins.  Patient wants to think about it.  Patient's fatigue could be from SILVERIO advised her to follow-up with pulmonary and comply with CPAP.  Follow-up with PMD for hypothyroidism.      Procedures EKG done 10/18/2021 reviewed/interpreted by me reveals sinus rhythm with rate of 71 bpm with LVH and repole        Procedures Holter monitor 7/12/2022 revealed A. fib with RVR lasting anywhere up to >3 hours at a time.    The following portions of the patient's history were reviewed and updated as appropriate: allergies, current medications, past family history, past medical history, past social history, past surgical history and problem list.    Assessment:         MDM     Diagnosis Plan   1. Palpitations  Ambulatory Referral to Endocrinology   2. Paroxysmal atrial fibrillation (HCC)  Ambulatory Referral to Endocrinology   3. Long term (current) use of anticoagulants  Ambulatory  Referral to Endocrinology   4. Essential hypertension  Ambulatory Referral to Endocrinology   5. Dyslipidemia  Ambulatory Referral to Endocrinology   6. Hypothyroidism (acquired)  Ambulatory Referral to Endocrinology          Plan:               Past Medical History:  Past Medical History:   Diagnosis Date   • Arthritis    • Cataract     Cataracts surgery   • COVID-19    • Degenerative joint disease    • Extremity pain     legs pain   • H/O degenerative disc disease    • History of colonoscopy 2009    last done 2009   • History of echocardiogram 09/04/2018    LVH EF 65%. RV OK. LA probably mildly dilated. AV OK. MV OK. Modest TR RVSP 26 or less. Nuclear MPI regadenoson 9/14/2018. LV uniform myocardial uptake and wall motion EF 71%.    • History of Holter monitoring 10/08/2018    SR 40-81 58. AF 2.7 hr episode VR . At termination AF 2 3.5-4.0 s pauses with patient possibly dizzy. 205 PAC 42 atrial ashanti several SVT. No ventricular ectopy.    • HL (hearing loss) 2020    Tried hearing aids twice . Didnt help   • HTN (hypertension)    • Hyperlipidemia    • Hypertensive cardiovascular disease    • Hypothyroidism    • Incontinence    • Leukopenia    • Low back pain    • PAF (paroxysmal atrial fibrillation) (HCC) 09/2018     [facility] Sept 2018 PAF and HTN. PAFL On bisoprolol and diltiazem bradycardia symptomatic with dizziness. Amiodarone alone Oct 2018 and no episodes of erratic or fast heartbeat or lightheadedness.    • Rheumatoid arthritis (HCC)     Possible pulmonary involvement    • Staph infection    • White coat syndrome with hypertension      Past Surgical History:  Past Surgical History:   Procedure Laterality Date   • APPENDECTOMY  1974   • BACK SURGERY  2003 2001, 2003   • SUBTOTAL HYSTERECTOMY  1974      Allergies:  Allergies   Allergen Reactions   • Sulfa Antibiotics Rash     Home Meds:  Current Meds:     Current Outpatient Medications:   •  calcium-vitamin D (OSCAL-500) 500-200 MG-UNIT per tablet,  "Take 1 tablet by mouth Daily., Disp: 90 tablet, Rfl: 3  •  Diclofenac Sodium (VOLTAREN) 1 % gel gel, Apply 4 g topically to the appropriate area as directed 4 (Four) Times a Day As Needed (pain)., Disp: 150 g, Rfl: 3  •  dilTIAZem CD (CARDIZEM CD) 120 MG 24 hr capsule, Take 2 capsules by mouth Daily., Disp: 90 capsule, Rfl: 3  •  leflunomide (ARAVA) 20 MG tablet, Take 1 tablet by mouth Daily., Disp: 90 tablet, Rfl: 0  •  levothyroxine (Synthroid) 100 MCG tablet, Take 1 tablet by mouth Every Morning., Disp: 30 tablet, Rfl: 2  •  losartan (COZAAR) 25 MG tablet, Take 1 tablet by mouth Daily., Disp: 90 tablet, Rfl: 3  •  metoprolol succinate XL (TOPROL-XL) 100 MG 24 hr tablet, TAKE ONE TABLET BY MOUTH DAILY, Disp: 90 tablet, Rfl: 2  •  multivitamin with minerals tablet tablet, Take 1 tablet by mouth Daily., Disp: , Rfl:   •  warfarin (COUMADIN) 2.5 MG tablet, TAKE TWO TABLETS BY MOUTH DAILY, Disp: 180 tablet, Rfl: 0  Social History:   Social History     Tobacco Use   • Smoking status: Never Smoker   • Smokeless tobacco: Never Used   Substance Use Topics   • Alcohol use: Yes     Alcohol/week: 1.0 standard drink     Types: 1 Glasses of wine per week     Comment: Occ.      Family History:  Family History   Problem Relation Age of Onset   • Osteoarthritis Mother    • Arthritis Mother    • Cancer Mother    • Other Other         Rheumatoid disease    • Cancer Father    • Cancer Sister               Review of Systems   Cardiovascular: Positive for palpitations. Negative for chest pain and leg swelling.   Respiratory: Positive for shortness of breath.    Neurological: Positive for light-headedness. Negative for dizziness and numbness.     All other systems are negative         Objective:     Physical Exam  BP (!) 188/87 (BP Location: Left arm, Patient Position: Sitting, Cuff Size: Adult)   Pulse 68   Ht 160 cm (63\")   Wt 52.2 kg (115 lb)   SpO2 95%   BMI 20.37 kg/m²   General:  Appears in no acute distress  Eyes: Sclera is " "anicteric,  conjunctiva is clear   HEENT:  No JVD.  No carotid bruits  Respiratory: Respirations regular and unlabored at rest.  Clear to auscultation  Cardiovascular: S1,S2 Regular rate and rhythm. No murmur, rub or gallop auscultated.   Extremities: No digital clubbing or cyanosis, no edema  Skin: Color pink. Skin warm and dry to touch. No rashes  No xanthoma  Neuro: Alert and awake.    Lab Reviewed:         Jose Patiño MD  7/26/2022 12:46 EDT      EMR Dragon/Transcription:   \"Dictated utilizing Dragon dictation\".        "

## 2022-07-27 ENCOUNTER — TELEPHONE (OUTPATIENT)
Dept: CARDIOLOGY | Facility: CLINIC | Age: 82
End: 2022-07-27

## 2022-07-27 NOTE — TELEPHONE ENCOUNTER
DR. NEWSOME REFERRED PATIENT TO DR. CARDOSO. SHE WAS NOT ABLE TO GET APT UNTIL 2/17/23. IS THERE SOMEONE ELSE WE CAN REFER HER TO?

## 2022-08-01 ENCOUNTER — OFFICE (AMBULATORY)
Dept: URBAN - METROPOLITAN AREA CLINIC 64 | Facility: CLINIC | Age: 82
End: 2022-08-01
Payer: COMMERCIAL

## 2022-08-01 VITALS
SYSTOLIC BLOOD PRESSURE: 155 MMHG | HEIGHT: 64 IN | WEIGHT: 116 LBS | DIASTOLIC BLOOD PRESSURE: 91 MMHG | HEART RATE: 64 BPM

## 2022-08-01 DIAGNOSIS — R10.84 GENERALIZED ABDOMINAL PAIN: ICD-10-CM

## 2022-08-01 PROCEDURE — 99204 OFFICE O/P NEW MOD 45 MIN: CPT | Performed by: INTERNAL MEDICINE

## 2022-08-08 DIAGNOSIS — Z79.01 LONG TERM (CURRENT) USE OF ANTICOAGULANTS: ICD-10-CM

## 2022-08-08 DIAGNOSIS — I48.0 PAROXYSMAL ATRIAL FIBRILLATION: ICD-10-CM

## 2022-08-08 RX ORDER — CALCIUM CARBONATE/VITAMIN D3 500MG-5MCG
TABLET ORAL
Qty: 90 TABLET | Refills: 2 | Status: SHIPPED | OUTPATIENT
Start: 2022-08-08 | End: 2023-03-24

## 2022-08-08 RX ORDER — WARFARIN SODIUM 2.5 MG/1
TABLET ORAL
Qty: 180 TABLET | Refills: 0 | Status: SHIPPED | OUTPATIENT
Start: 2022-08-08 | End: 2022-11-08

## 2022-08-08 NOTE — TELEPHONE ENCOUNTER
Rx Refill Note  Requested Prescriptions     Pending Prescriptions Disp Refills   • warfarin (COUMADIN) 2.5 MG tablet [Pharmacy Med Name: WARFARIN SODIUM 2.5 MG TABLET] 180 tablet 0     Sig: TAKE TWO TABLETS BY MOUTH DAILY      Last office visit with prescribing clinician: 7/26/2022      Next office visit with prescribing clinician: 2/1/2023              Anticoagulation Visit (07/12/2022)        Tory Khan LPN  08/08/22, 14:55 EDT

## 2022-08-16 ENCOUNTER — ANTICOAGULATION VISIT (OUTPATIENT)
Dept: CARDIOLOGY | Facility: CLINIC | Age: 82
End: 2022-08-16

## 2022-08-16 VITALS
WEIGHT: 116 LBS | HEART RATE: 61 BPM | SYSTOLIC BLOOD PRESSURE: 180 MMHG | DIASTOLIC BLOOD PRESSURE: 66 MMHG | BODY MASS INDEX: 20.55 KG/M2

## 2022-08-16 DIAGNOSIS — Z79.01 LONG TERM (CURRENT) USE OF ANTICOAGULANTS: ICD-10-CM

## 2022-08-16 DIAGNOSIS — I48.0 PAROXYSMAL ATRIAL FIBRILLATION: Primary | ICD-10-CM

## 2022-08-16 LAB — INR PPP: 3.1 (ref 0.9–1.1)

## 2022-08-16 PROCEDURE — 85610 PROTHROMBIN TIME: CPT | Performed by: INTERNAL MEDICINE

## 2022-08-16 PROCEDURE — 36416 COLLJ CAPILLARY BLOOD SPEC: CPT | Performed by: INTERNAL MEDICINE

## 2022-08-17 RX ORDER — WARFARIN SODIUM 5 MG/1
TABLET ORAL
COMMUNITY
End: 2022-11-08

## 2022-08-18 ENCOUNTER — LAB (OUTPATIENT)
Dept: FAMILY MEDICINE CLINIC | Facility: CLINIC | Age: 82
End: 2022-08-18

## 2022-08-18 ENCOUNTER — OFFICE VISIT (OUTPATIENT)
Dept: FAMILY MEDICINE CLINIC | Facility: CLINIC | Age: 82
End: 2022-08-18

## 2022-08-18 VITALS
HEIGHT: 63 IN | DIASTOLIC BLOOD PRESSURE: 68 MMHG | BODY MASS INDEX: 20.38 KG/M2 | WEIGHT: 115 LBS | SYSTOLIC BLOOD PRESSURE: 179 MMHG | OXYGEN SATURATION: 98 % | HEART RATE: 61 BPM

## 2022-08-18 DIAGNOSIS — I10 PRIMARY HYPERTENSION: Primary | ICD-10-CM

## 2022-08-18 DIAGNOSIS — D64.9 NORMOCYTIC ANEMIA: ICD-10-CM

## 2022-08-18 DIAGNOSIS — E03.9 HYPOTHYROIDISM, UNSPECIFIED TYPE: ICD-10-CM

## 2022-08-18 LAB
T3FREE SERPL-MCNC: 2.04 PG/ML (ref 2–4.4)
T4 FREE SERPL-MCNC: 1.29 NG/DL (ref 0.93–1.7)
TSH SERPL DL<=0.05 MIU/L-ACNC: 1.89 UIU/ML (ref 0.27–4.2)

## 2022-08-18 PROCEDURE — 84443 ASSAY THYROID STIM HORMONE: CPT | Performed by: PHYSICIAN ASSISTANT

## 2022-08-18 PROCEDURE — 84481 FREE ASSAY (FT-3): CPT | Performed by: PHYSICIAN ASSISTANT

## 2022-08-18 PROCEDURE — 36415 COLL VENOUS BLD VENIPUNCTURE: CPT

## 2022-08-18 PROCEDURE — 84439 ASSAY OF FREE THYROXINE: CPT | Performed by: PHYSICIAN ASSISTANT

## 2022-08-18 PROCEDURE — 99213 OFFICE O/P EST LOW 20 MIN: CPT | Performed by: FAMILY MEDICINE

## 2022-08-18 NOTE — PROGRESS NOTES
Subjective   Jt Valdes is a 81 y.o. female.     History of Present Illness     The patient comes in today for follow-up on her blood pressure and her thyroid. She saw her cardiologist recently and he referred her to endocrinology for help with her thyroid.    Hypertension  The patient's blood pressure is elevated today. She is currently taking diltiazem  mg, losartan 25 mg, and metoprolol  mg. She states she checks her blood pressure at home and it runs between 120 to 130 systolic and 60 diastolic. Her heart rate is running between 40 and 60 beats per minute. She denies any chest pain. The patient notes  she has atrial fibrillation and when it goes into atrial fibrillation it lasts several hours. She reports when it is about to kick back in, she has a dizzy spell. She states she wore a heart monitor 3 weeks ago and there were some irregularities.    Hypothyroidism  The patient notes she is exhausted all the time.    The following portions of the patient's history were reviewed and updated as appropriate: allergies, current medications, past family history, past medical history, past social history, past surgical history, and problem list.  Past Medical History:   Diagnosis Date   • Arthritis    • Cataract     Cataracts surgery   • COVID-19    • Degenerative joint disease    • Extremity pain     legs pain   • H/O degenerative disc disease    • History of colonoscopy 2009    last done 2009   • History of echocardiogram 09/04/2018    LVH EF 65%. RV OK. LA probably mildly dilated. AV OK. MV OK. Modest TR RVSP 26 or less. Nuclear MPI regadenoson 9/14/2018. LV uniform myocardial uptake and wall motion EF 71%.    • History of Holter monitoring 10/08/2018    SR 40-81 58. AF 2.7 hr episode VR . At termination AF 2 3.5-4.0 s pauses with patient possibly dizzy. 205 PAC 42 atrial ashanti several SVT. No ventricular ectopy.    • HL (hearing loss) 2020    Tried hearing aids twice . Didnt help   • HTN  (hypertension)    • Hyperlipidemia    • Hypertensive cardiovascular disease    • Hypothyroidism    • Incontinence    • Leukopenia    • Low back pain    • PAF (paroxysmal atrial fibrillation) (HCC) 09/2018    [facility] Sept 2018 PAF and HTN. PAFL On bisoprolol and diltiazem bradycardia symptomatic with dizziness. Amiodarone alone Oct 2018 and no episodes of erratic or fast heartbeat or lightheadedness.    • Rheumatoid arthritis (HCC)     Possible pulmonary involvement    • Staph infection    • White coat syndrome with hypertension      Past Surgical History:   Procedure Laterality Date   • APPENDECTOMY  1974   • BACK SURGERY  2003 2001, 2003   • SUBTOTAL HYSTERECTOMY  1974     Family History   Problem Relation Age of Onset   • Osteoarthritis Mother    • Arthritis Mother    • Cancer Mother    • Other Other         Rheumatoid disease    • Cancer Father    • Cancer Sister      Social History     Socioeconomic History   • Marital status:    Tobacco Use   • Smoking status: Never Smoker   • Smokeless tobacco: Never Used   Vaping Use   • Vaping Use: Never used   Substance and Sexual Activity   • Alcohol use: Yes     Alcohol/week: 1.0 standard drink     Types: 1 Glasses of wine per week     Comment: Occ.   • Drug use: No   • Sexual activity: Not Currently         Current Outpatient Medications:   •  Diclofenac Sodium (VOLTAREN) 1 % gel gel, Apply 4 g topically to the appropriate area as directed 4 (Four) Times a Day As Needed (pain)., Disp: 150 g, Rfl: 3  •  dilTIAZem CD (CARDIZEM CD) 120 MG 24 hr capsule, Take 2 capsules by mouth Daily., Disp: 90 capsule, Rfl: 3  •  leflunomide (ARAVA) 20 MG tablet, Take 1 tablet by mouth Daily., Disp: 90 tablet, Rfl: 0  •  levothyroxine (Synthroid) 100 MCG tablet, Take 1 tablet by mouth Every Morning., Disp: 30 tablet, Rfl: 2  •  losartan (COZAAR) 25 MG tablet, Take 1 tablet by mouth Daily., Disp: 90 tablet, Rfl: 3  •  metoprolol succinate XL (TOPROL-XL) 100 MG 24 hr tablet, TAKE  "ONE TABLET BY MOUTH DAILY, Disp: 90 tablet, Rfl: 2  •  multivitamin with minerals tablet tablet, Take 1 tablet by mouth Daily., Disp: , Rfl:   •  OYSCO 500 + D 500-200 MG-UNIT tablet, TAKE ONE TABLET BY MOUTH DAILY, Disp: 90 tablet, Rfl: 2  •  warfarin (COUMADIN) 2.5 MG tablet, TAKE TWO TABLETS BY MOUTH DAILY, Disp: 180 tablet, Rfl: 0  •  warfarin (COUMADIN) 5 MG tablet, , Disp: , Rfl:     Review of Systems   ROS done and noted in HPI  /68 (BP Location: Left arm, Patient Position: Sitting, Cuff Size: Adult)   Pulse 61   Ht 160 cm (63\")   Wt 52.2 kg (115 lb)   SpO2 98%   BMI 20.37 kg/m²       Objective   Physical Exam  Vitals and nursing note reviewed.   Constitutional:       General: She is not in acute distress.     Appearance: Normal appearance. She is well-developed.      Comments: Blood pressure is elevated at 179/88 mmHg, but it was normal at her house this morning. Weight is normal. Rest of her vitals were good.   HENT:      Head: Normocephalic and atraumatic.   Cardiovascular:      Rate and Rhythm: Normal rate and regular rhythm.      Heart sounds: Normal heart sounds. No murmur heard.     Comments: She is using a rollator to walk.  Pulmonary:      Effort: Pulmonary effort is normal. No respiratory distress.      Breath sounds: Normal breath sounds.   Musculoskeletal:      Right lower leg: No edema.      Left lower leg: No edema.   Skin:     General: Skin is warm and dry.   Neurological:      Mental Status: She is alert.   Psychiatric:         Mood and Affect: Affect normal.           Assessment & Plan   Problems Addressed this Visit        Cardiac and Vasculature    Primary hypertension - Primary       Endocrine and Metabolic    Hypothyroidism       Hematology and Neoplasia    Normocytic anemia      Diagnoses       Codes Comments    Primary hypertension    -  Primary ICD-10-CM: I10  ICD-9-CM: 401.9     Hypothyroidism, unspecified type     ICD-10-CM: E03.9  ICD-9-CM: 244.9     Normocytic anemia     " ICD-10-CM: D64.9  ICD-9-CM: 285.9         1. Hypothyroidism  Dose still needs to be adjusted. She is having her updated labs drawn today and she will be seeing endocrinology soon.    2. Hypertension  She has a component of white coat hypertension as well as primary hypertension. She is currently on diltiazem , losartan 25, and metoprolol . Blood pressures at home are running 100-130 and heart rates running between 40 and 60. Mild anemia is the third one. I will see her back in 6 months for follow-up and Medicare wellness.      Transcribed from ambient dictation for iDlcia Trinidad MD by Mouna Fraire.  08/18/22   12:25 EDT    Patient verbalized consent to the visit recording.

## 2022-08-26 ENCOUNTER — OFFICE VISIT (OUTPATIENT)
Dept: ENDOCRINOLOGY | Facility: CLINIC | Age: 82
End: 2022-08-26

## 2022-08-26 VITALS
SYSTOLIC BLOOD PRESSURE: 165 MMHG | DIASTOLIC BLOOD PRESSURE: 75 MMHG | BODY MASS INDEX: 20.38 KG/M2 | HEIGHT: 63 IN | HEART RATE: 61 BPM | WEIGHT: 115 LBS | OXYGEN SATURATION: 97 %

## 2022-08-26 DIAGNOSIS — E03.9 ACQUIRED HYPOTHYROIDISM: Primary | ICD-10-CM

## 2022-08-26 PROBLEM — H52.4 PRESBYOPIA: Status: ACTIVE | Noted: 2022-07-27

## 2022-08-26 PROCEDURE — 99203 OFFICE O/P NEW LOW 30 MIN: CPT | Performed by: INTERNAL MEDICINE

## 2022-08-26 NOTE — PROGRESS NOTES
Endocrine Consult Outpatient  Referred by Dr. Patiño for hypothyroidism consultation  Patient Care Team:  Dilcia Trinidad MD as PCP - General (Family Medicine)  Roland Benitez MD as Emergency Attending (Family Medicine)  Jose Patiño MD as Consulting Physician (Cardiology)     Chief Complaint: Hypothyroidism        HPI: This is an 81-year-old female with a longstanding history of hypothyroidism who is accompanied with her daughter today.  She apparently has been going with fluctuating thyroid labs and requiring up and down on the dosing to the point that she was taking as high as levothyroxine of 150 mcg and now down to 100 mcg for last couple of months.  She has been on this dose for last 2 months.  She does feel fatigue and tiredness.  She is taking it by itself and waits at least about 30 minutes before eating or drinking anything else.    Past Medical History:   Diagnosis Date   • Arthritis    • Cataract     Cataracts surgery   • COVID-19    • Degenerative joint disease    • Extremity pain     legs pain   • H/O degenerative disc disease    • History of colonoscopy 2009    last done 2009   • History of echocardiogram 09/04/2018    LVH EF 65%. RV OK. LA probably mildly dilated. AV OK. MV OK. Modest TR RVSP 26 or less. Nuclear MPI regadenoson 9/14/2018. LV uniform myocardial uptake and wall motion EF 71%.    • History of Holter monitoring 10/08/2018    SR 40-81 58. AF 2.7 hr episode VR . At termination AF 2 3.5-4.0 s pauses with patient possibly dizzy. 205 PAC 42 atrial ashanti several SVT. No ventricular ectopy.    • HL (hearing loss) 2020    Tried hearing aids twice . Didnt help   • HTN (hypertension)    • Hyperlipidemia    • Hypertensive cardiovascular disease    • Hypothyroidism    • Incontinence    • Leukopenia    • Low back pain    • PAF (paroxysmal atrial fibrillation) ([facility]) 09/2018     [facility] Sept 2018 PAF and HTN. PAFL On bisoprolol and diltiazem bradycardia symptomatic  with dizziness. Amiodarone alone Oct 2018 and no episodes of erratic or fast heartbeat or lightheadedness.    • Rheumatoid arthritis (HCC)     Possible pulmonary involvement    • Staph infection    • White coat syndrome with hypertension        Social History     Socioeconomic History   • Marital status:    • Number of children: 5   • Years of education: 12   Tobacco Use   • Smoking status: Never Smoker   • Smokeless tobacco: Never Used   Vaping Use   • Vaping Use: Never used   Substance and Sexual Activity   • Alcohol use: Yes     Alcohol/week: 1.0 standard drink     Types: 1 Glasses of wine per week     Comment: Occ.   • Drug use: No   • Sexual activity: Not Currently       Family History   Problem Relation Age of Onset   • Osteoarthritis Mother    • Arthritis Mother    • Cancer Mother    • Other Other         Rheumatoid disease    • Cancer Father    • Cancer Sister        Allergies   Allergen Reactions   • Sulfa Antibiotics Rash       ROS:   Constitutional:  Admit fatigue, tiredness.    Eyes:  Denies change in visual acuity   HENT:  Denies nasal congestion or sore throat   Respiratory: denies cough, shortness of breath.   Cardiovascular:  denies chest pain, edema   GI:  Denies abdominal pain, nausea, vomiting.    :  Denies dysuria   Musculoskeletal:  Denies back pain or joint pain   Integument:  Admit hair loss, constipation  Neurologic:  Denies headache, focal weakness or sensory changes   Endocrine:  Denies polyuria or polydipsia   Psychiatric:  Admit depression, denies anxiety      Vitals:    08/26/22 1318   BP: 165/75   Pulse: 61   SpO2: 97%     Body mass index is 20.37 kg/m².      Physical Exam:  GEN: NAD, conversant  EYES: EOMI, PERRL, no conjunctival erythema  NECK: no thyromegaly, full ROM   CV: RRR, no murmurs/rubs/gallops, no peripheral edema  LUNG: CTAB, no wheezes/rales/ronchi  SKIN: no rashes, no acanthosis  MSK: no deformities, full ROM of all extremities  NEURO: no tremors, DTR  normal  PSYCH: AOX3, appropriate mood, affect normal      Results Review:     I reviewed the patient's new clinical results.    Lab Results   Component Value Date    GLUCOSE 88 06/14/2022    BUN 21 06/14/2022    CREATININE 0.94 06/14/2022    EGFRIFNONA 58 (L) 12/20/2021    EGFRIFAFRI 57 (L) 04/27/2017    BCR 22.3 06/14/2022    K 4.3 06/14/2022    CO2 24.0 06/14/2022    CALCIUM 9.7 06/14/2022    ALBUMIN 4.30 06/14/2022    LABIL2 1.4 06/04/2019    AST 20 06/14/2022    ALT 11 06/14/2022    CHOL 174 06/14/2022    TRIG 109 06/14/2022    HDL 59 06/14/2022    LDL 95 06/14/2022     Lab Results   Component Value Date    HGBA1C 5.2 03/09/2020     Lab Results   Component Value Date    CREATININE 0.94 06/14/2022     Lab Results   Component Value Date    TSH 1.890 08/18/2022    FREET4 1.29 08/18/2022       Medication Review: Reviewed.       Current Outpatient Medications:   •  Diclofenac Sodium (VOLTAREN) 1 % gel gel, Apply 4 g topically to the appropriate area as directed 4 (Four) Times a Day As Needed (pain)., Disp: 150 g, Rfl: 3  •  dilTIAZem CD (CARDIZEM CD) 120 MG 24 hr capsule, Take 2 capsules by mouth Daily., Disp: 90 capsule, Rfl: 3  •  leflunomide (ARAVA) 20 MG tablet, Take 1 tablet by mouth Daily., Disp: 90 tablet, Rfl: 0  •  levothyroxine (Synthroid) 100 MCG tablet, Take 1 tablet by mouth Every Morning., Disp: 30 tablet, Rfl: 2  •  losartan (COZAAR) 25 MG tablet, Take 1 tablet by mouth Daily., Disp: 90 tablet, Rfl: 3  •  metoprolol succinate XL (TOPROL-XL) 100 MG 24 hr tablet, TAKE ONE TABLET BY MOUTH DAILY, Disp: 90 tablet, Rfl: 2  •  multivitamin with minerals tablet tablet, Take 1 tablet by mouth Daily., Disp: , Rfl:   •  OYSCO 500 + D 500-200 MG-UNIT tablet, TAKE ONE TABLET BY MOUTH DAILY, Disp: 90 tablet, Rfl: 2  •  warfarin (COUMADIN) 2.5 MG tablet, TAKE TWO TABLETS BY MOUTH DAILY, Disp: 180 tablet, Rfl: 0  •  warfarin (COUMADIN) 5 MG tablet, , Disp: , Rfl:         Assessment and plan:  Hypothyroidism: This is a  81-year-old female with longstanding history of hypothyroidism who recently underwent fluctuations in the thyroid labs.  She is now on levothyroxine 100 mcg p.o. daily for last 2 months.  Most recent TSH 8 days ago was normal with normal free T4.  At this time I recommend to continue levothyroxine 100 mcg p.o. daily and take it by itself with water at least 1 hour before or after the pills and follow labs in 6 weeks.  She verbalized understanding.    Fatigue most likely multifactorial, she is currently taking multivitamin with vitamin D.  Recommend to continue to follow-up with her primary care as well as cardiology.    Lucia Sahu MD FACE.

## 2022-08-26 NOTE — PATIENT INSTRUCTIONS
Continue levothyroxine 100 mcg p.o. daily  Please take your thyroid medicine by itself with water at least 1 hour before or after the pills and food  Check TSH and free T4 in 6 weeks and in 6 months.

## 2022-09-20 ENCOUNTER — ANTICOAGULATION VISIT (OUTPATIENT)
Dept: CARDIOLOGY | Facility: CLINIC | Age: 82
End: 2022-09-20

## 2022-09-20 VITALS — HEART RATE: 61 BPM | DIASTOLIC BLOOD PRESSURE: 78 MMHG | SYSTOLIC BLOOD PRESSURE: 167 MMHG

## 2022-09-20 DIAGNOSIS — I48.0 PAROXYSMAL ATRIAL FIBRILLATION: Primary | ICD-10-CM

## 2022-09-20 DIAGNOSIS — Z79.01 LONG TERM (CURRENT) USE OF ANTICOAGULANTS: ICD-10-CM

## 2022-09-20 LAB — INR PPP: 2.4 (ref 2–3)

## 2022-09-20 PROCEDURE — 36416 COLLJ CAPILLARY BLOOD SPEC: CPT | Performed by: INTERNAL MEDICINE

## 2022-09-20 PROCEDURE — 85610 PROTHROMBIN TIME: CPT | Performed by: INTERNAL MEDICINE

## 2022-09-30 DIAGNOSIS — I10 ESSENTIAL HYPERTENSION: ICD-10-CM

## 2022-10-02 RX ORDER — LOSARTAN POTASSIUM 25 MG/1
TABLET ORAL
Qty: 90 TABLET | Refills: 2 | Status: SHIPPED | OUTPATIENT
Start: 2022-10-02 | End: 2022-11-29 | Stop reason: SDUPTHER

## 2022-10-07 ENCOUNTER — LAB (OUTPATIENT)
Dept: LAB | Facility: HOSPITAL | Age: 82
End: 2022-10-07

## 2022-10-07 DIAGNOSIS — E03.9 HYPOTHYROIDISM, UNSPECIFIED TYPE: ICD-10-CM

## 2022-10-07 DIAGNOSIS — E03.9 ACQUIRED HYPOTHYROIDISM: ICD-10-CM

## 2022-10-07 LAB
T4 FREE SERPL-MCNC: 1.46 NG/DL (ref 0.93–1.7)
TSH SERPL DL<=0.05 MIU/L-ACNC: 2.35 UIU/ML (ref 0.27–4.2)

## 2022-10-07 PROCEDURE — 36415 COLL VENOUS BLD VENIPUNCTURE: CPT

## 2022-10-07 PROCEDURE — 84439 ASSAY OF FREE THYROXINE: CPT

## 2022-10-07 PROCEDURE — 84443 ASSAY THYROID STIM HORMONE: CPT

## 2022-10-07 RX ORDER — LEVOTHYROXINE SODIUM 0.1 MG/1
100 TABLET ORAL
Qty: 30 TABLET | Refills: 5 | Status: SHIPPED | OUTPATIENT
Start: 2022-10-07 | End: 2022-11-22 | Stop reason: SDUPTHER

## 2022-10-11 DIAGNOSIS — E03.9 HYPOTHYROIDISM, UNSPECIFIED TYPE: Primary | ICD-10-CM

## 2022-10-25 ENCOUNTER — ANTICOAGULATION VISIT (OUTPATIENT)
Dept: CARDIOLOGY | Facility: CLINIC | Age: 82
End: 2022-10-25

## 2022-10-25 VITALS
WEIGHT: 116 LBS | HEART RATE: 65 BPM | SYSTOLIC BLOOD PRESSURE: 175 MMHG | DIASTOLIC BLOOD PRESSURE: 85 MMHG | BODY MASS INDEX: 20.55 KG/M2

## 2022-10-25 DIAGNOSIS — Z79.01 LONG TERM (CURRENT) USE OF ANTICOAGULANTS: ICD-10-CM

## 2022-10-25 DIAGNOSIS — I48.0 PAROXYSMAL ATRIAL FIBRILLATION: Primary | ICD-10-CM

## 2022-10-25 LAB — INR PPP: 2 (ref 0.9–1.1)

## 2022-10-25 PROCEDURE — 85610 PROTHROMBIN TIME: CPT | Performed by: INTERNAL MEDICINE

## 2022-10-25 PROCEDURE — 36416 COLLJ CAPILLARY BLOOD SPEC: CPT | Performed by: INTERNAL MEDICINE

## 2022-11-08 DIAGNOSIS — I48.0 PAROXYSMAL ATRIAL FIBRILLATION: ICD-10-CM

## 2022-11-08 DIAGNOSIS — Z79.01 LONG TERM (CURRENT) USE OF ANTICOAGULANTS: ICD-10-CM

## 2022-11-08 RX ORDER — WARFARIN SODIUM 2.5 MG/1
TABLET ORAL
Qty: 180 TABLET | Refills: 0 | Status: SHIPPED | OUTPATIENT
Start: 2022-11-08 | End: 2022-11-21 | Stop reason: SDUPTHER

## 2022-11-08 RX ORDER — METOPROLOL SUCCINATE 100 MG/1
TABLET, EXTENDED RELEASE ORAL
Qty: 90 TABLET | Refills: 2 | Status: SHIPPED | OUTPATIENT
Start: 2022-11-08 | End: 2022-11-21 | Stop reason: SDUPTHER

## 2022-11-08 NOTE — TELEPHONE ENCOUNTER
Rx Refill Note  Requested Prescriptions     Pending Prescriptions Disp Refills   • warfarin (COUMADIN) 2.5 MG tablet [Pharmacy Med Name: WARFARIN SODIUM 2.5 MG TABLET] 180 tablet 0     Sig: TAKE TWO TABLETS BY MOUTH DAILY     Signed Prescriptions Disp Refills   • metoprolol succinate XL (TOPROL-XL) 100 MG 24 hr tablet 90 tablet 2     Sig: TAKE ONE TABLET BY MOUTH DAILY     Authorizing Provider: SANDRA NEWSOME     Ordering User: LORIN EMERY      Last office visit with prescribing clinician: 7/26/2022      Next office visit with prescribing clinician: 2/1/2023                 Anticoagulation Visit (10/25/2022)      Tory Khan LPN  11/08/22, 17:18 EST

## 2022-11-08 NOTE — TELEPHONE ENCOUNTER
Rx Refill Note  Requested Prescriptions     Pending Prescriptions Disp Refills   • metoprolol succinate XL (TOPROL-XL) 100 MG 24 hr tablet [Pharmacy Med Name: METOPROLOL SUCC  MG TAB] 90 tablet 2     Sig: TAKE ONE TABLET BY MOUTH DAILY   • warfarin (COUMADIN) 2.5 MG tablet [Pharmacy Med Name: WARFARIN SODIUM 2.5 MG TABLET] 180 tablet 0     Sig: TAKE TWO TABLETS BY MOUTH DAILY      Last office visit with prescribing clinician: 7/26/2022      Next office visit with prescribing clinician: 2/1/2023            Marah Carmichael MA  11/08/22, 08:50 EST

## 2022-11-21 DIAGNOSIS — Z79.01 LONG TERM (CURRENT) USE OF ANTICOAGULANTS: ICD-10-CM

## 2022-11-21 DIAGNOSIS — I48.0 PAROXYSMAL ATRIAL FIBRILLATION: ICD-10-CM

## 2022-11-21 RX ORDER — METOPROLOL SUCCINATE 100 MG/1
100 TABLET, EXTENDED RELEASE ORAL DAILY
Qty: 90 TABLET | Refills: 3 | Status: SHIPPED | OUTPATIENT
Start: 2022-11-21

## 2022-11-21 RX ORDER — WARFARIN SODIUM 2.5 MG/1
5 TABLET ORAL DAILY
Qty: 180 TABLET | Refills: 0 | Status: SHIPPED | OUTPATIENT
Start: 2022-11-21

## 2022-11-21 RX ORDER — DILTIAZEM HYDROCHLORIDE 120 MG/1
240 CAPSULE, COATED, EXTENDED RELEASE ORAL DAILY
Qty: 90 CAPSULE | Refills: 3 | Status: SHIPPED | OUTPATIENT
Start: 2022-11-21 | End: 2023-02-01 | Stop reason: SDUPTHER

## 2022-11-21 NOTE — TELEPHONE ENCOUNTER
Rx Refill Note  Requested Prescriptions     Pending Prescriptions Disp Refills   • warfarin (COUMADIN) 2.5 MG tablet 180 tablet 0     Sig: Take 2 tablets by mouth Daily.      Last office visit with prescribing clinician: 7/26/2022      Next office visit with prescribing clinician: 2/1/2023              Anticoagulation Visit (10/25/2022)        Tory Khan LPN  11/21/22, 17:26 EST

## 2022-11-21 NOTE — TELEPHONE ENCOUNTER
Rx Refill Note  Requested Prescriptions      No prescriptions requested or ordered in this encounter      Last office visit with prescribing clinician: 7/26/2022      Next office visit with prescribing clinician: 2/1/2023            Marah Carmichael MA  11/21/22, 16:34 EST

## 2022-11-22 DIAGNOSIS — E03.9 HYPOTHYROIDISM, UNSPECIFIED TYPE: ICD-10-CM

## 2022-11-22 RX ORDER — LEVOTHYROXINE SODIUM 0.1 MG/1
100 TABLET ORAL
Qty: 90 TABLET | Refills: 1 | Status: SHIPPED | OUTPATIENT
Start: 2022-11-22 | End: 2023-03-24 | Stop reason: SDUPTHER

## 2022-11-29 DIAGNOSIS — I10 ESSENTIAL HYPERTENSION: ICD-10-CM

## 2022-11-30 ENCOUNTER — ANTICOAGULATION VISIT (OUTPATIENT)
Dept: CARDIOLOGY | Facility: CLINIC | Age: 82
End: 2022-11-30

## 2022-11-30 VITALS
WEIGHT: 116 LBS | SYSTOLIC BLOOD PRESSURE: 170 MMHG | HEART RATE: 63 BPM | DIASTOLIC BLOOD PRESSURE: 72 MMHG | BODY MASS INDEX: 20.55 KG/M2

## 2022-11-30 DIAGNOSIS — I48.0 PAROXYSMAL ATRIAL FIBRILLATION: Primary | ICD-10-CM

## 2022-11-30 DIAGNOSIS — Z79.01 LONG TERM (CURRENT) USE OF ANTICOAGULANTS: ICD-10-CM

## 2022-11-30 LAB — INR PPP: 2.3 (ref 0.9–1.1)

## 2022-11-30 PROCEDURE — 85610 PROTHROMBIN TIME: CPT | Performed by: INTERNAL MEDICINE

## 2022-11-30 PROCEDURE — 36416 COLLJ CAPILLARY BLOOD SPEC: CPT | Performed by: INTERNAL MEDICINE

## 2022-11-30 RX ORDER — LOSARTAN POTASSIUM 25 MG/1
25 TABLET ORAL DAILY
Qty: 90 TABLET | Refills: 2 | Status: SHIPPED | OUTPATIENT
Start: 2022-11-30 | End: 2022-12-09

## 2022-12-09 ENCOUNTER — OFFICE VISIT (OUTPATIENT)
Dept: FAMILY MEDICINE CLINIC | Facility: CLINIC | Age: 82
End: 2022-12-09

## 2022-12-09 VITALS
SYSTOLIC BLOOD PRESSURE: 174 MMHG | HEART RATE: 61 BPM | TEMPERATURE: 97.5 F | DIASTOLIC BLOOD PRESSURE: 83 MMHG | HEIGHT: 63 IN | BODY MASS INDEX: 20.23 KG/M2 | WEIGHT: 114.2 LBS | OXYGEN SATURATION: 97 %

## 2022-12-09 DIAGNOSIS — I10 ESSENTIAL HYPERTENSION: ICD-10-CM

## 2022-12-09 PROCEDURE — 99213 OFFICE O/P EST LOW 20 MIN: CPT | Performed by: FAMILY MEDICINE

## 2022-12-09 RX ORDER — LOSARTAN POTASSIUM 50 MG/1
50 TABLET ORAL DAILY
Qty: 90 TABLET | Refills: 3 | Status: SHIPPED | OUTPATIENT
Start: 2022-12-09 | End: 2023-03-07 | Stop reason: SDUPTHER

## 2022-12-09 NOTE — PROGRESS NOTES
Clyde Valdes is a 82 y.o. female.     History of Present Illness  Comes in today for follow-up after her blood pressure was running high so her losartan was increased from 25 mg to 50  bp varying at home  Recent bp improving   Has had 118 and 125 sys  Recent episode of afib       The following portions of the patient's history were reviewed and updated as appropriate: allergies, current medications, past family history, past medical history, past social history, past surgical history, and problem list.  Past Medical History:   Diagnosis Date   • Arthritis    • Cataract     Cataracts surgery   • COVID-19    • Degenerative joint disease    • Extremity pain     legs pain   • H/O degenerative disc disease    • History of colonoscopy 2009    last done 2009   • History of echocardiogram 09/04/2018    LVH EF 65%. RV OK. LA probably mildly dilated. AV OK. MV OK. Modest TR RVSP 26 or less. Nuclear MPI regadenoson 9/14/2018. LV uniform myocardial uptake and wall motion EF 71%.    • History of Holter monitoring 10/08/2018    SR 40-81 58. AF 2.7 hr episode VR . At termination AF 2 3.5-4.0 s pauses with patient possibly dizzy. 205 PAC 42 atrial ashanti several SVT. No ventricular ectopy.    • HL (hearing loss) 2020    Tried hearing aids twice . Didnt help   • HTN (hypertension)    • Hyperlipidemia    • Hypertensive cardiovascular disease    • Hypothyroidism    • Incontinence    • Leukopenia    • Low back pain    • PAF (paroxysmal atrial fibrillation) (HCC) 09/2018     [facility] Sept 2018 PAF and HTN. PAFL On bisoprolol and diltiazem bradycardia symptomatic with dizziness. Amiodarone alone Oct 2018 and no episodes of erratic or fast heartbeat or lightheadedness.    • Rheumatoid arthritis (HCC)     Possible pulmonary involvement    • Staph infection    • White coat syndrome with hypertension      Past Surgical History:   Procedure Laterality Date   • APPENDECTOMY  1974   • BACK SURGERY  2003 2001, 2003   •  SUBTOTAL HYSTERECTOMY  1974     Family History   Problem Relation Age of Onset   • Osteoarthritis Mother    • Arthritis Mother    • Cancer Mother    • Other Other         Rheumatoid disease    • Cancer Father    • Cancer Sister      Social History     Socioeconomic History   • Marital status:    • Number of children: 5   • Years of education: 12   Tobacco Use   • Smoking status: Never   • Smokeless tobacco: Never   Vaping Use   • Vaping Use: Never used   Substance and Sexual Activity   • Alcohol use: Yes     Alcohol/week: 1.0 standard drink     Types: 1 Glasses of wine per week     Comment: Occ.   • Drug use: No   • Sexual activity: Not Currently         Current Outpatient Medications:   •  Diclofenac Sodium (VOLTAREN) 1 % gel gel, Apply 4 g topically to the appropriate area as directed 4 (Four) Times a Day As Needed (pain)., Disp: 150 g, Rfl: 3  •  dilTIAZem CD (CARDIZEM CD) 120 MG 24 hr capsule, Take 2 capsules by mouth Daily., Disp: 90 capsule, Rfl: 3  •  leflunomide (ARAVA) 20 MG tablet, Take 1 tablet by mouth Daily., Disp: 90 tablet, Rfl: 0  •  levothyroxine (Synthroid) 100 MCG tablet, Take 1 tablet by mouth Every Morning., Disp: 90 tablet, Rfl: 1  •  losartan (COZAAR) 50 MG tablet, Take 1 tablet by mouth Daily., Disp: 90 tablet, Rfl: 3  •  metoprolol succinate XL (TOPROL-XL) 100 MG 24 hr tablet, Take 1 tablet by mouth Daily., Disp: 90 tablet, Rfl: 3  •  multivitamin with minerals tablet tablet, Take 1 tablet by mouth Daily., Disp: , Rfl:   •  OYSCO 500 + D 500-200 MG-UNIT tablet, TAKE ONE TABLET BY MOUTH DAILY, Disp: 90 tablet, Rfl: 2  •  warfarin (COUMADIN) 2.5 MG tablet, Take 2 tablets by mouth Daily., Disp: 180 tablet, Rfl: 0    Review of Systems   Constitutional: Negative.    Respiratory: Negative.    Cardiovascular: Negative.    Neurological: Negative for light-headedness and headache.     /83 (BP Location: Left arm, Patient Position: Sitting, Cuff Size: Adult)   Pulse 61   Temp 97.5 °F  "(36.4 °C) (Temporal)   Ht 160 cm (63\")   Wt 51.8 kg (114 lb 3.2 oz)   SpO2 97%   BMI 20.23 kg/m²       Objective   Physical Exam  Vitals and nursing note reviewed.   Constitutional:       Appearance: Normal appearance. She is normal weight.   HENT:      Head: Normocephalic and atraumatic.   Cardiovascular:      Rate and Rhythm: Normal rate and regular rhythm.      Heart sounds: Normal heart sounds.   Pulmonary:      Effort: Pulmonary effort is normal.      Breath sounds: Normal breath sounds.   Musculoskeletal:      Cervical back: Neck supple.      Right lower leg: No edema.      Left lower leg: No edema.   Skin:     General: Skin is warm and dry.   Neurological:      Mental Status: She is alert.           Assessment & Plan   Problems Addressed this Visit    None  Visit Diagnoses     Essential hypertension        Relevant Medications    losartan (COZAAR) 50 MG tablet      Diagnoses       Codes Comments    Essential hypertension     ICD-10-CM: I10  ICD-9-CM: 401.9           Improved control at home  Component of white coat hypertension   Will continue the losartan 50 mg in addition to diltiazem and metoprolol       "

## 2023-01-04 ENCOUNTER — ANTICOAGULATION VISIT (OUTPATIENT)
Dept: CARDIOLOGY | Facility: CLINIC | Age: 83
End: 2023-01-04
Payer: MEDICARE

## 2023-01-04 VITALS
WEIGHT: 116 LBS | DIASTOLIC BLOOD PRESSURE: 76 MMHG | BODY MASS INDEX: 20.55 KG/M2 | HEART RATE: 62 BPM | SYSTOLIC BLOOD PRESSURE: 180 MMHG

## 2023-01-04 DIAGNOSIS — Z79.01 LONG TERM (CURRENT) USE OF ANTICOAGULANTS: ICD-10-CM

## 2023-01-04 DIAGNOSIS — I48.0 PAROXYSMAL ATRIAL FIBRILLATION: Primary | ICD-10-CM

## 2023-01-04 LAB — INR PPP: 2.3 (ref 0.9–1.1)

## 2023-01-04 PROCEDURE — 85610 PROTHROMBIN TIME: CPT | Performed by: INTERNAL MEDICINE

## 2023-01-04 PROCEDURE — 36416 COLLJ CAPILLARY BLOOD SPEC: CPT | Performed by: INTERNAL MEDICINE

## 2023-02-01 ENCOUNTER — OFFICE VISIT (OUTPATIENT)
Dept: CARDIOLOGY | Facility: CLINIC | Age: 83
End: 2023-02-01
Payer: MEDICARE

## 2023-02-01 ENCOUNTER — HOSPITAL ENCOUNTER (OUTPATIENT)
Dept: CARDIOLOGY | Facility: HOSPITAL | Age: 83
Discharge: HOME | End: 2023-02-01
Admitting: INTERNAL MEDICINE
Payer: MEDICARE

## 2023-02-01 ENCOUNTER — ANTICOAGULATION VISIT (OUTPATIENT)
Dept: CARDIOLOGY | Facility: CLINIC | Age: 83
End: 2023-02-01
Payer: MEDICARE

## 2023-02-01 VITALS
BODY MASS INDEX: 20.55 KG/M2 | WEIGHT: 116 LBS | OXYGEN SATURATION: 96 % | DIASTOLIC BLOOD PRESSURE: 76 MMHG | HEART RATE: 56 BPM | HEIGHT: 63 IN | SYSTOLIC BLOOD PRESSURE: 158 MMHG

## 2023-02-01 VITALS
BODY MASS INDEX: 20.73 KG/M2 | HEART RATE: 63 BPM | WEIGHT: 117 LBS | DIASTOLIC BLOOD PRESSURE: 69 MMHG | SYSTOLIC BLOOD PRESSURE: 171 MMHG

## 2023-02-01 DIAGNOSIS — I48.0 PAROXYSMAL ATRIAL FIBRILLATION: ICD-10-CM

## 2023-02-01 DIAGNOSIS — Z79.01 LONG TERM (CURRENT) USE OF ANTICOAGULANTS: ICD-10-CM

## 2023-02-01 DIAGNOSIS — N18.32 STAGE 3B CHRONIC KIDNEY DISEASE: ICD-10-CM

## 2023-02-01 DIAGNOSIS — I10 ESSENTIAL HYPERTENSION: ICD-10-CM

## 2023-02-01 DIAGNOSIS — R00.2 PALPITATIONS: ICD-10-CM

## 2023-02-01 DIAGNOSIS — E78.5 DYSLIPIDEMIA: ICD-10-CM

## 2023-02-01 DIAGNOSIS — R00.2 PALPITATIONS: Primary | ICD-10-CM

## 2023-02-01 DIAGNOSIS — I48.0 PAROXYSMAL ATRIAL FIBRILLATION: Primary | ICD-10-CM

## 2023-02-01 LAB
BH CV ECHO MEAS - ACS: 1.82 CM
BH CV ECHO MEAS - AO MAX PG: 5.9 MMHG
BH CV ECHO MEAS - AO MEAN PG: 2.9 MMHG
BH CV ECHO MEAS - AO ROOT DIAM: 3.3 CM
BH CV ECHO MEAS - AO V2 MAX: 121.5 CM/SEC
BH CV ECHO MEAS - AO V2 VTI: 26.3 CM
BH CV ECHO MEAS - AVA(I,D): 2.8 CM2
BH CV ECHO MEAS - EDV(CUBED): 70 ML
BH CV ECHO MEAS - EDV(MOD-SP4): 50 ML
BH CV ECHO MEAS - EF(MOD-BP): 58 %
BH CV ECHO MEAS - EF(MOD-SP4): 57.6 %
BH CV ECHO MEAS - ESV(CUBED): 22.9 ML
BH CV ECHO MEAS - ESV(MOD-SP4): 21.2 ML
BH CV ECHO MEAS - FS: 31.1 %
BH CV ECHO MEAS - IVS/LVPW: 0.94 CM
BH CV ECHO MEAS - IVSD: 0.98 CM
BH CV ECHO MEAS - LA DIMENSION: 4.1 CM
BH CV ECHO MEAS - LV DIASTOLIC VOL/BSA (35-75): 32.7 CM2
BH CV ECHO MEAS - LV MASS(C)D: 135.6 GRAMS
BH CV ECHO MEAS - LV MAX PG: 4.4 MMHG
BH CV ECHO MEAS - LV MEAN PG: 2.03 MMHG
BH CV ECHO MEAS - LV SYSTOLIC VOL/BSA (12-30): 13.9 CM2
BH CV ECHO MEAS - LV V1 MAX: 105 CM/SEC
BH CV ECHO MEAS - LV V1 VTI: 22.8 CM
BH CV ECHO MEAS - LVIDD: 4.1 CM
BH CV ECHO MEAS - LVIDS: 2.8 CM
BH CV ECHO MEAS - LVOT AREA: 3.3 CM2
BH CV ECHO MEAS - LVOT DIAM: 2.04 CM
BH CV ECHO MEAS - LVPWD: 1.04 CM
BH CV ECHO MEAS - MR MAX PG: 58.9 MMHG
BH CV ECHO MEAS - MR MAX VEL: 383.6 CM/SEC
BH CV ECHO MEAS - MV A MAX VEL: 58.9 CM/SEC
BH CV ECHO MEAS - MV DEC SLOPE: 405.1 CM/SEC2
BH CV ECHO MEAS - MV DEC TIME: 0.19 MSEC
BH CV ECHO MEAS - MV E MAX VEL: 76.7 CM/SEC
BH CV ECHO MEAS - MV E/A: 1.3
BH CV ECHO MEAS - MV MAX PG: 3.9 MMHG
BH CV ECHO MEAS - MV MEAN PG: 1.18 MMHG
BH CV ECHO MEAS - MV V2 VTI: 25 CM
BH CV ECHO MEAS - MVA(VTI): 3 CM2
BH CV ECHO MEAS - PA ACC TIME: 0.12 SEC
BH CV ECHO MEAS - PA PR(ACCEL): 22.9 MMHG
BH CV ECHO MEAS - PA V2 MAX: 91.8 CM/SEC
BH CV ECHO MEAS - PULM A REVS DUR: 0.12 SEC
BH CV ECHO MEAS - PULM A REVS VEL: 26.9 CM/SEC
BH CV ECHO MEAS - PULM DIAS VEL: 58.6 CM/SEC
BH CV ECHO MEAS - PULM S/D: 0.81
BH CV ECHO MEAS - PULM SYS VEL: 47.2 CM/SEC
BH CV ECHO MEAS - RAP SYSTOLE: 3 MMHG
BH CV ECHO MEAS - RV MAX PG: 2.4 MMHG
BH CV ECHO MEAS - RV V1 MAX: 77.5 CM/SEC
BH CV ECHO MEAS - RV V1 VTI: 17.5 CM
BH CV ECHO MEAS - RVDD: 2.6 CM
BH CV ECHO MEAS - RVSP: 37.3 MMHG
BH CV ECHO MEAS - SI(MOD-SP4): 18.9 ML/M2
BH CV ECHO MEAS - SV(LVOT): 74.2 ML
BH CV ECHO MEAS - SV(MOD-SP4): 28.8 ML
BH CV ECHO MEAS - TR MAX PG: 34.3 MMHG
BH CV ECHO MEAS - TR MAX VEL: 291.9 CM/SEC
INR PPP: 2.5 (ref 0.9–1.1)
MAXIMAL PREDICTED HEART RATE: 137 BPM
STRESS TARGET HR: 116 BPM

## 2023-02-01 PROCEDURE — 93306 TTE W/DOPPLER COMPLETE: CPT

## 2023-02-01 PROCEDURE — 36416 COLLJ CAPILLARY BLOOD SPEC: CPT | Performed by: INTERNAL MEDICINE

## 2023-02-01 PROCEDURE — 85610 PROTHROMBIN TIME: CPT | Performed by: INTERNAL MEDICINE

## 2023-02-01 PROCEDURE — 99214 OFFICE O/P EST MOD 30 MIN: CPT | Performed by: INTERNAL MEDICINE

## 2023-02-01 PROCEDURE — 93306 TTE W/DOPPLER COMPLETE: CPT | Performed by: INTERNAL MEDICINE

## 2023-02-01 RX ORDER — DILTIAZEM HYDROCHLORIDE EXTENDED-RELEASE TABLETS 180 MG/1
180 TABLET, EXTENDED RELEASE ORAL DAILY
Qty: 90 TABLET | Refills: 3 | Status: SHIPPED | OUTPATIENT
Start: 2023-02-01 | End: 2024-02-01

## 2023-02-01 NOTE — PROGRESS NOTES
Subjective:     Encounter Date:02/01/2023      Patient ID: Jt Valdes is a 82 y.o. female.    Chief Complaint :follow-up for A. fib, long-term anticoagulation, whitecoat hypertension, dyslipidemia    History of Present Illness      Ms. Jt Valdes has PMH of     Hypertension/hypertensive cardiovascular disease  Paroxysmal atrial fibrillation, noncompliant on Xarelto, now on coumadin   [id number] SCORE   [id number] Score: 4 (2/1/2023 11:51 AM)    Hyperlipidemia  SILVERIO, noncompliant on CPAP  Hypothyroidism  Whitecoat hypertension  History of leukopenia, staph infection  Rheumatoid arthritis, possible pulmonary involved ,DJD, DDD  Partial hysterectomy, appendectomy, back surgery  Allergies/intolerance to sulfa-rash  Non-smoker      Here for follow-up.   Patient is complaining of occasional palpitations previously used to take extra metoprolol which used to help her is not helping anymore.  Patient has difficulty with balance and has ataxia walks with a cane.  Patient had echocardiogram revealing severe concentric LVH.  Patient previously used to tell that her blood pressure in the office is high normal at home.  Now is telling that even home blood pressure is high.    Patient's arterial blood pressure is is 163/82, heart rate 60, O2 sat of 96% on room air.      Patient had labs done 6/4/2019 which showed normal CBC CMP and TSH.  Labs from 3/9/2020 reveal TSH 1.47, cholesterol 202, triglycerides 102, HDL 58, , CMP with a cr 1.06, glucose of 111, A1c of 5.2, normal CBC.  Lipid profile 3/9/2020 with cholesterol 202 triglycerides 102 HDL 58 , CMP with a BUN/creatinine of 21 INR 1.06 and GFR 50.  Hemoglobin A1c 5.2  Labs from 3-2-21 revealed normal CRP TSH and free T4.  CMP with BUN/creatinine of 43/1.32 and GFR of 39.  Labs from 6-21 reveal BUN/creatinine of 34/1.0 EGFR 49, glucose 150.  Labs from 2/18/2022 revealed TSH of 7.8, normal FT BN 4.  Labs from 3/10/2020 reveal INR of 2.1.  Labs from  7/12/2022 revealed low TSH at 0.235 and elevated FT4 at 1.77..  Labs from 6/14/2022 reveal lipid profile with cholesterol 174, triglycerides 109, HDL 59, LDL 95.  Normal CMP  INR 2/1/2023 is 2.5    Reviewed previous records:    Echo 09/04/2018  LVH EF 65%. RV OK.  LA probably mildly dilated.  AV OK.  MV OK.  Modest TR RVSP 26 or less.Holter monitor 9/17-9/20/2021 was unremarkable.  Lexiscan Cardiolite 4/8/2020 were negative for ischemia           Assessment:    Palpitations   Paroxysmal A. Fib, long-term anticoagulation with warfarin  Whitecoat hypertension  CKD 3B  Hypertension  SILVERIO  Hyperlipidemia  Hypothyroidism    Plan:    Reviewed echo results with patient.  Change diltiazem to 180 mg.  Send for sleep apnea evaluation.  Continue medical management with Cardizem , losartan, metoprolol, warfarin as tolerated.  Will check labs before visit.  Advised patient to check blood pressure once a week and bring the readings.  Follow-up with endocrinology for low TSH and high FT4, which potentially could be causing her tachycardia  Patient had echocardiogram 2/1/2023 reviewed/interpreted by me which reveals normal LV systolic function with concentric LVH and left atrial enlargement consistent with hypertensive cardiovascular disease.  Patient has high [id number] score due to female gender, age >75,, hypertension making it 4, would benefit from long-term anticoagulation, patient could not afford NOACs is on warfarin.  We will follow-up in INR clinic to keep PT/INR between 2 and 3.    Continue losartan, diltiazem and Toprol for blood pressure and CAD   Continue aspirin for CAD.    Advised statins.  Patient wants to think about it.  Patient's fatigue, could be from SILVERIO we will send to sleep medicine.  Follow-up with PMD for hypothyroidism.      Procedures EKG done 10/18/2021 reviewed/interpreted by me reveals sinus rhythm with rate of 71 bpm with LVH and repole    Procedures Holter monitor 7/12/2022 revealed A. fib  with RVR lasting anywhere up to >3 hours at a time.            Copied text in this portion of the note has been reviewed and is accurate as of 2/1/2023  The following portions of the patient's history were reviewed and updated as appropriate: allergies, current medications, past family history, past medical history, past social history, past surgical history and problem list.    Assessment:         MDM     Diagnosis Plan   1. Palpitations  Ambulatory Referral to Sleep Medicine    Comprehensive Metabolic Panel    Lipid Panel    BNP      2. Essential hypertension  Ambulatory Referral to Sleep Medicine    Comprehensive Metabolic Panel    Lipid Panel    BNP      3. Paroxysmal atrial fibrillation (HCC)  Ambulatory Referral to Sleep Medicine    Comprehensive Metabolic Panel    Lipid Panel    BNP      4. Long term (current) use of anticoagulants  Ambulatory Referral to Sleep Medicine    Comprehensive Metabolic Panel    Lipid Panel    BNP      5. Dyslipidemia  Ambulatory Referral to Sleep Medicine    Comprehensive Metabolic Panel    Lipid Panel    BNP      6. Stage 3b chronic kidney disease (HCC)  Ambulatory Referral to Sleep Medicine    Comprehensive Metabolic Panel    Lipid Panel    BNP             Plan:               Past Medical History:  Past Medical History:   Diagnosis Date   • Arthritis    • Cataract     Cataracts surgery   • COVID-19    • Degenerative joint disease    • Extremity pain     legs pain   • H/O degenerative disc disease    • History of colonoscopy 2009    last done 2009   • History of echocardiogram 09/04/2018    LVH EF 65%. RV OK. LA probably mildly dilated. AV OK. MV OK. Modest TR RVSP 26 or less. Nuclear MPI regadenoson 9/14/2018. LV uniform myocardial uptake and wall motion EF 71%.    • History of Holter monitoring 10/08/2018    SR 40-81 58. AF 2.7 hr episode VR . At termination AF 2 3.5-4.0 s pauses with patient possibly dizzy. 205 PAC 42 atrial ashanti several SVT. No ventricular ectopy.    •  HL (hearing loss) 2020    Tried hearing aids twice . Didnt help   • HTN (hypertension)    • Hyperlipidemia    • Hypertensive cardiovascular disease    • Hypothyroidism    • Incontinence    • Leukopenia    • Low back pain    • PAF (paroxysmal atrial fibrillation) (HCC) 09/2018    [facility] Sept 2018 PAF and HTN. PAFL On bisoprolol and diltiazem bradycardia symptomatic with dizziness. Amiodarone alone Oct 2018 and no episodes of erratic or fast heartbeat or lightheadedness.    • Rheumatoid arthritis (HCC)     Possible pulmonary involvement    • Staph infection    • White coat syndrome with hypertension      Past Surgical History:  Past Surgical History:   Procedure Laterality Date   • APPENDECTOMY  1974   • BACK SURGERY  2003 2001, 2003   • SUBTOTAL HYSTERECTOMY  1974      Allergies:  Allergies   Allergen Reactions   • Sulfa Antibiotics Rash     Home Meds:  Current Meds:     Current Outpatient Medications:   •  Diclofenac Sodium (VOLTAREN) 1 % gel gel, Apply 4 g topically to the appropriate area as directed 4 (Four) Times a Day As Needed (pain)., Disp: 150 g, Rfl: 3  •  leflunomide (ARAVA) 20 MG tablet, Take 1 tablet by mouth Daily., Disp: 90 tablet, Rfl: 0  •  levothyroxine (Synthroid) 100 MCG tablet, Take 1 tablet by mouth Every Morning., Disp: 90 tablet, Rfl: 1  •  losartan (COZAAR) 50 MG tablet, Take 1 tablet by mouth Daily., Disp: 90 tablet, Rfl: 3  •  metoprolol succinate XL (TOPROL-XL) 100 MG 24 hr tablet, Take 1 tablet by mouth Daily., Disp: 90 tablet, Rfl: 3  •  multivitamin with minerals tablet tablet, Take 1 tablet by mouth Daily., Disp: , Rfl:   •  OYSCO 500 + D 500-200 MG-UNIT tablet, TAKE ONE TABLET BY MOUTH DAILY, Disp: 90 tablet, Rfl: 2  •  warfarin (COUMADIN) 2.5 MG tablet, Take 2 tablets by mouth Daily., Disp: 180 tablet, Rfl: 0  •  dilTIAZem LA (CARDIZEM LA) 180 MG 24 hr tablet, Take 1 tablet by mouth Daily., Disp: 90 tablet, Rfl: 3  Social History:   Social History     Tobacco Use   • Smoking  "status: Never   • Smokeless tobacco: Never   Substance Use Topics   • Alcohol use: Yes     Alcohol/week: 1.0 standard drink     Types: 1 Glasses of wine per week     Comment: Occ.      Family History:  Family History   Problem Relation Age of Onset   • Osteoarthritis Mother    • Arthritis Mother    • Cancer Mother    • Other Other         Rheumatoid disease    • Cancer Father    • Cancer Sister               Review of Systems   Cardiovascular: Negative for chest pain, leg swelling and palpitations.   Respiratory: Positive for shortness of breath.    Neurological: Negative for dizziness and numbness.     All other systems are negative         Objective:     Physical Exam  /76 (BP Location: Left arm, Patient Position: Sitting, Cuff Size: Adult) Comment: DR NOTIFIED  Pulse 56   Ht 160 cm (63\")   Wt 52.6 kg (116 lb)   SpO2 96%   BMI 20.55 kg/m²   General:  Appears in no acute distress  Eyes: Sclera is anicteric,  conjunctiva is clear   HEENT:  No JVD.  No carotid bruits  Respiratory: Respirations regular and unlabored at rest.  Clear to auscultation  Cardiovascular: S1,S2 Regular rate and rhythm. No murmur, rub or gallop auscultated.   Extremities: No digital clubbing or cyanosis, no edema  Skin: Color pink. Skin warm and dry to touch. No rashes  No xanthoma  Neuro: Alert and awake.  Walks with a walker    Lab Reviewed:         Jose Patiño MD  2/1/2023 12:55 EST      EMR Dragon/Transcription:   \"Dictated utilizing Dragon dictation\".        "

## 2023-03-01 ENCOUNTER — ANTICOAGULATION VISIT (OUTPATIENT)
Dept: CARDIOLOGY | Facility: CLINIC | Age: 83
End: 2023-03-01
Payer: MEDICARE

## 2023-03-01 VITALS
BODY MASS INDEX: 21.61 KG/M2 | HEART RATE: 72 BPM | SYSTOLIC BLOOD PRESSURE: 172 MMHG | WEIGHT: 122 LBS | DIASTOLIC BLOOD PRESSURE: 83 MMHG

## 2023-03-01 DIAGNOSIS — Z79.01 LONG TERM (CURRENT) USE OF ANTICOAGULANTS: ICD-10-CM

## 2023-03-01 DIAGNOSIS — I48.0 PAROXYSMAL ATRIAL FIBRILLATION: Primary | ICD-10-CM

## 2023-03-01 LAB — INR PPP: 3 (ref 0.9–1.1)

## 2023-03-01 PROCEDURE — 85610 PROTHROMBIN TIME: CPT | Performed by: INTERNAL MEDICINE

## 2023-03-01 PROCEDURE — 36416 COLLJ CAPILLARY BLOOD SPEC: CPT | Performed by: INTERNAL MEDICINE

## 2023-03-07 DIAGNOSIS — I10 ESSENTIAL HYPERTENSION: ICD-10-CM

## 2023-03-07 RX ORDER — LOSARTAN POTASSIUM 50 MG/1
50 TABLET ORAL DAILY
Qty: 90 TABLET | Refills: 2 | Status: SHIPPED | OUTPATIENT
Start: 2023-03-07

## 2023-03-14 DIAGNOSIS — R06.83 SNORING: ICD-10-CM

## 2023-03-14 DIAGNOSIS — G47.19 EXCESSIVE DAYTIME SLEEPINESS: ICD-10-CM

## 2023-03-14 DIAGNOSIS — R06.81 WITNESSED EPISODE OF APNEA: ICD-10-CM

## 2023-03-14 DIAGNOSIS — G47.33 OSA (OBSTRUCTIVE SLEEP APNEA): Primary | ICD-10-CM

## 2023-03-23 ENCOUNTER — HOSPITAL ENCOUNTER (OUTPATIENT)
Dept: SLEEP MEDICINE | Facility: HOSPITAL | Age: 83
Discharge: HOME | End: 2023-03-23
Admitting: INTERNAL MEDICINE
Payer: MEDICARE

## 2023-03-23 ENCOUNTER — LAB (OUTPATIENT)
Dept: LAB | Facility: HOSPITAL | Age: 83
End: 2023-03-23
Payer: MEDICARE

## 2023-03-23 DIAGNOSIS — R06.83 SNORING: ICD-10-CM

## 2023-03-23 DIAGNOSIS — I48.0 PAROXYSMAL ATRIAL FIBRILLATION: ICD-10-CM

## 2023-03-23 DIAGNOSIS — E03.9 HYPOTHYROIDISM, UNSPECIFIED TYPE: ICD-10-CM

## 2023-03-23 DIAGNOSIS — R00.2 PALPITATIONS: ICD-10-CM

## 2023-03-23 DIAGNOSIS — G47.19 EXCESSIVE DAYTIME SLEEPINESS: ICD-10-CM

## 2023-03-23 DIAGNOSIS — I10 ESSENTIAL HYPERTENSION: ICD-10-CM

## 2023-03-23 DIAGNOSIS — R06.81 WITNESSED EPISODE OF APNEA: ICD-10-CM

## 2023-03-23 DIAGNOSIS — N18.32 STAGE 3B CHRONIC KIDNEY DISEASE: ICD-10-CM

## 2023-03-23 DIAGNOSIS — Z79.01 LONG TERM (CURRENT) USE OF ANTICOAGULANTS: ICD-10-CM

## 2023-03-23 DIAGNOSIS — E78.5 DYSLIPIDEMIA: ICD-10-CM

## 2023-03-23 LAB
ALBUMIN SERPL-MCNC: 4.2 G/DL (ref 3.5–5.2)
ALBUMIN/GLOB SERPL: 1.4 G/DL
ALP SERPL-CCNC: 86 U/L (ref 39–117)
ALT SERPL W P-5'-P-CCNC: 18 U/L (ref 1–33)
ANION GAP SERPL CALCULATED.3IONS-SCNC: 12 MMOL/L (ref 5–15)
AST SERPL-CCNC: 26 U/L (ref 1–32)
BILIRUB SERPL-MCNC: 0.5 MG/DL (ref 0–1.2)
BUN SERPL-MCNC: 30 MG/DL (ref 8–23)
BUN/CREAT SERPL: 30 (ref 7–25)
CALCIUM SPEC-SCNC: 9.6 MG/DL (ref 8.6–10.5)
CHLORIDE SERPL-SCNC: 103 MMOL/L (ref 98–107)
CHOLEST SERPL-MCNC: 208 MG/DL (ref 0–200)
CO2 SERPL-SCNC: 24 MMOL/L (ref 22–29)
CREAT SERPL-MCNC: 1 MG/DL (ref 0.57–1)
EGFRCR SERPLBLD CKD-EPI 2021: 56 ML/MIN/1.73
GLOBULIN UR ELPH-MCNC: 3.1 GM/DL
GLUCOSE SERPL-MCNC: 110 MG/DL (ref 65–99)
HDLC SERPL-MCNC: 62 MG/DL (ref 40–60)
LDLC SERPL CALC-MCNC: 131 MG/DL (ref 0–100)
LDLC/HDLC SERPL: 2.08 {RATIO}
NT-PROBNP SERPL-MCNC: 609 PG/ML (ref 0–1800)
POTASSIUM SERPL-SCNC: 4.6 MMOL/L (ref 3.5–5.2)
PROT SERPL-MCNC: 7.3 G/DL (ref 6–8.5)
SODIUM SERPL-SCNC: 139 MMOL/L (ref 136–145)
T4 FREE SERPL-MCNC: 1.32 NG/DL (ref 0.93–1.7)
TRIGL SERPL-MCNC: 84 MG/DL (ref 0–150)
TSH SERPL DL<=0.05 MIU/L-ACNC: 5.1 UIU/ML (ref 0.27–4.2)
VLDLC SERPL-MCNC: 15 MG/DL (ref 5–40)

## 2023-03-23 PROCEDURE — 36415 COLL VENOUS BLD VENIPUNCTURE: CPT

## 2023-03-23 PROCEDURE — 84443 ASSAY THYROID STIM HORMONE: CPT

## 2023-03-23 PROCEDURE — 95806 SLEEP STUDY UNATT&RESP EFFT: CPT

## 2023-03-23 PROCEDURE — 83880 ASSAY OF NATRIURETIC PEPTIDE: CPT

## 2023-03-23 PROCEDURE — 84439 ASSAY OF FREE THYROXINE: CPT

## 2023-03-23 PROCEDURE — 80061 LIPID PANEL: CPT

## 2023-03-23 PROCEDURE — 80053 COMPREHEN METABOLIC PANEL: CPT

## 2023-03-24 ENCOUNTER — OFFICE VISIT (OUTPATIENT)
Dept: ENDOCRINOLOGY | Facility: CLINIC | Age: 83
End: 2023-03-24
Payer: MEDICARE

## 2023-03-24 VITALS
WEIGHT: 121 LBS | DIASTOLIC BLOOD PRESSURE: 75 MMHG | OXYGEN SATURATION: 96 % | SYSTOLIC BLOOD PRESSURE: 145 MMHG | HEART RATE: 64 BPM | HEIGHT: 63 IN | BODY MASS INDEX: 21.44 KG/M2

## 2023-03-24 DIAGNOSIS — E03.9 HYPOTHYROIDISM, UNSPECIFIED TYPE: ICD-10-CM

## 2023-03-24 PROCEDURE — 1160F RVW MEDS BY RX/DR IN RCRD: CPT | Performed by: INTERNAL MEDICINE

## 2023-03-24 PROCEDURE — 99214 OFFICE O/P EST MOD 30 MIN: CPT | Performed by: INTERNAL MEDICINE

## 2023-03-24 PROCEDURE — 3078F DIAST BP <80 MM HG: CPT | Performed by: INTERNAL MEDICINE

## 2023-03-24 PROCEDURE — 3077F SYST BP >= 140 MM HG: CPT | Performed by: INTERNAL MEDICINE

## 2023-03-24 PROCEDURE — 1159F MED LIST DOCD IN RCRD: CPT | Performed by: INTERNAL MEDICINE

## 2023-03-24 RX ORDER — LEVOTHYROXINE SODIUM 112 UG/1
TABLET ORAL
Qty: 90 TABLET | Refills: 4 | Status: SHIPPED | OUTPATIENT
Start: 2023-03-24

## 2023-03-24 NOTE — PATIENT INSTRUCTIONS
Increase levothyroxine 212 mcg p.o. daily  Check TSH and free T4 in 6 weeks and before follow-up in 6 months.

## 2023-03-24 NOTE — PROGRESS NOTES
Endocrine Progress Note Outpatient     Patient Care Team:  Dilcia Trinidad MD as PCP - General (Family Medicine)  Roland Benitez MD as Emergency Attending (Family Medicine)  Jose Patiño MD as Consulting Physician (Cardiology)    Chief Complaint: Follow-up hypothyroidism          HPI: This is an 82-year-old female with history of hypothyroidism is here for follow-up.  She is accompanied today with her son.  She is on levothyroxine 100 mcg p.o. daily.  She is telling me that she is taking it on regular basis by itself with water every day.  She is complaining of fatigue and freezing cold all the time.    Past Medical History:   Diagnosis Date   • Arthritis    • Cataract     Cataracts surgery   • COVID-19    • Degenerative joint disease    • Extremity pain     legs pain   • H/O degenerative disc disease    • History of colonoscopy 2009    last done 2009   • History of echocardiogram 09/04/2018    LVH EF 65%. RV OK. LA probably mildly dilated. AV OK. MV OK. Modest TR RVSP 26 or less. Nuclear MPI regadenoson 9/14/2018. LV uniform myocardial uptake and wall motion EF 71%.    • History of Holter monitoring 10/08/2018    SR 40-81 58. AF 2.7 hr episode VR . At termination AF 2 3.5-4.0 s pauses with patient possibly dizzy. 205 PAC 42 atrial ashanti several SVT. No ventricular ectopy.    • HL (hearing loss) 2020    Tried hearing aids twice . Didnt help   • HTN (hypertension)    • Hyperlipidemia    • Hypertensive cardiovascular disease    • Hypothyroidism    • Incontinence    • Leukopenia    • Low back pain    • PAF (paroxysmal atrial fibrillation) (HCC) 09/2018     [facility] Sept 2018 PAF and HTN. PAFL On bisoprolol and diltiazem bradycardia symptomatic with dizziness. Amiodarone alone Oct 2018 and no episodes of erratic or fast heartbeat or lightheadedness.    • Rheumatoid arthritis (HCC)     Possible pulmonary involvement    • Staph infection    • Vitamin D deficiency    • White coat syndrome  with hypertension        Social History     Socioeconomic History   • Marital status:    • Number of children: 5   • Years of education: 12   Tobacco Use   • Smoking status: Never   • Smokeless tobacco: Never   Vaping Use   • Vaping Use: Never used   Substance and Sexual Activity   • Alcohol use: Yes     Alcohol/week: 1.0 standard drink     Types: 1 Glasses of wine per week     Comment: Occ.   • Drug use: No   • Sexual activity: Not Currently       Family History   Problem Relation Age of Onset   • Osteoarthritis Mother    • Arthritis Mother    • Cancer Mother    • Other Other         Rheumatoid disease    • Cancer Father    • Cancer Sister        Allergies   Allergen Reactions   • Sulfa Antibiotics Rash       ROS:   Constitutional: Admit fatigue, tiredness.    Eyes:  Denies change in visual acuity   HENT:  Denies nasal congestion or sore throat   Respiratory: denies cough, shortness of breath.   Cardiovascular:  denies chest pain, edema   GI:  Denies abdominal pain, nausea, vomiting.   Musculoskeletal:  Denies back pain or joint pain   Integument:  Denies dry skin and rash   Neurologic:  Denies headache, focal weakness or sensory changes   Endocrine:  Denies polyuria or polydipsia   Psychiatric:  Denies depression or anxiety      Vitals:    03/24/23 1018   BP: 145/75   Pulse: 64   SpO2: 96%     Body mass index is 21.43 kg/m².     Physical Exam:  GEN: NAD, conversant  EYES: EOMI, PERRL, no conjunctival erythema  NECK: no thyromegaly, full ROM   CV: RRR, no murmurs/rubs/gallops, no peripheral edema  LUNG: CTAB, no wheezes/rales/ronchi  SKIN: no rashes, no acanthosis  MSK: no deformities, full ROM of all extremities  NEURO: no tremors, DTR normal  PSYCH: AOX3, appropriate mood, affect normal      Results Review:     I reviewed the patient's new clinical results.    Lab Results   Component Value Date    HGBA1C 5.2 03/09/2020      Lab Results   Component Value Date    GLUCOSE 110 (H) 03/23/2023    BUN 30 (H)  03/23/2023    CREATININE 1.00 03/23/2023    EGFRIFNONA 58 (L) 12/20/2021    EGFRIFAFRI 57 (L) 04/27/2017    BCR 30.0 (H) 03/23/2023    K 4.6 03/23/2023    CO2 24.0 03/23/2023    CALCIUM 9.6 03/23/2023    ALBUMIN 4.2 03/23/2023    LABIL2 1.4 06/04/2019    AST 26 03/23/2023    ALT 18 03/23/2023    CHOL 208 (H) 03/23/2023    TRIG 84 03/23/2023     (H) 03/23/2023    HDL 62 (H) 03/23/2023     Lab Results   Component Value Date    TSH 5.100 (H) 03/23/2023    FREET4 1.32 03/23/2023         Medication Review: Reviewed.       Current Outpatient Medications:   •  Diclofenac Sodium (VOLTAREN) 1 % gel gel, Apply 4 g topically to the appropriate area as directed 4 (Four) Times a Day As Needed (pain)., Disp: 150 g, Rfl: 3  •  dilTIAZem LA (CARDIZEM LA) 180 MG 24 hr tablet, Take 1 tablet by mouth Daily., Disp: 90 tablet, Rfl: 3  •  leflunomide (ARAVA) 20 MG tablet, Take 1 tablet by mouth Daily., Disp: 90 tablet, Rfl: 0  •  levothyroxine (Synthroid) 100 MCG tablet, Take 1 tablet by mouth Every Morning., Disp: 90 tablet, Rfl: 1  •  losartan (COZAAR) 50 MG tablet, Take 1 tablet by mouth Daily., Disp: 90 tablet, Rfl: 2  •  metoprolol succinate XL (TOPROL-XL) 100 MG 24 hr tablet, Take 1 tablet by mouth Daily., Disp: 90 tablet, Rfl: 3  •  multivitamin with minerals tablet tablet, Take 1 tablet by mouth Daily., Disp: , Rfl:   •  warfarin (COUMADIN) 2.5 MG tablet, Take 2 tablets by mouth Daily., Disp: 180 tablet, Rfl: 0      Assessment and plan:  Hypothyroidism: Uncontrolled with mild high TSH and symptoms consistent with hypothyroidism.  We will increase levothyroxine to 112 mcg p.o. daily and follow labs.           Lucia Sahu MD FACE.

## 2023-03-28 PROCEDURE — 95806 SLEEP STUDY UNATT&RESP EFFT: CPT | Performed by: INTERNAL MEDICINE

## 2023-03-29 ENCOUNTER — ANTICOAGULATION VISIT (OUTPATIENT)
Dept: CARDIOLOGY | Facility: CLINIC | Age: 83
End: 2023-03-29
Payer: MEDICARE

## 2023-03-29 VITALS
HEART RATE: 65 BPM | SYSTOLIC BLOOD PRESSURE: 180 MMHG | WEIGHT: 118 LBS | BODY MASS INDEX: 20.9 KG/M2 | DIASTOLIC BLOOD PRESSURE: 70 MMHG

## 2023-03-29 DIAGNOSIS — Z79.01 LONG TERM (CURRENT) USE OF ANTICOAGULANTS: ICD-10-CM

## 2023-03-29 DIAGNOSIS — I48.0 PAROXYSMAL ATRIAL FIBRILLATION: Primary | ICD-10-CM

## 2023-03-29 LAB — INR PPP: 2.5 (ref 0.9–1.1)

## 2023-03-29 PROCEDURE — 85610 PROTHROMBIN TIME: CPT | Performed by: INTERNAL MEDICINE

## 2023-03-29 PROCEDURE — 36416 COLLJ CAPILLARY BLOOD SPEC: CPT | Performed by: INTERNAL MEDICINE

## 2023-04-26 ENCOUNTER — LAB (OUTPATIENT)
Dept: FAMILY MEDICINE CLINIC | Facility: CLINIC | Age: 83
End: 2023-04-26
Payer: MEDICARE

## 2023-04-26 ENCOUNTER — OFFICE VISIT (OUTPATIENT)
Dept: FAMILY MEDICINE CLINIC | Facility: CLINIC | Age: 83
End: 2023-04-26
Payer: MEDICARE

## 2023-04-26 VITALS
HEART RATE: 67 BPM | SYSTOLIC BLOOD PRESSURE: 186 MMHG | WEIGHT: 118 LBS | OXYGEN SATURATION: 97 % | DIASTOLIC BLOOD PRESSURE: 76 MMHG | TEMPERATURE: 97.8 F | BODY MASS INDEX: 20.9 KG/M2

## 2023-04-26 DIAGNOSIS — M54.50 ACUTE RIGHT-SIDED LOW BACK PAIN, UNSPECIFIED WHETHER SCIATICA PRESENT: Primary | ICD-10-CM

## 2023-04-26 DIAGNOSIS — I10 ESSENTIAL HYPERTENSION: ICD-10-CM

## 2023-04-26 LAB
BILIRUB BLD-MCNC: NEGATIVE MG/DL
CLARITY, POC: CLEAR
COLOR UR: YELLOW
EXPIRATION DATE: ABNORMAL
GLUCOSE UR STRIP-MCNC: NEGATIVE MG/DL
KETONES UR QL: NEGATIVE
LEUKOCYTE EST, POC: ABNORMAL
Lab: ABNORMAL
NITRITE UR-MCNC: NEGATIVE MG/ML
PH UR: 6 [PH] (ref 5–8)
PROT UR STRIP-MCNC: ABNORMAL MG/DL
RBC # UR STRIP: ABNORMAL /UL
SP GR UR: 1.02 (ref 1–1.03)
UROBILINOGEN UR QL: NORMAL

## 2023-04-26 PROCEDURE — 81003 URINALYSIS AUTO W/O SCOPE: CPT | Performed by: NURSE PRACTITIONER

## 2023-04-26 PROCEDURE — 85025 COMPLETE CBC W/AUTO DIFF WBC: CPT | Performed by: NURSE PRACTITIONER

## 2023-04-26 PROCEDURE — 1159F MED LIST DOCD IN RCRD: CPT | Performed by: NURSE PRACTITIONER

## 2023-04-26 PROCEDURE — 1160F RVW MEDS BY RX/DR IN RCRD: CPT | Performed by: NURSE PRACTITIONER

## 2023-04-26 PROCEDURE — 3077F SYST BP >= 140 MM HG: CPT | Performed by: NURSE PRACTITIONER

## 2023-04-26 PROCEDURE — 3078F DIAST BP <80 MM HG: CPT | Performed by: NURSE PRACTITIONER

## 2023-04-26 PROCEDURE — 99214 OFFICE O/P EST MOD 30 MIN: CPT | Performed by: NURSE PRACTITIONER

## 2023-04-26 RX ORDER — LOSARTAN POTASSIUM 50 MG/1
75 TABLET ORAL DAILY
Qty: 90 TABLET | Refills: 2
Start: 2023-04-26 | End: 2023-05-01 | Stop reason: SDUPTHER

## 2023-04-26 RX ORDER — TIZANIDINE 2 MG/1
2 TABLET ORAL NIGHTLY PRN
Qty: 10 TABLET | Refills: 0 | Status: SHIPPED | OUTPATIENT
Start: 2023-04-26

## 2023-04-26 RX ORDER — TIZANIDINE 2 MG/1
2 TABLET ORAL NIGHTLY PRN
Qty: 10 TABLET | Refills: 0 | Status: SHIPPED | OUTPATIENT
Start: 2023-04-26 | End: 2023-04-26

## 2023-04-26 NOTE — PROGRESS NOTES
Subjective     Jt Valdes is a 82 y.o. female.     History of Present Illness  Pt is here today with c/o right lower/mid back pain.  It started last Weds when she was in bed and turned a certain way.  Felt a sharp pain in that area.  It has slowly been improving.  She has a history of chronic back pain and operations.   It pulls some when she twists.  Denies any urinary issues.  She sometimes has some incontinence when she doesn't go to the restroom right away.   On Sunday she had some frequency but that has resolved  Denies dysuria.   She takes tylenol for her back which helps.  Pain comes and goes.  It is relieved with sitting.    Pts BP is high in office.  She states it often is in the office.  She monitors at home and it runs 140s-150s/90s  She states it has been fluctuating.     Back Pain  This is a recurrent problem. The current episode started in the past 7 days. The problem occurs constantly. The problem has been gradually worsening since onset. The pain is present in the lumbar spine. The quality of the pain is described as aching. The pain is at a severity of 8/10. The pain is the same all the time. The symptoms are aggravated by standing. Stiffness is present in the morning. Associated symptoms include bladder incontinence. Pertinent negatives include no abdominal pain, bowel incontinence, chest pain, dysuria, fever, headaches, leg pain or numbness.        The following portions of the patient's history were reviewed and updated as appropriate: allergies, current medications, past family history, past medical history, past social history, past surgical history and problem list.    Review of Systems   Constitutional: Negative for chills, fatigue and fever.   Respiratory: Negative for chest tightness and shortness of breath.    Cardiovascular: Negative for chest pain.   Gastrointestinal: Negative for abdominal pain and bowel incontinence.   Genitourinary: Positive for urinary incontinence. Negative for  dysuria, frequency and urgency.   Musculoskeletal: Positive for back pain.   Neurological: Negative for dizziness, numbness and headache.       Objective     BP (!) 186/76 (BP Location: Left arm, Patient Position: Sitting, Cuff Size: Adult)   Pulse 67   Temp 97.8 °F (36.6 °C) (Tympanic)   Wt 53.5 kg (118 lb)   SpO2 97%   BMI 20.90 kg/m²     Current Outpatient Medications on File Prior to Visit   Medication Sig Dispense Refill   • Diclofenac Sodium (VOLTAREN) 1 % gel gel Apply 4 g topically to the appropriate area as directed 4 (Four) Times a Day As Needed (pain). 150 g 3   • dilTIAZem LA (CARDIZEM LA) 180 MG 24 hr tablet Take 1 tablet by mouth Daily. 90 tablet 3   • leflunomide (ARAVA) 20 MG tablet Take 1 tablet by mouth Daily. 90 tablet 0   • levothyroxine (Synthroid) 112 MCG tablet Take 1 tablet p.o. daily. 90 tablet 4   • metoprolol succinate XL (TOPROL-XL) 100 MG 24 hr tablet Take 1 tablet by mouth Daily. 90 tablet 3   • multivitamin with minerals tablet tablet Take 1 tablet by mouth Daily.     • warfarin (COUMADIN) 2.5 MG tablet Take 2 tablets by mouth Daily. 180 tablet 0   • [DISCONTINUED] losartan (COZAAR) 50 MG tablet Take 1 tablet by mouth Daily. 90 tablet 2     No current facility-administered medications on file prior to visit.        Physical Exam  Vitals reviewed.   Constitutional:       General: She is not in acute distress.     Appearance: Normal appearance. She is well-developed. She is not diaphoretic.   HENT:      Head: Normocephalic and atraumatic.   Eyes:      General:         Right eye: No discharge.         Left eye: No discharge.      Extraocular Movements: Extraocular movements intact.      Conjunctiva/sclera: Conjunctivae normal.   Cardiovascular:      Rate and Rhythm: Normal rate and regular rhythm.      Heart sounds: No murmur heard.  Pulmonary:      Effort: Pulmonary effort is normal. No respiratory distress.      Breath sounds: Normal breath sounds. No wheezing or rales.    Abdominal:      General: Bowel sounds are normal.      Palpations: Abdomen is soft.   Musculoskeletal:         General: Deformity (spine is not aligned- at baseline) present.      Cervical back: Normal range of motion.   Skin:     General: Skin is warm and dry.   Neurological:      General: No focal deficit present.      Mental Status: She is alert and oriented to person, place, and time.   Psychiatric:         Mood and Affect: Mood normal.         Behavior: Behavior normal.         Thought Content: Thought content normal.         Judgment: Judgment normal.           Assessment & Plan     Diagnoses and all orders for this visit:    1. Acute right-sided low back pain, unspecified whether sciatica present (Primary)  Comments:  appears to be muscle strain  pain is improving  tizanidine prn  has chronic back issues  pt unable to leave urine.   UA showed leuks and blood- barely enough urine to even dip. will have her come in tomorrow to leave a new urine with enough to culture.   Orders:  -     Discontinue: tiZANidine (ZANAFLEX) 2 MG tablet; Take 1 tablet by mouth At Night As Needed for Muscle Spasms.  Dispense: 10 tablet; Refill: 0  -     tiZANidine (ZANAFLEX) 2 MG tablet; Take 1 tablet by mouth At Night As Needed for Muscle Spasms.  Dispense: 10 tablet; Refill: 0  -     CBC & Differential  -     POCT urinalysis dipstick, automated    2. Essential hypertension  Comments:  elevated today  inc losartan to 75mg daily  has appt with cardiology on monday  monitor at home  Orders:  -     losartan (COZAAR) 50 MG tablet; Take 1.5 tablets by mouth Daily.  Dispense: 90 tablet; Refill: 2

## 2023-04-26 NOTE — PATIENT INSTRUCTIONS
Continue tylenol as needed  Muscle relaxant as needed  Increase losartan to 75mg daily- 1.5 pills  Follow up with cardiology

## 2023-04-27 ENCOUNTER — CLINICAL SUPPORT (OUTPATIENT)
Dept: FAMILY MEDICINE CLINIC | Facility: CLINIC | Age: 83
End: 2023-04-27
Payer: MEDICARE

## 2023-04-27 DIAGNOSIS — N39.498 OTHER URINARY INCONTINENCE: ICD-10-CM

## 2023-04-27 DIAGNOSIS — M54.50 ACUTE RIGHT-SIDED LOW BACK PAIN, UNSPECIFIED WHETHER SCIATICA PRESENT: Primary | ICD-10-CM

## 2023-04-27 LAB
BASOPHILS # BLD AUTO: 0.07 10*3/MM3 (ref 0–0.2)
BASOPHILS NFR BLD AUTO: 1.3 % (ref 0–1.5)
DEPRECATED RDW RBC AUTO: 37.8 FL (ref 37–54)
EOSINOPHIL # BLD AUTO: 0.19 10*3/MM3 (ref 0–0.4)
EOSINOPHIL NFR BLD AUTO: 3.7 % (ref 0.3–6.2)
ERYTHROCYTE [DISTWIDTH] IN BLOOD BY AUTOMATED COUNT: 12.1 % (ref 12.3–15.4)
HCT VFR BLD AUTO: 33.5 % (ref 34–46.6)
HGB BLD-MCNC: 11.5 G/DL (ref 12–15.9)
IMM GRANULOCYTES # BLD AUTO: 0.01 10*3/MM3 (ref 0–0.05)
IMM GRANULOCYTES NFR BLD AUTO: 0.2 % (ref 0–0.5)
LYMPHOCYTES # BLD AUTO: 0.85 10*3/MM3 (ref 0.7–3.1)
LYMPHOCYTES NFR BLD AUTO: 16.3 % (ref 19.6–45.3)
MCH RBC QN AUTO: 29.6 PG (ref 26.6–33)
MCHC RBC AUTO-ENTMCNC: 34.3 G/DL (ref 31.5–35.7)
MCV RBC AUTO: 86.1 FL (ref 79–97)
MONOCYTES # BLD AUTO: 0.57 10*3/MM3 (ref 0.1–0.9)
MONOCYTES NFR BLD AUTO: 11 % (ref 5–12)
NEUTROPHILS NFR BLD AUTO: 3.51 10*3/MM3 (ref 1.7–7)
NEUTROPHILS NFR BLD AUTO: 67.5 % (ref 42.7–76)
NRBC BLD AUTO-RTO: 0 /100 WBC (ref 0–0.2)
PLATELET # BLD AUTO: 224 10*3/MM3 (ref 140–450)
PMV BLD AUTO: 10.1 FL (ref 6–12)
RBC # BLD AUTO: 3.89 10*6/MM3 (ref 3.77–5.28)
WBC NRBC COR # BLD: 5.2 10*3/MM3 (ref 3.4–10.8)

## 2023-04-27 PROCEDURE — 87186 SC STD MICRODIL/AGAR DIL: CPT | Performed by: NURSE PRACTITIONER

## 2023-04-27 PROCEDURE — 87086 URINE CULTURE/COLONY COUNT: CPT | Performed by: NURSE PRACTITIONER

## 2023-04-27 PROCEDURE — 87088 URINE BACTERIA CULTURE: CPT | Performed by: NURSE PRACTITIONER

## 2023-04-28 RX ORDER — NITROFURANTOIN 25; 75 MG/1; MG/1
100 CAPSULE ORAL 2 TIMES DAILY
Qty: 14 CAPSULE | Refills: 0 | Status: SHIPPED | OUTPATIENT
Start: 2023-04-28

## 2023-04-29 LAB — BACTERIA SPEC AEROBE CULT: ABNORMAL

## 2023-05-01 ENCOUNTER — ANTICOAGULATION VISIT (OUTPATIENT)
Dept: CARDIOLOGY | Facility: CLINIC | Age: 83
End: 2023-05-01
Payer: MEDICARE

## 2023-05-01 ENCOUNTER — OFFICE VISIT (OUTPATIENT)
Dept: CARDIOLOGY | Facility: CLINIC | Age: 83
End: 2023-05-01
Payer: MEDICARE

## 2023-05-01 VITALS
HEART RATE: 71 BPM | BODY MASS INDEX: 21.08 KG/M2 | DIASTOLIC BLOOD PRESSURE: 94 MMHG | WEIGHT: 119 LBS | SYSTOLIC BLOOD PRESSURE: 186 MMHG

## 2023-05-01 VITALS
BODY MASS INDEX: 21.09 KG/M2 | OXYGEN SATURATION: 96 % | SYSTOLIC BLOOD PRESSURE: 188 MMHG | HEIGHT: 63 IN | HEART RATE: 72 BPM | WEIGHT: 119 LBS | DIASTOLIC BLOOD PRESSURE: 91 MMHG

## 2023-05-01 DIAGNOSIS — N18.32 STAGE 3B CHRONIC KIDNEY DISEASE: ICD-10-CM

## 2023-05-01 DIAGNOSIS — Z79.01 LONG TERM (CURRENT) USE OF ANTICOAGULANTS: ICD-10-CM

## 2023-05-01 DIAGNOSIS — I10 ESSENTIAL HYPERTENSION: ICD-10-CM

## 2023-05-01 DIAGNOSIS — I48.0 PAROXYSMAL ATRIAL FIBRILLATION: ICD-10-CM

## 2023-05-01 DIAGNOSIS — I10 WHITE COAT SYNDROME WITH DIAGNOSIS OF HYPERTENSION: Primary | ICD-10-CM

## 2023-05-01 DIAGNOSIS — E78.5 DYSLIPIDEMIA: ICD-10-CM

## 2023-05-01 DIAGNOSIS — Z78.9 STATIN INTOLERANCE: ICD-10-CM

## 2023-05-01 DIAGNOSIS — I48.0 PAROXYSMAL ATRIAL FIBRILLATION: Primary | ICD-10-CM

## 2023-05-01 LAB — INR PPP: 2.7 (ref 2–3)

## 2023-05-01 PROCEDURE — 85610 PROTHROMBIN TIME: CPT | Performed by: INTERNAL MEDICINE

## 2023-05-01 PROCEDURE — 36416 COLLJ CAPILLARY BLOOD SPEC: CPT | Performed by: INTERNAL MEDICINE

## 2023-05-01 RX ORDER — LOSARTAN POTASSIUM 100 MG/1
100 TABLET ORAL DAILY
Qty: 90 TABLET | Refills: 3 | Status: ON HOLD
Start: 2023-05-01

## 2023-05-01 RX ORDER — WARFARIN SODIUM 2.5 MG/1
TABLET ORAL
Qty: 180 TABLET | Refills: 1 | Status: ON HOLD | OUTPATIENT
Start: 2023-05-01

## 2023-05-01 NOTE — TELEPHONE ENCOUNTER
Rx Refill Note  Requested Prescriptions     Pending Prescriptions Disp Refills   • warfarin (COUMADIN) 2.5 MG tablet [Pharmacy Med Name: WARFARIN TABS 2.5MG] 180 tablet 1     Sig: TAKE 2 TABLETS DAILY    Last INR 3/29/23  Last office visit with prescribing clinician: 2/1/2023   Last telemedicine visit with prescribing clinician: 5/1/2023   Next office visit with prescribing clinician: 5/1/2023                         Would you like a call back once the refill request has been completed: [] Yes [] No    If the office needs to give you a call back, can they leave a voicemail: [] Yes [] No    Veena Lee RN  05/01/23, 11:23 EDT

## 2023-05-01 NOTE — PROGRESS NOTES
Subjective:     Encounter Date:05/01/2023      Patient ID: Jt Valdes is a 82 y.o. female.    Chief Complaint and history of present illness:     follow-up for A. fib, long-term anticoagulation, whitecoat hypertension, dyslipidemia    History of Present Illness      Ms. Jt Valdes has PMH of     Hypertension/hypertensive cardiovascular disease  Paroxysmal atrial fibrillation, noncompliant on Xarelto, now on coumadin   [id number] SCORE   [id number] Score: 4 (2/1/2023 11:51 AM)    Hyperlipidemia  SILVERIO, noncompliant on CPAP  Hypothyroidism  Whitecoat hypertension  History of leukopenia, staph infection  Rheumatoid arthritis, possible pulmonary involved ,DJD, DDD  Partial hysterectomy, appendectomy, back surgery  Allergies/intolerance to sulfa-rash  Non-smoker      Here for follow-up.   Denies any chest pain or shortness of breath.  Home blood pressure readings are increasing.    Patient's arterial blood pressure is 196/85, heart rate 72, O2 sat of 94% on room air.    Patient had labs done 6/4/2019 which showed normal CBC CMP and TSH.  Labs from 3/9/2020 reveal TSH 1.47, cholesterol 202, triglycerides 102, HDL 58, , CMP with a cr 1.06, glucose of 111, A1c of 5.2, normal CBC.  Lipid profile 3/9/2020 with cholesterol 202 triglycerides 102 HDL 58 , CMP with a BUN/creatinine of 21 INR 1.06 and GFR 50.  Hemoglobin A1c 5.2  Labs from 3-2-21 revealed normal CRP TSH and free T4.  CMP with BUN/creatinine of 43/1.32 and GFR of 39.  Labs from 6-21 reveal BUN/creatinine of 34/1.0 EGFR 49, glucose 150.  Labs from 2/18/2022 revealed TSH of 7.8, normal FT BN 4.  Labs from 3/10/2020 reveal INR of 2.1.  Labs from 7/12/2022 revealed low TSH at 0.235 and elevated FT4 at 1.77..  Labs from 6/14/2022 reveal lipid profile with cholesterol 174, triglycerides 109, HDL 59, LDL 95.  Normal CMP. INR 2/1/2023 is 2.5.  Labs from 3/29/2023 reveal an INR of 2.5, CMP with a glucose of 110.  Cholesterol 208, Tri 84, HDL 62,  proBNP normal at 609..  Labs from 4/26/2023 reveal CBC with a hemoglobin of 11.5      Reviewed previous records:    Echo 09/04/2018  LVH EF 65%. RV OK.  LA probably mildly dilated.  AV OK.  MV OK.  Modest TR RVSP 26 or less.Holter monitor 9/17-9/20/2021 was unremarkable.  Lexiscan Cardiolite 4/8/2020 were negative for ischemia  Echo 2/1/2023 reveals EF 60 to 65% PA systolic pressure of 40 mmHg           Assessment:    Hypertension  Paroxysmal A. Fib, long-term anticoagulation with warfarin  Whitecoat hypertension  CKD 3B  SILVERIO  Hyperlipidemia  Hypothyroidism    Plan:  Reviewed EKG results with patient  Patient was diagnosed with sleep apnea and is getting CPAP.  Will increase losartan to 100 mg.  Patient's losartan was increased to 75 mg and its not convenient to take 75 mg for patient.  Wants to go 200 mg and 1 needs prescription for 100 mg.  Continue medical management with Cardizem , losartan, metoprolol, warfarin as tolerated.  Will check labs before visit.  Advised patient to check blood pressure once a week and bring the readings.  Follow-up with endocrinology for low TSH and high FT4.  Patient had echocardiogram 2/1/2023 reviewed/interpreted by me which reveals normal LV systolic function with concentric LVH and left atrial enlargement consistent with hypertensive cardiovascular disease.  Patient has high [id number] score due to female gender, age >75,, hypertension making it 4, would benefit from long-term anticoagulation, patient could not afford NOACs is on warfarin.  We will follow-up in INR clinic to keep PT/INR between 2 and 3.    Continue losartan, diltiazem and Toprol for blood pressure and CAD   Continue aspirin for CAD.    Advised statins.  Patient was previously intolerant to statins.  Patient's fatigue, could be from SILVERIO, advised compliance.  Follow-up with PMD for hypothyroidism.    Procedures Holter monitor 7/12/2022 revealed A. fib with RVR lasting anywhere up to >3 hours at a  time.              ECG 12 Lead    Date/Time: 5/1/2023 1:34 PM  Performed by: Jose Patiño MD  Authorized by: Jose Patiño MD   Comparison: compared with previous ECG from 10/18/2021  Comparison to previous ECG: EKG done today reviewed/interpreted by me reveals sinus rhythm with rate of 68 bpm with nonspecific ST-T changes, no new change compared EKG from 10/18/2021              Copied text in this portion of the note has been reviewed and is accurate as of 5/1/2023  The following portions of the patient's history were reviewed and updated as appropriate: allergies, current medications, past family history, past medical history, past social history, past surgical history and problem list.    Assessment:         [facility]     Diagnosis Plan   1. White coat syndrome with diagnosis of hypertension  Comprehensive Metabolic Panel    Lipid Panel      2. Paroxysmal atrial fibrillation  Comprehensive Metabolic Panel    Lipid Panel      3. Long term (current) use of anticoagulants  Comprehensive Metabolic Panel    Lipid Panel      4. Essential hypertension  Comprehensive Metabolic Panel    Lipid Panel    losartan (COZAAR) 100 MG tablet      5. Dyslipidemia  Comprehensive Metabolic Panel    Lipid Panel      6. Stage 3b chronic kidney disease  Comprehensive Metabolic Panel    Lipid Panel      7. Statin intolerance        8. Essential hypertension  Comprehensive Metabolic Panel    Lipid Panel    losartan (COZAAR) 100 MG tablet    elevated today  inc losartan to 75mg daily  has appt with cardiology on monday  monitor at home             Plan:               Past Medical History:  Past Medical History:   Diagnosis Date   • Arthritis    • Cataract     Cataracts surgery   • COVID-19    • Degenerative joint disease    • Extremity pain     legs pain   • H/O degenerative disc disease    • History of colonoscopy 2009    last done 2009   • History of echocardiogram 09/04/2018    LVH EF 65%. RV OK. LA probably mildly  dilated. AV OK. MV OK. Modest TR RVSP 26 or less. Nuclear MPI regadenoson 9/14/2018. LV uniform myocardial uptake and wall motion EF 71%.    • History of Holter monitoring 10/08/2018    SR 40-81 58. AF 2.7 hr episode VR . At termination AF 2 3.5-4.0 s pauses with patient possibly dizzy. 205 PAC 42 atrial ashanti several SVT. No ventricular ectopy.    • HL (hearing loss) 2020    Tried hearing aids twice . Didnt help   • HTN (hypertension)    • Hyperlipidemia    • Hypertensive cardiovascular disease    • Hypothyroidism    • Incontinence    • Leukopenia    • Low back pain    • PAF (paroxysmal atrial fibrillation) 09/2018    [facility] Sept 2018 PAF and HTN. PAFL On bisoprolol and diltiazem bradycardia symptomatic with dizziness. Amiodarone alone Oct 2018 and no episodes of erratic or fast heartbeat or lightheadedness.    • Rheumatoid arthritis     Possible pulmonary involvement    • Staph infection    • Vitamin D deficiency    • White coat syndrome with hypertension      Past Surgical History:  Past Surgical History:   Procedure Laterality Date   • APPENDECTOMY  1974   • BACK SURGERY  2003 2001, 2003   • SUBTOTAL HYSTERECTOMY  1974      Allergies:  Allergies   Allergen Reactions   • Sulfa Antibiotics Rash     Home Meds:  Current Meds:     Current Outpatient Medications:   •  Diclofenac Sodium (VOLTAREN) 1 % gel gel, Apply 4 g topically to the appropriate area as directed 4 (Four) Times a Day As Needed (pain)., Disp: 150 g, Rfl: 3  •  dilTIAZem LA (CARDIZEM LA) 180 MG 24 hr tablet, Take 1 tablet by mouth Daily., Disp: 90 tablet, Rfl: 3  •  leflunomide (ARAVA) 20 MG tablet, Take 1 tablet by mouth Daily., Disp: 90 tablet, Rfl: 0  •  levothyroxine (Synthroid) 112 MCG tablet, Take 1 tablet p.o. daily., Disp: 90 tablet, Rfl: 4  •  losartan (COZAAR) 100 MG tablet, Take 1 tablet by mouth Daily., Disp: 90 tablet, Rfl: 3  •  metoprolol succinate XL (TOPROL-XL) 100 MG 24 hr tablet, Take 1 tablet by mouth Daily., Disp: 90  "tablet, Rfl: 3  •  multivitamin with minerals tablet tablet, Take 1 tablet by mouth Daily., Disp: , Rfl:   •  nitrofurantoin, macrocrystal-monohydrate, (Macrobid) 100 MG capsule, Take 1 capsule by mouth 2 (Two) Times a Day., Disp: 14 capsule, Rfl: 0  •  tiZANidine (ZANAFLEX) 2 MG tablet, Take 1 tablet by mouth At Night As Needed for Muscle Spasms., Disp: 10 tablet, Rfl: 0  •  warfarin (COUMADIN) 2.5 MG tablet, TAKE 2 TABLETS DAILY, Disp: 180 tablet, Rfl: 1  Social History:   Social History     Tobacco Use   • Smoking status: Never   • Smokeless tobacco: Never   Substance Use Topics   • Alcohol use: Yes     Alcohol/week: 1.0 standard drink     Types: 1 Glasses of wine per week     Comment: Occ.      Family History:  Family History   Problem Relation Age of Onset   • Osteoarthritis Mother    • Arthritis Mother    • Cancer Mother    • Other Other         Rheumatoid disease    • Cancer Father    • Cancer Sister               Review of Systems   Constitutional: Negative for malaise/fatigue.   Cardiovascular: Negative for chest pain, leg swelling and palpitations.   Respiratory: Negative for shortness of breath.    Gastrointestinal: Negative for nausea and vomiting.   Neurological: Negative for dizziness, light-headedness and numbness.   All other systems reviewed and are negative.    All other systems are negative         Objective:     Physical Exam  BP (!) 188/91 (BP Location: Right arm, Patient Position: Sitting, Cuff Size: Adult) Comment: white coat syndome  Pulse 72   Ht 160 cm (63\")   Wt 54 kg (119 lb)   SpO2 96%   BMI 21.08 kg/m²   General:  Appears in no acute distress  Eyes: Sclera is anicteric,  conjunctiva is clear   HEENT:  No JVD.  No carotid bruits  Respiratory: Respirations regular and unlabored at rest.  Clear to auscultation  Cardiovascular: S1,S2 Regular rate and rhythm. No murmur, rub or gallop auscultated.   Extremities: No digital clubbing or cyanosis, no edema  Skin: Color pink. Skin warm and " "dry to touch. No rashes  No xanthoma  Neuro: Alert and awake.    Lab Reviewed:         Jose Patiño MD  5/1/2023 13:37 EDT      EMR Dragon/Transcription:   \"Dictated utilizing Dragon dictation\".        "

## 2023-05-03 ENCOUNTER — TELEPHONE (OUTPATIENT)
Dept: CARDIOLOGY | Facility: CLINIC | Age: 83
End: 2023-05-03
Payer: MEDICARE

## 2023-05-03 NOTE — TELEPHONE ENCOUNTER
Called spoke with pt advised okay to start steroid follow INR closely. Pt INR was checked on Monday. Pt will start steroid tomorrow and we will recheck INR on Monday 5/8/23 apLPN

## 2023-05-04 RX ORDER — CEFDINIR 300 MG/1
300 CAPSULE ORAL 2 TIMES DAILY
Qty: 14 CAPSULE | Refills: 0 | Status: ON HOLD | OUTPATIENT
Start: 2023-05-04 | End: 2023-05-11

## 2023-05-08 ENCOUNTER — ANTICOAGULATION VISIT (OUTPATIENT)
Dept: CARDIOLOGY | Facility: CLINIC | Age: 83
End: 2023-05-08
Payer: MEDICARE

## 2023-05-08 ENCOUNTER — OFFICE VISIT (OUTPATIENT)
Dept: CARDIOLOGY | Facility: CLINIC | Age: 83
End: 2023-05-08
Payer: MEDICARE

## 2023-05-08 ENCOUNTER — HOSPITAL ENCOUNTER (INPATIENT)
Facility: HOSPITAL | Age: 83
LOS: 2 days | Discharge: HOME | End: 2023-05-11
Attending: EMERGENCY MEDICINE | Admitting: STUDENT IN AN ORGANIZED HEALTH CARE EDUCATION/TRAINING PROGRAM
Payer: MEDICARE

## 2023-05-08 ENCOUNTER — APPOINTMENT (OUTPATIENT)
Dept: GENERAL RADIOLOGY | Facility: HOSPITAL | Age: 83
End: 2023-05-08
Payer: MEDICARE

## 2023-05-08 VITALS
WEIGHT: 116 LBS | BODY MASS INDEX: 20.55 KG/M2 | SYSTOLIC BLOOD PRESSURE: 120 MMHG | DIASTOLIC BLOOD PRESSURE: 84 MMHG | HEART RATE: 133 BPM

## 2023-05-08 VITALS
SYSTOLIC BLOOD PRESSURE: 120 MMHG | HEART RATE: 133 BPM | DIASTOLIC BLOOD PRESSURE: 84 MMHG | WEIGHT: 116 LBS | HEIGHT: 63 IN | BODY MASS INDEX: 20.55 KG/M2

## 2023-05-08 DIAGNOSIS — Z79.01 LONG TERM (CURRENT) USE OF ANTICOAGULANTS: ICD-10-CM

## 2023-05-08 DIAGNOSIS — I48.91 ATRIAL FIBRILLATION WITH RVR: Primary | ICD-10-CM

## 2023-05-08 DIAGNOSIS — R07.2 PRECORDIAL PAIN: Primary | ICD-10-CM

## 2023-05-08 DIAGNOSIS — E78.5 DYSLIPIDEMIA: ICD-10-CM

## 2023-05-08 DIAGNOSIS — I48.0 PAROXYSMAL ATRIAL FIBRILLATION: Primary | ICD-10-CM

## 2023-05-08 DIAGNOSIS — R00.2 PALPITATIONS: ICD-10-CM

## 2023-05-08 DIAGNOSIS — Z78.9 STATIN INTOLERANCE: ICD-10-CM

## 2023-05-08 DIAGNOSIS — I48.91 ATRIAL FIBRILLATION WITH RVR: ICD-10-CM

## 2023-05-08 DIAGNOSIS — I10 WHITE COAT SYNDROME WITH DIAGNOSIS OF HYPERTENSION: ICD-10-CM

## 2023-05-08 DIAGNOSIS — R06.09 DYSPNEA ON EXERTION: ICD-10-CM

## 2023-05-08 DIAGNOSIS — N18.32 STAGE 3B CHRONIC KIDNEY DISEASE: ICD-10-CM

## 2023-05-08 DIAGNOSIS — I95.9 HYPOTENSION, UNSPECIFIED HYPOTENSION TYPE: ICD-10-CM

## 2023-05-08 DIAGNOSIS — I10 ESSENTIAL HYPERTENSION: ICD-10-CM

## 2023-05-08 DIAGNOSIS — E03.9 HYPOTHYROIDISM (ACQUIRED): ICD-10-CM

## 2023-05-08 LAB
ANION GAP SERPL CALCULATED.3IONS-SCNC: 14 MMOL/L (ref 5–15)
BASOPHILS # BLD AUTO: 0 10*3/MM3 (ref 0–0.2)
BASOPHILS NFR BLD AUTO: 0.6 % (ref 0–1.5)
BUN SERPL-MCNC: 36 MG/DL (ref 8–23)
BUN/CREAT SERPL: 31.9 (ref 7–25)
CALCIUM SPEC-SCNC: 9.4 MG/DL (ref 8.6–10.5)
CHLORIDE SERPL-SCNC: 102 MMOL/L (ref 98–107)
CHOLEST SERPL-MCNC: 221 MG/DL (ref 0–200)
CO2 SERPL-SCNC: 21 MMOL/L (ref 22–29)
CREAT SERPL-MCNC: 1.13 MG/DL (ref 0.57–1)
DEPRECATED RDW RBC AUTO: 45.1 FL (ref 37–54)
EGFRCR SERPLBLD CKD-EPI 2021: 48.4 ML/MIN/1.73
EOSINOPHIL # BLD AUTO: 0 10*3/MM3 (ref 0–0.4)
EOSINOPHIL NFR BLD AUTO: 0.1 % (ref 0.3–6.2)
ERYTHROCYTE [DISTWIDTH] IN BLOOD BY AUTOMATED COUNT: 14.2 % (ref 12.3–15.4)
GEN 5 2HR TROPONIN T REFLEX: 25 NG/L
GLUCOSE SERPL-MCNC: 141 MG/DL (ref 65–99)
HCT VFR BLD AUTO: 37.5 % (ref 34–46.6)
HDLC SERPL-MCNC: 56 MG/DL (ref 40–60)
HGB BLD-MCNC: 12.1 G/DL (ref 12–15.9)
INR PPP: 3.1 (ref 2–3)
INR PPP: 3.42 (ref 2–3)
LDLC SERPL CALC-MCNC: 134 MG/DL (ref 0–100)
LDLC/HDLC SERPL: 2.31 {RATIO}
LYMPHOCYTES # BLD AUTO: 1.6 10*3/MM3 (ref 0.7–3.1)
LYMPHOCYTES NFR BLD AUTO: 18.5 % (ref 19.6–45.3)
MCH RBC QN AUTO: 29.7 PG (ref 26.6–33)
MCHC RBC AUTO-ENTMCNC: 32.4 G/DL (ref 31.5–35.7)
MCV RBC AUTO: 91.6 FL (ref 79–97)
MONOCYTES # BLD AUTO: 0.8 10*3/MM3 (ref 0.1–0.9)
MONOCYTES NFR BLD AUTO: 9 % (ref 5–12)
NEUTROPHILS NFR BLD AUTO: 6 10*3/MM3 (ref 1.7–7)
NEUTROPHILS NFR BLD AUTO: 71.8 % (ref 42.7–76)
NRBC BLD AUTO-RTO: 0.1 /100 WBC (ref 0–0.2)
PLATELET # BLD AUTO: 299 10*3/MM3 (ref 140–450)
PMV BLD AUTO: 8 FL (ref 6–12)
POTASSIUM SERPL-SCNC: 4.7 MMOL/L (ref 3.5–5.2)
PROTHROMBIN TIME: 34.1 SECONDS (ref 19.4–28.5)
QT INTERVAL: 266 MS
RBC # BLD AUTO: 4.09 10*6/MM3 (ref 3.77–5.28)
SODIUM SERPL-SCNC: 137 MMOL/L (ref 136–145)
TRIGL SERPL-MCNC: 177 MG/DL (ref 0–150)
TROPONIN T DELTA: -2 NG/L
TROPONIN T SERPL HS-MCNC: 27 NG/L
TSH SERPL DL<=0.05 MIU/L-ACNC: 2.38 UIU/ML (ref 0.27–4.2)
VLDLC SERPL-MCNC: 31 MG/DL (ref 5–40)
WBC NRBC COR # BLD: 8.4 10*3/MM3 (ref 3.4–10.8)

## 2023-05-08 PROCEDURE — 84484 ASSAY OF TROPONIN QUANT: CPT | Performed by: EMERGENCY MEDICINE

## 2023-05-08 PROCEDURE — 85610 PROTHROMBIN TIME: CPT | Performed by: INTERNAL MEDICINE

## 2023-05-08 PROCEDURE — 93005 ELECTROCARDIOGRAM TRACING: CPT | Performed by: EMERGENCY MEDICINE

## 2023-05-08 PROCEDURE — 80061 LIPID PANEL: CPT | Performed by: STUDENT IN AN ORGANIZED HEALTH CARE EDUCATION/TRAINING PROGRAM

## 2023-05-08 PROCEDURE — 99285 EMERGENCY DEPT VISIT HI MDM: CPT

## 2023-05-08 PROCEDURE — 80048 BASIC METABOLIC PNL TOTAL CA: CPT | Performed by: EMERGENCY MEDICINE

## 2023-05-08 PROCEDURE — 85025 COMPLETE CBC W/AUTO DIFF WBC: CPT | Performed by: EMERGENCY MEDICINE

## 2023-05-08 PROCEDURE — 93005 ELECTROCARDIOGRAM TRACING: CPT

## 2023-05-08 PROCEDURE — G0378 HOSPITAL OBSERVATION PER HR: HCPCS

## 2023-05-08 PROCEDURE — 85610 PROTHROMBIN TIME: CPT | Performed by: EMERGENCY MEDICINE

## 2023-05-08 PROCEDURE — 84443 ASSAY THYROID STIM HORMONE: CPT | Performed by: STUDENT IN AN ORGANIZED HEALTH CARE EDUCATION/TRAINING PROGRAM

## 2023-05-08 PROCEDURE — 36416 COLLJ CAPILLARY BLOOD SPEC: CPT | Performed by: INTERNAL MEDICINE

## 2023-05-08 PROCEDURE — 71045 X-RAY EXAM CHEST 1 VIEW: CPT

## 2023-05-08 RX ORDER — SODIUM CHLORIDE 0.9 % (FLUSH) 0.9 %
10 SYRINGE (ML) INJECTION EVERY 12 HOURS SCHEDULED
Status: DISCONTINUED | OUTPATIENT
Start: 2023-05-08 | End: 2023-05-11 | Stop reason: HOSPADM

## 2023-05-08 RX ORDER — METOPROLOL TARTRATE 5 MG/5ML
5 INJECTION INTRAVENOUS ONCE
Status: COMPLETED | OUTPATIENT
Start: 2023-05-08 | End: 2023-05-08

## 2023-05-08 RX ORDER — METOPROLOL SUCCINATE 50 MG/1
100 TABLET, EXTENDED RELEASE ORAL DAILY
Status: DISCONTINUED | OUTPATIENT
Start: 2023-05-09 | End: 2023-05-11 | Stop reason: HOSPADM

## 2023-05-08 RX ORDER — SODIUM CHLORIDE 0.9 % (FLUSH) 0.9 %
10 SYRINGE (ML) INJECTION AS NEEDED
Status: DISCONTINUED | OUTPATIENT
Start: 2023-05-08 | End: 2023-05-11 | Stop reason: HOSPADM

## 2023-05-08 RX ORDER — SODIUM CHLORIDE 9 MG/ML
40 INJECTION, SOLUTION INTRAVENOUS AS NEEDED
Status: DISCONTINUED | OUTPATIENT
Start: 2023-05-08 | End: 2023-05-11 | Stop reason: HOSPADM

## 2023-05-08 RX ORDER — ONDANSETRON 4 MG/1
4 TABLET, FILM COATED ORAL EVERY 6 HOURS PRN
Status: DISCONTINUED | OUTPATIENT
Start: 2023-05-08 | End: 2023-05-09

## 2023-05-08 RX ORDER — PREDNISONE 5 MG/1
TABLET ORAL
COMMUNITY
Start: 2023-05-03

## 2023-05-08 RX ORDER — NITROGLYCERIN 0.4 MG/1
0.4 TABLET SUBLINGUAL
Status: DISCONTINUED | OUTPATIENT
Start: 2023-05-08 | End: 2023-05-11 | Stop reason: HOSPADM

## 2023-05-08 RX ORDER — DILTIAZEM HCL/D5W 125 MG/125
5-15 PLASTIC BAG, INJECTION (ML) INTRAVENOUS CONTINUOUS
Status: DISCONTINUED | OUTPATIENT
Start: 2023-05-08 | End: 2023-05-09

## 2023-05-08 RX ORDER — ONDANSETRON 2 MG/ML
4 INJECTION INTRAMUSCULAR; INTRAVENOUS EVERY 6 HOURS PRN
Status: DISCONTINUED | OUTPATIENT
Start: 2023-05-08 | End: 2023-05-09

## 2023-05-08 RX ORDER — LEVOTHYROXINE SODIUM 112 UG/1
112 TABLET ORAL
Status: DISCONTINUED | OUTPATIENT
Start: 2023-05-09 | End: 2023-05-11 | Stop reason: HOSPADM

## 2023-05-08 RX ADMIN — METOPROLOL TARTRATE 5 MG: 1 INJECTION, SOLUTION INTRAVENOUS at 20:59

## 2023-05-08 RX ADMIN — Medication 5 MG/HR: at 18:05

## 2023-05-08 RX ADMIN — SODIUM CHLORIDE 500 ML: 9 INJECTION, SOLUTION INTRAVENOUS at 19:52

## 2023-05-08 NOTE — Clinical Note
Level of Care: Telemetry [5]   Diagnosis: Atrial fibrillation with RVR [[id number]]   Admitting Physician: ZANDER SINCLAIR [[id number]]   Attending Physician: ZANDER SINCLAIR [[id number]]

## 2023-05-08 NOTE — PROGRESS NOTES
Subjective:     Encounter Date:05/08/2023      Patient ID: Jt Valdes is a 82 y.o. female.    Chief Complaint and history of present illness:     Acute visit for feeling poorly.    follow-up for A. fib, long-term anticoagulation, whitecoat hypertension, dyslipidemia    History of Present Illness      Ms. Jt Valdes has PMH of     Hypertension/hypertensive cardiovascular disease  Paroxysmal atrial fibrillation, noncompliant on Xarelto, now on coumadin   [id number] SCORE   [id number] Score: 4 (2/1/2023 11:51 AM)    Hyperlipidemia  SILVERIO, noncompliant on CPAP  Hypothyroidism  Whitecoat hypertension  History of leukopenia, staph infection  Rheumatoid arthritis, possible pulmonary involved ,DJD, DDD  Partial hysterectomy, appendectomy, back surgery  Allergies/intolerance to sulfa-rash  Non-smoker      Here for follow-up.   Was recently seen 5/1/2023 and losartan increased for blood pressure.  Patient's physician for rheumatoid arthritis and was put on prednisone and is feeling poorly since then.  Feels like her heart is racing and she is feeling weak and dizzy.  Patient is having chest pain and with A-fib with RVR.  Denies any ashwini loss of consciousness.    Patient's arterial blood pressure is 120/84, heart rate 133 bpm.    Patient had labs done 6/4/2019 which showed normal CBC CMP and TSH.  Labs from 3/9/2020 reveal TSH 1.47, cholesterol 202, triglycerides 102, HDL 58, , CMP with a cr 1.06, glucose of 111, A1c of 5.2, normal CBC.  Lipid profile 3/9/2020 with cholesterol 202 triglycerides 102 HDL 58 , CMP with a BUN/creatinine of 21 INR 1.06 and GFR 50.  Hemoglobin A1c 5.2  Labs from 3-2-21 revealed normal CRP TSH and free T4.  CMP with BUN/creatinine of 43/1.32 and GFR of 39.  Labs from 6-21 reveal BUN/creatinine of 34/1.0 EGFR 49, glucose 150.  Labs from 2/18/2022 revealed TSH of 7.8, normal FT BN 4.  Labs from 3/10/2020 reveal INR of 2.1.  Labs from 7/12/2022 revealed low TSH at 0.235 and  elevated FT4 at 1.77..  Labs from 6/14/2022 reveal lipid profile with cholesterol 174, triglycerides 109, HDL 59, LDL 95.  Normal CMP. INR 2/1/2023 is 2.5.  Labs from 3/29/2023 reveal an INR of 2.5, CMP with a glucose of 110.  Cholesterol 208, Tri 84, HDL 62, proBNP normal at 609..  Labs from 4/26/2023 reveal CBC with a hemoglobin of 11.5      Reviewed previous records:    Echo 09/04/2018  LVH EF 65%. RV OK.  LA probably mildly dilated.  AV OK.  MV OK.  Modest TR RVSP 26 or less.Holter monitor 9/17-9/20/2021 was unremarkable.  Lexiscan Cardiolite 4/8/2021 were negative for ischemia  Echo 2/1/2023 reveals EF 60 to 65% PA systolic pressure of 40 mmHg           Assessment:    A-fib with RVR  Chest pain  Shortness of breath  Abnormal EKG  Paroxysmal A. Fib, long-term anticoagulation with warfarin  Hypertension, history of whitecoat hypertension  CKD 3B  SILVERIO  Hyperlipidemia  Hypothyroidism    Plan:  Reviewed EKG results with patient  Patient is in A-fib with RVR.  We will continue metoprolol succinate 100 mg, losartan 100 mg, Cardizem , warfarin as tolerated.  Patient has A-fib with RVR after recent starting of prednisone has history of hypertensive cardiovascular disease.   We will admit her to PCU overnight she was started on IV Cardizem drip.  Monitor for toxic side effects.  Will admit to hospitalist.  We will call transfer center.  We will check TFTs patient had low TSH and high FT4 which could be causing her A-fib with RVR.   Patient's levothyroxine may need to be adjusted.  Patient had echocardiogram 2/1/2023 reviewed/interpreted by me which reveals normal LV systolic function with concentric LVH and left atrial enlargement consistent with hypertensive cardiovascular disease.  Patient has high GIQ1IA3-MBLi score due to female gender, age >75,, hypertension making it 4, would benefit from long-term anticoagulation, patient could not afford NOACs is on warfarin.  We will follow-up in INR clinic to keep PT/INR  between 2 and 3.    Advised statins.  Patient was previously intolerant to statins.  Procedures Holter monitor 7/12/2022 revealed A. fib with RVR lasting anywhere up to >3 hours at a time  Spent 50 minutes taking care of patient, including review of data, documentation, taking care of patient MDM and arranging admission for patient today..  Patient's last ischemic work-up was 2021 will benefit from ischemic work-up.            ECG 12 Lead    Date/Time: 5/8/2023 2:39 PM  Performed by: Jose Patiño MD  Authorized by: Jose Patiño MD   Comparison: compared with previous ECG from 5/1/2023  Comparison to previous ECG: EKG done today reviewed/interpreted by me reveals A-fib with RVR at the rate of 136 bpm with ST segment depression in inferolateral leads.  Compared to EKG from 5/1/2023 rhythm is changed.              Copied text in this portion of the note has been reviewed and is accurate as of 5/8/2023  The following portions of the patient's history were reviewed and updated as appropriate: allergies, current medications, past family history, past medical history, past social history, past surgical history and problem list.    Assessment:         [facility]     Diagnosis Plan   1. Precordial pain  ECG 12 Lead      2. Dyspnea on exertion  ECG 12 Lead      3. Atrial fibrillation with RVR  ECG 12 Lead      4. Essential hypertension  ECG 12 Lead      5. Dyslipidemia  ECG 12 Lead      6. Stage 3b chronic kidney disease  ECG 12 Lead      7. Statin intolerance  ECG 12 Lead      8. Hypothyroidism (acquired)  ECG 12 Lead      9. White coat syndrome with diagnosis of hypertension  ECG 12 Lead      10. Palpitations  ECG 12 Lead      11. Long term (current) use of anticoagulants  ECG 12 Lead             Plan:               Past Medical History:  Past Medical History:   Diagnosis Date   • Arthritis    • Cataract     Cataracts surgery   • COVID-19    • Degenerative joint disease    • Extremity pain     legs pain    • H/O degenerative disc disease    • History of colonoscopy 2009    last done 2009   • History of echocardiogram 09/04/2018    LVH EF 65%. RV OK. LA probably mildly dilated. AV OK. MV OK. Modest TR RVSP 26 or less. Nuclear MPI regadenoson 9/14/2018. LV uniform myocardial uptake and wall motion EF 71%.    • History of Holter monitoring 10/08/2018    SR 40-81 58. AF 2.7 hr episode VR . At termination AF 2 3.5-4.0 s pauses with patient possibly dizzy. 205 PAC 42 atrial ashanti several SVT. No ventricular ectopy.    • HL (hearing loss) 2020    Tried hearing aids twice . Didnt help   • HTN (hypertension)    • Hyperlipidemia    • Hypertensive cardiovascular disease    • Hypothyroidism    • Incontinence    • Leukopenia    • Low back pain    • PAF (paroxysmal atrial fibrillation) 09/2018     [facility] Sept 2018 PAF and HTN. PAFL On bisoprolol and diltiazem bradycardia symptomatic with dizziness. Amiodarone alone Oct 2018 and no episodes of erratic or fast heartbeat or lightheadedness.    • Rheumatoid arthritis     Possible pulmonary involvement    • Staph infection    • Vitamin D deficiency    • White coat syndrome with hypertension      Past Surgical History:  Past Surgical History:   Procedure Laterality Date   • APPENDECTOMY  1974   • BACK SURGERY  2003 2001, 2003   • SUBTOTAL HYSTERECTOMY  1974      Allergies:  Allergies   Allergen Reactions   • Sulfa Antibiotics Rash   • Macrobid [Nitrofurantoin] Unknown - High Severity     Home Meds:  Current Meds:     Current Outpatient Medications:   •  cefdinir (OMNICEF) 300 MG capsule, Take 1 capsule by mouth 2 (Two) Times a Day for 7 days., Disp: 14 capsule, Rfl: 0  •  Diclofenac Sodium (VOLTAREN) 1 % gel gel, Apply 4 g topically to the appropriate area as directed 4 (Four) Times a Day As Needed (pain)., Disp: 150 g, Rfl: 3  •  dilTIAZem LA (CARDIZEM LA) 180 MG 24 hr tablet, Take 1 tablet by mouth Daily., Disp: 90 tablet, Rfl: 3  •  leflunomide (ARAVA) 20 MG tablet, Take 1  "tablet by mouth Daily., Disp: 90 tablet, Rfl: 0  •  levothyroxine (Synthroid) 112 MCG tablet, Take 1 tablet p.o. daily., Disp: 90 tablet, Rfl: 4  •  losartan (COZAAR) 100 MG tablet, Take 1 tablet by mouth Daily. (Patient taking differently: Take 75 mg by mouth Daily. PT TAKES 50MG , 1.5 PILLS DAILY), Disp: 90 tablet, Rfl: 3  •  metoprolol succinate XL (TOPROL-XL) 100 MG 24 hr tablet, Take 1 tablet by mouth Daily., Disp: 90 tablet, Rfl: 3  •  multivitamin with minerals tablet tablet, Take 1 tablet by mouth Daily., Disp: , Rfl:   •  nitrofurantoin, macrocrystal-monohydrate, (Macrobid) 100 MG capsule, Take 1 capsule by mouth 2 (Two) Times a Day., Disp: 14 capsule, Rfl: 0  •  predniSONE (DELTASONE) 5 MG tablet, , Disp: , Rfl:   •  tiZANidine (ZANAFLEX) 2 MG tablet, Take 1 tablet by mouth At Night As Needed for Muscle Spasms., Disp: 10 tablet, Rfl: 0  •  warfarin (COUMADIN) 2.5 MG tablet, TAKE 2 TABLETS DAILY, Disp: 180 tablet, Rfl: 1  Social History:   Social History     Tobacco Use   • Smoking status: Never   • Smokeless tobacco: Never   Substance Use Topics   • Alcohol use: Yes     Alcohol/week: 1.0 standard drink     Types: 1 Glasses of wine per week     Comment: Occ.      Family History:  Family History   Problem Relation Age of Onset   • Osteoarthritis Mother    • Arthritis Mother    • Cancer Mother    • Other Other         Rheumatoid disease    • Cancer Father    • Cancer Sister               Review of Systems   Constitutional: Positive for malaise/fatigue.   Cardiovascular: Positive for palpitations. Negative for chest pain and leg swelling.   Respiratory: Positive for shortness of breath.    Neurological: Negative for dizziness and numbness.     All other systems are negative         Objective:     Physical Exam  /84 (BP Location: Left arm, Patient Position: Sitting, Cuff Size: Adult)   Pulse (!) 133   Ht 160 cm (63\")   Wt 52.6 kg (116 lb)   BMI 20.55 kg/m²   General:  Appears in no acute " "distress  Eyes: Sclera is anicteric,  conjunctiva is clear   HEENT:  No JVD.  No carotid bruits  Respiratory: Respirations regular and unlabored at rest.  Clear to auscultation  Cardiovascular: S1,S2 irregular rate and rhythm. No murmur, rub or gallop auscultated.   Extremities: No digital clubbing or cyanosis, no edema  Skin: Color pink. Skin warm and dry to touch. No rashes  No xanthoma  Neuro: Alert and awake.  Walks with a walker    Lab Reviewed:         Jose Patiño MD  5/8/2023 15:31 EDT      EMR Dragon/Transcription:   \"Dictated utilizing Dragon dictation\".        "

## 2023-05-09 ENCOUNTER — PREP FOR SURGERY (OUTPATIENT)
Dept: OTHER | Facility: HOSPITAL | Age: 83
End: 2023-05-09
Payer: MEDICARE

## 2023-05-09 DIAGNOSIS — I48.91 ATRIAL FIBRILLATION WITH RVR: Primary | ICD-10-CM

## 2023-05-09 DIAGNOSIS — I10 ESSENTIAL HYPERTENSION: ICD-10-CM

## 2023-05-09 DIAGNOSIS — E03.9 HYPOTHYROIDISM (ACQUIRED): ICD-10-CM

## 2023-05-09 LAB
ANION GAP SERPL CALCULATED.3IONS-SCNC: 11 MMOL/L (ref 5–15)
ANION GAP SERPL CALCULATED.3IONS-SCNC: 12 MMOL/L (ref 5–15)
BUN SERPL-MCNC: 38 MG/DL (ref 8–23)
BUN SERPL-MCNC: 44 MG/DL (ref 8–23)
BUN/CREAT SERPL: 32.5 (ref 7–25)
BUN/CREAT SERPL: 35.8 (ref 7–25)
CALCIUM SPEC-SCNC: 8.8 MG/DL (ref 8.6–10.5)
CALCIUM SPEC-SCNC: 8.9 MG/DL (ref 8.6–10.5)
CHLORIDE SERPL-SCNC: 100 MMOL/L (ref 98–107)
CHLORIDE SERPL-SCNC: 103 MMOL/L (ref 98–107)
CO2 SERPL-SCNC: 23 MMOL/L (ref 22–29)
CO2 SERPL-SCNC: 24 MMOL/L (ref 22–29)
CREAT SERPL-MCNC: 1.17 MG/DL (ref 0.57–1)
CREAT SERPL-MCNC: 1.23 MG/DL (ref 0.57–1)
DEPRECATED RDW RBC AUTO: 45.1 FL (ref 37–54)
DEPRECATED RDW RBC AUTO: 46.8 FL (ref 37–54)
EGFRCR SERPLBLD CKD-EPI 2021: 43.7 ML/MIN/1.73
EGFRCR SERPLBLD CKD-EPI 2021: 46.4 ML/MIN/1.73
ERYTHROCYTE [DISTWIDTH] IN BLOOD BY AUTOMATED COUNT: 13.8 % (ref 12.3–15.4)
ERYTHROCYTE [DISTWIDTH] IN BLOOD BY AUTOMATED COUNT: 14.1 % (ref 12.3–15.4)
GLUCOSE SERPL-MCNC: 111 MG/DL (ref 65–99)
GLUCOSE SERPL-MCNC: 138 MG/DL (ref 65–99)
HCT VFR BLD AUTO: 34.6 % (ref 34–46.6)
HCT VFR BLD AUTO: 35 % (ref 34–46.6)
HGB BLD-MCNC: 11.4 G/DL (ref 12–15.9)
HGB BLD-MCNC: 11.7 G/DL (ref 12–15.9)
INR PPP: 2.25 (ref 2–3)
INR PPP: 3.23 (ref 2–3)
MAGNESIUM SERPL-MCNC: 1.8 MG/DL (ref 1.6–2.4)
MAGNESIUM SERPL-MCNC: 1.9 MG/DL (ref 1.6–2.4)
MCH RBC QN AUTO: 29.6 PG (ref 26.6–33)
MCH RBC QN AUTO: 29.7 PG (ref 26.6–33)
MCHC RBC AUTO-ENTMCNC: 33 G/DL (ref 31.5–35.7)
MCHC RBC AUTO-ENTMCNC: 33.4 G/DL (ref 31.5–35.7)
MCV RBC AUTO: 88.9 FL (ref 79–97)
MCV RBC AUTO: 89.6 FL (ref 79–97)
PLATELET # BLD AUTO: 260 10*3/MM3 (ref 140–450)
PLATELET # BLD AUTO: 265 10*3/MM3 (ref 140–450)
PMV BLD AUTO: 8 FL (ref 6–12)
PMV BLD AUTO: 8.3 FL (ref 6–12)
POTASSIUM SERPL-SCNC: 4.5 MMOL/L (ref 3.5–5.2)
POTASSIUM SERPL-SCNC: 4.8 MMOL/L (ref 3.5–5.2)
PROTHROMBIN TIME: 23 SECONDS (ref 19.4–28.5)
PROTHROMBIN TIME: 32.3 SECONDS (ref 19.4–28.5)
RBC # BLD AUTO: 3.87 10*6/MM3 (ref 3.77–5.28)
RBC # BLD AUTO: 3.94 10*6/MM3 (ref 3.77–5.28)
SODIUM SERPL-SCNC: 135 MMOL/L (ref 136–145)
SODIUM SERPL-SCNC: 138 MMOL/L (ref 136–145)
WBC NRBC COR # BLD: 8.6 10*3/MM3 (ref 3.4–10.8)
WBC NRBC COR # BLD: 8.8 10*3/MM3 (ref 3.4–10.8)

## 2023-05-09 PROCEDURE — 99222 1ST HOSP IP/OBS MODERATE 55: CPT | Performed by: INTERNAL MEDICINE

## 2023-05-09 PROCEDURE — 93005 ELECTROCARDIOGRAM TRACING: CPT | Performed by: NURSE PRACTITIONER

## 2023-05-09 PROCEDURE — 80048 BASIC METABOLIC PNL TOTAL CA: CPT | Performed by: STUDENT IN AN ORGANIZED HEALTH CARE EDUCATION/TRAINING PROGRAM

## 2023-05-09 PROCEDURE — 85610 PROTHROMBIN TIME: CPT | Performed by: STUDENT IN AN ORGANIZED HEALTH CARE EDUCATION/TRAINING PROGRAM

## 2023-05-09 PROCEDURE — 63710000001 PREDNISONE PER 5 MG: Performed by: INTERNAL MEDICINE

## 2023-05-09 PROCEDURE — 36415 COLL VENOUS BLD VENIPUNCTURE: CPT | Performed by: STUDENT IN AN ORGANIZED HEALTH CARE EDUCATION/TRAINING PROGRAM

## 2023-05-09 PROCEDURE — 85027 COMPLETE CBC AUTOMATED: CPT | Performed by: STUDENT IN AN ORGANIZED HEALTH CARE EDUCATION/TRAINING PROGRAM

## 2023-05-09 PROCEDURE — 93010 ELECTROCARDIOGRAM REPORT: CPT | Performed by: INTERNAL MEDICINE

## 2023-05-09 PROCEDURE — 93005 ELECTROCARDIOGRAM TRACING: CPT | Performed by: INTERNAL MEDICINE

## 2023-05-09 PROCEDURE — G0378 HOSPITAL OBSERVATION PER HR: HCPCS

## 2023-05-09 PROCEDURE — 83735 ASSAY OF MAGNESIUM: CPT | Performed by: NURSE PRACTITIONER

## 2023-05-09 RX ORDER — MAGNESIUM SULFATE HEPTAHYDRATE 40 MG/ML
4 INJECTION, SOLUTION INTRAVENOUS AS NEEDED
Status: DISCONTINUED | OUTPATIENT
Start: 2023-05-09 | End: 2023-05-11 | Stop reason: HOSPADM

## 2023-05-09 RX ORDER — DOFETILIDE 0.12 MG/1
125 CAPSULE ORAL EVERY 12 HOURS SCHEDULED
Status: DISCONTINUED | OUTPATIENT
Start: 2023-05-09 | End: 2023-05-11 | Stop reason: HOSPADM

## 2023-05-09 RX ORDER — MAGNESIUM SULFATE HEPTAHYDRATE 40 MG/ML
2 INJECTION, SOLUTION INTRAVENOUS AS NEEDED
Status: DISCONTINUED | OUTPATIENT
Start: 2023-05-09 | End: 2023-05-11 | Stop reason: HOSPADM

## 2023-05-09 RX ORDER — SODIUM CHLORIDE 0.9 % (FLUSH) 0.9 %
3 SYRINGE (ML) INJECTION EVERY 12 HOURS SCHEDULED
OUTPATIENT
Start: 2023-05-09

## 2023-05-09 RX ORDER — DOFETILIDE 0.12 MG/1
125 CAPSULE ORAL EVERY 12 HOURS
Status: DISCONTINUED | OUTPATIENT
Start: 2023-05-09 | End: 2023-05-09

## 2023-05-09 RX ORDER — DOFETILIDE 0.12 MG/1
125 CAPSULE ORAL EVERY 12 HOURS
Status: DISCONTINUED | OUTPATIENT
Start: 2023-05-10 | End: 2023-05-09

## 2023-05-09 RX ORDER — POTASSIUM CHLORIDE 1.5 G/1.77G
40 POWDER, FOR SOLUTION ORAL AS NEEDED
Status: DISCONTINUED | OUTPATIENT
Start: 2023-05-09 | End: 2023-05-11 | Stop reason: HOSPADM

## 2023-05-09 RX ORDER — CEFDINIR 300 MG/1
300 CAPSULE ORAL 2 TIMES DAILY
Status: DISPENSED | OUTPATIENT
Start: 2023-05-09 | End: 2023-05-11

## 2023-05-09 RX ORDER — LEFLUNOMIDE 20 MG/1
20 TABLET ORAL DAILY
Status: DISCONTINUED | OUTPATIENT
Start: 2023-05-09 | End: 2023-05-11 | Stop reason: HOSPADM

## 2023-05-09 RX ORDER — PREDNISONE 5 MG/1
5 TABLET ORAL
Status: DISCONTINUED | OUTPATIENT
Start: 2023-05-15 | End: 2023-05-11 | Stop reason: HOSPADM

## 2023-05-09 RX ORDER — DILTIAZEM HYDROCHLORIDE 180 MG/1
180 CAPSULE, COATED, EXTENDED RELEASE ORAL
Status: DISCONTINUED | OUTPATIENT
Start: 2023-05-09 | End: 2023-05-10

## 2023-05-09 RX ORDER — POTASSIUM CHLORIDE 20 MEQ/1
40 TABLET, EXTENDED RELEASE ORAL AS NEEDED
Status: DISCONTINUED | OUTPATIENT
Start: 2023-05-09 | End: 2023-05-11 | Stop reason: HOSPADM

## 2023-05-09 RX ORDER — PREDNISONE 10 MG/1
10 TABLET ORAL
Status: DISCONTINUED | OUTPATIENT
Start: 2023-05-11 | End: 2023-05-11 | Stop reason: HOSPADM

## 2023-05-09 RX ORDER — SODIUM CHLORIDE 9 MG/ML
80 INJECTION, SOLUTION INTRAVENOUS CONTINUOUS
OUTPATIENT
Start: 2023-05-09

## 2023-05-09 RX ORDER — POLYETHYLENE GLYCOL 3350 17 G/17G
17 POWDER, FOR SOLUTION ORAL DAILY
Status: DISCONTINUED | OUTPATIENT
Start: 2023-05-09 | End: 2023-05-11 | Stop reason: HOSPADM

## 2023-05-09 RX ORDER — SODIUM CHLORIDE 9 MG/ML
40 INJECTION, SOLUTION INTRAVENOUS AS NEEDED
OUTPATIENT
Start: 2023-05-09

## 2023-05-09 RX ORDER — SODIUM CHLORIDE 0.9 % (FLUSH) 0.9 %
3-10 SYRINGE (ML) INJECTION AS NEEDED
OUTPATIENT
Start: 2023-05-09

## 2023-05-09 RX ORDER — DOFETILIDE 0.12 MG/1
125 CAPSULE ORAL EVERY 12 HOURS
Status: DISCONTINUED | OUTPATIENT
Start: 2023-05-09 | End: 2023-05-09 | Stop reason: SDUPTHER

## 2023-05-09 RX ADMIN — CEFDINIR 300 MG: 300 CAPSULE ORAL at 20:44

## 2023-05-09 RX ADMIN — Medication 10 ML: at 20:48

## 2023-05-09 RX ADMIN — DOFETILIDE 125 MCG: 0.12 CAPSULE ORAL at 20:42

## 2023-05-09 RX ADMIN — CEFDINIR 300 MG: 300 CAPSULE ORAL at 08:55

## 2023-05-09 RX ADMIN — METOPROLOL SUCCINATE 100 MG: 50 TABLET, EXTENDED RELEASE ORAL at 08:55

## 2023-05-09 RX ADMIN — Medication 10 ML: at 08:55

## 2023-05-09 RX ADMIN — DILTIAZEM HYDROCHLORIDE 180 MG: 180 CAPSULE, COATED, EXTENDED RELEASE ORAL at 08:55

## 2023-05-09 RX ADMIN — Medication 9 MG/HR: at 09:59

## 2023-05-09 RX ADMIN — PREDNISONE 15 MG: 5 TABLET ORAL at 15:10

## 2023-05-09 RX ADMIN — POLYETHYLENE GLYCOL 3350 17 G: 17 POWDER, FOR SOLUTION ORAL at 15:10

## 2023-05-09 RX ADMIN — LEFLUNOMIDE 20 MG: 20 TABLET ORAL at 08:55

## 2023-05-09 RX ADMIN — LEVOTHYROXINE SODIUM 112 MCG: 0.11 TABLET ORAL at 05:26

## 2023-05-09 NOTE — CONSULTS
Cardiology Consult Note    Patient Identification:  Name: Jt Valdes  Age: 82 y.o.  Sex: female  :  1940  MRN: [id number]             Requesting Physician :  Dr. Guerrero, hospitalist     Reason for Consultation / Chief Complaint : Afib RVR     History of Present Illness:       Ms. Jt Valdes has PMH of      Hypertension/hypertensive cardiovascular disease  Paroxysmal atrial fibrillation, noncompliant on Xarelto, now on coumadin   [id number] SCORE   [id number] Score: 4 (2023 11:51 AM)     Hyperlipidemia  SILVERIO, noncompliant on CPAP  Hypothyroidism  Whitecoat hypertension  History of leukopenia, staph infection  Rheumatoid arthritis, possible pulmonary involved ,DJD, DDD  Partial hysterectomy, appendectomy, back surgery  Allergies/intolerance to sulfa-rash  Non-smoker     Presented to the ED from home.  Patient was seen in Dr. Patiño office 2023 with A-fib with RVR and was going to be directly admitted to the hospital however she did not get a bed and he advised that she come to the ED.  Patient was started on Cardizem drip for rate control.  This morning she remains in A-fib on Cardizem drip her blood pressure is mildly hypotensive.    Labs in the ED showed high-sensitivity troponin of 27 and 25 glucose mildly elevated 141 potassium normal 4.7 TSH 2.38 BUN 38 creatinine 1.17 INR 3.23 hemoglobin 11.4  Chest x-ray was without any disease    Continue Cardizem plan for stress test in the morning as patient already had coffee this morning   echocardiogram was done recently Which showed normal LV systolic function of 60 to 65% normal RV or VSP is 40    Cardiology attending addendum :    I have personally performed a face-to-face diagnostic evaluation, physical exam and reviewed data on this patient.  I have reviewed documentation done by me and nurse practitioner  and corrected as needed.  And agree with the different components of documentation.Greater than 50% of the time spent in the care  of this patient was provided by attending consultant/me.      This is an 82-year-old with previous history of paroxysmal A-fib on long-term anticoagulation with warfarin presented with very symptomatic A-fib with RVR        Office note:     Here for follow-up.   Was recently seen 5/1/2023 and losartan increased for blood pressure.  Patient's physician for rheumatoid arthritis and was put on prednisone and is feeling poorly since then.  Feels like her heart is racing and she is feeling weak and dizzy.  Patient is having chest pain and with A-fib with RVR.  Denies any ashwini loss of consciousness.     Patient's arterial blood pressure is 120/84, heart rate 133 bpm.     Patient had labs done 6/4/2019 which showed normal CBC CMP and TSH.  Labs from 3/9/2020 reveal TSH 1.47, cholesterol 202, triglycerides 102, HDL 58, , CMP with a cr 1.06, glucose of 111, A1c of 5.2, normal CBC.  Lipid profile 3/9/2020 with cholesterol 202 triglycerides 102 HDL 58 , CMP with a BUN/creatinine of 21 INR 1.06 and GFR 50.  Hemoglobin A1c 5.2  Labs from 3-2-21 revealed normal CRP TSH and free T4.  CMP with BUN/creatinine of 43/1.32 and GFR of 39.  Labs from 6-21 reveal BUN/creatinine of 34/1.0 EGFR 49, glucose 150.  Labs from 2/18/2022 revealed TSH of 7.8, normal FT BN 4.  Labs from 3/10/2020 reveal INR of 2.1.  Labs from 7/12/2022 revealed low TSH at 0.235 and elevated FT4 at 1.77..  Labs from 6/14/2022 reveal lipid profile with cholesterol 174, triglycerides 109, HDL 59, LDL 95.  Normal CMP. INR 2/1/2023 is 2.5.  Labs from 3/29/2023 reveal an INR of 2.5, CMP with a glucose of 110.  Cholesterol 208, Tri 84, HDL 62, proBNP normal at 609..  Labs from 4/26/2023 reveal CBC with a hemoglobin of 11.5        Reviewed previous records:    Echo 09/04/2018  LVH EF 65%. RV OK.  LA probably mildly dilated.  AV OK.  MV OK.  Modest TR RVSP 26 or less.Holter monitor 9/17-9/20/2021 was unremarkable.  Lexiscan Cardiolite 4/8/2021 were negative  for ischemia  Echo 2/1/2023 reveals EF 60 to 65% PA systolic pressure of 40 mmHg             Assessment:     A-fib with RVR  Chest pain  Shortness of breath  Abnormal EKG  Paroxysmal A. Fib, long-term anticoagulation with warfarin  Hypertension, history of whitecoat hypertension  CKD 3B  SILVERIO  Hyperlipidemia  Hypothyroidism     Plan:    Telemetry to monitor rhythm  Patient was admitted from my office due to symptomatic A-fib with RVR  Patient has A-fib with RVR after recent starting of prednisone has history of hypertensive cardiovascular disease.  Says she was started on prednisone for RA and that is what put her in A-fib  TFTs were checked and were normal.  Patient was started on IV Cardizem and toxic side effects were being monitored.  Patient has converted to sinus rhythm.  EP was consulted  Patient was seen by   I discussed options of antiarrhythmic drugs like Tikosyn to keep her in sinus rhythm and prevent future symptomatic recurrences versus ablation.  Explained to patient and her son had a lengthy discussion and are considering whether things want to find out the cost of Tikosyn.    Discussed with  [facility] because somewhere in the $180- $190 per month for patient.  If patient wants to go antiarrhythmics we will keep her and give Tikosyn if she wants to go for ablation will discharge home on medical management and  will bring her back for elective ablation.  We will continue metoprolol succinate 100 mg, losartan 100 mg, Cardizem , warfarin as tolerated.      Patient had echocardiogram 2/1/2023 reviewed/interpreted by me which reveals normal LV systolic function with concentric LVH and left atrial enlargement consistent with hypertensive cardiovascular disease.  Patient has high [id number]-CDIb score due to female gender, age >75,, hypertension making it 4, would benefit from long-term anticoagulation, patient could not afford NOACs is on warfarin.  We will follow-up in  INR clinic to keep PT/INR between 2 and 3.    Advised statins.  Patient was previously intolerant to statins.  Procedures Holter monitor 7/12/2022 revealed A. fib with RVR lasting anywhere up to >3 hours at a time                Diagnosis Plan   1. Atrial fibrillation with RVR        2. Hypotension, unspecified hypotension type                   Past Medical History:  Past Medical History:   Diagnosis Date   • Arthritis    • Cataract     Cataracts surgery   • COVID-19    • Degenerative joint disease    • Extremity pain     legs pain   • H/O degenerative disc disease    • History of colonoscopy 2009    last done 2009   • History of echocardiogram 09/04/2018    LVH EF 65%. RV OK. LA probably mildly dilated. AV OK. MV OK. Modest TR RVSP 26 or less. Nuclear MPI regadenoson 9/14/2018. LV uniform myocardial uptake and wall motion EF 71%.    • History of Holter monitoring 10/08/2018    SR 40-81 58. AF 2.7 hr episode VR . At termination AF 2 3.5-4.0 s pauses with patient possibly dizzy. 205 PAC 42 atrial ashanti several SVT. No ventricular ectopy.    • HL (hearing loss) 2020    Tried hearing aids twice . Didnt help   • HTN (hypertension)    • Hyperlipidemia    • Hypertensive cardiovascular disease    • Hypothyroidism    • Incontinence    • Leukopenia    • Low back pain    • PAF (paroxysmal atrial fibrillation) 09/2018     [place] Sept 2018 PAF and HTN. PAFL On bisoprolol and diltiazem bradycardia symptomatic with dizziness. Amiodarone alone Oct 2018 and no episodes of erratic or fast heartbeat or lightheadedness.    • Rheumatoid arthritis     Possible pulmonary involvement    • Staph infection    • Vitamin D deficiency    • White coat syndrome with hypertension      Past Surgical History:  Past Surgical History:   Procedure Laterality Date   • APPENDECTOMY  1974   • BACK SURGERY  2003 2001, 2003   • SUBTOTAL HYSTERECTOMY  1974      Allergies:  Allergies   Allergen Reactions   • Sulfa Antibiotics Rash   • Macrobid  [Nitrofurantoin] Unknown - High Severity     Home Meds:  Medications Prior to Admission   Medication Sig Dispense Refill Last Dose   • cefdinir (OMNICEF) 300 MG capsule Take 1 capsule by mouth 2 (Two) Times a Day for 7 days. 14 capsule 0    • Diclofenac Sodium (VOLTAREN) 1 % gel gel Apply 4 g topically to the appropriate area as directed 4 (Four) Times a Day As Needed (pain). 150 g 3    • dilTIAZem LA (CARDIZEM LA) 180 MG 24 hr tablet Take 1 tablet by mouth Daily. 90 tablet 3    • leflunomide (ARAVA) 20 MG tablet Take 1 tablet by mouth Daily. 90 tablet 0    • levothyroxine (Synthroid) 112 MCG tablet Take 1 tablet p.o. daily. 90 tablet 4    • losartan (COZAAR) 100 MG tablet Take 1 tablet by mouth Daily. (Patient taking differently: Take 75 mg by mouth Daily. PT TAKES 50MG , 1.5 PILLS DAILY) 90 tablet 3    • metoprolol succinate XL (TOPROL-XL) 100 MG 24 hr tablet Take 1 tablet by mouth Daily. 90 tablet 3    • multivitamin with minerals tablet tablet Take 1 tablet by mouth Daily.      • nitrofurantoin, macrocrystal-monohydrate, (Macrobid) 100 MG capsule Take 1 capsule by mouth 2 (Two) Times a Day. 14 capsule 0    • predniSONE (DELTASONE) 5 MG tablet       • tiZANidine (ZANAFLEX) 2 MG tablet Take 1 tablet by mouth At Night As Needed for Muscle Spasms. 10 tablet 0    • warfarin (COUMADIN) 2.5 MG tablet TAKE 2 TABLETS DAILY 180 tablet 1      Current Meds:     Current Facility-Administered Medications:   •  cefdinir (OMNICEF) capsule 300 mg, 300 mg, Oral, BID, Begum, Jalaram, DO, 300 mg at 05/09/23 0855  •  dilTIAZem CD (CARDIZEM CD) 24 hr capsule 180 mg, 180 mg, Oral, Q24H, Begum, Jalaram, DO, 180 mg at 05/09/23 0855  •  leflunomide (ARAVA) tablet 20 mg, 20 mg, Oral, Daily, Begum, Jalaram, DO, 20 mg at 05/09/23 0855  •  levothyroxine (SYNTHROID, LEVOTHROID) tablet 112 mcg, 112 mcg, Oral, Q AM, Kenneth Guerrero, , 112 mcg at 05/09/23 0526  •  Magnesium Sulfate - Total Dose 10 grams - Magnesium 1 or Less, 2 g, Intravenous,  PRN **OR** Magnesium Sulfate - Total Dose 6 grams - Magnesium 1.1 - 1.5, 2 g, Intravenous, PRN **OR** Magnesium Sulfate - Total Dose 4 grams - Magnesium 1.6 - 1.9, 4 g, Intravenous, PRN, Marija, Asuncion Fort, APRN  •  metoprolol succinate XL (TOPROL-XL) 24 hr tablet 100 mg, 100 mg, Oral, Daily, Kenneth Guerrero, , 100 mg at 05/09/23 0855  •  nitroglycerin (NITROSTAT) SL tablet 0.4 mg, 0.4 mg, Sublingual, Q5 Min PRN, Kenneth Guerrero,   •  Pharmacy Consult, , Does not apply, Once PRN, Asuncion Dutton, APRN  •  Pharmacy to dose warfarin, , Does not apply, Continuous PRN, Kenneth Guerrero,   •  polyethylene glycol (MIRALAX) packet 17 g, 17 g, Oral, Daily, BegumAlex adame, DO, 17 g at 05/09/23 1510  •  potassium chloride (K-DUR,KLOR-CON) CR tablet 40 mEq, 40 mEq, Oral, PRN **OR** potassium chloride (KLOR-CON) packet 40 mEq, 40 mEq, Oral, PRN, Marija, Asuncion Fort, APRN  •  [START ON 5/11/2023] predniSONE (DELTASONE) tablet 10 mg, 10 mg, Oral, Daily With Breakfast, Begum, Kallaram, DO  •  predniSONE (DELTASONE) tablet 15 mg, 15 mg, Oral, Daily With Breakfast, Pretty Begumram, DO, 15 mg at 05/09/23 1510  •  [START ON 5/15/2023] predniSONE (DELTASONE) tablet 5 mg, 5 mg, Oral, Daily With Breakfast, Begum, Jalaram, DO  •  [COMPLETED] Insert Peripheral IV, , , Once **AND** sodium chloride 0.9 % flush 10 mL, 10 mL, Intravenous, PRN, Kenneth Guerrero,   •  sodium chloride 0.9 % flush 10 mL, 10 mL, Intravenous, Q12H, Kenneth Guerrero, , 10 mL at 05/09/23 0855  •  sodium chloride 0.9 % flush 10 mL, 10 mL, Intravenous, PRN, Kenneth Guerrero, DO  •  sodium chloride 0.9 % infusion 40 mL, 40 mL, Intravenous, PRESTEE, Kenneth Guerrero, DO  Social History:   Social History     Tobacco Use   • Smoking status: Never   • Smokeless tobacco: Never   Substance Use Topics   • Alcohol use: Yes     Alcohol/week: 1.0 standard drink     Types: 1 Glasses of wine per week     Comment: Occ.      Family History:  Family History   Problem Relation  "Age of Onset   • Osteoarthritis Mother    • Arthritis Mother    • Cancer Mother    • Other Other         Rheumatoid disease    • Cancer Father    • Cancer Sister         Review of Systems : Review of Systems   Constitutional: Positive for malaise/fatigue.   Cardiovascular: Positive for irregular heartbeat and palpitations. Negative for chest pain and leg swelling.   Respiratory: Positive for shortness of breath. Negative for cough.    Neurological: Positive for weakness.   All other systems reviewed and are negative.     Constitutional:  Temp:  [97.8 °F (36.6 °C)-98.6 °F (37 °C)] 97.8 °F (36.6 °C)  Heart Rate:  [] 62  Resp:  [16-18] 16  BP: ()/() 129/69    Physical Exam   /69 (BP Location: Right arm, Patient Position: Lying)   Pulse 62   Temp 97.8 °F (36.6 °C) (Oral)   Resp 16   Ht 160 cm (63\")   Wt 53.5 kg (117 lb 15.1 oz)   SpO2 96%   BMI 20.89 kg/m²   Physical Exam  General:  Appears in no acute distress  Eyes: Sclerae are anicteric,  conjunctivae are clear   HEENT:  No JVD. Thyroid not visibly enlarged. No mucosal pallor or cyanosis  Respiratory: Respirations regular and unlabored at rest.  Bilaterally good breath sounds with good air entry in all fields. No crackles, rubs or wheezes auscultated  Cardiovascular: S1,S2 irregular rate and rhythm. No murmur, rub or gallop auscultated. No pretibial pitting edema  Gastrointestinal: Abdomen soft, flat, nontender. Bowel sounds present.   Musculoskeletal:  No abnormal movements  Extremities: No digital clubbing or cyanosis  Skin: Color pink. Skin warm and dry to touch. No rashes  No xanthoma  Neuro: Alert and awake, no lateralizing deficits appreciated    Cardiographics  ECG: EKG tracing was  personally reviewed/interpreted by me  ECG 12 Lead Drug Monitoring; Tikosyn Initiation   Preliminary Result   HEART RATE= 66  bpm   RR Interval= 912  ms   IA Interval= 168  ms   P Horizontal Axis= 42  deg   P Front Axis= 36  deg   QRSD Interval= 79  " ms   QT Interval= 406  ms   QRS Axis= -4  deg   T Wave Axis= -69  deg   - ABNORMAL ECG -   Sinus rhythm   Abnrm T, consider ischemia, anterolateral lds   Electronically Signed By:    Date and Time of Study: 2023-05-09 14:34:18      ECG 12 Lead Chest Pain   Preliminary Result   HEART RATE= 150  bpm   RR Interval= 399  ms   WA Interval=   ms   P Horizontal Axis=   deg   P Front Axis=   deg   QRSD Interval= 77  ms   QT Interval= 266  ms   QRS Axis= -5  deg   T Wave Axis= 174  deg   - ABNORMAL ECG -   Atrial fibrillation with rapid V-rate   Repolarization abnormality, prob rate related   When compared with ECG of 13-Sep-2018 4:19:04,   Significant change in rhythm: previously sinus   Significant repolarization change   Electronically Signed By:    Date and Time of Study: 2023-05-08 16:49:33      SCANNED - TELEMETRY     Final Result      SCANNED - TELEMETRY     Final Result      SCANNED - TELEMETRY     Final Result      SCANNED - TELEMETRY     Final Result      SCANNED - TELEMETRY     Final Result      SCANNED - TELEMETRY     Final Result      ECG 12 Lead Rhythm Change    (Results Pending)       Telemetry: afib with intermittent RVR     Echocardiogram:   Results for orders placed during the hospital encounter of 02/01/23    Adult Transthoracic Echo Complete W/ Cont if Necessary Per Protocol    Interpretation Summary  •  Estimated right ventricular systolic pressure from tricuspid regurgitation is mildly elevated (35-45 mmHg).      Normal LV size and contractility EF of 60 to 65%  Normal RV size  Normal atrial size  Pulmonic valve is not well visualized.  Aortic valve, mitral valve, tricuspid valve appears structurally normal, mild tricuspid regurgitation seen.  Calculated RV systolic pressure of 40 mm of.  No pericardial effusion seen.  Proximal aorta appears normal in size.      Imaging  Chest X-ray:   Imaging Results (Last 24 Hours)     Procedure Component Value Units Date/Time    XR Chest 1 View [[id number]] Collected:  05/08/23 1745     Updated: 05/08/23 1749    Narrative:      XR CHEST 1 VW    Date of Exam: 5/8/2023 5:36 PM EDT    Indication: Heart palpitations.    Comparison: 3/15/2019 performed at [facility].    Findings:  The heart size is normal. The pulmonary vascular markings are normal. The lungs and pleural spaces are clear of active disease. There is scoliotic curvature of the thoracolumbar spine with degenerative changes. There are atherosclerotic vascular   calcifications in the thoracic aorta.      Impression:      Impression:  No active disease.      Electronically Signed: Dillon Basurto    5/8/2023 5:47 PM EDT    Workstation ID: [id number]          Lab Review: I have reviewed the labs  Results from last 7 days   Lab Units 05/08/23  1944 05/08/23  1728   HSTROP T ng/L 25* 27*     Results from last 7 days   Lab Units 05/09/23  0033   MAGNESIUM mg/dL 1.9     Results from last 7 days   Lab Units 05/09/23  0033   SODIUM mmol/L 138   POTASSIUM mmol/L 4.5   BUN mg/dL 38*   CREATININE mg/dL 1.17*   CALCIUM mg/dL 8.9             Results from last 7 days   Lab Units 05/09/23  0033 05/08/23  1728   WBC 10*3/mm3 8.80 8.40   HEMOGLOBIN g/dL 11.4* 12.1   HEMATOCRIT % 34.6 37.5   PLATELETS 10*3/mm3 265 299     Results from last 7 days   Lab Units 05/09/23  0033 05/08/23  1728 05/08/23  1354   INR  3.23* 3.42* 3.10             Jose Patiño MD  5/9/2023, 16:31 EDT      EMR Dragon/Transcription:   Dictated utilizing Dragon dictation

## 2023-05-09 NOTE — H&P
[facility] Medicine Services      Patient Name: Jt Valdes  : 1940  MRN: [id number]  Primary Care Physician:  Dilcia Trinidad MD  Date of admission: 2023      Subjective      Chief Complaint: Palpitations    History of Present Illness: Jt Valdes is a 82 y.o. female who presented to [facility] on 2023 complaining of Palpitations.  Admits to palpitations ongoing since 2pm yesterday and have not let up. Denies chest pain, numbness, abdominal pain, dyspnea, fevers. States she saw her cardiologist today, was unable to direct admit her so sent her to [facility] for evaluation.     In the ER, vitals 98.4, , RR 18, /80, 96 RA.  Labs notable for HS troponin 27 >25, glucose 141, bicarb 21, Cr 1.13, INR 3.42.  CXR without acute cardiopulmonary process.  EKG showed A. Fib with RVR.      ROS   12 point ROS reviewed and negative except as mentioned above      Personal History     Past Medical History:   Diagnosis Date   • Arthritis    • Cataract     Cataracts surgery   • COVID-19    • Degenerative joint disease    • Extremity pain     legs pain   • H/O degenerative disc disease    • History of colonoscopy 2009    last done    • History of echocardiogram 2018    LVH EF 65%. RV OK. LA probably mildly dilated. AV OK. MV OK. Modest TR RVSP 26 or less. Nuclear MPI regadenoson 2018. LV uniform myocardial uptake and wall motion EF 71%.    • History of Holter monitoring 10/08/2018    SR 40-81 58. AF 2.7 hr episode VR . At termination AF 2 3.5-4.0 s pauses with patient possibly dizzy. 205 PAC 42 atrial ashanti several SVT. No ventricular ectopy.    • HL (hearing loss) 2020    Tried hearing aids twice . Didnt help   • HTN (hypertension)    • Hyperlipidemia    • Hypertensive cardiovascular disease    • Hypothyroidism    • Incontinence    • Leukopenia    • Low back pain    • PAF (paroxysmal atrial fibrillation) 2018    [facility] 2018 PAF and  HTN. PAFL On bisoprolol and diltiazem bradycardia symptomatic with dizziness. Amiodarone alone Oct 2018 and no episodes of erratic or fast heartbeat or lightheadedness.    • Rheumatoid arthritis     Possible pulmonary involvement    • Staph infection    • Vitamin D deficiency    • White coat syndrome with hypertension        Past Surgical History:   Procedure Laterality Date   • APPENDECTOMY  1974   • BACK SURGERY  2003 2001, 2003   • SUBTOTAL HYSTERECTOMY  1974       Family History: family history includes Arthritis in her mother; Cancer in her father, mother, and sister; Osteoarthritis in her mother; Other in an other family member. Otherwise pertinent FHx was reviewed and not pertinent to current issue.    Social History:  reports that she has never smoked. She has never used smokeless tobacco. She reports current alcohol use of about 1.0 standard drink per week. She reports that she does not use drugs.    Home Medications:  Prior to Admission Medications     Prescriptions Last Dose Informant Patient Reported? Taking?    cefdinir (OMNICEF) 300 MG capsule   No No    Take 1 capsule by mouth 2 (Two) Times a Day for 7 days.    Diclofenac Sodium (VOLTAREN) 1 % gel gel   No No    Apply 4 g topically to the appropriate area as directed 4 (Four) Times a Day As Needed (pain).    dilTIAZem LA (CARDIZEM LA) 180 MG 24 hr tablet   No No    Take 1 tablet by mouth Daily.    leflunomide (ARAVA) 20 MG tablet   No No    Take 1 tablet by mouth Daily.    levothyroxine (Synthroid) 112 MCG tablet   No No    Take 1 tablet p.o. daily.    losartan (COZAAR) 100 MG tablet   No No    Take 1 tablet by mouth Daily.    Patient taking differently:  Take 75 mg by mouth Daily. PT TAKES 50MG , 1.5 PILLS DAILY    metoprolol succinate XL (TOPROL-XL) 100 MG 24 hr tablet   No No    Take 1 tablet by mouth Daily.    multivitamin with minerals tablet tablet   Yes No    Take 1 tablet by mouth Daily.    nitrofurantoin, macrocrystal-monohydrate,  (Macrobid) 100 MG capsule   No No    Take 1 capsule by mouth 2 (Two) Times a Day.    predniSONE (DELTASONE) 5 MG tablet   Yes No    tiZANidine (ZANAFLEX) 2 MG tablet   No No    Take 1 tablet by mouth At Night As Needed for Muscle Spasms.    warfarin (COUMADIN) 2.5 MG tablet   No No    TAKE 2 TABLETS DAILY            Allergies:  Allergies   Allergen Reactions   • Sulfa Antibiotics Rash   • Macrobid [Nitrofurantoin] Unknown - High Severity       Objective      Vitals:   Temp:  [98.4 °F (36.9 °C)] 98.4 °F (36.9 °C)  Heart Rate:  [] 95  Resp:  [18] 18  BP: ()/(60-84) 101/80    Physical Exam  Constitutional:       General: She is not in acute distress.     Appearance: Normal appearance.   HENT:      Head: Normocephalic and atraumatic.      Nose: Nose normal. No congestion.      Mouth/Throat:      Pharynx: Oropharynx is clear. No oropharyngeal exudate.   Eyes:      General: No scleral icterus.  Cardiovascular:      Rate and Rhythm: Tachycardia present. Rhythm irregular.      Heart sounds: No murmur heard.    No friction rub. No gallop.   Pulmonary:      Effort: No respiratory distress.      Breath sounds: No wheezing or rales.   Abdominal:      General: There is no distension.      Tenderness: There is no abdominal tenderness. There is no guarding.   Musculoskeletal:         General: No swelling or deformity.      Cervical back: Normal range of motion. No rigidity.      Right lower leg: No edema.      Left lower leg: No edema.   Skin:     Coloration: Skin is not jaundiced.      Findings: No bruising or lesion.   Neurological:      General: No focal deficit present.      Mental Status: She is alert and oriented to person, place, and time.      Motor: No weakness.          Result Review    Result Review:  I have personally reviewed the results from the time of this admission to 5/8/2023 21:43 EDT and agree with these findings:  [x]  Laboratory  []  Microbiology  [x]  Radiology  [x]  EKG/Telemetry   []   Cardiology/Vascular   []  Pathology  []  Old records  []  Other:        Assessment & Plan        Active Hospital Problems:  There are no active hospital problems to display for this patient.    Plan:     #A. Fib with RVR  #Supratherapeutic INR    - EKG shows A. Fib with RVR, no gross ischemia    - cardizem drip    - consult cardiology    - recent echo reviewed    - INR 3.42, hold warfarin now, goal 2-3, pharm to dose    - defer home PO cardizem to cardiology as on drip    - resume home Toprol      #elevated creatinine    - Cr 1.13, base 0.9    - possible 2/2 a. Fib with RVR    - follow labs    - hold home losartan due to nephrotoxicity    #HTN    - continue cardizem drip    - resume home Toprol    - hold home losartan due to nephrotoxicity    #HLD    - statin intolerance    - check lipid panel    #Hypothryroid    - check TSH    - resume home synthroid    DVT prophylaxis: warfarin, pharm to dose, INR currently supratherapeutic    CODE STATUS:       Admission Status:  I believe this patient meets observation status.    I discussed the patient's findings and my recommendations with patient.    This patient has been examined wearing appropriate Personal Protective Equipment and discussed with Chelo. 05/08/23      Signature: Electronically signed by Kenneth Guerrero DO, 05/08/23, 9:50 PM EDT.

## 2023-05-09 NOTE — ED PROVIDER NOTES
Subjective   History of Present Illness  Patient is an 82-year-old female complaint of dizziness palpitations rapid heartbeat.  States she has a history of atrial fibrillation but feels like it is out of control.  Denies chest pain balm diarrhea or other complaint.        Review of Systems    Past Medical History:   Diagnosis Date   • Arthritis    • Cataract     Cataracts surgery   • COVID-19    • Degenerative joint disease    • Extremity pain     legs pain   • H/O degenerative disc disease    • History of colonoscopy 2009    last done 2009   • History of echocardiogram 09/04/2018    LVH EF 65%. RV OK. LA probably mildly dilated. AV OK. MV OK. Modest TR RVSP 26 or less. Nuclear MPI regadenoson 9/14/2018. LV uniform myocardial uptake and wall motion EF 71%.    • History of Holter monitoring 10/08/2018    SR 40-81 58. AF 2.7 hr episode VR . At termination AF 2 3.5-4.0 s pauses with patient possibly dizzy. 205 PAC 42 atrial ashanti several SVT. No ventricular ectopy.    • HL (hearing loss) 2020    Tried hearing aids twice . Didnt help   • HTN (hypertension)    • Hyperlipidemia    • Hypertensive cardiovascular disease    • Hypothyroidism    • Incontinence    • Leukopenia    • Low back pain    • PAF (paroxysmal atrial fibrillation) 09/2018    [facility] Sept 2018 PAF and HTN. PAFL On bisoprolol and diltiazem bradycardia symptomatic with dizziness. Amiodarone alone Oct 2018 and no episodes of erratic or fast heartbeat or lightheadedness.    • Rheumatoid arthritis     Possible pulmonary involvement    • Staph infection    • Vitamin D deficiency    • White coat syndrome with hypertension        Allergies   Allergen Reactions   • Sulfa Antibiotics Rash   • Macrobid [Nitrofurantoin] Unknown - High Severity       Past Surgical History:   Procedure Laterality Date   • APPENDECTOMY  1974   • BACK SURGERY  2003 2001, 2003   • SUBTOTAL HYSTERECTOMY  1974       Family History   Problem Relation Age of Onset   • Osteoarthritis  Mother    • Arthritis Mother    • Cancer Mother    • Other Other         Rheumatoid disease    • Cancer Father    • Cancer Sister        Social History     Socioeconomic History   • Marital status:    • Number of children: 5   • Years of education: 12   Tobacco Use   • Smoking status: Never   • Smokeless tobacco: Never   Vaping Use   • Vaping Use: Never used   Substance and Sexual Activity   • Alcohol use: Yes     Alcohol/week: 1.0 standard drink     Types: 1 Glasses of wine per week     Comment: Occ.   • Drug use: No   • Sexual activity: Not Currently           Objective   Physical Exam  Neck has no adenopathy JVD or bruits.  Lungs are clear.  Heart has an irregular rate rhythm is tachycardic without murmur rub or gallop.  Abdomen soft.  Extremity exam unremarkable.  Procedures  My EKG interpretation shows A-fib with RVR at a rate of 150 with no acute ST change         ED Course      Results for orders placed or performed during the hospital encounter of 05/08/23   Basic Metabolic Panel    Specimen: Blood   Result Value Ref Range    Glucose 141 (H) 65 - 99 mg/dL    BUN 36 (H) 8 - 23 mg/dL    Creatinine 1.13 (H) 0.57 - 1.00 mg/dL    Sodium 137 136 - 145 mmol/L    Potassium 4.7 3.5 - 5.2 mmol/L    Chloride 102 98 - 107 mmol/L    CO2 21.0 (L) 22.0 - 29.0 mmol/L    Calcium 9.4 8.6 - 10.5 mg/dL    BUN/Creatinine Ratio 31.9 (H) 7.0 - 25.0    Anion Gap 14.0 5.0 - 15.0 mmol/L    eGFR 48.4 (L) >60.0 mL/min/1.73   High Sensitivity Troponin T    Specimen: Blood   Result Value Ref Range    HS Troponin T 27 (H) <10 ng/L   Protime-INR    Specimen: Blood   Result Value Ref Range    Protime 34.1 (H) 19.4 - 28.5 Seconds    INR 3.42 (H) 2.00 - 3.00   CBC Auto Differential    Specimen: Blood   Result Value Ref Range    WBC 8.40 3.40 - 10.80 10*3/mm3    RBC 4.09 3.77 - 5.28 10*6/mm3    Hemoglobin 12.1 12.0 - 15.9 g/dL    Hematocrit 37.5 34.0 - 46.6 %    MCV 91.6 79.0 - 97.0 fL    MCH 29.7 26.6 - 33.0 pg    MCHC 32.4 31.5 - 35.7  g/dL    RDW 14.2 12.3 - 15.4 %    RDW-SD 45.1 37.0 - 54.0 fl    MPV 8.0 6.0 - 12.0 fL    Platelets 299 140 - 450 10*3/mm3    Neutrophil % 71.8 42.7 - 76.0 %    Lymphocyte % 18.5 (L) 19.6 - 45.3 %    Monocyte % 9.0 5.0 - 12.0 %    Eosinophil % 0.1 (L) 0.3 - 6.2 %    Basophil % 0.6 0.0 - 1.5 %    Neutrophils, Absolute 6.00 1.70 - 7.00 10*3/mm3    Lymphocytes, Absolute 1.60 0.70 - 3.10 10*3/mm3    Monocytes, Absolute 0.80 0.10 - 0.90 10*3/mm3    Eosinophils, Absolute 0.00 0.00 - 0.40 10*3/mm3    Basophils, Absolute 0.00 0.00 - 0.20 10*3/mm3    nRBC 0.1 0.0 - 0.2 /100 WBC   High Sensitivity Troponin T 2Hr    Specimen: Blood   Result Value Ref Range    HS Troponin T 25 (H) <10 ng/L    Troponin T Delta -2 >=-4 - <+4 ng/L   ECG 12 Lead Chest Pain   Result Value Ref Range    QT Interval 266 ms     XR Chest 1 View    Result Date: 5/8/2023  Impression: No active disease. Electronically Signed: Dillon Basurto  5/8/2023 5:47 PM EDT  Workstation ID: [id number]                                         Medical Decision Making  Patient is noted to have A-fib with RVR.  She was placed on Cardizem both IV bolus and drip.  She became mildly hypotensive but responded to IV fluids.  She was given Lopressor IV.  Current rate is 110 with blood pressure 100 systolic.  Patient has no evidence of metabolic abnormality with no renal insufficiency or electrolyte abnormality there is no acute infectious process.  My chest interpretation shows no cardiac effusion or infiltrate.  Patient will be admitted for further rate control cardiac evaluation.  Did speak the on-call hospitalist.  Total critical care time 45 minutes    Amount and/or Complexity of Data Reviewed  Labs: ordered. Decision-making details documented in ED Course.  Radiology: ordered and independent interpretation performed.  ECG/medicine tests: ordered and independent interpretation performed.      Risk  Prescription drug management.  Decision regarding hospitalization.          Final  diagnoses:   Atrial fibrillation with RVR   Hypotension, unspecified hypotension type       ED Disposition  ED Disposition     ED Disposition   Decision to Admit    Condition   --    Comment   Level of Care: Telemetry [5]   Diagnosis: Atrial fibrillation with RVR [273246]   Admitting Physician: ZANDER SINCLAIR [[id number]]   Attending Physician: ZANDER SINCLAIR [[id number]]               No follow-up provider specified.       Medication List      Changed    losartan 100 MG tablet  Commonly known as: COZAAR  Take 1 tablet by mouth Daily.  What changed:   · how much to take  · additional instructions             Yannick Kaur MD  05/08/23 1041

## 2023-05-09 NOTE — CASE MANAGEMENT/SOCIAL WORK
Discharge Planning Assessment  [facility]     Patient Name: Jt Valdes  MRN: [id number]  Today's Date: 5/9/2023    Admit Date: 5/8/2023    Plan: DC PLAN: Routine home      Discharge Needs Assessment     Row Name 05/09/23 1412       Living Environment    People in Home alone    Current Living Arrangements home    Primary Care Provided by self    Provides Primary Care For no one    Able to Return to Prior Arrangements yes       Resource/Environmental Concerns    Resource/Environmental Concerns none       Food Insecurity    Within the past 12 months, you worried that your food would run out before you got the money to buy more. Never true    Within the past 12 months, the food you bought just didn't last and you didn't have money to get more. Never true       Transition Planning    Patient/Family Anticipates Transition to home    Patient/Family Anticipated Services at Transition     Transportation Anticipated family or friend will provide;car, drives self       Discharge Needs Assessment    Equipment Currently Used at Home walker, standard;cane, straight               Discharge Plan     Row Name 05/09/23 1413       Plan    Plan DC PLAN: Routine home    Patient/Family in Agreement with Plan yes    Plan Comments Met with patient at bedside, from routine home. Independent with ADL's uses a cane, Walker. PCP is Vivi, Pharmacy is [facility]. Able to afford medications, denies any transportation issues, still drives or family will provide. Denies any concerns regarding DC home. DC BARRIERS: EP consult possible ablation.    Called [phone] and spoke with Paul regarding Tikosyn 250mcg PO BID, price checked it at $188.68, bedside nurse made aware.                Continued Care and Services - Admitted Since 5/8/2023    Coordination has not been started for this encounter.       Expected Discharge Date and Time     Expected Discharge Date Expected Discharge Time    May 10, 2023          Demographic Summary      Row Name 05/09/23 1410       General Information    Admission Type inpatient    Arrived From emergency department    Referral Source admission list    Reason for Consult discharge planning    Preferred Language English       Contact Information    Permission Granted to Share Info With     Contact Information Obtained for                Functional Status     Row Name 05/09/23 1410       Functional Status    Usual Activity Tolerance moderate    Current Activity Tolerance moderate       Assessment of Health Literacy    How often do you have someone help you read hospital materials? Sometimes    How often do you have problems learning about your medical condition because of difficulty understanding written information? Sometimes    How often do you have a problem understanding what is told to you about your medical condition? Sometimes    How confident are you filling out medical forms by yourself? Somewhat    Health Literacy Moderate       Functional Status, IADL    Medications independent    Meal Preparation independent    Housekeeping independent    Laundry independent    Shopping independent       Mental Status    General Appearance WDL WDL                   Patient Forms     Row Name 05/09/23 1409       Patient Forms    Important Message from Medicare (IMM) Delivered  IMM 5/9/23 per case management    Delivered to Patient    Method of delivery In person    Row Name 05/09/23 1212       Patient Forms    Important Message from Medicare (IMM) Delivered  [place] 5/9/23    Delivered to Patient    Method of delivery In person                  Leeanna Da Silva RN     Office phone: [phone]  Cell phone: [phone]            Psych/Behavioral

## 2023-05-09 NOTE — PLAN OF CARE
Goal Outcome Evaluation:  Plan of Care Reviewed With: patient     Patient came to PCU from ED. Has been pleasant through the shift with daughters at bedside. Currently on a cardizem gtt (9.5). HR has fluctuated from the 120's to 150 BPM. While in the ED, patient's BP fluctuated as well, BP is within therapeutic range Patient has had no chest complaints at this time and is resting in bed.      Progress: improving      Problem: Adult Inpatient Plan of Care  Goal: Plan of Care Review  Outcome: Ongoing, Progressing  Flowsheets (Taken 5/9/2023 4825)  Progress: improving  Plan of Care Reviewed With: patient

## 2023-05-09 NOTE — DISCHARGE SUMMARY
[facility] Medicine Services   DISCHARGE SUMMARY    Patient Name: Jt Valdes  : 1940  MRN: [id number]    Date of Admission: 2023  Date of Discharge:  23    Primary Care Physician: Dilcia Trinidad MD      Presenting Problem:   Atrial fibrillation with RVR [I48.91]  Hypotension, unspecified hypotension type [I95.9]    Active and Resolved Hospital Problems:  Active Hospital Problems    Diagnosis POA   • **Atrial fibrillation with RVR [I48.91] Yes      Resolved Hospital Problems   No resolved problems to display.         Hospital Course     Hospital Course:  Jt Valdes is a 82 y.o. female with known history of A-fib, hypothyroidism, hypertension, and RA who presented to the ED with complaints of palpitations.  Her symptoms were going on for 1 day and went to see her primary cardiologist Dr. Patiño and her heart rate was noted to be elevated with A-fib RVR and was sent here.  Started on Cardizem drip initially.  Cardiology consulted.  Patient states she has been on tapering dose of prednisone that was started recently by her rheumatologist for her RA.  She is currently on 15 mg daily and she is also taking Omnicef for recently diagnosed UTI.  Patient eventually converted to NSR and Cardizem drip turned off.  EP evaluated the patient and recommended Tikosyn for rhythm control however patient wants to hold off on it for now due to high cost.  Ablation also discussed by EP and patient leaning more towards it.  EP to follow-up outpatient and schedule ablation pending final patient decision.  She has converted to NSR and okay to discharge per cardiology with outpatient follow-up.  Patient to hold warfarin for tonight's dose due to INR greater than 3 and resume from tomorrow and have INR rechecked in 2 to 3 days.  Patient is medically stable to discharge home today with follow-up with PCP and cardiology as an outpatient.    A/P:    A-fib RVR, improved  Supratherapeutic  INR  -Converted to NSR, off Cardizem drip  -Continue beta-blocker and p.o. Cardizem  -INR 3.42 on admission, hold warfarin today, can resume from tomorrow  -Case discussed with cardiology/EP, patient wants to hold off on Tikosyn for rhythm control for now due to cost issue, leaning more towards ablation which can be scheduled as outpatient per cardiology.  -Medically stable for discharge with outpatient follow-up     RA  -Continue tapering dose prednisone dose recently started  -Continue outpatient monitoring with rheumatology     Recent UTI, POA  -UA and urine culture noted  -Continue p.o. Omnicef to finish of the course     CKD 3A  -Creatinine appears to be at baseline around 1-1.1, no ASPEN noted  -Monitor renal function     Hypertension  -Continue home meds except losartan  -Monitor     Hypothyroidism  -Continue levothyroxine    Addendum: Patient now changed her mind and wants to start Tikosyn, discharge on hold.  Cardiology to be notified per nursing.    DISCHARGE Follow Up Recommendations for labs and diagnostics:   Follow-up with PCP and cardiology    Reasons For Change In Medications and Indications for New Medications:  Hold warfarin today 5/9, okay to resume from 5/10.    Day of Discharge     Vital Signs:  Temp:  [97.8 °F (36.6 °C)-98.6 °F (37 °C)] 97.8 °F (36.6 °C)  Heart Rate:  [] 62  Resp:  [16-18] 16  BP: ()/() 129/69    Physical Exam:  General: Awake, alert, NAD  Eyes: PERRL, EOMI, conjunctivae are clear  Cardiovascular: Regular rate and rhythm, no murmurs  Respiratory: Clear to auscultation bilaterally, no wheezing or rales, unlabored breathing  Abdomen: Soft, nontender, positive bowel sounds, no guarding  Neurologic: A&O, CN grossly intact, moves all extremities spontaneously  Musculoskeletal: Normal range of motion, no other gross deformities  Skin: Warm, dry, intact        Pertinent  and/or Most Recent Results     LAB RESULTS:      Lab 05/09/23  0033 05/08/23  1728   WBC 8.80 8.40    HEMOGLOBIN 11.4* 12.1   HEMATOCRIT 34.6 37.5   PLATELETS 265 299   NEUTROS ABS  --  6.00   LYMPHS ABS  --  1.60   MONOS ABS  --  0.80   EOS ABS  --  0.00   MCV 89.6 91.6   PROTIME 32.3* 34.1*         Lab 05/09/23 0033 05/08/23 1944 05/08/23  1728   SODIUM 138  --  137   POTASSIUM 4.5  --  4.7   CHLORIDE 103  --  102   CO2 23.0  --  21.0*   ANION GAP 12.0  --  14.0   BUN 38*  --  36*   CREATININE 1.17*  --  1.13*   EGFR 46.4*  --  48.4*   GLUCOSE 111*  --  141*   CALCIUM 8.9  --  9.4   MAGNESIUM 1.9  --   --    TSH  --  2.380  --              Lab 05/09/23 0033 05/08/23 1944 05/08/23 1728 05/08/23  1354   HSTROP T  --  25* 27*  --    PROTIME 32.3*  --  34.1*  --    INR 3.23*  --  3.42* 3.10         Lab 05/08/23 1944   CHOLESTEROL 221*   LDL CHOL 134*   HDL CHOL 56   TRIGLYCERIDES 177*             Brief Urine Lab Results  (Last result in the past 365 days)      Color   Clarity   Blood   Leuk Est   Nitrite   Protein   CREAT   Urine HCG        04/26/23 1455 Yellow   Clear   Trace   Large (3+)   Negative   30 mg/dL               Microbiology Results (last 10 days)     ** No results found for the last 240 hours. **          XR Chest 1 View    Result Date: 5/8/2023  Impression: Impression: No active disease. Electronically Signed: Dillon Basurto  5/8/2023 5:47 PM EDT  Workstation ID: [id number]              Results for orders placed during the hospital encounter of 02/01/23    Adult Transthoracic Echo Complete W/ Cont if Necessary Per Protocol    Interpretation Summary  •  Estimated right ventricular systolic pressure from tricuspid regurgitation is mildly elevated (35-45 mmHg).      Normal LV size and contractility EF of 60 to 65%  Normal RV size  Normal atrial size  Pulmonic valve is not well visualized.  Aortic valve, mitral valve, tricuspid valve appears structurally normal, mild tricuspid regurgitation seen.  Calculated RV systolic pressure of 40 mm of.  No pericardial effusion seen.  Proximal aorta appears normal in  size.      Labs Pending at Discharge:      Procedures Performed           Consults:   Consults     Date and Time Order Name Status Description    5/8/2023  9:55 PM Inpatient Cardiology Consult Completed             Discharge Details        Discharge Medications      Changes to Medications      Instructions Start Date   losartan 100 MG tablet  Commonly known as: COZAAR  What changed:   · how much to take  · additional instructions   100 mg, Oral, Daily         Continue These Medications      Instructions Start Date   cefdinir 300 MG capsule  Commonly known as: OMNICEF   300 mg, Oral, 2 Times Daily      Diclofenac Sodium 1 % gel gel  Commonly known as: VOLTAREN   4 g, Topical, 4 Times Daily PRN      dilTIAZem  MG 24 hr tablet  Commonly known as: CARDIZEM LA   180 mg, Oral, Daily      leflunomide 20 MG tablet  Commonly known as: ARAVA   20 mg, Oral, Daily      levothyroxine 112 MCG tablet  Commonly known as: Synthroid   Take 1 tablet p.o. daily.      metoprolol succinate  MG 24 hr tablet  Commonly known as: TOPROL-XL   100 mg, Oral, Daily      multivitamin with minerals tablet tablet   1 tablet, Oral, Daily      predniSONE 5 MG tablet  Commonly known as: DELTASONE   No dose, route, or frequency recorded.      tiZANidine 2 MG tablet  Commonly known as: ZANAFLEX   2 mg, Oral, Nightly PRN      warfarin 2.5 MG tablet  Commonly known as: COUMADIN   TAKE 2 TABLETS DAILY         Stop These Medications    nitrofurantoin (macrocrystal-monohydrate) 100 MG capsule  Commonly known as: Macrobid            Allergies   Allergen Reactions   • Sulfa Antibiotics Rash   • Macrobid [Nitrofurantoin] Unknown - High Severity         Discharge Disposition:  Home or Self Care    Diet:  Hospital:  Diet Order   Procedures   • NPO Diet NPO Type: Strict NPO   • Diet: Cardiac Diets, Diabetic Diets; Healthy Heart (2-3 Na+); Consistent Carbohydrate; Texture: Regular Texture (IDDSI 7); Fluid Consistency: Thin (IDDSI 0)         Discharge  Activity:         CODE STATUS:  Code Status and Medical Interventions:   Ordered at: 05/08/23 2155     Code Status (Patient has no pulse and is not breathing):    CPR (Attempt to Resuscitate)     Medical Interventions (Patient has pulse or is breathing):    Full Support         Future Appointments   Date Time Provider Department Center   5/11/2023  1:00 PM Laya Jimenez PA [facility]   5/16/2023 10:15 AM [facility] [place] [facility]   6/5/2023 10:00 AM [facility] [place] [facility]   10/12/2023 10:00 AM [facility] None   10/19/2023 10:15 AM Lucia Sahu MD [facility]   5/1/2024 10:50 AM Jose Patiño MD [facility]           Time spent on Discharge including face to face service:  35 minutes    Signature:    Electronically signed by Alex Begum DO, 05/09/23, 4:31 PM EDT.      Part of this note may be an electronic transcription/translation of spoken language to printed text using the Dragon Dictation System.

## 2023-05-09 NOTE — PROGRESS NOTES
[facility] Medicine Services   Daily Progress Note      Patient Name: Jt Valdes  : 1940  MRN: [id number]  Primary Care Physician:  Dilcia Trinidad MD  Date of admission: 2023      Subjective      Chief Complaint: Palpitations    Patient seen and examined this morning.  Doing better but still remains in A-fib RVR.  Still having some palpitations but no dizziness, chest pain, or shortness of breath.  She has been on recent tapering dose of prednisone which was prescribed by her rheumatologist for her RA, currently taking 15 mg daily.  Also diagnosed with recent UTI and on Omnicef.    Pertinent positives as noted in HPI/subjective.  All other systems were reviewed and are negative.      Objective      Vitals:   Temp:  [97.9 °F (36.6 °C)-98.6 °F (37 °C)] 97.9 °F (36.6 °C)  Heart Rate:  [] 108  Resp:  [16-18] 16  BP: ()/() 114/49    Physical Exam:    General: Awake, alert, NAD  Eyes: PERRL, EOMI, conjunctivae are clear  Cardiovascular: Tachycardic, irregularly irregular rhythm, no murmurs  Respiratory: Clear to auscultation bilaterally, no wheezing or rales, unlabored breathing  Abdomen: Soft, nontender, positive bowel sounds, no guarding  Neurologic: A&O, CN grossly intact, moves all extremities spontaneously  Musculoskeletal: Normal range of motion, no other gross deformities  Skin: Warm, dry, intact         Result Review    Result Review:  I have personally reviewed the results from the time of this admission to 2023 10:19 EDT and agree with these findings:  [x]  Laboratory  [x]  Microbiology  [x]  Radiology  [x]  EKG/Telemetry   [x]  Cardiology/Vascular   []  Pathology  [x]  Old records  []  Other:          Assessment & Plan      Brief Patient Summary:  Jt Valdes is a 82 y.o. female with known history of A-fib, hypothyroidism, hypertension, and RA who presented to the ED with complaints of palpitations.  Her symptoms were going on for 1 day and went to  see her primary cardiologist Dr. Patiño and her heart rate was noted to be elevated with A-fib RVR and was sent here.  Started on Cardizem drip initially.  Cardiology consulted.  Patient states she has been on tapering dose of prednisone that was started recently by her rheumatologist for her RA.  She is currently on 15 mg daily and she is also taking Omnicef for recently diagnosed UTI.      cefdinir, 300 mg, Oral, BID  dilTIAZem CD, 180 mg, Oral, Q24H  leflunomide, 20 mg, Oral, Daily  levothyroxine, 112 mcg, Oral, Q AM  metoprolol succinate XL, 100 mg, Oral, Daily  polyethylene glycol, 17 g, Oral, Daily  predniSONE, 15 mg, Oral, Daily With Breakfast  sodium chloride, 10 mL, Intravenous, Q12H       dilTIAZem, 5-15 mg/hr, Last Rate: 9 mg/hr (05/09/23 0959)  Pharmacy to dose warfarin,          I have utilized all available, immediate resources to obtain, update, or review the patient's current medications including all prescriptions, over-the-counter products, herbals, cannabis/cannabidiol products, and vitamin.mineral/dietary (nutritional) supplements.    Active Hospital Problems:  Active Hospital Problems    Diagnosis    • **Atrial fibrillation with RVR      Plan:     A-fib RVR  Supratherapeutic INR  -Remains on Cardizem drip, wean off as tolerated  -Continue beta-blocker and p.o. Cardizem  -INR 3.42 on admission, hold warfarin and resume as able  -Pharmacy to dose warfarin  -Cardiology consulted    RA  -Continue tapering dose prednisone dose recently started  -Continue outpatient monitoring with rheumatology    Recent UTI, POA  -UA and urine culture noted  -Continue p.o. Omnicef to finish of the course    CKD 3A  -Creatinine appears to be at baseline around 1-1.1, no ASPEN noted  -Monitor renal function    Hypertension  -Continue home meds except losartan  -Monitor    Hypothyroidism  -Continue levothyroxine    DVT prophylaxis  -Warfarin    CODE STATUS:    Code Status (Patient has no pulse and is not breathing): CPR  (Attempt to Resuscitate)  Medical Interventions (Patient has pulse or is breathing): Full Support      Disposition: Home once cleared by cardiology.    Electronically signed by Alex Begum DO, 05/09/23, 10:19 EDT.  [facility]      Part of this note may be an electronic transcription/translation of spoken language to printed text using the Dragon Dictation System.

## 2023-05-09 NOTE — CONSULTS
[facility] CARDIOLOGY CONSULT  [facility]        Subjective:     Encounter Date:05/08/2023      Patient ID: Jt Valdes is a 82 y.o. female.    Chief Complaint: Afib      HPI:  Jt Valdes is a 82 y.o. female known to primary cardiologist, Dr. Patiño, with a past cardiac history of atrial fibrillation (anticoagulated with coumadin).  Last nuclear stress test 2021 was normal.  Last 2D echo 2/2023 showed an EF = 60-65% with mild TR.  PMH includes HTN, HLD, CKD stage 3, RA, and hypothyroidism.  Patient was also recently diagnosed with mild SILVERIO and planned to start treatment in the near future.  Patient presented with c/o palpitations and found with afib RVR and started on cardizem gtt.  EP was consulted for rhythm strategy.    Currently patient is afib with rate 100-110s on cardizem gtt at 9 mg/h.  She tells me that she was first diagnosed with afib in 2018 with hospitalization and required cardioversion.  She states that she is very aware of when she is in afib and states that she has never gone more than 3 months without being in afib since 2018.  She tells me that on Sunday she had recurrent palpitations accompanied by SOA that persisted for more than 24 hours.  She denies chest pain.  She denies PND/orthopnea or edema.  She is planned for stress testing today.      Past Medical History:   Diagnosis Date   • Arthritis    • Cataract     Cataracts surgery   • COVID-19    • Degenerative joint disease    • Extremity pain     legs pain   • H/O degenerative disc disease    • History of colonoscopy 2009    last done 2009   • History of echocardiogram 09/04/2018    LVH EF 65%. RV OK. LA probably mildly dilated. AV OK. MV OK. Modest TR RVSP 26 or less. Nuclear MPI regadenoson 9/14/2018. LV uniform myocardial uptake and wall motion EF 71%.    • History of Holter monitoring 10/08/2018    SR 40-81 58. AF 2.7 hr episode VR . At termination AF 2 3.5-4.0 s pauses with patient  possibly dizzy. 205 PAC 42 atrial ashanti several SVT. No ventricular ectopy.    • HL (hearing loss) 2020    Tried hearing aids twice . Didnt help   • HTN (hypertension)    • Hyperlipidemia    • Hypertensive cardiovascular disease    • Hypothyroidism    • Incontinence    • Leukopenia    • Low back pain    • PAF (paroxysmal atrial fibrillation) 09/2018    [facility] Sept 2018 PAF and HTN. PAFL On bisoprolol and diltiazem bradycardia symptomatic with dizziness. Amiodarone alone Oct 2018 and no episodes of erratic or fast heartbeat or lightheadedness.    • Rheumatoid arthritis     Possible pulmonary involvement    • Staph infection    • Vitamin D deficiency    • White coat syndrome with hypertension          Past Surgical History:   Procedure Laterality Date   • APPENDECTOMY  1974   • BACK SURGERY  2003 2001, 2003   • SUBTOTAL HYSTERECTOMY  1974         Social History     Socioeconomic History   • Marital status:    • Number of children: 5   • Years of education: 12   Tobacco Use   • Smoking status: Never   • Smokeless tobacco: Never   Vaping Use   • Vaping Use: Never used   Substance and Sexual Activity   • Alcohol use: Yes     Alcohol/week: 1.0 standard drink     Types: 1 Glasses of wine per week     Comment: Occ.   • Drug use: No   • Sexual activity: Not Currently       Family History   Problem Relation Age of Onset   • Osteoarthritis Mother    • Arthritis Mother    • Cancer Mother    • Other Other         Rheumatoid disease    • Cancer Father    • Cancer Sister          Allergies   Allergen Reactions   • Sulfa Antibiotics Rash   • Macrobid [Nitrofurantoin] Unknown - High Severity       Current Medications:   Scheduled Meds:cefdinir, 300 mg, Oral, BID  dilTIAZem CD, 180 mg, Oral, Q24H  leflunomide, 20 mg, Oral, Daily  levothyroxine, 112 mcg, Oral, Q AM  metoprolol succinate XL, 100 mg, Oral, Daily  polyethylene glycol, 17 g, Oral, Daily  predniSONE, 15 mg, Oral, Daily With Breakfast  sodium chloride, 10 mL,  "Intravenous, Q12H      Continuous Infusions:dilTIAZem, 5-15 mg/hr, Last Rate: 9 mg/hr (05/09/23 0959)  Pharmacy to dose warfarin,         ROS  All other systems reviewed and are negative.       Objective:         /49   Pulse 108   Temp 97.9 °F (36.6 °C) (Oral)   Resp 16   Ht 160 cm (63\")   Wt 53.5 kg (117 lb 15.1 oz)   SpO2 96%   BMI 20.89 kg/m²       General: Elderly, in NAD.  Neuro: AAOx3. No gross deficits.  HEENT: Sclera clear, no xanthelasmas.  CV: irregular S1S2 RRR. No murmurs.  Resp: Breathing is unlabored. Lungs CTA throughout.  GI: BS+. Abdomen soft and NTTP.  Ext: Pedal pulses are palpable. Extremities are nonedematous.  MS: moves all extremities, no weakness.  Skin: warm, dry.  Psych: calm and cooperative.            Lab Review:     Results from last 7 days   Lab Units 05/09/23  0033 05/08/23  1728   SODIUM mmol/L 138 137   POTASSIUM mmol/L 4.5 4.7   CHLORIDE mmol/L 103 102   CO2 mmol/L 23.0 21.0*   BUN mg/dL 38* 36*   CREATININE mg/dL 1.17* 1.13*   GLUCOSE mg/dL 111* 141*   CALCIUM mg/dL 8.9 9.4     Results from last 7 days   Lab Units 05/08/23 1944 05/08/23  1728   HSTROP T ng/L 25* 27*     Results from last 7 days   Lab Units 05/09/23  0033 05/08/23  1728   WBC 10*3/mm3 8.80 8.40   HEMOGLOBIN g/dL 11.4* 12.1   HEMATOCRIT % 34.6 37.5   PLATELETS 10*3/mm3 265 299     Results from last 7 days   Lab Units 05/09/23  0033 05/08/23  1728   INR  3.23* 3.42*         Results from last 7 days   Lab Units 05/08/23 1944   CHOLESTEROL mg/dL 221*   TRIGLYCERIDES mg/dL 177*   HDL CHOL mg/dL 56         Results from last 7 days   Lab Units 05/08/23 1944   TSH uIU/mL 2.380       Recent Radiology:  Imaging Results (Most Recent)     Procedure Component Value Units Date/Time    XR Chest 1 View [[id number]] Collected: 05/08/23 1745     Updated: 05/08/23 1749    Narrative:      XR CHEST 1 VW    Date of Exam: 5/8/2023 5:36 PM EDT    Indication: Heart palpitations.    Comparison: 3/15/2019 performed at [facility]    Findings:  The heart size is normal. The pulmonary vascular markings are normal. The lungs and pleural spaces are clear of active disease. There is scoliotic curvature of the thoracolumbar spine with degenerative changes. There are atherosclerotic vascular   calcifications in the thoracic aorta.      Impression:      Impression:  No active disease.      Electronically Signed: Dillon Basurto    5/8/2023 5:47 PM EDT    Workstation ID: [id number]            ECHOCARDIOGRAM:    Results for orders placed during the hospital encounter of 02/01/23    Adult Transthoracic Echo Complete W/ Cont if Necessary Per Protocol    Interpretation Summary  •  Estimated right ventricular systolic pressure from tricuspid regurgitation is mildly elevated (35-45 mmHg).      Normal LV size and contractility EF of 60 to 65%  Normal RV size  Normal atrial size  Pulmonic valve is not well visualized.  Aortic valve, mitral valve, tricuspid valve appears structurally normal, mild tricuspid regurgitation seen.  Calculated RV systolic pressure of 40 mm of.  No pericardial effusion seen.  Proximal aorta appears normal in size.            Assessment:         Active Hospital Problems    Diagnosis  POA   • **Atrial fibrillation with RVR [I48.91]  Yes     1) Recurrent symptomatic Afib  - K 4.5  - TSH WNL  - with CVR on cardizem gtt currently  - on beta blockers and CCBs at home  - never been on AADs in past per patient report  - anticoagulated with coumadin  - Last nuclear stress test 2021 was normal.    - Last 2D echo 2/2023 showed an EF = 60-65% with mild TR.    - baseline QT/QTc 266/421 ms    2) CKD stage 3   - Cr 1.17    3) HTN    4) HLD    5) RA    6) hypothyroidism    7) newly diagnosed SILVERIO  - has not started treatment yet           Plan:   As d/w Dr. Lloyd, will start tikosyn therapy at 250 mcg PO bid.  Will check stat Mg.  Monitor QT closely.  Patient will be eligible for discharge after at least 4 doses.           Electronically  signed by REINA Carver, 05/09/23, 12:02 PM EDT.        Patient seen and examined    Recurrent and increasing episodes of atrial arrhythmia in the form of atrial fibrillation with significant symptoms with normal EF.  Patient also has history of chronic kidney disease hypertension and history of rheumatoid arthritis and hypothyroidism.  Denies any stroke or bleeding diathesis.      Physical Exam    General:      well developed, well nourished, in no acute distress.    Head:      normocephalic and atraumatic.    Eyes:      PERRL/EOM intact, conjunctivae and sclerae clear without nystagmus.    Neck:      no  thyromegaly, trachea central with normal respiratory effort  Lungs:      clear bilaterally to auscultation.    Heart:       regular rate and rhythm, S1, S2 without murmurs, rubs, or gallops  Skin:      intact without lesions or rashes.    Psych:      alert and cooperative; normal mood and affect; normal attention span and concentration.          Labs x-rays EKGs reviewed.    Therapeutic options for recurrent AF include initiation of antiarrhythmic treatment versus AF ablation.  Patient to be initiated on Tikosyn and dosing of Tikosyn reviewed with the pharmacist.  However patient is worried about expense of Tikosyn.  If the expense of Tikosyn is not affordable by the patient, patient can be discharged home since she went into sinus rhythm wants to be scheduled for AF ablation.  Outcomes from different approaches like antiarrhythmic treatment versus AF ablation, risks and benefits and outcomes educated.      Electronically signed by Dyllan Lloyd MD, 05/09/23, 4:10 PM EDT.

## 2023-05-09 NOTE — ED NOTES
Nursing report ED to floor  Jt Valdes  82 y.o.  female    HPI:   Chief Complaint   Patient presents with    Admit     Admit, Pt stated she was sent in by Dr. Patiño to be admitted, pt was waiting at home for a bed but he told her to come to the ER and see if she can be admitted faster.  Pt states her heart has been racing since yesterday.         Admitting doctor:   Kenneth Guerrero DO    Admitting diagnosis:   The primary encounter diagnosis was Atrial fibrillation with RVR. A diagnosis of Hypotension, unspecified hypotension type was also pertinent to this visit.    Code status:   Current Code Status       Date Active Code Status Order ID Comments User Context       5/8/2023 2155 CPR (Attempt to Resuscitate) [id number]  Kenneth Guerrero DO ED        Question Answer    Code Status (Patient has no pulse and is not breathing) CPR (Attempt to Resuscitate)    Medical Interventions (Patient has pulse or is breathing) Full Support                    Allergies:   Sulfa antibiotics and Macrobid [nitrofurantoin]    Isolation:  No active isolations     Fall Risk:  Fall Risk Assessment was completed, and patient is at low risk for falls.   Predictive Model Details         10 (Low) Factor Value    Calculated 5/8/2023 22:07 Age 82    Risk of Fall Model Musculoskeletal Assessment WDL     Active Peripheral IV Present     Imaging order in this encounter Present     Respiratory Rate 18     Skin Assessment WDL     Magnesium not on file     Financial Class Medicare     Number of Distinct Medication Classes administered 3     Drug Use No     Chicho Scale not on file     Peripheral Vascular Assessment WDL     Cardiac Assessment X     Diastolic BP 80     Creatinine 1.13 mg/dL     Gastrointestinal Assessment WDL     Albumin not on file     Chloride 102 mmol/L     Days after Admission 0.212     Potassium 4.7 mmol/L     Calcium 9.4 mg/dL     Total Bilirubin not on file     ALT not on file         Weight:       05/08/23  1644   Weight:  52.2 kg (115 lb)       Intake and Output  No intake or output data in the 24 hours ending 05/08/23 2245    Diet:   Dietary Orders (From admission, onward)       Start     Ordered    05/08/23 2156  Diet: Cardiac Diets, Diabetic Diets; Healthy Heart (2-3 Na+); Consistent Carbohydrate; Texture: Regular Texture (IDDSI 7); Fluid Consistency: Thin (IDDSI 0)  Diet Effective Now        References:    Diet Order [facility]   Question Answer Comment   Diets: Cardiac Diets    Diets: Diabetic Diets    Cardiac Diet: Healthy Heart (2-3 Na+)    Diabetic Diet: Consistent Carbohydrate    Texture: Regular Texture (IDDSI 7)    Fluid Consistency: Thin (IDDSI 0)        05/08/23 2155                     Most recent vitals:   Vitals:    05/08/23 2203 05/08/23 2208 05/08/23 2218 05/08/23 2233   BP: 102/68  101/57 117/92   BP Location:       Patient Position:       Pulse: 92 103 114 101   Resp:       Temp:       TempSrc:       SpO2:  96% 95% 95%   Weight:       Height:           Active LDAs/IV Access:   Lines, Drains & Airways       Active LDAs       Name Placement date Placement time Site Days    Peripheral IV 05/08/23 1728 Left Antecubital 05/08/23 1728  Antecubital  less than 1                    Skin Condition:   Skin Assessments (last day)       None             Labs (abnormal labs have a star):   Labs Reviewed   BASIC METABOLIC PANEL - Abnormal; Notable for the following components:       Result Value    Glucose 141 (*)     BUN 36 (*)     Creatinine 1.13 (*)     CO2 21.0 (*)     BUN/Creatinine Ratio 31.9 (*)     eGFR 48.4 (*)     All other components within normal limits    Narrative:     GFR Normal >60  Chronic Kidney Disease <60  Kidney Failure <15    The GFR formula is only valid for adults with stable renal function between ages 18 and 70.   TROPONIN - Abnormal; Notable for the following components:    HS Troponin T 27 (*)     All other components within normal limits    Narrative:     High Sensitive Troponin T Reference  Range:  <10.0 ng/L- Negative Female for AMI  <15.0 ng/L- Negative Male for AMI  >=10 - Abnormal Female indicating possible myocardial injury.  >=15 - Abnormal Male indicating possible myocardial injury.   Clinicians would have to utilize clinical acumen, EKG, Troponin, and serial changes to determine if it is an Acute Myocardial Infarction or myocardial injury due to an underlying chronic condition.        PROTIME-INR - Abnormal; Notable for the following components:    Protime 34.1 (*)     INR 3.42 (*)     All other components within normal limits   CBC WITH AUTO DIFFERENTIAL - Abnormal; Notable for the following components:    Lymphocyte % 18.5 (*)     Eosinophil % 0.1 (*)     All other components within normal limits   HIGH SENSITIVITIY TROPONIN T 2HR - Abnormal; Notable for the following components:    HS Troponin T 25 (*)     All other components within normal limits    Narrative:     High Sensitive Troponin T Reference Range:  <10.0 ng/L- Negative Female for AMI  <15.0 ng/L- Negative Male for AMI  >=10 - Abnormal Female indicating possible myocardial injury.  >=15 - Abnormal Male indicating possible myocardial injury.   Clinicians would have to utilize clinical acumen, EKG, Troponin, and serial changes to determine if it is an Acute Myocardial Infarction or myocardial injury due to an underlying chronic condition.        TSH   LIPID PANEL   CBC (NO DIFF)   BASIC METABOLIC PANEL   PROTIME-INR   CBC AND DIFFERENTIAL    Narrative:     The following orders were created for panel order CBC & Differential.  Procedure                               Abnormality         Status                     ---------                               -----------         ------                     CBC Auto Differential[[id number]]        Abnormal            Final result                 Please view results for these tests on the individual orders.       LOC: Person, Place, Time, and Situation    Telemetry:  Telemetry    Cardiac  Monitoring Ordered: yes    EKG:   ECG 12 Lead Chest Pain   Preliminary Result   HEART RATE= 150  bpm   RR Interval= 399  ms   CT Interval=   ms   P Horizontal Axis=   deg   P Front Axis=   deg   QRSD Interval= 77  ms   QT Interval= 266  ms   QRS Axis= -5  deg   T Wave Axis= 174  deg   - ABNORMAL ECG -   Atrial fibrillation with rapid V-rate   Repolarization abnormality, prob rate related   When compared with ECG of 13-Sep-2018 4:19:04,   Significant change in rhythm: previously sinus   Significant repolarization change   Electronically Signed By:    Date and Time of Study: 2023-05-08 16:49:33          Medications Given in the ED:   Medications   sodium chloride 0.9 % flush 10 mL (has no administration in time range)   dilTIAZem (CARDIZEM) 125 mg in 125 mL D5W infusion (5 mg/hr Intravenous Rate/Dose Change 5/8/23 1952)   levothyroxine (SYNTHROID, LEVOTHROID) tablet 112 mcg (has no administration in time range)   metoprolol succinate XL (TOPROL-XL) 24 hr tablet 100 mg (has no administration in time range)   Pharmacy to dose warfarin (has no administration in time range)   sodium chloride 0.9 % flush 10 mL (has no administration in time range)   sodium chloride 0.9 % flush 10 mL (has no administration in time range)   sodium chloride 0.9 % infusion 40 mL (has no administration in time range)   ondansetron (ZOFRAN) tablet 4 mg (has no administration in time range)     Or   ondansetron (ZOFRAN) injection 4 mg (has no administration in time range)   nitroglycerin (NITROSTAT) SL tablet 0.4 mg (has no administration in time range)   dilTIAZem (CARDIZEM) bolus from bag 1 mg/mL 10 mg (10 mg Intravenous Bolus from Bag 5/8/23 1808)   sodium chloride 0.9 % bolus 500 mL (0 mL Intravenous Stopped 5/8/23 2026)   metoprolol tartrate (LOPRESSOR) injection 5 mg (5 mg Intravenous Given 5/8/23 2059)       Imaging results:  XR Chest 1 View    Result Date: 5/8/2023  Impression: No active disease. Electronically Signed: Dillon Basurto   5/8/2023 5:47 PM EDT  Workstation ID: [id number]     Social issues:   Social History     Socioeconomic History    Marital status:     Number of children: 5    Years of education: 12   Tobacco Use    Smoking status: Never    Smokeless tobacco: Never   Vaping Use    Vaping Use: Never used   Substance and Sexual Activity    Alcohol use: Yes     Alcohol/week: 1.0 standard drink     Types: 1 Glasses of wine per week     Comment: Occ.    Drug use: No    Sexual activity: Not Currently       NIH Stroke Scale:  Interval: (not recorded)  1a. Level of Consciousness: (not recorded)  1b. LOC Questions: (not recorded)  1c. LOC Commands: (not recorded)  2. Best Gaze: (not recorded)  3. Visual: (not recorded)  4. Facial Palsy: (not recorded)  5a. Motor Arm, Left: (not recorded)  5b. Motor Arm, Right: (not recorded)  6a. Motor Leg, Left: (not recorded)  6b. Motor Leg, Right: (not recorded)  7. Limb Ataxia: (not recorded)  8. Sensory: (not recorded)  9. Best Language: (not recorded)  10. Dysarthria: (not recorded)  11. Extinction and Inattention (formerly Neglect): (not recorded)    Total (NIH Stroke Scale): (not recorded)     Additional notable assessment information:    Nursing report ED to floor:  Mauricio Mcelroy RN   05/08/23 22:45 EDT

## 2023-05-09 NOTE — PROGRESS NOTES
"Pharmacy dosing service  Anticoagulant  Warfarin     Subjective:    Jt Valdes is a 82 y.o.female being continued on warfarin for atrial fibrillation.    INR Goal: 2 - 3  Home medication?: Yes, warfarin 5 mg PO every day at 1800  Bridge Therapy Present?:  No  Interacting Medications Evaluation (New/Present/Discontinued):   Cefdinir -- finishing course for treatment of UTI; can increase INR  Prednisone -- on prolonged taper at home; can have varying effects on INR  Additional Contributing Factors:      Assessment/Plan:  INR supratherapeutic. Suspect secondary to antibiotics, PO steroids, and acute illness. Will hold warfarin tonight and reassess INR tomorrow.    Continue to monitor and adjust based on INR.       Date 5/8 5/9          INR 3.42 3.23          Dose hold hold            Objective:  [Ht: 160 cm (63\"); Wt: 53.5 kg (117 lb 15.1 oz); BMI: Body mass index is 20.89 kg/m².]    Lab Results   Component Value Date    ALBUMIN 4.2 03/23/2023     Lab Results   Component Value Date    INR 3.23 (H) 05/09/2023    INR 3.42 (H) 05/08/2023    INR 3.10 05/08/2023    PROTIME 32.3 (H) 05/09/2023    PROTIME 34.1 (H) 05/08/2023    PROTIME 12.6 04/20/2021     Lab Results   Component Value Date    HGB 11.4 (L) 05/09/2023    HGB 12.1 05/08/2023    HGB 11.5 (L) 04/26/2023     Lab Results   Component Value Date    HCT 34.6 05/09/2023    HCT 37.5 05/08/2023    HCT 33.5 (L) 04/26/2023     Juaquin Alvarado RPH  05/09/23 11:09 EDT   "

## 2023-05-10 ENCOUNTER — APPOINTMENT (OUTPATIENT)
Dept: NUCLEAR MEDICINE | Facility: HOSPITAL | Age: 83
End: 2023-05-10
Payer: MEDICARE

## 2023-05-10 LAB
BH CV NUCLEAR PRIOR STUDY: 3
BH CV REST NUCLEAR ISOTOPE DOSE: 11 MCI
BH CV STRESS BP STAGE 1: NORMAL
BH CV STRESS BP STAGE 2: NORMAL
BH CV STRESS BP STAGE 3: NORMAL
BH CV STRESS COMMENTS STAGE 1: NORMAL
BH CV STRESS COMMENTS STAGE 2: NORMAL
BH CV STRESS DOSE REGADENOSON STAGE 1: 0.4
BH CV STRESS DURATION MIN STAGE 1: 0
BH CV STRESS DURATION MIN STAGE 2: 4
BH CV STRESS DURATION SEC STAGE 1: 10
BH CV STRESS DURATION SEC STAGE 2: 0
BH CV STRESS HR STAGE 1: 78
BH CV STRESS HR STAGE 2: 82
BH CV STRESS HR STAGE 3: 79
BH CV STRESS NUCLEAR ISOTOPE DOSE: 31.6 MCI
BH CV STRESS PROTOCOL 1: NORMAL
BH CV STRESS RECOVERY BP: NORMAL MMHG
BH CV STRESS RECOVERY HR: 79 BPM
BH CV STRESS STAGE 1: 1
BH CV STRESS STAGE 2: 2
BH CV STRESS STAGE 3: 3
MAXIMAL PREDICTED HEART RATE: 137 BPM
PERCENT MAX PREDICTED HR: 57.66 %
QT INTERVAL: 403 MS
QT INTERVAL: 406 MS
QT INTERVAL: 426 MS
QT INTERVAL: 428 MS
QT INTERVAL: 463 MS
STRESS BASELINE BP: NORMAL MMHG
STRESS BASELINE HR: 62 BPM
STRESS PERCENT HR: 68 %
STRESS POST PEAK BP: NORMAL MMHG
STRESS POST PEAK HR: 79 BPM
STRESS TARGET HR: 116 BPM

## 2023-05-10 PROCEDURE — 93005 ELECTROCARDIOGRAM TRACING: CPT | Performed by: INTERNAL MEDICINE

## 2023-05-10 PROCEDURE — 93005 ELECTROCARDIOGRAM TRACING: CPT | Performed by: NURSE PRACTITIONER

## 2023-05-10 PROCEDURE — 99233 SBSQ HOSP IP/OBS HIGH 50: CPT | Performed by: INTERNAL MEDICINE

## 2023-05-10 PROCEDURE — 78452 HT MUSCLE IMAGE SPECT MULT: CPT | Performed by: INTERNAL MEDICINE

## 2023-05-10 PROCEDURE — 78452 HT MUSCLE IMAGE SPECT MULT: CPT

## 2023-05-10 PROCEDURE — 99232 SBSQ HOSP IP/OBS MODERATE 35: CPT | Performed by: INTERNAL MEDICINE

## 2023-05-10 PROCEDURE — 93010 ELECTROCARDIOGRAM REPORT: CPT | Performed by: INTERNAL MEDICINE

## 2023-05-10 PROCEDURE — 25010000002 REGADENOSON 0.4 MG/5ML SOLUTION: Performed by: INTERNAL MEDICINE

## 2023-05-10 PROCEDURE — 93017 CV STRESS TEST TRACING ONLY: CPT

## 2023-05-10 PROCEDURE — 0 TECHNETIUM TETROFOSMIN KIT: Performed by: INTERNAL MEDICINE

## 2023-05-10 PROCEDURE — 63710000001 PREDNISONE PER 5 MG: Performed by: INTERNAL MEDICINE

## 2023-05-10 PROCEDURE — 93018 CV STRESS TEST I&R ONLY: CPT | Performed by: INTERNAL MEDICINE

## 2023-05-10 PROCEDURE — A9502 TC99M TETROFOSMIN: HCPCS | Performed by: INTERNAL MEDICINE

## 2023-05-10 PROCEDURE — 0 MAGNESIUM SULFATE 4 GM/100ML SOLUTION: Performed by: NURSE PRACTITIONER

## 2023-05-10 RX ORDER — REGADENOSON 0.08 MG/ML
0.4 INJECTION, SOLUTION INTRAVENOUS
Status: COMPLETED | OUTPATIENT
Start: 2023-05-10 | End: 2023-05-10

## 2023-05-10 RX ORDER — ONDANSETRON 2 MG/ML
4 INJECTION INTRAMUSCULAR; INTRAVENOUS EVERY 6 HOURS PRN
Status: DISCONTINUED | OUTPATIENT
Start: 2023-05-10 | End: 2023-05-11 | Stop reason: HOSPADM

## 2023-05-10 RX ORDER — AMLODIPINE BESYLATE 5 MG/1
5 TABLET ORAL
Status: DISCONTINUED | OUTPATIENT
Start: 2023-05-11 | End: 2023-05-11 | Stop reason: HOSPADM

## 2023-05-10 RX ORDER — WARFARIN SODIUM 5 MG/1
5 TABLET ORAL
Status: DISCONTINUED | OUTPATIENT
Start: 2023-05-10 | End: 2023-05-11 | Stop reason: HOSPADM

## 2023-05-10 RX ORDER — ACETAMINOPHEN 325 MG/1
650 TABLET ORAL EVERY 6 HOURS PRN
Status: DISCONTINUED | OUTPATIENT
Start: 2023-05-10 | End: 2023-05-11 | Stop reason: HOSPADM

## 2023-05-10 RX ADMIN — TETROFOSMIN 1 DOSE: 1.38 INJECTION, POWDER, LYOPHILIZED, FOR SOLUTION INTRAVENOUS at 08:00

## 2023-05-10 RX ADMIN — REGADENOSON 0.4 MG: 0.08 INJECTION, SOLUTION INTRAVENOUS at 08:00

## 2023-05-10 RX ADMIN — LEVOTHYROXINE SODIUM 112 MCG: 0.11 TABLET ORAL at 05:14

## 2023-05-10 RX ADMIN — DOFETILIDE 125 MCG: 0.12 CAPSULE ORAL at 07:04

## 2023-05-10 RX ADMIN — LEFLUNOMIDE 20 MG: 20 TABLET ORAL at 09:00

## 2023-05-10 RX ADMIN — DILTIAZEM HYDROCHLORIDE 180 MG: 180 CAPSULE, COATED, EXTENDED RELEASE ORAL at 08:59

## 2023-05-10 RX ADMIN — WARFARIN SODIUM 5 MG: 5 TABLET ORAL at 17:32

## 2023-05-10 RX ADMIN — Medication 10 ML: at 21:17

## 2023-05-10 RX ADMIN — MAGNESIUM SULFATE HEPTAHYDRATE 4 G: 40 INJECTION, SOLUTION INTRAVENOUS at 09:27

## 2023-05-10 RX ADMIN — DOFETILIDE 125 MCG: 0.12 CAPSULE ORAL at 19:07

## 2023-05-10 RX ADMIN — Medication 10 ML: at 09:01

## 2023-05-10 RX ADMIN — METOPROLOL SUCCINATE 100 MG: 50 TABLET, EXTENDED RELEASE ORAL at 08:59

## 2023-05-10 RX ADMIN — PREDNISONE 15 MG: 5 TABLET ORAL at 09:00

## 2023-05-10 RX ADMIN — TETROFOSMIN 1 DOSE: 1.38 INJECTION, POWDER, LYOPHILIZED, FOR SOLUTION INTRAVENOUS at 06:53

## 2023-05-10 RX ADMIN — CEFDINIR 300 MG: 300 CAPSULE ORAL at 08:59

## 2023-05-10 NOTE — PLAN OF CARE
Goal Outcome Evaluation:         Pt started on Tikosyn. Pt converted to Sinus Rhythm. If patient does well tonight. Plan for d/c tomorrow after morning dose of tikosyn.       Problem: Adult Inpatient Plan of Care  Goal: Plan of Care Review  Outcome: Ongoing, Progressing  Goal: Patient-Specific Goal (Individualized)  Outcome: Ongoing, Progressing  Goal: Absence of Hospital-Acquired Illness or Injury  Outcome: Ongoing, Progressing  Intervention: Identify and Manage Fall Risk  Recent Flowsheet Documentation  Taken 5/10/2023 1400 by Beatris Lerma RN  Safety Promotion/Fall Prevention:   activity supervised   clutter free environment maintained   nonskid shoes/slippers when out of bed   safety round/check completed  Taken 5/10/2023 1200 by Beatris Lerma RN  Safety Promotion/Fall Prevention:   activity supervised   clutter free environment maintained   nonskid shoes/slippers when out of bed   safety round/check completed   assistive device/personal items within reach  Taken 5/10/2023 1000 by Beatris Lerma RN  Safety Promotion/Fall Prevention:   activity supervised   clutter free environment maintained   assistive device/personal items within reach   nonskid shoes/slippers when out of bed   safety round/check completed  Taken 5/10/2023 0800 by Beatris Lerma RN  Safety Promotion/Fall Prevention:   activity supervised   assistive device/personal items within reach   clutter free environment maintained   nonskid shoes/slippers when out of bed   safety round/check completed  Intervention: Prevent Infection  Recent Flowsheet Documentation  Taken 5/10/2023 1000 by Beatris Lerma RN  Infection Prevention:   hand hygiene promoted   personal protective equipment utilized  Taken 5/10/2023 0800 by Beatris Lerma RN  Infection Prevention:   hand hygiene promoted   personal protective equipment utilized  Goal: Optimal Comfort and Wellbeing  Outcome: Ongoing, Progressing  Goal: Readiness for Transition  of Care  Outcome: Ongoing, Progressing

## 2023-05-10 NOTE — PROGRESS NOTES
Cardiology Progress Note    Patient Identification:  Name: Jt Valdes  Age: 82 y.o.  Sex: female  :  1940  MRN: [id number]                 Follow Up / Chief Complaint: A-fib RVR  Chief Complaint   Patient presents with   • Admit     Admit, Pt stated she was sent in by Dr. Patiño to be admitted, pt was waiting at home for a bed but he told her to come to the ER and see if she can be admitted faster.  Pt states her heart has been racing since yesterday.         Interval History: Patient presented from office with A-fib with RVR patient has been symptomatic with A-fib.  She is now on Tikosyn    NP note: Patient seen with Dr. Patiño.  She is doing well denies any symptoms at this time she is currently in sinus rhythm.  Stress test was unremarkable.  Patient can discharge home tomorrow if EKGs are stable with Tikosyn.     Electronically signed by REINA Maier, 05/10/23, 11:11 AM EDT.      Subjective: Patient seen and examined.  Chart reviewed.  Labs reviewed.  Discussed with RN taking care of patient.      Objective:   2023 glucose 138 BUN 44 creatinine 1.23 hemoglobin 11.7    History of Present Illness:       Ms. Jt Valdes has PMH of      Hypertension/hypertensive cardiovascular disease  Paroxysmal atrial fibrillation, noncompliant on Xarelto, now on coumadin   [id number] SCORE   [id number] Score: 4 (2023 11:51 AM)     Hyperlipidemia  SILVERIO, noncompliant on CPAP  Hypothyroidism  Whitecoat hypertension  History of leukopenia, staph infection  Rheumatoid arthritis, possible pulmonary involved ,DJD, DDD  Partial hysterectomy, appendectomy, back surgery  Allergies/intolerance to sulfa-rash  Non-smoker     Presented to the ED from home.  Patient was seen in Dr. Patiño office 2023 with A-fib with RVR and was going to be directly admitted to the hospital however she did not get a bed and he advised that she come to the ED.  Patient was started on Cardizem drip for rate control.   This morning she remains in A-fib on Cardizem drip her blood pressure is mildly hypotensive.     Labs in the ED showed high-sensitivity troponin of 27 and 25 glucose mildly elevated 141 potassium normal 4.7 TSH 2.38 BUN 38 creatinine 1.17 INR 3.23 hemoglobin 11.4  Chest x-ray was without any disease     Continue Cardizem plan for stress test in the morning as patient already had coffee this morning   echocardiogram was done recently Which showed normal LV systolic function of 60 to 65% normal RV or VSP is 40       Office note:     Here for follow-up.   Was recently seen 5/1/2023 and losartan increased for blood pressure.  Patient's physician for rheumatoid arthritis and was put on prednisone and is feeling poorly since then.  Feels like her heart is racing and she is feeling weak and dizzy.  Patient is having chest pain and with A-fib with RVR.  Denies any ashwini loss of consciousness.     Patient's arterial blood pressure is 120/84, heart rate 133 bpm.     Patient had labs done 6/4/2019 which showed normal CBC CMP and TSH.  Labs from 3/9/2020 reveal TSH 1.47, cholesterol 202, triglycerides 102, HDL 58, , CMP with a cr 1.06, glucose of 111, A1c of 5.2, normal CBC.  Lipid profile 3/9/2020 with cholesterol 202 triglycerides 102 HDL 58 , CMP with a BUN/creatinine of 21 INR 1.06 and GFR 50.  Hemoglobin A1c 5.2  Labs from 3-2-21 revealed normal CRP TSH and free T4.  CMP with BUN/creatinine of 43/1.32 and GFR of 39.  Labs from 6-21 reveal BUN/creatinine of 34/1.0 EGFR 49, glucose 150.  Labs from 2/18/2022 revealed TSH of 7.8, normal FT BN 4.  Labs from 3/10/2020 reveal INR of 2.1.  Labs from 7/12/2022 revealed low TSH at 0.235 and elevated FT4 at 1.77..  Labs from 6/14/2022 reveal lipid profile with cholesterol 174, triglycerides 109, HDL 59, LDL 95.  Normal CMP. INR 2/1/2023 is 2.5.  Labs from 3/29/2023 reveal an INR of 2.5, CMP with a glucose of 110.  Cholesterol 208, Tri 84, HDL 62, proBNP normal at 609..   Labs from 4/26/2023 reveal CBC with a hemoglobin of 11.5        Reviewed previous records:    Echo 09/04/2018  LVH EF 65%. RV OK.  LA probably mildly dilated.  AV OK.  MV OK.  Modest TR RVSP 26 or less.Holter monitor 9/17-9/20/2021 was unremarkable.  Lexiscan Cardiolite 4/8/2021 were negative for ischemia  Echo 2/1/2023 reveals EF 60 to 65% PA systolic pressure of 40 mmHg             Assessment:     A-fib with RVR  Chest pain  Shortness of breath  Abnormal EKG  Paroxysmal A. Fib, long-term anticoagulation with warfarin  Hypertension, history of whitecoat hypertension  CKD 3B  SILVERIO  Hyperlipidemia  Hypothyroidism     Plan:     Telemetry is revealing sinus rhythm.  Patient was admitted from my office due to symptomatic A-fib with RVR  Patient has A-fib with RVR after recent starting of prednisone has history of hypertensive cardiovascular disease.  Says she was started on prednisone for RA and that is what put her in A-fib  TFTs were checked and were normal.  Patient was started on IV Cardizem on 5/8/2023 this is the final 9 this is a finish line my box is outside and converted to sinus rhythm on 5/9/2023.  EP was consulted  Patient was seen by   I discussed options of antiarrhythmic drugs like Tikosyn to keep her in sinus rhythm and prevent future symptomatic recurrences versus ablation.  Explained to patient and her son had a lengthy discussion and are considering whether things want to find out the cost of Tikosyn.    Discussed with  [facility] because somewhere in the $180- $190 per month for patient.  Patient wants to go on Tikosyn.  We will start her on Tikosyn and monitor for toxic side effects closely by monitoring electrolytes and QTc.  We will continue metoprolol succinate 100 mg, losartan 100 mg, Cardizem , warfarin as tolerated.        Patient had echocardiogram 2/1/2023 reviewed/interpreted by me which reveals normal LV systolic function with concentric LVH and left atrial  enlargement consistent with hypertensive cardiovascular disease.  Patient has high [id number] score due to female gender, age >75,, hypertension making it 4, would benefit from long-term anticoagulation, patient could not afford NOACs is on warfarin.  We will follow-up in INR clinic to keep PT/INR between 2 and 3.    Advised statins.  Patient was previously intolerant to statins.  Procedures Holter monitor 7/12/2022 revealed A. fib with RVR lasting anywhere up to >3 hours at a time   Patient underwent Lexiscan Cardiolite 5/10/2023 which was negative for ischemia.  Reviewed results with patient.       Copied text in this portion of the note has been reviewed and is accurate as of 5/10/2023    Past Medical History:  Past Medical History:   Diagnosis Date   • Arthritis    • Cataract     Cataracts surgery   • COVID-19    • Degenerative joint disease    • Extremity pain     legs pain   • H/O degenerative disc disease    • History of colonoscopy 2009    last done 2009   • History of echocardiogram 09/04/2018    LVH EF 65%. RV OK. LA probably mildly dilated. AV OK. MV OK. Modest TR RVSP 26 or less. Nuclear MPI regadenoson 9/14/2018. LV uniform myocardial uptake and wall motion EF 71%.    • History of Holter monitoring 10/08/2018    SR 40-81 58. AF 2.7 hr episode VR . At termination AF 2 3.5-4.0 s pauses with patient possibly dizzy. 205 PAC 42 atrial ashanti several SVT. No ventricular ectopy.    • HL (hearing loss) 2020    Tried hearing aids twice . Didnt help   • HTN (hypertension)    • Hyperlipidemia    • Hypertensive cardiovascular disease    • Hypothyroidism    • Incontinence    • Leukopenia    • Low back pain    • PAF (paroxysmal atrial fibrillation) 09/2018     Vaughn Sept 2018 PAF and HTN. PAFL On bisoprolol and diltiazem bradycardia symptomatic with dizziness. Amiodarone alone Oct 2018 and no episodes of erratic or fast heartbeat or lightheadedness.    • Rheumatoid arthritis     Possible pulmonary  "involvement    • Staph infection    • Vitamin D deficiency    • White coat syndrome with hypertension      Past Surgical History:  Past Surgical History:   Procedure Laterality Date   • APPENDECTOMY  1974   • BACK SURGERY  2003 2001, 2003   • SUBTOTAL HYSTERECTOMY  1974        Social History:   Social History     Tobacco Use   • Smoking status: Never   • Smokeless tobacco: Never   Substance Use Topics   • Alcohol use: Yes     Alcohol/week: 1.0 standard drink     Types: 1 Glasses of wine per week     Comment: Occ.      Family History:  Family History   Problem Relation Age of Onset   • Osteoarthritis Mother    • Arthritis Mother    • Cancer Mother    • Other Other         Rheumatoid disease    • Cancer Father    • Cancer Sister           Allergies:  Allergies   Allergen Reactions   • Sulfa Antibiotics Rash   • Macrobid [Nitrofurantoin] Unknown - High Severity     Scheduled Meds:  [START ON 5/11/2023] amLODIPine, 5 mg, Q24H  cefdinir, 300 mg, BID  dofetilide, 125 mcg, Q12H  leflunomide, 20 mg, Daily  levothyroxine, 112 mcg, Q AM  metoprolol succinate XL, 100 mg, Daily  polyethylene glycol, 17 g, Daily  [START ON 5/11/2023] predniSONE, 10 mg, Daily With Breakfast  [START ON 5/15/2023] predniSONE, 5 mg, Daily With Breakfast  sodium chloride, 10 mL, Q12H  warfarin, 5 mg, Daily          Review of Systems:   ROS  Review of Systems   Constitution: Negative for chills and fever.   Cardiovascular: Negative for chest pain and palpitations.   Respiratory: Negative for cough and hemoptysis.    Gastrointestinal: Negative for nausea.        Constitutional:  Temp:  [97.6 °F (36.4 °C)-98 °F (36.7 °C)] 97.7 °F (36.5 °C)  Heart Rate:  [63-72] 63  Resp:  [13-18] 13  BP: (110-151)/(65-90) 151/78    Physical Exam   /78   Pulse 63   Temp 97.7 °F (36.5 °C) (Oral)   Resp 13   Ht 160 cm (63\")   Wt 53.9 kg (118 lb 13.3 oz)   SpO2 98%   BMI 21.05 kg/m²   General:  Appears in no acute distress  Eyes: Sclera is anicteric,  " conjunctiva is clear   HEENT:  No JVD. Thyroid not visibly enlarged. No mucosal pallor or cyanosis  Respiratory: Respirations regular and unlabored at rest.  Bilaterally good breath sounds, with good air entry in all fields. No crackles, rubs or wheezes auscultated  Cardiovascular: S1,S2 Regular rate and rhythm. No murmur, rub or gallop auscultated.  . No pretibial pitting edema  Gastrointestinal: Abdomen nondistended, soft  Musculoskeletal:  No abnormal movements  Extremities: No digital clubbing or cyanosis  Skin: Color pink. Skin warm and dry to touch. No rashes  No xanthoma  Neuro: Alert and awake, no lateralizing deficits appreciated    INTAKE AND OUTPUT:    Intake/Output Summary (Last 24 hours) at 5/10/2023 1250  Last data filed at 5/10/2023 1026  Gross per 24 hour   Intake 840 ml   Output 600 ml   Net 240 ml       Cardiographics  Telemetry: Sinus rhythm    ECG:   ECG 12 Lead Rhythm Change   Preliminary Result   HEART RATE= 59  bpm   RR Interval= 1020  ms   VT Interval= 166  ms   P Horizontal Axis= -10  deg   P Front Axis= 46  deg   QRSD Interval= 92  ms   QT Interval= 457  ms   QRS Axis= -5  deg   T Wave Axis= -86  deg   - ABNORMAL ECG -   Sinus bradycardia   Repol abnrm suggests ischemia, anterolateral   Electronically Signed By:    Date and Time of Study: 2023-05-10 09:29:58      ECG 12 Lead Drug Monitoring; Tikosyn Initiation   Preliminary Result   HEART RATE= 62  bpm   RR Interval= 964  ms   VT Interval= 172  ms   P Horizontal Axis= 52  deg   P Front Axis= 57  deg   QRSD Interval= 85  ms   QT Interval= 426  ms   QRS Axis= 16  deg   T Wave Axis= -75  deg   - ABNORMAL ECG -   Sinus rhythm   Repol abnrm suggests ischemia, anterolateral   Electronically Signed By:    Date and Time of Study: 2023-05-10 06:41:32      ECG 12 Lead QT Measurement   Preliminary Result   HEART RATE= 72  bpm   RR Interval= 856  ms   VT Interval= 166  ms   P Horizontal Axis= 22  deg   P Front Axis= 54  deg   QRSD Interval= 80  ms   QT  Interval= 403  ms   QRS Axis= -18  deg   T Wave Axis= 266  deg   - ABNORMAL ECG -   Sinus rhythm   Atrial premature complex   Posterior infarct, old   Repol abnrm, probable ischemia, inferior lds   Electronically Signed By:    Date and Time of Study: 2023-05-09 22:31:21      ECG 12 Lead QT Measurement   Preliminary Result   HEART RATE= 68  bpm   RR Interval= 880  ms   NV Interval= 133  ms   P Horizontal Axis=   deg   P Front Axis= 35  deg   QRSD Interval= 80  ms   QT Interval= 428  ms   QRS Axis= -20  deg   T Wave Axis= -45  deg   - ABNORMAL ECG -   Sinus rhythm   LVH with secondary repolarization abnormality   When compared with ECG of 09-May-2023 14:34:18,   Significant repolarization change   Significant axis, voltage or hypertrophy change   Electronically Signed By:    Date and Time of Study: 2023-05-09 20:09:46      ECG 12 Lead Drug Monitoring; Tikosyn Initiation   Preliminary Result   HEART RATE= 66  bpm   RR Interval= 912  ms   NV Interval= 168  ms   P Horizontal Axis= 42  deg   P Front Axis= 36  deg   QRSD Interval= 79  ms   QT Interval= 406  ms   QRS Axis= -4  deg   T Wave Axis= -69  deg   - ABNORMAL ECG -   Sinus rhythm   Abnrm T, consider ischemia, anterolateral lds   Electronically Signed By:    Date and Time of Study: 2023-05-09 14:34:18      ECG 12 Lead Chest Pain   Preliminary Result   HEART RATE= 150  bpm   RR Interval= 399  ms   NV Interval=   ms   P Horizontal Axis=   deg   P Front Axis=   deg   QRSD Interval= 77  ms   QT Interval= 266  ms   QRS Axis= -5  deg   T Wave Axis= 174  deg   - ABNORMAL ECG -   Atrial fibrillation with rapid V-rate   Repolarization abnormality, prob rate related   When compared with ECG of 13-Sep-2018 4:19:04,   Significant change in rhythm: previously sinus   Significant repolarization change   Electronically Signed By:    Date and Time of Study: 2023-05-08 16:49:33      SCANNED - TELEMETRY     Final Result      SCANNED - TELEMETRY     Final Result      SCANNED -  TELEMETRY     Final Result      SCANNED - TELEMETRY     Final Result      SCANNED - TELEMETRY     Final Result      SCANNED - TELEMETRY     Final Result      SCANNED - TELEMETRY     Final Result      SCANNED - TELEMETRY     Final Result      SCANNED - TELEMETRY     Final Result      SCANNED - TELEMETRY     Final Result      ECG 12 Lead Other; tikosyn    (Results Pending)     I have personally reviewed EKG    Echocardiogram: Results for orders placed during the hospital encounter of 02/01/23    Adult Transthoracic Echo Complete W/ Cont if Necessary Per Protocol    Interpretation Summary  •  Estimated right ventricular systolic pressure from tricuspid regurgitation is mildly elevated (35-45 mmHg).      Normal LV size and contractility EF of 60 to 65%  Normal RV size  Normal atrial size  Pulmonic valve is not well visualized.  Aortic valve, mitral valve, tricuspid valve appears structurally normal, mild tricuspid regurgitation seen.  Calculated RV systolic pressure of 40 mm of.  No pericardial effusion seen.  Proximal aorta appears normal in size.      Lab Review   I have reviewed the labs  Results from last 7 days   Lab Units 05/08/23  1944 05/08/23  1728   HSTROP T ng/L 25* 27*     Results from last 7 days   Lab Units 05/09/23  2251   MAGNESIUM mg/dL 1.8     Results from last 7 days   Lab Units 05/09/23  2251   SODIUM mmol/L 135*   POTASSIUM mmol/L 4.8   BUN mg/dL 44*   CREATININE mg/dL 1.23*   CALCIUM mg/dL 8.8         Results from last 7 days   Lab Units 05/09/23 2251 05/09/23  0033 05/08/23  1728   WBC 10*3/mm3 8.60 8.80 8.40   HEMOGLOBIN g/dL 11.7* 11.4* 12.1   HEMATOCRIT % 35.0 34.6 37.5   PLATELETS 10*3/mm3 260 265 299     Results from last 7 days   Lab Units 05/09/23 2251 05/09/23  0033 05/08/23  1728   INR  2.25 3.23* 3.42*       RADIOLOGY:  Imaging Results (Last 24 Hours)     ** No results found for the last 24 hours. **                )5/10/2023  MD LOLITA Delong/Transcription:  "  \"Dictated utilizing Dragon dictation\".   "

## 2023-05-10 NOTE — CASE MANAGEMENT/SOCIAL WORK
Continued Stay Note  GENE Mendes     Patient Name: Jt Valdes  MRN: [id number]  Today's Date: 5/10/2023    Admit Date: 5/8/2023    Plan: DC PLAN: Routine home.(Tikosyn [place] check of $188.86 monthly)      Discharge Plan     Row Name 05/10/23 1555       Plan    Plan DC PLAN: Routine home.(Tikosyn [place] check of $188.86 monthly)    Plan Comments Called [facility] to price check Tikosyn which will be $188.86, patient not pleased with cost. [facility]. Bedside nurse aware and going to discuss with patient on which [facility] and can change pharmacy accordingly.                    Expected Discharge Date and Time     Expected Discharge Date Expected Discharge Time    May 11, 2023           Leeanna Da Silva RN      Office phone: [phone]  Cell phone: [phone]

## 2023-05-10 NOTE — PROGRESS NOTES
"Pharmacy dosing service  Anticoagulant  Warfarin     Subjective:    Jt Valdes is a 82 y.o.female being continued on warfarin for atrial fibrillation.    INR Goal: 2 - 3  Home medication?: Yes, warfarin 5 mg PO every day at 1800  Bridge Therapy Present?:  No  Interacting Medications Evaluation (New/Present/Discontinued):   Cefdinir -- finishing course for treatment of UTI on 5/10; can increase INR  Prednisone -- on prolonged taper; can have varying effects on INR  Additional Contributing Factors:      Assessment/Plan:  INR therapeutic. Will resume home warfarin dose. Pt started dofetilide yesterday.     Continue to monitor and adjust based on INR.       Date 5/8 5/9 5/10         INR 3.42 3.23 2.25         Dose hold hold 5 mg           Objective:  [Ht: 160 cm (63\"); Wt: 53.9 kg (118 lb 13.3 oz); BMI: Body mass index is 21.05 kg/m².]    Lab Results   Component Value Date    ALBUMIN 4.2 03/23/2023     Lab Results   Component Value Date    INR 2.25 05/09/2023    INR 3.23 (H) 05/09/2023    INR 3.42 (H) 05/08/2023    PROTIME 23.0 05/09/2023    PROTIME 32.3 (H) 05/09/2023    PROTIME 34.1 (H) 05/08/2023     Lab Results   Component Value Date    HGB 11.7 (L) 05/09/2023    HGB 11.4 (L) 05/09/2023    HGB 12.1 05/08/2023     Lab Results   Component Value Date    HCT 35.0 05/09/2023    HCT 34.6 05/09/2023    HCT 37.5 05/08/2023     Juaquin Alvarado RP  05/10/23 08:25 EDT   "

## 2023-05-10 NOTE — PROGRESS NOTES
[facility] Medicine Services   Daily Progress Note      Patient Name: Jt Valdes  : 1940  MRN: [id number]  Primary Care Physician:  Dilcia Trinidad MD  Date of admission: 2023      Subjective      Chief Complaint: Palpitations    Patient seen and examined this morning.  Doing well this morning, seen after stress test.  Denies any complaints.  Agreed to Tikosyn yesterday.    Pertinent positives as noted in HPI/subjective.  All other systems were reviewed and are negative.      Objective      Vitals:   Temp:  [97.6 °F (36.4 °C)-98 °F (36.7 °C)] 97.7 °F (36.5 °C)  Heart Rate:  [62-72] 63  Resp:  [13-18] 13  BP: (110-151)/(65-90) 151/78    Physical Exam:    General: Awake, alert, NAD  Eyes: PERRL, EOMI, conjunctivae are clear  Cardiovascular: Regular rate and rhythm, no murmurs  Respiratory: Clear to auscultation bilaterally, no wheezing or rales, unlabored breathing  Abdomen: Soft, nontender, positive bowel sounds, no guarding  Neurologic: A&O, CN grossly intact, moves all extremities spontaneously  Musculoskeletal: Normal range of motion, no other gross deformities  Skin: Warm, dry, intact         Result Review    Result Review:  I have personally reviewed the results from the time of this admission to 5/10/2023 10:45 EDT and agree with these findings:  [x]  Laboratory  []  Microbiology  []  Radiology  []  EKG/Telemetry   [x]  Cardiology/Vascular   []  Pathology  []  Old records  []  Other:          Assessment & Plan      Brief Patient Summary:  Jt Valdes is a 82 y.o. female with known history of A-fib, hypothyroidism, hypertension, and RA who presented to the ED with complaints of palpitations.  Her symptoms were going on for 1 day and went to see her primary cardiologist Dr. Patiño and her heart rate was noted to be elevated with A-fib RVR and was sent here.  Started on Cardizem drip initially.  Cardiology consulted.  Patient states she has been on tapering dose of  prednisone that was started recently by her rheumatologist for her RA.  She is currently on 15 mg daily and she is also taking Omnicef for recently diagnosed UTI. Patient eventually converted to NSR and Cardizem drip turned off.  EP evaluated the patient and recommended Tikosyn for rhythm control which patient initially refused due to high cost but then agreed to it.  Patient initiated on Tikosyn on 5/9.  Stress test done on 5/10 which appears to be negative.  Continued on warfarin with pharmacy to dose.      [START ON 5/11/2023] amLODIPine, 5 mg, Oral, Q24H  cefdinir, 300 mg, Oral, BID  dofetilide, 125 mcg, Oral, Q12H  leflunomide, 20 mg, Oral, Daily  levothyroxine, 112 mcg, Oral, Q AM  metoprolol succinate XL, 100 mg, Oral, Daily  polyethylene glycol, 17 g, Oral, Daily  [START ON 5/11/2023] predniSONE, 10 mg, Oral, Daily With Breakfast  [START ON 5/15/2023] predniSONE, 5 mg, Oral, Daily With Breakfast  sodium chloride, 10 mL, Intravenous, Q12H  warfarin, 5 mg, Oral, Daily       Pharmacy to dose warfarin,          I have utilized all available, immediate resources to obtain, update, or review the patient's current medications including all prescriptions, over-the-counter products, herbals, cannabis/cannabidiol products, and vitamin.mineral/dietary (nutritional) supplements.    Active Hospital Problems:  Active Hospital Problems    Diagnosis    • **Atrial fibrillation with RVR      Plan:     A-fib RVR  Supratherapeutic INR  -Converted to NSR, off Cardizem drip  -Initiated on Tikosyn on 5/9, continue beta-blocker, off p.o. Cardizem  -INR 3.42 on admission, improved, resumed warfarin with pharmacy to dose  -Stress test done on 5/10  -Cardiology following    RA  -Continue tapering dose prednisone dose that was recently started  -Continue outpatient monitoring with rheumatology    Recent UTI, POA  -UA and urine culture noted  -Continue p.o. Omnicef to finish of the course    CKD 3A  -Creatinine appears to be at baseline  around 1-1.1, no ASPEN noted  -Monitor renal function    Hypertension  -Continue metoprolol, amlodipine added  -Losartan on hold for now  -Monitor    Hypothyroidism  -Continue levothyroxine    DVT prophylaxis  -Warfarin    CODE STATUS:    Code Status (Patient has no pulse and is not breathing): CPR (Attempt to Resuscitate)  Medical Interventions (Patient has pulse or is breathing): Full Support      Disposition: Home once cleared by cardiology.    Electronically signed by Alex Begum DO, 05/10/23, 10:45 EDT.  [facility]      Part of this note may be an electronic transcription/translation of spoken language to printed text using the Dragon Dictation System.

## 2023-05-10 NOTE — PROGRESS NOTES
CC--- recurrent symptomatic atrial fibrillation hypertension    Sub  Patient is doing well and denies any symptoms of palpitations.  Patient is able to tolerate Tikosyn without any symptoms.        Past Medical History:   Diagnosis Date   • Arthritis    • Cataract     Cataracts surgery   • COVID-19    • Degenerative joint disease    • Extremity pain     legs pain   • H/O degenerative disc disease    • History of colonoscopy 2009    last done 2009   • History of echocardiogram 09/04/2018    LVH EF 65%. RV OK. LA probably mildly dilated. AV OK. MV OK. Modest TR RVSP 26 or less. Nuclear MPI regadenoson 9/14/2018. LV uniform myocardial uptake and wall motion EF 71%.    • History of Holter monitoring 10/08/2018    SR 40-81 58. AF 2.7 hr episode VR . At termination AF 2 3.5-4.0 s pauses with patient possibly dizzy. 205 PAC 42 atrial ashanti several SVT. No ventricular ectopy.    • HL (hearing loss) 2020    Tried hearing aids twice . Didnt help   • HTN (hypertension)    • Hyperlipidemia    • Hypertensive cardiovascular disease    • Hypothyroidism    • Incontinence    • Leukopenia    • Low back pain    • PAF (paroxysmal atrial fibrillation) 09/2018    [facility] Sept 2018 PAF and HTN. PAFL On bisoprolol and diltiazem bradycardia symptomatic with dizziness. Amiodarone alone Oct 2018 and no episodes of erratic or fast heartbeat or lightheadedness.    • Rheumatoid arthritis     Possible pulmonary involvement    • Staph infection    • Vitamin D deficiency    • White coat syndrome with hypertension      Past Surgical History:   Procedure Laterality Date   • APPENDECTOMY  1974   • BACK SURGERY  2003 2001, 2003   • SUBTOTAL HYSTERECTOMY  1974     Family History   Problem Relation Age of Onset   • Osteoarthritis Mother    • Arthritis Mother    • Cancer Mother    • Other Other         Rheumatoid disease    • Cancer Father    • Cancer Sister      Social History     Tobacco Use   • Smoking status: Never   • Smokeless tobacco:  Never   Vaping Use   • Vaping Use: Never used   Substance Use Topics   • Alcohol use: Yes     Alcohol/week: 1.0 standard drink     Types: 1 Glasses of wine per week     Comment: Occ.   • Drug use: No       Allergies:  Sulfa antibiotics and Macrobid [nitrofurantoin]    Review of Systems  General: Negative  for fatigue and tiredness  Eyes: No redness  Cardiovascular: No chest pain, positive for palpitations  Respiratory:   No  shortness of breath  Gastrointestinal: No nausea or vomiting, bleeding  Genitourinary: no hematuria or dysuria  Musculoskeletal: No arthralgia or myalgia  Skin: No rash  Neurologic: No numbness, tingling, syncope  Hematologic/Lymphatic: No abnormal bleeding        Physical Exam    General:      well developed, well nourished, in no acute distress.    Head:      normocephalic and atraumatic.    Eyes:      PERRL/EOM intact, conjunctivae and sclerae clear without nystagmus.    Neck:      no  thyromegaly, trachea central with normal respiratory effort  Lungs:      clear bilaterally to auscultation.    Heart:       regular rate and rhythm, S1, S2 without murmurs, rubs, or gallops  Skin:      intact without lesions or rashes.    Psych:      alert and cooperative; normal mood and affect; normal attention span and concentration.      Pulse rate is 62 patient afebrile respiration 12 times a minute blood pressure 128/90          CBC    Results from last 7 days   Lab Units 05/09/23 2251 05/09/23  0033 05/08/23  1728   WBC 10*3/mm3 8.60 8.80 8.40   HEMOGLOBIN g/dL 11.7* 11.4* 12.1   PLATELETS 10*3/mm3 260 265 299     BMP   Results from last 7 days   Lab Units 05/09/23 2251 05/09/23  0033 05/08/23  1728   SODIUM mmol/L 135* 138 137   POTASSIUM mmol/L 4.8 4.5 4.7   CHLORIDE mmol/L 100 103 102   CO2 mmol/L 24.0 23.0 21.0*   BUN mg/dL 44* 38* 36*   CREATININE mg/dL 1.23* 1.17* 1.13*   GLUCOSE mg/dL 138* 111* 141*   MAGNESIUM mg/dL 1.8 1.9  --      Coag   Results from last 7 days   Lab Units 05/09/23 2251  05/09/23  0033 05/08/23  1728 05/08/23  1354   INR  2.25 3.23* 3.42* 3.10     Radiology(recent) XR Chest 1 View    Result Date: 5/8/2023  Impression: No active disease. Electronically Signed: Dillonmaurisio Basurto  5/8/2023 5:47 PM EDT  Workstation ID: [id number]            Assessment and plan    Recurrent symptomatic atrial fibrillation.  Different therapeutic options like initiation of antiarrhythmic versus ablation discussed with this patient.  Late last evening patient has decided on Tikosyn therapy.  Tikosyn started at 125 mcg twice daily as per her GFR.  Patient remains in sinus rhythm and doing well.  Cardizem can be stopped and start Norvasc tomorrow for hypertension  Essential hypertension continue current management with initiation of Norvasc tomorrow  Chronic kidney disease stable  History of rheumatoid arthritis      If patient has breakthrough arrhythmia despite Tikosyn, AF ablation should be considered      Electronically signed by Dyllan Lloyd MD, 05/10/23, 11:05 AM EDT.

## 2023-05-10 NOTE — PLAN OF CARE
Goal Outcome Evaluation:  Plan of Care Reviewed With: patient     Patient has been resting and optimistic about stress test in the AM. Continues to be pleasant, A&O x4, on room air, and currently on normal sinus rhyntm with a depressed T-wave. Has not experienced any new onset of pain and has not converted to any other rhythm. Administered tikosyn, Qtc levels within range, MD notified about patients baseline Qtc levels before administration.  Patient is resting and VSS.      Progress: improving      Problem: Adult Inpatient Plan of Care  Goal: Plan of Care Review  Outcome: Ongoing, Progressing  Flowsheets (Taken 5/10/2023 0257)  Progress: improving

## 2023-05-11 ENCOUNTER — READMISSION MANAGEMENT (OUTPATIENT)
Dept: CALL CENTER | Facility: HOSPITAL | Age: 83
End: 2023-05-11
Payer: MEDICARE

## 2023-05-11 VITALS
TEMPERATURE: 97.7 F | HEIGHT: 63 IN | RESPIRATION RATE: 16 BRPM | DIASTOLIC BLOOD PRESSURE: 75 MMHG | BODY MASS INDEX: 20.74 KG/M2 | WEIGHT: 117.06 LBS | SYSTOLIC BLOOD PRESSURE: 132 MMHG | OXYGEN SATURATION: 96 % | HEART RATE: 60 BPM

## 2023-05-11 LAB
ANION GAP SERPL CALCULATED.3IONS-SCNC: 12 MMOL/L (ref 5–15)
BUN SERPL-MCNC: 40 MG/DL (ref 8–23)
BUN/CREAT SERPL: 28 (ref 7–25)
CALCIUM SPEC-SCNC: 8.8 MG/DL (ref 8.6–10.5)
CHLORIDE SERPL-SCNC: 103 MMOL/L (ref 98–107)
CO2 SERPL-SCNC: 23 MMOL/L (ref 22–29)
CREAT SERPL-MCNC: 1.43 MG/DL (ref 0.57–1)
DEPRECATED RDW RBC AUTO: 45.1 FL (ref 37–54)
EGFRCR SERPLBLD CKD-EPI 2021: 36.5 ML/MIN/1.73
ERYTHROCYTE [DISTWIDTH] IN BLOOD BY AUTOMATED COUNT: 13.8 % (ref 12.3–15.4)
GLUCOSE SERPL-MCNC: 100 MG/DL (ref 65–99)
HCT VFR BLD AUTO: 35.5 % (ref 34–46.6)
HGB BLD-MCNC: 11.9 G/DL (ref 12–15.9)
INR PPP: 1.78 (ref 2–3)
MAGNESIUM SERPL-MCNC: 2.7 MG/DL (ref 1.6–2.4)
MCH RBC QN AUTO: 30 PG (ref 26.6–33)
MCHC RBC AUTO-ENTMCNC: 33.4 G/DL (ref 31.5–35.7)
MCV RBC AUTO: 89.8 FL (ref 79–97)
PLATELET # BLD AUTO: 258 10*3/MM3 (ref 140–450)
PMV BLD AUTO: 8.3 FL (ref 6–12)
POTASSIUM SERPL-SCNC: 4.6 MMOL/L (ref 3.5–5.2)
PROTHROMBIN TIME: 18.4 SECONDS (ref 19.4–28.5)
QT INTERVAL: 457 MS
QT INTERVAL: 483 MS
RBC # BLD AUTO: 3.96 10*6/MM3 (ref 3.77–5.28)
SODIUM SERPL-SCNC: 138 MMOL/L (ref 136–145)
WBC NRBC COR # BLD: 9.5 10*3/MM3 (ref 3.4–10.8)

## 2023-05-11 PROCEDURE — 63710000001 PREDNISONE PER 5 MG: Performed by: INTERNAL MEDICINE

## 2023-05-11 PROCEDURE — 93010 ELECTROCARDIOGRAM REPORT: CPT | Performed by: INTERNAL MEDICINE

## 2023-05-11 PROCEDURE — 93005 ELECTROCARDIOGRAM TRACING: CPT | Performed by: NURSE PRACTITIONER

## 2023-05-11 PROCEDURE — 85610 PROTHROMBIN TIME: CPT | Performed by: STUDENT IN AN ORGANIZED HEALTH CARE EDUCATION/TRAINING PROGRAM

## 2023-05-11 PROCEDURE — 36415 COLL VENOUS BLD VENIPUNCTURE: CPT | Performed by: STUDENT IN AN ORGANIZED HEALTH CARE EDUCATION/TRAINING PROGRAM

## 2023-05-11 PROCEDURE — 85027 COMPLETE CBC AUTOMATED: CPT | Performed by: STUDENT IN AN ORGANIZED HEALTH CARE EDUCATION/TRAINING PROGRAM

## 2023-05-11 PROCEDURE — 83735 ASSAY OF MAGNESIUM: CPT | Performed by: NURSE PRACTITIONER

## 2023-05-11 PROCEDURE — 80048 BASIC METABOLIC PNL TOTAL CA: CPT | Performed by: STUDENT IN AN ORGANIZED HEALTH CARE EDUCATION/TRAINING PROGRAM

## 2023-05-11 PROCEDURE — 99232 SBSQ HOSP IP/OBS MODERATE 35: CPT | Performed by: INTERNAL MEDICINE

## 2023-05-11 RX ORDER — DOFETILIDE 0.12 MG/1
125 CAPSULE ORAL EVERY 12 HOURS SCHEDULED
Qty: 60 CAPSULE | Refills: 0 | Status: SHIPPED | OUTPATIENT
Start: 2023-05-11

## 2023-05-11 RX ORDER — AMLODIPINE BESYLATE 5 MG/1
5 TABLET ORAL
Qty: 30 TABLET | Refills: 0 | Status: SHIPPED | OUTPATIENT
Start: 2023-05-11

## 2023-05-11 RX ADMIN — Medication 10 ML: at 09:25

## 2023-05-11 RX ADMIN — LEVOTHYROXINE SODIUM 112 MCG: 0.11 TABLET ORAL at 05:37

## 2023-05-11 RX ADMIN — LEFLUNOMIDE 20 MG: 20 TABLET ORAL at 09:25

## 2023-05-11 RX ADMIN — PREDNISONE 10 MG: 10 TABLET ORAL at 09:25

## 2023-05-11 RX ADMIN — AMLODIPINE BESYLATE 5 MG: 5 TABLET ORAL at 09:25

## 2023-05-11 RX ADMIN — METOPROLOL SUCCINATE 100 MG: 50 TABLET, EXTENDED RELEASE ORAL at 09:25

## 2023-05-11 RX ADMIN — DOFETILIDE 125 MCG: 0.12 CAPSULE ORAL at 07:32

## 2023-05-11 NOTE — PROGRESS NOTES
Cardiology Progress Note    Patient Identification:  Name: Jt Valdes  Age: 82 y.o.  Sex: female  :  1940  MRN: [id number]                 Follow Up / Chief Complaint: A-fib RVR  Chief Complaint   Patient presents with   • Admit     Admit, Pt stated she was sent in by Dr. Patiño to be admitted, pt was waiting at home for a bed but he told her to come to the ER and see if she can be admitted faster.  Pt states her heart has been racing since yesterday.         Interval History: Patient presented from office with A-fib with RVR patient has been symptomatic with A-fib.  She is now on Tikosyn    NP note: Patient seen with Dr. Patiño.  Doing well this morning denies any chest pain shortness of breath.  She remains in normal sinus rhythm on Tikosyn EKGs reviewed and QTc is stable we will follow-up next week in office with repeat EKG    Discussed with EP and okay to discharge home    Electronically signed by REINA Maier, 23, 9:25 AM EDT.    Cardiology attending addendum :    I have personally performed a face-to-face diagnostic evaluation, physical exam and reviewed data on this patient.  I have reviewed documentation done by me and nurse practitioner  and corrected as needed.  And agree with the different components of documentation.Greater than 50% of the time spent in the care of this patient was provided by attending consultant/me.          Subjective: Patient seen and examined.  Chart reviewed.  Labs reviewed.  Discussed with RN taking care of patient.      Objective:   2023 glucose 138 BUN 44 creatinine 1.23 hemoglobin 11.7    History of Present Illness:       Ms. Jt Valdes has PMH of      Hypertension/hypertensive cardiovascular disease  Paroxysmal atrial fibrillation, noncompliant on Xarelto, now on coumadin   [id number] SCORE   [id number] Score: 4 (2023 11:51 AM)     Hyperlipidemia  SILVERIO, noncompliant on CPAP  Hypothyroidism  Whitecoat hypertension  History of  leukopenia, staph infection  Rheumatoid arthritis, possible pulmonary involved ,DJD, DDD  Partial hysterectomy, appendectomy, back surgery  Allergies/intolerance to sulfa-rash  Non-smoker     Presented to the ED from home.  Patient was seen in Dr. Patiño office 5/8/2023 with A-fib with RVR and was going to be directly admitted to the hospital however she did not get a bed and he advised that she come to the ED.  Patient was started on Cardizem drip for rate control.  This morning she remains in A-fib on Cardizem drip her blood pressure is mildly hypotensive.     Labs in the ED showed high-sensitivity troponin of 27 and 25 glucose mildly elevated 141 potassium normal 4.7 TSH 2.38 BUN 38 creatinine 1.17 INR 3.23 hemoglobin 11.4  Chest x-ray was without any disease     Continue Cardizem plan for stress test in the morning as patient already had coffee this morning   echocardiogram was done recently Which showed normal LV systolic function of 60 to 65% normal RV or VSP is 40       Office note:     Here for follow-up.   Was recently seen 5/1/2023 and losartan increased for blood pressure.  Patient's physician for rheumatoid arthritis and was put on prednisone and is feeling poorly since then.  Feels like her heart is racing and she is feeling weak and dizzy.  Patient is having chest pain and with A-fib with RVR.  Denies any ashwini loss of consciousness.     Patient's arterial blood pressure is 120/84, heart rate 133 bpm.     Patient had labs done 6/4/2019 which showed normal CBC CMP and TSH.  Labs from 3/9/2020 reveal TSH 1.47, cholesterol 202, triglycerides 102, HDL 58, , CMP with a cr 1.06, glucose of 111, A1c of 5.2, normal CBC.  Lipid profile 3/9/2020 with cholesterol 202 triglycerides 102 HDL 58 , CMP with a BUN/creatinine of 21 INR 1.06 and GFR 50.  Hemoglobin A1c 5.2  Labs from 3-2-21 revealed normal CRP TSH and free T4.  CMP with BUN/creatinine of 43/1.32 and GFR of 39.  Labs from 6-21 reveal  BUN/creatinine of 34/1.0 EGFR 49, glucose 150.  Labs from 2/18/2022 revealed TSH of 7.8, normal FT BN 4.  Labs from 3/10/2020 reveal INR of 2.1.  Labs from 7/12/2022 revealed low TSH at 0.235 and elevated FT4 at 1.77..  Labs from 6/14/2022 reveal lipid profile with cholesterol 174, triglycerides 109, HDL 59, LDL 95.  Normal CMP. INR 2/1/2023 is 2.5.  Labs from 3/29/2023 reveal an INR of 2.5, CMP with a glucose of 110.  Cholesterol 208, Tri 84, HDL 62, proBNP normal at 609..  Labs from 4/26/2023 reveal CBC with a hemoglobin of 11.5        Reviewed previous records:    Echo 09/04/2018  LVH EF 65%. RV OK.  LA probably mildly dilated.  AV OK.  MV OK.  Modest TR RVSP 26 or less.Holter monitor 9/17-9/20/2021 was unremarkable.  Lexiscan Cardiolite 4/8/2021 were negative for ischemia  Echo 2/1/2023 reveals EF 60 to 65% PA systolic pressure of 40 mmHg             Assessment:     A-fib with RVR  Chest pain  Shortness of breath  Abnormal EKG  Paroxysmal A. Fib, long-term anticoagulation with warfarin  Hypertension, history of whitecoat hypertension  CKD 3B  SILVERIO  Hyperlipidemia  Hypothyroidism     Plan:     Telemetry is revealing sinus rhythm.  Patient was admitted from my office due to symptomatic A-fib with RVR  Patient has A-fib with RVR after recent starting of prednisone has history of hypertensive cardiovascular disease.  Says she was started on prednisone for RA and that is what put her in A-fib  TFTs were checked and were normal.  Patient was started on IV Cardizem on 5/8/2023 this is the final 9 this is a finish line my box is outside and converted to sinus rhythm on 5/9/2023.  EP was consulted  Patient was seen by   I discussed options of antiarrhythmic drugs like Tikosyn to keep her in sinus rhythm and prevent future symptomatic recurrences versus ablation.  Explained to patient and her son had a lengthy discussion and are considering whether things want to find out the cost of Tikosyn.    Discussed with   [facility] because somewhere in the $180- $190 per month for patient.  Patient was started on Tikosyn.  QTc by EKG reviewed by me is revealing normal QTc.  Discussed with EP .  We will send her home on medical management with Tikosyn 125 twice daily  Patient's losartan and Cardizem has been discontinued and started on amlodipine for renal insufficiency.  We will continue metoprolol succinate 100 mg, , warfarin as tolerated.        Patient had echocardiogram 2/1/2023 reviewed/interpreted by me which reveals normal LV systolic function with concentric LVH and left atrial enlargement consistent with hypertensive cardiovascular disease.  Patient has high [id number] score due to female gender, age >75,, hypertension making it 4, would benefit from long-term anticoagulation, patient could not afford NOACs is on warfarin.  We will follow-up in INR clinic to keep PT/INR between 2 and 3.    Advised statins.  Patient was previously intolerant to statins.  Procedures Holter monitor 7/12/2022 revealed A. fib with RVR lasting anywhere up to >3 hours at a time   Patient underwent Lexiscan Cardiolite 5/10/2023 which was negative for ischemia.  Reviewed results with patient.       Copied text in this portion of the note has been reviewed and is accurate as of 5/11/2023    Past Medical History:  Past Medical History:   Diagnosis Date   • Arthritis    • Cataract     Cataracts surgery   • COVID-19    • Degenerative joint disease    • Extremity pain     legs pain   • H/O degenerative disc disease    • History of colonoscopy 2009    last done 2009   • History of echocardiogram 09/04/2018    LVH EF 65%. RV OK. LA probably mildly dilated. AV OK. MV OK. Modest TR RVSP 26 or less. Nuclear MPI regadenoson 9/14/2018. LV uniform myocardial uptake and wall motion EF 71%.    • History of Holter monitoring 10/08/2018    SR 40-81 58. AF 2.7 hr episode VR . At termination AF 2 3.5-4.0 s pauses with patient possibly  dizzy. 205 PAC 42 atrial ashanti several SVT. No ventricular ectopy.    • HL (hearing loss) 2020    Tried hearing aids twice . Didnt help   • HTN (hypertension)    • Hyperlipidemia    • Hypertensive cardiovascular disease    • Hypothyroidism    • Incontinence    • Leukopenia    • Low back pain    • PAF (paroxysmal atrial fibrillation) 09/2018    [facility] Sept 2018 PAF and HTN. PAFL On bisoprolol and diltiazem bradycardia symptomatic with dizziness. Amiodarone alone Oct 2018 and no episodes of erratic or fast heartbeat or lightheadedness.    • Rheumatoid arthritis     Possible pulmonary involvement    • Staph infection    • Vitamin D deficiency    • White coat syndrome with hypertension      Past Surgical History:  Past Surgical History:   Procedure Laterality Date   • APPENDECTOMY  1974   • BACK SURGERY  2003 2001, 2003   • SUBTOTAL HYSTERECTOMY  1974        Social History:   Social History     Tobacco Use   • Smoking status: Never   • Smokeless tobacco: Never   Substance Use Topics   • Alcohol use: Yes     Alcohol/week: 1.0 standard drink     Types: 1 Glasses of wine per week     Comment: Occ.      Family History:  Family History   Problem Relation Age of Onset   • Osteoarthritis Mother    • Arthritis Mother    • Cancer Mother    • Other Other         Rheumatoid disease    • Cancer Father    • Cancer Sister           Allergies:  Allergies   Allergen Reactions   • Sulfa Antibiotics Rash   • Macrobid [Nitrofurantoin] Unknown - High Severity     Scheduled Meds:  amLODIPine, 5 mg, Q24H  dofetilide, 125 mcg, Q12H  leflunomide, 20 mg, Daily  levothyroxine, 112 mcg, Q AM  metoprolol succinate XL, 100 mg, Daily  polyethylene glycol, 17 g, Daily  predniSONE, 10 mg, Daily With Breakfast  [START ON 5/15/2023] predniSONE, 5 mg, Daily With Breakfast  sodium chloride, 10 mL, Q12H  warfarin, 5 mg, Daily          Review of Systems:   ROS  Review of Systems   Constitution: Negative for chills and fever.   Cardiovascular: Negative  "for chest pain and palpitations.   Respiratory: Negative for cough and hemoptysis.    Gastrointestinal: Negative for nausea.        Constitutional:  Temp:  [97.6 °F (36.4 °C)-98.2 °F (36.8 °C)] 97.7 °F (36.5 °C)  Heart Rate:  [60-73] 60  Resp:  [14-18] 16  BP: (126-140)/(65-81) 132/75    Physical Exam   /75 (BP Location: Right arm, Patient Position: Lying)   Pulse 60   Temp 97.7 °F (36.5 °C) (Oral)   Resp 16   Ht 160 cm (63\")   Wt 53.1 kg (117 lb 1 oz)   SpO2 96%   BMI 20.74 kg/m²   General:  Appears in no acute distress  Eyes: Sclera is anicteric,  conjunctiva is clear   HEENT:  No JVD. Thyroid not visibly enlarged. No mucosal pallor or cyanosis  Respiratory: Respirations regular and unlabored at rest.  Bilaterally good breath sounds, with good air entry in all fields. No crackles, rubs or wheezes auscultated  Cardiovascular: S1,S2 Regular rate and rhythm. No murmur, rub or gallop auscultated.  . No pretibial pitting edema  Gastrointestinal: Abdomen nondistended, soft  Musculoskeletal:  No abnormal movements  Extremities: No digital clubbing or cyanosis  Skin: Color pink. Skin warm and dry to touch. No rashes  No xanthoma  Neuro: Alert and awake, no lateralizing deficits appreciated    INTAKE AND OUTPUT:    Intake/Output Summary (Last 24 hours) at 5/11/2023 1102  Last data filed at 5/10/2023 1300  Gross per 24 hour   Intake 240 ml   Output --   Net 240 ml       Cardiographics  Telemetry: Sinus rhythm    ECG:   ECG 12 Lead Other; tikosyn   Preliminary Result   HEART RATE= 64  bpm   RR Interval= 944  ms   HI Interval= 170  ms   P Horizontal Axis= -14  deg   P Front Axis= 61  deg   QRSD Interval= 89  ms   QT Interval= 483  ms   QRS Axis= 1  deg   T Wave Axis= -24  deg   - NORMAL ECG -   Sinus rhythm   Electronically Signed By:    Date and Time of Study: 2023-05-11 09:33:46      ECG 12 Lead Drug Monitoring; Tikosyn Initiation   Preliminary Result   HEART RATE= 57  bpm   RR Interval= 1044  ms   HI Interval= " 185  ms   P Horizontal Axis= 49  deg   P Front Axis= 59  deg   QRSD Interval= 93  ms   QT Interval= 464  ms   QRS Axis= 10  deg   T Wave Axis= -19  deg   - ABNORMAL ECG -   Sinus bradycardia   Posterior infarct, old   When compared with ECG of 10-May-2023 21:02:44,   New or worsened ischemia or infarction   Significant repolarization change   Significant axis, voltage or hypertrophy change   Electronically Signed By:    Date and Time of Study: 2023-05-11 05:27:56      ECG 12 Lead Other; tikosyn   Preliminary Result   HEART RATE= 62  bpm   RR Interval= 972  ms   IN Interval= 186  ms   P Horizontal Axis= -16  deg   P Front Axis= 43  deg   QRSD Interval= 84  ms   QT Interval= 463  ms   QRS Axis= -10  deg   T Wave Axis= -23  deg   - ABNORMAL ECG -   Sinus rhythm   Probable LVH with secondary repol abnrm   When compared with ECG of 10-May-2023 9:29:58,   Significant repolarization change   Significant axis, voltage or hypertrophy change   Electronically Signed By:    Date and Time of Study: 2023-05-10 21:02:44      ECG 12 Lead Rhythm Change   Preliminary Result   HEART RATE= 59  bpm   RR Interval= 1020  ms   IN Interval= 166  ms   P Horizontal Axis= -10  deg   P Front Axis= 46  deg   QRSD Interval= 92  ms   QT Interval= 457  ms   QRS Axis= -5  deg   T Wave Axis= -86  deg   - ABNORMAL ECG -   Sinus bradycardia   Repol abnrm suggests ischemia, anterolateral   Electronically Signed By:    Date and Time of Study: 2023-05-10 09:29:58      ECG 12 Lead Drug Monitoring; Tikosyn Initiation   Final Result   HEART RATE= 62  bpm   RR Interval= 964  ms   IN Interval= 172  ms   P Horizontal Axis= 52  deg   P Front Axis= 57  deg   QRSD Interval= 85  ms   QT Interval= 426  ms   QRS Axis= 16  deg   T Wave Axis= -75  deg   - ABNORMAL ECG -   Sinus rhythm   Repol abnrm suggests ischemia, anterolateral   When compared with ECG of 09-May-2023 22:31:21,   Nonspecific significant change   Electronically Signed By: Keith Parks (MARVA)  10-May-2023 18:31:29   Date and Time of Study: 2023-05-10 06:41:32      ECG 12 Lead QT Measurement   Final Result   HEART RATE= 72  bpm   RR Interval= 856  ms   CT Interval= 166  ms   P Horizontal Axis= 22  deg   P Front Axis= 54  deg   QRSD Interval= 80  ms   QT Interval= 403  ms   QRS Axis= -18  deg   T Wave Axis= 266  deg   - ABNORMAL ECG -   Sinus rhythm   Atrial premature complex   Posterior infarct, old   Repol abnrm, probable ischemia, inferior lds   When compared with ECG of 09-May-2023 20:09:46,   New or worsened ischemia or infarction   Significant repolarization change   Significant axis, voltage or hypertrophy change   Electronically Signed By: Keith Parks (MARVA) 10-May-2023 18:31:33   Date and Time of Study: 2023-05-09 22:31:21      ECG 12 Lead QT Measurement   Final Result   HEART RATE= 68  bpm   RR Interval= 880  ms   CT Interval= 133  ms   P Horizontal Axis=   deg   P Front Axis= 35  deg   QRSD Interval= 80  ms   QT Interval= 428  ms   QRS Axis= -20  deg   T Wave Axis= -45  deg   - ABNORMAL ECG -   Sinus rhythm   LVH with secondary repolarization abnormality   When compared with ECG of 09-May-2023 14:34:18,   Significant repolarization change   Significant axis, voltage or hypertrophy change   Electronically Signed By: Keith Parks (MARVA) 10-May-2023 18:31:37   Date and Time of Study: 2023-05-09 20:09:46      ECG 12 Lead Drug Monitoring; Tikosyn Initiation   Final Result   HEART RATE= 66  bpm   RR Interval= 912  ms   CT Interval= 168  ms   P Horizontal Axis= 42  deg   P Front Axis= 36  deg   QRSD Interval= 79  ms   QT Interval= 406  ms   QRS Axis= -4  deg   T Wave Axis= -69  deg   - ABNORMAL ECG -   Sinus rhythm   Abnrm T, consider ischemia, anterolateral lds   When compared with ECG of 08-May-2023 16:49:33,   Significant change in rhythm: previously atrial fibrillation   New or worsened ischemia or infarction   Electronically Signed By: Keith Parks (MARVA) 10-May-2023  18:31:43   Date and Time of Study: 2023-05-09 14:34:18      ECG 12 Lead Chest Pain   Preliminary Result   HEART RATE= 150  bpm   RR Interval= 399  ms   GA Interval=   ms   P Horizontal Axis=   deg   P Front Axis=   deg   QRSD Interval= 77  ms   QT Interval= 266  ms   QRS Axis= -5  deg   T Wave Axis= 174  deg   - ABNORMAL ECG -   Atrial fibrillation with rapid V-rate   Repolarization abnormality, prob rate related   When compared with ECG of 13-Sep-2018 4:19:04,   Significant change in rhythm: previously sinus   Significant repolarization change   Electronically Signed By:    Date and Time of Study: 2023-05-08 16:49:33      SCANNED - TELEMETRY     Final Result      SCANNED - TELEMETRY     Final Result      SCANNED - TELEMETRY     Final Result      SCANNED - TELEMETRY     Final Result      SCANNED - TELEMETRY     Final Result      SCANNED - TELEMETRY     Final Result      SCANNED - TELEMETRY     Final Result      SCANNED - TELEMETRY     Final Result      SCANNED - TELEMETRY     Final Result      SCANNED - TELEMETRY     Final Result      SCANNED - TELEMETRY     Final Result      SCANNED - TELEMETRY     Final Result      SCANNED - TELEMETRY     Final Result      SCANNED - TELEMETRY     Final Result      SCANNED - TELEMETRY     Final Result      SCANNED - TELEMETRY     Final Result      ECG 12 Lead QT Measurement    (Results Pending)     I have personally reviewed EKG    Echocardiogram: Results for orders placed during the hospital encounter of 02/01/23    Adult Transthoracic Echo Complete W/ Cont if Necessary Per Protocol    Interpretation Summary  •  Estimated right ventricular systolic pressure from tricuspid regurgitation is mildly elevated (35-45 mmHg).      Normal LV size and contractility EF of 60 to 65%  Normal RV size  Normal atrial size  Pulmonic valve is not well visualized.  Aortic valve, mitral valve, tricuspid valve appears structurally normal, mild tricuspid regurgitation seen.  Calculated RV systolic  "pressure of 40 mm of.  No pericardial effusion seen.  Proximal aorta appears normal in size.      Lab Review   I have reviewed the labs  Results from last 7 days   Lab Units 05/08/23  1944 05/08/23  1728   HSTROP T ng/L 25* 27*     Results from last 7 days   Lab Units 05/11/23  0227   MAGNESIUM mg/dL 2.7*     Results from last 7 days   Lab Units 05/11/23 0227   SODIUM mmol/L 138   POTASSIUM mmol/L 4.6   BUN mg/dL 40*   CREATININE mg/dL 1.43*   CALCIUM mg/dL 8.8         Results from last 7 days   Lab Units 05/11/23 0227 05/09/23 2251 05/09/23  0033   WBC 10*3/mm3 9.50 8.60 8.80   HEMOGLOBIN g/dL 11.9* 11.7* 11.4*   HEMATOCRIT % 35.5 35.0 34.6   PLATELETS 10*3/mm3 258 260 265     Results from last 7 days   Lab Units 05/11/23 0227 05/09/23 2251 05/09/23  0033   INR  1.78* 2.25 3.23*       RADIOLOGY:  Imaging Results (Last 24 Hours)     ** No results found for the last 24 hours. **                )5/11/2023  MD LOLITA Delong/Transcription:   \"Dictated utilizing Dragon dictation\".   "

## 2023-05-11 NOTE — DISCHARGE SUMMARY
[facility] Medicine Services   DISCHARGE SUMMARY    Patient Name: Jt Valdes  : 1940  MRN: [id number]    Date of Admission: 2023  Date of Discharge:  23    Primary Care Physician: Dilcia Trinidad MD      Presenting Problem:   Atrial fibrillation with RVR [I48.91]  Hypotension, unspecified hypotension type [I95.9]    Active and Resolved Hospital Problems:  Active Hospital Problems    Diagnosis POA   • **Atrial fibrillation with RVR [I48.91] Yes      Resolved Hospital Problems   No resolved problems to display.         Hospital Course     Hospital Course:  Jt Valdes is a 82 y.o. female with known history of A-fib, hypothyroidism, hypertension, and RA who presented to the ED with complaints of palpitations.  Her symptoms were going on for 1 day and went to see her primary cardiologist Dr. Patiño and her heart rate was noted to be elevated with A-fib RVR and was sent here.  Started on Cardizem drip initially.  Cardiology consulted.  Patient states she has been on tapering dose of prednisone that was started recently by her rheumatologist for her RA.  She is currently on 15 mg daily and she is also taking Omnicef for recently diagnosed UTI. Patient eventually converted to NSR and Cardizem drip turned off.  EP evaluated the patient and recommended Tikosyn for rhythm control which patient initially refused due to high cost but then agreed to it.  Patient initiated on Tikosyn on .  Stress test done on 5/10 which appears to be negative.  Continued on warfarin with pharmacy to dose.  Patient remains in NSR with stable QTc on Tikosyn.  No further inpatient work-up required.  Okay to discharge per cardiology/EP with outpatient follow-up.  Patient is medically stable to discharge home with follow-up with PCP and cardiology as an outpatient.    A/P:    A-fib RVR  Supratherapeutic INR, resolved  -Converted to NSR, off Cardizem drip  -Initiated on Tikosyn on , continue  beta-blocker, off p.o. Cardizem  -INR 3.42 on admission, improved, resumed warfarin with pharmacy to dose  -Stress test done on 5/10 which is negative  -Patient remains in NSR with stable QTc on Tikosyn  -Okay to discharge per cardiology with outpatient follow-up     RA  -Continue tapering dose prednisone dose that was recently started  -Continue outpatient monitoring with rheumatology     Recent UTI, POA  -UA and recent urine culture noted  -Continue p.o. Omnicef to finish of the course     CKD 3A  -Creatinine appears to be at baseline around 1.2, no ASPEN noted  -Monitor renal function     Hypertension  -Continue metoprolol, amlodipine added  -Losartan on hold for now  -Monitor     Hypothyroidism  -Continue levothyroxine          DISCHARGE Follow Up Recommendations for labs and diagnostics:   Follow-up with PCP and cardiology    Reasons For Change In Medications and Indications for New Medications:  Medication changes as noted below in DC med rec    Day of Discharge     Vital Signs:  Temp:  [97.6 °F (36.4 °C)-98.2 °F (36.8 °C)] 97.7 °F (36.5 °C)  Heart Rate:  [60-73] 60  Resp:  [14-18] 16  BP: (126-140)/(65-81) 132/75    Physical Exam:  General: Awake, alert, NAD  Eyes: PERRL, EOMI, conjunctivae are clear  Cardiovascular: Regular rate and rhythm, no murmurs  Respiratory: Clear to auscultation bilaterally, no wheezing or rales, unlabored breathing  Abdomen: Soft, nontender, positive bowel sounds, no guarding  Neurologic: A&O, CN grossly intact, moves all extremities spontaneously  Musculoskeletal: Normal range of motion, no other gross deformities  Skin: Warm, dry, intact        Pertinent  and/or Most Recent Results     LAB RESULTS:      Lab 05/11/23  0227 05/09/23  2251 05/09/23  0033 05/08/23  1728   WBC 9.50 8.60 8.80 8.40   HEMOGLOBIN 11.9* 11.7* 11.4* 12.1   HEMATOCRIT 35.5 35.0 34.6 37.5   PLATELETS 258 260 265 299   NEUTROS ABS  --   --   --  6.00   LYMPHS ABS  --   --   --  1.60   MONOS ABS  --   --   --   0.80   EOS ABS  --   --   --  0.00   MCV 89.8 88.9 89.6 91.6   PROTIME 18.4* 23.0 32.3* 34.1*         Lab 05/11/23 0227 05/09/23 2251 05/09/23 0033 05/08/23 1944 05/08/23  1728   SODIUM 138 135* 138  --  137   POTASSIUM 4.6 4.8 4.5  --  4.7   CHLORIDE 103 100 103  --  102   CO2 23.0 24.0 23.0  --  21.0*   ANION GAP 12.0 11.0 12.0  --  14.0   BUN 40* 44* 38*  --  36*   CREATININE 1.43* 1.23* 1.17*  --  1.13*   EGFR 36.5* 43.7* 46.4*  --  48.4*   GLUCOSE 100* 138* 111*  --  141*   CALCIUM 8.8 8.8 8.9  --  9.4   MAGNESIUM 2.7* 1.8 1.9  --   --    TSH  --   --   --  2.380  --              Lab 05/11/23 0227 05/09/23 2251 05/09/23 0033 05/08/23 1944 05/08/23  1728 05/08/23  1354   HSTROP T  --   --   --  25* 27*  --    PROTIME 18.4* 23.0 32.3*  --  34.1*  --    INR 1.78* 2.25 3.23*  --  3.42* 3.10         Lab 05/08/23 1944   CHOLESTEROL 221*   LDL CHOL 134*   HDL CHOL 56   TRIGLYCERIDES 177*             Brief Urine Lab Results  (Last result in the past 365 days)      Color   Clarity   Blood   Leuk Est   Nitrite   Protein   CREAT   Urine HCG        04/26/23 1455 Yellow   Clear   Trace   Large (3+)   Negative   30 mg/dL               Microbiology Results (last 10 days)     ** No results found for the last 240 hours. **          XR Chest 1 View    Result Date: 5/8/2023  Impression: Impression: No active disease. Electronically Signed: Dillon Basurto  5/8/2023 5:47 PM EDT  Workstation ID: [id number]              Results for orders placed during the hospital encounter of 02/01/23    Adult Transthoracic Echo Complete W/ Cont if Necessary Per Protocol    Interpretation Summary  •  Estimated right ventricular systolic pressure from tricuspid regurgitation is mildly elevated (35-45 mmHg).      Normal LV size and contractility EF of 60 to 65%  Normal RV size  Normal atrial size  Pulmonic valve is not well visualized.  Aortic valve, mitral valve, tricuspid valve appears structurally normal, mild tricuspid regurgitation  seen.  Calculated RV systolic pressure of 40 mm of.  No pericardial effusion seen.  Proximal aorta appears normal in size.      Labs Pending at Discharge:      Procedures Performed           Consults:   Consults     Date and Time Order Name Status Description    5/8/2023  9:55 PM Inpatient Cardiology Consult Completed             Discharge Details        Discharge Medications      New Medications      Instructions Start Date   amLODIPine 5 MG tablet  Commonly known as: NORVASC   5 mg, Oral, Every 24 Hours Scheduled      dofetilide 125 MCG capsule  Commonly known as: TIKOSYN   125 mcg, Oral, Every 12 Hours Scheduled         Continue These Medications      Instructions Start Date   cefdinir 300 MG capsule  Commonly known as: OMNICEF   300 mg, Oral, 2 Times Daily      Diclofenac Sodium 1 % gel gel  Commonly known as: VOLTAREN   4 g, Topical, 4 Times Daily PRN      leflunomide 20 MG tablet  Commonly known as: ARAVA   20 mg, Oral, Daily      levothyroxine 112 MCG tablet  Commonly known as: Synthroid   Take 1 tablet p.o. daily.      metoprolol succinate  MG 24 hr tablet  Commonly known as: TOPROL-XL   100 mg, Oral, Daily      multivitamin with minerals tablet tablet   1 tablet, Oral, Daily      predniSONE 5 MG tablet  Commonly known as: DELTASONE   No dose, route, or frequency recorded.      tiZANidine 2 MG tablet  Commonly known as: ZANAFLEX   2 mg, Oral, Nightly PRN      warfarin 2.5 MG tablet  Commonly known as: COUMADIN   TAKE 2 TABLETS DAILY         Stop These Medications    dilTIAZem  MG 24 hr tablet  Commonly known as: CARDIZEM LA     losartan 100 MG tablet  Commonly known as: COZAAR     nitrofurantoin (macrocrystal-monohydrate) 100 MG capsule  Commonly known as: Macrobid            Allergies   Allergen Reactions   • Sulfa Antibiotics Rash   • Macrobid [Nitrofurantoin] Unknown - High Severity         Discharge Disposition:  Home or Self Care    Diet:  Hospital:  Diet Order   Procedures   • Diet: Cardiac  Diets; Healthy Heart (2-3 Na+); Texture: Regular Texture (IDDSI 7); Fluid Consistency: Thin (IDDSI 0)         Discharge Activity:         CODE STATUS:  Code Status and Medical Interventions:   Ordered at: 05/08/23 2155     Code Status (Patient has no pulse and is not breathing):    CPR (Attempt to Resuscitate)     Medical Interventions (Patient has pulse or is breathing):    Full Support         Future Appointments   Date Time Provider Department Center   5/11/2023  1:00 PM Laya Jimenez PA [facility]   5/16/2023 10:15 AM [facility] [place] [facility]   5/19/2023 11:30 AM Aurora Mckeon APRN [facility]   6/5/2023 10:00 AM [facility] [place] [facility]   10/12/2023 10:00 AM LAB  [facility] None   10/19/2023 10:15 AM Lucia Sahu MD [facility]   5/1/2024 10:50 AM Jose Patiño MD [facility]           Time spent on Discharge including face to face service:  35 minutes    Signature:    Electronically signed by Alex Begum DO, 05/11/23, 10:04 AM EDT.      Part of this note may be an electronic transcription/translation of spoken language to printed text using the Dragon Dictation System.

## 2023-05-11 NOTE — PLAN OF CARE
Problem: Adult Inpatient Plan of Care  Goal: Plan of Care Review  Outcome: Met  Flowsheets (Taken 5/11/2023 1030)  Progress: improving  Plan of Care Reviewed With: patient  Outcome Evaluation: patient discharging home on tikosyn and will follow up with cardiology  Goal: Patient-Specific Goal (Individualized)  Outcome: Met  Goal: Absence of Hospital-Acquired Illness or Injury  Outcome: Met  Intervention: Identify and Manage Fall Risk  Recent Flowsheet Documentation  Taken 5/11/2023 1000 by Berta Omer RN  Safety Promotion/Fall Prevention: safety round/check completed  Taken 5/11/2023 0800 by Berta Omer RN  Safety Promotion/Fall Prevention:   safety round/check completed   activity supervised  Intervention: Prevent Skin Injury  Recent Flowsheet Documentation  Taken 5/11/2023 0800 by Berta Omer, RN  Skin Protection:   adhesive use limited   incontinence pads utilized  Goal: Optimal Comfort and Wellbeing  Outcome: Met  Intervention: Provide Person-Centered Care  Recent Flowsheet Documentation  Taken 5/11/2023 0800 by Berta Omer RN  Trust Relationship/Rapport:   care explained   choices provided  Goal: Readiness for Transition of Care  Outcome: Met   Goal Outcome Evaluation:  Plan of Care Reviewed With: patient        Progress: improving  Outcome Evaluation: patient discharging home on tikosyn and will follow up with cardiology

## 2023-05-11 NOTE — PLAN OF CARE
Goal Outcome Evaluation:  Plan of Care Reviewed With: patient      Patient has been pleasant through the shift. Was admin tikosyn, QTcB levels are 470. Anticipating discharge tomorrow after final tikosyn administration in the AM. No new complaints at this time, remains on NSR.    Progress: improving      Problem: Adult Inpatient Plan of Care  Goal: Plan of Care Review  Outcome: Ongoing, Progressing  Flowsheets (Taken 5/11/2023 0333)  Progress: improving  Plan of Care Reviewed With: patient

## 2023-05-11 NOTE — OUTREACH NOTE
Prep Survey    Flowsheet Row Responses   [facility] patient discharged from? [facility]   Is LACE score < 7 ? No   Eligibility [facility]   Date of Admission 05/08/23   Date of Discharge 05/11/23   Discharge Disposition Home or Self Care   Discharge diagnosis Atrial fibrillation with RVR   Does the patient have one of the following disease processes/diagnoses(primary or secondary)? Other   Does the patient have Home health ordered? No   Is there a DME ordered? No   Prep survey completed? Yes          Rosario BATISTA - Registered Nurse

## 2023-05-11 NOTE — PROGRESS NOTES
"Pharmacy dosing service  Anticoagulant  Warfarin     Subjective:    Jt Valdes is a 82 y.o.female being continued on warfarin for atrial fibrillation.    INR Goal: 2 - 3  Home medication?: Yes, warfarin 5 mg PO every day at 1800  Bridge Therapy Present?:  No  Interacting Medications Evaluation (New/Present/Discontinued):   Cefdinir -- finished 5/10; can increase INR  Prednisone -- on prolonged taper; can have varying effects on INR  Additional Contributing Factors: none      Assessment/Plan:  INR slightly subtherapeutic. Suspect due to held doses at admission. Will continue home warfarin dose.    Continue to monitor and adjust based on INR.       Date 5/8 5/9 5/10 5/11        INR 3.42 3.23 2.25 1.78        Dose hold hold 5 mg 5 mg          Objective:  [Ht: 160 cm (63\"); Wt: 53.1 kg (117 lb 1 oz); BMI: Body mass index is 20.74 kg/m².]    Lab Results   Component Value Date    ALBUMIN 4.2 03/23/2023     Lab Results   Component Value Date    INR 1.78 (L) 05/11/2023    INR 2.25 05/09/2023    INR 3.23 (H) 05/09/2023    PROTIME 18.4 (L) 05/11/2023    PROTIME 23.0 05/09/2023    PROTIME 32.3 (H) 05/09/2023     Lab Results   Component Value Date    HGB 11.9 (L) 05/11/2023    HGB 11.7 (L) 05/09/2023    HGB 11.4 (L) 05/09/2023     Lab Results   Component Value Date    HCT 35.5 05/11/2023    HCT 35.0 05/09/2023    HCT 34.6 05/09/2023     Juaquin Alvarado RPH  05/11/23 08:36 EDT   "

## 2023-05-12 ENCOUNTER — TRANSITIONAL CARE MANAGEMENT TELEPHONE ENCOUNTER (OUTPATIENT)
Dept: CALL CENTER | Facility: HOSPITAL | Age: 83
End: 2023-05-12
Payer: MEDICARE

## 2023-05-12 LAB — QT INTERVAL: 464 MS

## 2023-05-12 NOTE — OUTREACH NOTE
"Call Center TCM Note    Flowsheet Row Responses   [facility] patient discharged from? [facility]   Does the patient have one of the following disease processes/diagnoses(primary or secondary)? Other   TCM attempt successful? Yes   Call start time 1225   Call end time 1229   Meds reviewed with patient/caregiver? Yes   Is the patient having any side effects they believe may be caused by any medication additions or changes? No   Does the patient have all medications ordered at discharge? Yes   Is the patient taking all medications as directed (includes completed medication regime)? Yes   Comments Patient has appt with MILY Delatorre, scheduled for 05/17/23 at 10:00 am, which is within TCM time frame. Cardiology appt 05/19/23..   Does the patient have an appointment with their PCP within 7 days of discharge? Yes   Has home health visited the patient within 72 hours of discharge? N/A   Psychosocial issues? No   Did the patient receive a copy of their discharge instructions? Yes   Nursing interventions Reviewed instructions with patient   What is the patient's perception of their health status since discharge? Improving   Is the patient/caregiver able to teach back signs and symptoms related to disease process for when to call PCP? Yes   Is the patient/caregiver able to teach back signs and symptoms related to disease process for when to call 911? Yes   Is the patient/caregiver able to teach back the hierarchy of who to call/visit for symptoms/problems? PCP, Specialist, Home health nurse, Urgent Care, ED, 911 Yes   If the patient is a current smoker, are they able to teach back resources for cessation? Not a smoker   Additional teach back comments Monitors BP and HR at home.   TCM call completed? Yes   Wrap up additional comments Patient states is improving. States BP and HR were \"good\" this morning. States still weak from hospital stay. Uses rollator as needed. Family live nearby, and able to assist if needed. " Denies any needs/concerns today. TCM complete.   Call end time 1229   Would this patient benefit from a Referral to [facility] Social Work? No   Is the patient interested in additional calls from an ambulatory ?  NOTE:  applies to high risk patients requiring additional follow-up. No          Verena Reece RN    5/12/2023, 12:31 EDT

## 2023-05-15 NOTE — PROGRESS NOTES
"Transitional Care Follow Up Visit  Subjective     Jt Valdes is a 82 y.o. female who presents for a transitional care management visit.    Within 48 business hours after discharge our office contacted her via telephone to coordinate her care and needs.      I reviewed and discussed the details of that call along with the discharge summary, hospital problems, inpatient lab results, inpatient diagnostic studies, and consultation reports with Jt.     Current outpatient and discharge medications have been reconciled for the patient.  Reviewed by: REINA Arnold          5/11/2023     6:48 PM   Date of TCM Phone Call   [facility]   Date of Admission 5/8/2023   Date of Discharge 5/11/2023   Discharge Disposition Home or Self Care     Risk for Readmission (LACE) Score: 10 (5/11/2023  6:00 AM)      History of Present Illness   Course During Hospital Stay:      \"Hospital Course:  Jt Valdes is a 82 y.o. female with known history of A-fib, hypothyroidism, hypertension, and RA who presented to the ED with complaints of palpitations.  Her symptoms were going on for 1 day and went to see her primary cardiologist Dr. Patiño and her heart rate was noted to be elevated with A-fib RVR and was sent here.  Started on Cardizem drip initially.  Cardiology consulted.  Patient states she has been on tapering dose of prednisone that was started recently by her rheumatologist for her RA.  She is currently on 15 mg daily and she is also taking Omnicef for recently diagnosed UTI. Patient eventually converted to NSR and Cardizem drip turned off.  EP evaluated the patient and recommended Tikosyn for rhythm control which patient initially refused due to high cost but then agreed to it.  Patient initiated on Tikosyn on 5/9.  Stress test done on 5/10 which appears to be negative.  Continued on warfarin with pharmacy to dose.  Patient remains in NSR with stable QTc on Tikosyn.  No further inpatient work-up required. " " Okay to discharge per cardiology/EP with outpatient follow-up.  Patient is medically stable to discharge home with follow-up with PCP and cardiology as an outpatient.\"    Pt is here today following up from 05/08 hospitalization for Atrial fibrillation with RVR and hypotension.    She denies palpitations, chest pain, dizziness since she got home. She has some SOA occasionally. She does have fatigue, this has been going on for years. Her appetite is good, she is drinking water.    Her BP has been running high. 150s-160s/60-70s at home. She sees Dr. Patiño's NP on Friday for follow up.     She does not see a nephrologist.     The following portions of the patient's history were reviewed and updated as appropriate: allergies, current medications, past family history, past medical history, past social history, past surgical history and problem list.    Review of Systems   Constitutional: Positive for fatigue. Negative for activity change, appetite change, chills, diaphoresis, fever and unexpected weight change.   Respiratory: Positive for shortness of breath. Negative for cough, chest tightness and wheezing.    Cardiovascular: Negative for chest pain, palpitations and leg swelling.   Gastrointestinal: Negative for abdominal pain, nausea and vomiting.   Musculoskeletal: Positive for gait problem (walks with walker) and joint swelling (left wrist, RA). Negative for myalgias, neck pain and neck stiffness.   Skin: Negative for color change, rash and wound.   Neurological: Negative for dizziness, syncope, weakness, light-headedness and headaches.   Psychiatric/Behavioral: Negative for agitation, behavioral problems, confusion and decreased concentration. The patient is not nervous/anxious.        Objective   Physical Exam  Vitals reviewed.   Constitutional:       General: She is not in acute distress.     Appearance: Normal appearance. She is not ill-appearing, toxic-appearing or diaphoretic.   Cardiovascular:      Rate " and Rhythm: Normal rate and regular rhythm.      Pulses: Normal pulses.      Heart sounds: Normal heart sounds.   Pulmonary:      Effort: Pulmonary effort is normal. No respiratory distress.      Breath sounds: Normal breath sounds.   Musculoskeletal:      Right lower leg: No edema.      Left lower leg: No edema.   Skin:     General: Skin is warm and dry.      Capillary Refill: Capillary refill takes less than 2 seconds.   Neurological:      General: No focal deficit present.      Mental Status: She is alert and oriented to person, place, and time.   Psychiatric:         Mood and Affect: Mood normal.         Behavior: Behavior normal.         Thought Content: Thought content normal.         Judgment: Judgment normal.         Assessment & Plan   Diagnoses and all orders for this visit:    1. Atrial fibrillation with RVR (Primary)  Comments:  -f/u with Dr. Patiño    2. Hypertensive heart disease, unspecified whether heart failure present  Comments:  -f/u with Dr. Patiño  -keep track of home BPs    3. ASPEN (acute kidney injury)  Comments:  -re-check CMP  Orders:  -     Comprehensive metabolic panel; Future

## 2023-05-16 ENCOUNTER — ANTICOAGULATION VISIT (OUTPATIENT)
Dept: CARDIOLOGY | Facility: CLINIC | Age: 83
End: 2023-05-16
Payer: MEDICARE

## 2023-05-16 VITALS
DIASTOLIC BLOOD PRESSURE: 67 MMHG | SYSTOLIC BLOOD PRESSURE: 168 MMHG | WEIGHT: 118 LBS | BODY MASS INDEX: 20.9 KG/M2 | HEART RATE: 58 BPM

## 2023-05-16 DIAGNOSIS — Z79.01 LONG TERM (CURRENT) USE OF ANTICOAGULANTS: ICD-10-CM

## 2023-05-16 DIAGNOSIS — I48.0 PAROXYSMAL ATRIAL FIBRILLATION: Primary | ICD-10-CM

## 2023-05-16 LAB — INR PPP: 2.4 (ref 0.9–1.1)

## 2023-05-16 PROCEDURE — 36416 COLLJ CAPILLARY BLOOD SPEC: CPT | Performed by: INTERNAL MEDICINE

## 2023-05-16 PROCEDURE — 85610 PROTHROMBIN TIME: CPT | Performed by: INTERNAL MEDICINE

## 2023-05-17 ENCOUNTER — LAB (OUTPATIENT)
Dept: FAMILY MEDICINE CLINIC | Facility: CLINIC | Age: 83
End: 2023-05-17
Payer: MEDICARE

## 2023-05-17 ENCOUNTER — OFFICE VISIT (OUTPATIENT)
Dept: FAMILY MEDICINE CLINIC | Facility: CLINIC | Age: 83
End: 2023-05-17
Payer: MEDICARE

## 2023-05-17 VITALS
DIASTOLIC BLOOD PRESSURE: 74 MMHG | TEMPERATURE: 97.5 F | RESPIRATION RATE: 16 BRPM | WEIGHT: 117 LBS | HEIGHT: 63 IN | HEART RATE: 56 BPM | OXYGEN SATURATION: 98 % | SYSTOLIC BLOOD PRESSURE: 167 MMHG | BODY MASS INDEX: 20.73 KG/M2

## 2023-05-17 DIAGNOSIS — I48.91 ATRIAL FIBRILLATION WITH RVR: ICD-10-CM

## 2023-05-17 DIAGNOSIS — I11.9 HYPERTENSIVE HEART DISEASE, UNSPECIFIED WHETHER HEART FAILURE PRESENT: ICD-10-CM

## 2023-05-17 DIAGNOSIS — I48.91 ATRIAL FIBRILLATION WITH RVR: Primary | ICD-10-CM

## 2023-05-17 DIAGNOSIS — N17.9 AKI (ACUTE KIDNEY INJURY): ICD-10-CM

## 2023-05-17 LAB
ALBUMIN SERPL-MCNC: 4.1 G/DL (ref 3.5–5.2)
ALBUMIN/GLOB SERPL: 1.6 G/DL
ALP SERPL-CCNC: 66 U/L (ref 39–117)
ALT SERPL W P-5'-P-CCNC: 18 U/L (ref 1–33)
ANION GAP SERPL CALCULATED.3IONS-SCNC: 13 MMOL/L (ref 5–15)
AST SERPL-CCNC: 18 U/L (ref 1–32)
BILIRUB SERPL-MCNC: 0.6 MG/DL (ref 0–1.2)
BUN SERPL-MCNC: 26 MG/DL (ref 8–23)
BUN/CREAT SERPL: 27.1 (ref 7–25)
CALCIUM SPEC-SCNC: 10.1 MG/DL (ref 8.6–10.5)
CHLORIDE SERPL-SCNC: 99 MMOL/L (ref 98–107)
CO2 SERPL-SCNC: 26 MMOL/L (ref 22–29)
CREAT SERPL-MCNC: 0.96 MG/DL (ref 0.57–1)
EGFRCR SERPLBLD CKD-EPI 2021: 58.8 ML/MIN/1.73
GLOBULIN UR ELPH-MCNC: 2.5 GM/DL
GLUCOSE SERPL-MCNC: 86 MG/DL (ref 65–99)
POTASSIUM SERPL-SCNC: 3.8 MMOL/L (ref 3.5–5.2)
PROT SERPL-MCNC: 6.6 G/DL (ref 6–8.5)
QT INTERVAL: 463 MS
SODIUM SERPL-SCNC: 138 MMOL/L (ref 136–145)

## 2023-05-17 PROCEDURE — 80053 COMPREHEN METABOLIC PANEL: CPT

## 2023-05-17 PROCEDURE — 36415 COLL VENOUS BLD VENIPUNCTURE: CPT

## 2023-05-18 PROBLEM — Z79.899 VISIT FOR MONITORING TIKOSYN THERAPY: Status: ACTIVE | Noted: 2023-05-18

## 2023-05-18 PROBLEM — Z51.81 VISIT FOR MONITORING TIKOSYN THERAPY: Status: ACTIVE | Noted: 2023-05-18

## 2023-05-19 ENCOUNTER — HOSPITAL ENCOUNTER (EMERGENCY)
Facility: HOSPITAL | Age: 83
Discharge: HOME | End: 2023-05-19
Attending: EMERGENCY MEDICINE
Payer: MEDICARE

## 2023-05-19 ENCOUNTER — TELEPHONE (OUTPATIENT)
Dept: CARDIOLOGY | Facility: CLINIC | Age: 83
End: 2023-05-19

## 2023-05-19 VITALS
RESPIRATION RATE: 18 BRPM | DIASTOLIC BLOOD PRESSURE: 57 MMHG | SYSTOLIC BLOOD PRESSURE: 113 MMHG | TEMPERATURE: 98.9 F | OXYGEN SATURATION: 98 % | HEART RATE: 59 BPM | HEIGHT: 63 IN | BODY MASS INDEX: 20.73 KG/M2 | WEIGHT: 117 LBS

## 2023-05-19 DIAGNOSIS — I48.0 PAROXYSMAL ATRIAL FIBRILLATION: Primary | Chronic | ICD-10-CM

## 2023-05-19 DIAGNOSIS — I48.91 ATRIAL FIBRILLATION WITH RAPID VENTRICULAR RESPONSE: Primary | ICD-10-CM

## 2023-05-19 PROBLEM — E78.5 HYPERLIPIDEMIA: Chronic | Status: ACTIVE | Noted: 2019-09-08

## 2023-05-19 PROBLEM — E03.9 HYPOTHYROIDISM: Chronic | Status: ACTIVE | Noted: 2019-09-08

## 2023-05-19 PROBLEM — D64.9 NORMOCYTIC ANEMIA: Chronic | Status: ACTIVE | Noted: 2022-08-18

## 2023-05-19 PROBLEM — Z79.01 LONG TERM (CURRENT) USE OF ANTICOAGULANTS: Chronic | Status: ACTIVE | Noted: 2021-03-22

## 2023-05-19 LAB
ANION GAP SERPL CALCULATED.3IONS-SCNC: 13 MMOL/L (ref 5–15)
APTT PPP: 37.7 SECONDS (ref 61–76.5)
BASOPHILS # BLD AUTO: 0.1 10*3/MM3 (ref 0–0.2)
BASOPHILS NFR BLD AUTO: 0.8 % (ref 0–1.5)
BUN SERPL-MCNC: 23 MG/DL (ref 8–23)
BUN/CREAT SERPL: 25.6 (ref 7–25)
CALCIUM SPEC-SCNC: 10.2 MG/DL (ref 8.6–10.5)
CHLORIDE SERPL-SCNC: 101 MMOL/L (ref 98–107)
CO2 SERPL-SCNC: 24 MMOL/L (ref 22–29)
CREAT SERPL-MCNC: 0.9 MG/DL (ref 0.57–1)
DEPRECATED RDW RBC AUTO: 47.7 FL (ref 37–54)
EGFRCR SERPLBLD CKD-EPI 2021: 63.6 ML/MIN/1.73
EOSINOPHIL # BLD AUTO: 0.1 10*3/MM3 (ref 0–0.4)
EOSINOPHIL NFR BLD AUTO: 2.1 % (ref 0.3–6.2)
ERYTHROCYTE [DISTWIDTH] IN BLOOD BY AUTOMATED COUNT: 14.4 % (ref 12.3–15.4)
GLUCOSE SERPL-MCNC: 136 MG/DL (ref 65–99)
HCT VFR BLD AUTO: 37.1 % (ref 34–46.6)
HGB BLD-MCNC: 12.2 G/DL (ref 12–15.9)
INR PPP: 2.69 (ref 2–3)
LYMPHOCYTES # BLD AUTO: 0.9 10*3/MM3 (ref 0.7–3.1)
LYMPHOCYTES NFR BLD AUTO: 12.5 % (ref 19.6–45.3)
MCH RBC QN AUTO: 29.6 PG (ref 26.6–33)
MCHC RBC AUTO-ENTMCNC: 33 G/DL (ref 31.5–35.7)
MCV RBC AUTO: 89.8 FL (ref 79–97)
MONOCYTES # BLD AUTO: 0.8 10*3/MM3 (ref 0.1–0.9)
MONOCYTES NFR BLD AUTO: 11.2 % (ref 5–12)
NEUTROPHILS NFR BLD AUTO: 5.3 10*3/MM3 (ref 1.7–7)
NEUTROPHILS NFR BLD AUTO: 73.4 % (ref 42.7–76)
NRBC BLD AUTO-RTO: 0.2 /100 WBC (ref 0–0.2)
PLATELET # BLD AUTO: 200 10*3/MM3 (ref 140–450)
PMV BLD AUTO: 8.3 FL (ref 6–12)
POTASSIUM SERPL-SCNC: 4 MMOL/L (ref 3.5–5.2)
PROTHROMBIN TIME: 27.2 SECONDS (ref 19.4–28.5)
RBC # BLD AUTO: 4.13 10*6/MM3 (ref 3.77–5.28)
SODIUM SERPL-SCNC: 138 MMOL/L (ref 136–145)
TROPONIN T SERPL HS-MCNC: 37 NG/L
WBC NRBC COR # BLD: 7.2 10*3/MM3 (ref 3.4–10.8)

## 2023-05-19 PROCEDURE — 80048 BASIC METABOLIC PNL TOTAL CA: CPT | Performed by: EMERGENCY MEDICINE

## 2023-05-19 PROCEDURE — 93005 ELECTROCARDIOGRAM TRACING: CPT | Performed by: EMERGENCY MEDICINE

## 2023-05-19 PROCEDURE — 85730 THROMBOPLASTIN TIME PARTIAL: CPT | Performed by: EMERGENCY MEDICINE

## 2023-05-19 PROCEDURE — 93005 ELECTROCARDIOGRAM TRACING: CPT

## 2023-05-19 PROCEDURE — 85610 PROTHROMBIN TIME: CPT | Performed by: EMERGENCY MEDICINE

## 2023-05-19 PROCEDURE — 96365 THER/PROPH/DIAG IV INF INIT: CPT

## 2023-05-19 PROCEDURE — 99284 EMERGENCY DEPT VISIT MOD MDM: CPT

## 2023-05-19 PROCEDURE — 84484 ASSAY OF TROPONIN QUANT: CPT | Performed by: EMERGENCY MEDICINE

## 2023-05-19 PROCEDURE — 85025 COMPLETE CBC W/AUTO DIFF WBC: CPT | Performed by: EMERGENCY MEDICINE

## 2023-05-19 RX ORDER — DILTIAZEM HCL/D5W 125 MG/125
5-15 PLASTIC BAG, INJECTION (ML) INTRAVENOUS CONTINUOUS
Status: DISCONTINUED | OUTPATIENT
Start: 2023-05-19 | End: 2023-05-19 | Stop reason: HOSPADM

## 2023-05-19 RX ORDER — SODIUM CHLORIDE 0.9 % (FLUSH) 0.9 %
10 SYRINGE (ML) INJECTION AS NEEDED
Status: DISCONTINUED | OUTPATIENT
Start: 2023-05-19 | End: 2023-05-19 | Stop reason: HOSPADM

## 2023-05-19 RX ADMIN — Medication 5 MG/HR: at 08:57

## 2023-05-19 NOTE — ED PROVIDER NOTES
Subjective   History of Present Illness  Chief complaint: Palpitations    82-year-old female presents with palpitations.  Patient was in the hospital last week for atrial fibrillation with RVR.  She was started on Tikosyn at that time and she states her heart has been in a normal rhythm until last night around 7 PM.  She states she started feeling palpitations and like her heart was going to beat out of her chest.  She has had some associated shortness of breath.  She denies chest pain.  She has had mild dizziness.  She denies any alleviating or exacerbating factors.  She is on warfarin.    History provided by:  Patient      Review of Systems   Constitutional: Negative for fever.   HENT: Negative for congestion.    Respiratory: Positive for shortness of breath. Negative for cough.    Cardiovascular: Positive for palpitations. Negative for chest pain.   Gastrointestinal: Negative for abdominal pain and vomiting.   Musculoskeletal: Negative for back pain.   Neurological: Positive for dizziness.       Past Medical History:   Diagnosis Date   • Allergic     Sulfa   • Arthritis    • Cataract     Cataracts surgery   • COVID-19    • Degenerative joint disease    • Extremity pain     legs pain   • H/O degenerative disc disease    • History of colonoscopy 2009    last done 2009   • History of echocardiogram 09/04/2018    LVH EF 65%. RV OK. LA probably mildly dilated. AV OK. MV OK. Modest TR RVSP 26 or less. Nuclear MPI regadenoson 9/14/2018. LV uniform myocardial uptake and wall motion EF 71%.    • History of Holter monitoring 10/08/2018    SR 40-81 58. AF 2.7 hr episode VR . At termination AF 2 3.5-4.0 s pauses with patient possibly dizzy. 205 PAC 42 atrial ashanti several SVT. No ventricular ectopy.    • HL (hearing loss) 2020    Tried hearing aids twice . Didnt help   • HTN (hypertension)    • Hyperlipidemia    • Hypertensive cardiovascular disease    • Hypothyroidism    • Incontinence    • Leukopenia    • Low back  "pain    • PAF (paroxysmal atrial fibrillation) 09/2018     [facility] Sept 2018 PAF and HTN. PAFL On bisoprolol and diltiazem bradycardia symptomatic with dizziness. Amiodarone alone Oct 2018 and no episodes of erratic or fast heartbeat or lightheadedness.    • Rheumatoid arthritis     Possible pulmonary involvement    • Staph infection    • Vitamin D deficiency    • White coat syndrome with hypertension        Allergies   Allergen Reactions   • Sulfa Antibiotics Rash   • Macrobid [Nitrofurantoin] Unknown - High Severity       Past Surgical History:   Procedure Laterality Date   • APPENDECTOMY  1974   • BACK SURGERY  2003 2001, 2003   • SPINE SURGERY  2001.    2003   • SUBTOTAL HYSTERECTOMY  1974       Family History   Problem Relation Age of Onset   • Osteoarthritis Mother    • Arthritis Mother    • Cancer Mother    • Other Other         Rheumatoid disease    • Cancer Father    • Cancer Sister        Social History     Socioeconomic History   • Marital status:    • Number of children: 5   • Years of education: 12   Tobacco Use   • Smoking status: Never   • Smokeless tobacco: Never   Vaping Use   • Vaping Use: Never used   Substance and Sexual Activity   • Alcohol use: Yes     Alcohol/week: 1.0 standard drink     Types: 1 Glasses of wine per week     Comment: Occ.   • Drug use: No   • Sexual activity: Not Currently       /79   Pulse (!) 121   Temp 98.9 °F (37.2 °C) (Oral)   Resp 18   Ht 160 cm (63\")   Wt 53.1 kg (117 lb)   SpO2 96%   BMI 20.73 kg/m²       Objective   Physical Exam  Vitals and nursing note reviewed.   Constitutional:       Appearance: Normal appearance.   HENT:      Head: Normocephalic and atraumatic.      Mouth/Throat:      Mouth: Mucous membranes are moist.   Cardiovascular:      Rate and Rhythm: Tachycardia present. Rhythm irregular.   Pulmonary:      Effort: Pulmonary effort is normal. No respiratory distress.      Breath sounds: Normal breath sounds.   Abdominal:      " Palpations: Abdomen is soft.      Tenderness: There is no abdominal tenderness.   Skin:     General: Skin is warm and dry.   Neurological:      Mental Status: She is alert and oriented to person, place, and time.         Procedures           ED Course      My interpretation of EKG shows atrial fibrillation, rate of 115, no ST elevation     Results for orders placed or performed during the hospital encounter of 05/19/23   Basic Metabolic Panel    Specimen: Arm, Right; Blood   Result Value Ref Range    Glucose 136 (H) 65 - 99 mg/dL    BUN 23 8 - 23 mg/dL    Creatinine 0.90 0.57 - 1.00 mg/dL    Sodium 138 136 - 145 mmol/L    Potassium 4.0 3.5 - 5.2 mmol/L    Chloride 101 98 - 107 mmol/L    CO2 24.0 22.0 - 29.0 mmol/L    Calcium 10.2 8.6 - 10.5 mg/dL    BUN/Creatinine Ratio 25.6 (H) 7.0 - 25.0    Anion Gap 13.0 5.0 - 15.0 mmol/L    eGFR 63.6 >60.0 mL/min/1.73   Protime-INR    Specimen: Arm, Right; Blood   Result Value Ref Range    Protime 27.2 19.4 - 28.5 Seconds    INR 2.69 2.00 - 3.00   aPTT    Specimen: Arm, Right; Blood   Result Value Ref Range    PTT 37.7 (L) 61.0 - 76.5 seconds   Single High Sensitivity Troponin T    Specimen: Arm, Right; Blood   Result Value Ref Range    HS Troponin T 37 (H) <10 ng/L   CBC Auto Differential    Specimen: Arm, Right; Blood   Result Value Ref Range    WBC 7.20 3.40 - 10.80 10*3/mm3    RBC 4.13 3.77 - 5.28 10*6/mm3    Hemoglobin 12.2 12.0 - 15.9 g/dL    Hematocrit 37.1 34.0 - 46.6 %    MCV 89.8 79.0 - 97.0 fL    MCH 29.6 26.6 - 33.0 pg    MCHC 33.0 31.5 - 35.7 g/dL    RDW 14.4 12.3 - 15.4 %    RDW-SD 47.7 37.0 - 54.0 fl    MPV 8.3 6.0 - 12.0 fL    Platelets 200 140 - 450 10*3/mm3    Neutrophil % 73.4 42.7 - 76.0 %    Lymphocyte % 12.5 (L) 19.6 - 45.3 %    Monocyte % 11.2 5.0 - 12.0 %    Eosinophil % 2.1 0.3 - 6.2 %    Basophil % 0.8 0.0 - 1.5 %    Neutrophils, Absolute 5.30 1.70 - 7.00 10*3/mm3    Lymphocytes, Absolute 0.90 0.70 - 3.10 10*3/mm3    Monocytes, Absolute 0.80 0.10 - 0.90  10*3/mm3    Eosinophils, Absolute 0.10 0.00 - 0.40 10*3/mm3    Basophils, Absolute 0.10 0.00 - 0.20 10*3/mm3    nRBC 0.2 0.0 - 0.2 /100 WBC   ECG 12 Lead Rhythm Change   Result Value Ref Range    QT Interval 295 ms   ECG 12 Lead Rhythm Change   Result Value Ref Range    QT Interval 398 ms                                     Medical Decision Making  Atrial fibrillation with rapid ventricular response: acute illness or injury  Amount and/or Complexity of Data Reviewed  Labs: ordered. Decision-making details documented in ED Course.  ECG/medicine tests: ordered and independent interpretation performed. Decision-making details documented in ED Course.      Risk  Prescription drug management.         Patient had the above evaluation.  Results were discussed with the patient.  Patient was found to be in atrial fibrillation with RVR.  She was given a Cardizem bolus and started on a Cardizem drip.  Troponin is borderline elevated at 37.  White blood cell count is normal.  BMP is unremarkable.  Blood pressure has remained stable.  Patient was going to be admitted for further management with cardiology consult however patient did convert to a normal sinus rhythm in the emergency room.  Dr. Patiño saw the patient in the emergency room and he states he can follow-up with the patient on an outpatient basis.  Patient is agreeable with this plan.  She is stable for discharge.      Final diagnoses:   Atrial fibrillation with rapid ventricular response       ED Disposition  ED Disposition     ED Disposition   Discharge    Condition   Stable    Comment   --             Dilcia Trinidad MD  [address] [facility] [address]  [place] [address]  [phone]    Call in 2 days      Jose Patiño MD  [address] [facility] [place] [address]  [phone]    Call in 2 days           Medication List      No changes were made to your prescriptions during this visit.          Flavio Alejo MD  05/19/23 [id number]

## 2023-05-19 NOTE — CONSULTS
Cardiology Consult Note    Patient Identification:  Name: Jt Valdes  Age: 82 y.o.  Sex: female  :  1940  MRN: [id number]             Requesting Physician : Flavio Alejo    Reason for Consultation / Chief Complaint : A-fib    History of Present Illness:        Ms. Jt Valdes has PMH of      Hypertension/hypertensive cardiovascular disease  Paroxysmal atrial fibrillation, noncompliant on Xarelto, now on coumadin   [id number] SCORE   [id number] Score: 4 (2023 11:51 AM)     Hyperlipidemia  SILVERIO, noncompliant on CPAP  Hypothyroidism  Whitecoat hypertension  History of leukopenia, staph infection  Rheumatoid arthritis, possible pulmonary involved ,DJD, DDD  Partial hysterectomy, appendectomy, back surgery  Allergies/intolerance to sulfa-rash  Non-smoker    Presented through emergency room 2023 with complaint of palpitations.  Patient was in the hospital recently had extensive work-up and was started on Tikosyn and sent home.  And yesterday night she overdid it washing the windows and started having palpitations around 7 PM Saturday shortness of breath denies any chest pain.  No aggravating or relieving factors.  Patient is taking her medications.  Patient was monitored and given Tikosyn here and converted to sinus rhythm.  Work-up here 2023 reveal HS troponin of 37.  Glucose 136.  EKG done 2023  At 8:26 AM showed A-fib at the rate of 115 bpm.  Repeat EKG at 9:39 AM showed sinus rhythm with a rate of 55 bpm.  QTc normal at 398.    Review of records revealed that patient was in my office 2023 was found to be in A-fib with RVR was directly admitted.Labs in the ED showed high-sensitivity troponin of 27 and 25 glucose mildly elevated 141 potassium normal 4.7 TSH 2.38 BUN 38 creatinine 1.17 INR 3.23 hemoglobin 11.4  Chest x-ray was without any disease  Echocardiogram 2023 was done recently Which showed normal LV systolic function of 60 to 65% normal RVSP is 40  Lexiscan  Cardiolite 5/10/2023 negative for ischemia EF of 75%.       Reviewed previous records:    Echo 09/04/2018  LVH EF 65%. RV OK.  LA probably mildly dilated.  AV OK.  MV OK.  Modest TR RVSP 26 or less.Holter monitor 9/17-9/20/2021 was unremarkable.  Lexiscan Cardiolite 4/8/2021 were negative for ischemia  Echo 2/1/2023 reveals EF 60 to 65% PA systolic pressure of 40 mmHg             Assessment:     Symptomatic paroxysmal A-fib  Shortness of breath  Paroxysmal A. Fib, long-term anticoagulation with warfarin  Hypertension, history of whitecoat hypertension  CKD 3B  SILVERIO  Hyperlipidemia  Hypothyroidism     Plan:     Patient is very symptomatic with her atrial fibrillation.  Patient was given IV Cardizem and has converted to sinus rhythm.  We will continue antiarrhythmic drugs with Tikosyn as tolerated and monitor QTc.  We will make an outpatient appointment with EP , next week if possible..  We will send her home on medical management with Tikosyn 125 twice daily  Continue home medications of amlodipine, warfarin, metoprolol succinate as tolerated.  Patient has high [id number] score due to female gender, age >75,, hypertension making it 4, would benefit from long-term anticoagulation, patient could not afford NOACs is on warfarin.  We will follow-up in INR clinic to keep PT/INR between 2 and 3.    Advised statins.  Patient was previously intolerant to statins.  Procedures Holter monitor 7/12/2022 revealed A. fib with RVR lasting anywhere up to >3 hours at a time   Patient underwent Lexiscan Cardiolite 5/10/2023 which was negative for ischemia.  Reviewed results with patient.  Discussed with ER physician and RN taking care of patient to coordinate care.                    Diagnosis Plan   1. Atrial fibrillation with rapid ventricular response                   Past Medical History:  Past Medical History:   Diagnosis Date   • Allergic     Sulfa   • Arthritis    • Cataract     Cataracts surgery   • COVID-19    •  Degenerative joint disease    • Extremity pain     legs pain   • H/O degenerative disc disease    • History of colonoscopy 2009    last done 2009   • History of echocardiogram 09/04/2018    LVH EF 65%. RV OK. LA probably mildly dilated. AV OK. MV OK. Modest TR RVSP 26 or less. Nuclear MPI regadenoson 9/14/2018. LV uniform myocardial uptake and wall motion EF 71%.    • History of Holter monitoring 10/08/2018    SR 40-81 58. AF 2.7 hr episode VR . At termination AF 2 3.5-4.0 s pauses with patient possibly dizzy. 205 PAC 42 atrial ashanti several SVT. No ventricular ectopy.    • HL (hearing loss) 2020    Tried hearing aids twice . Didnt help   • HTN (hypertension)    • Hyperlipidemia    • Hypertensive cardiovascular disease    • Hypothyroidism    • Incontinence    • Leukopenia    • Low back pain    • PAF (paroxysmal atrial fibrillation) 09/2018    [facility] Sept 2018 PAF and HTN. PAFL On bisoprolol and diltiazem bradycardia symptomatic with dizziness. Amiodarone alone Oct 2018 and no episodes of erratic or fast heartbeat or lightheadedness.    • Rheumatoid arthritis     Possible pulmonary involvement    • Staph infection    • Vitamin D deficiency    • White coat syndrome with hypertension      Past Surgical History:  Past Surgical History:   Procedure Laterality Date   • APPENDECTOMY  1974   • BACK SURGERY  2003 2001, 2003   • SPINE SURGERY  2001.    2003   • SUBTOTAL HYSTERECTOMY  1974      Allergies:  Allergies   Allergen Reactions   • Sulfa Antibiotics Rash   • Macrobid [Nitrofurantoin] Unknown - High Severity     Home Meds:  (Not in a hospital admission)    Current Meds:     Current Facility-Administered Medications:   •  dilTIAZem (CARDIZEM) 125 mg in 125 mL D5W infusion, 5-15 mg/hr, Intravenous, Continuous, Flavio Alejo MD, Stopped at 05/19/23 0948  •  [COMPLETED] Insert Peripheral IV, , , Once **AND** sodium chloride 0.9 % flush 10 mL, 10 mL, Intravenous, PRN, Flavio Alejo MD    Current Outpatient  "Medications:   •  amLODIPine (NORVASC) 5 MG tablet, Take 1 tablet by mouth Daily., Disp: 30 tablet, Rfl: 0  •  dofetilide (TIKOSYN) 125 MCG capsule, Take 1 capsule by mouth Every 12 (Twelve) Hours., Disp: 60 capsule, Rfl: 0  •  leflunomide (ARAVA) 20 MG tablet, Take 1 tablet by mouth Daily., Disp: 90 tablet, Rfl: 0  •  levothyroxine (Synthroid) 112 MCG tablet, Take 1 tablet p.o. daily., Disp: 90 tablet, Rfl: 4  •  metoprolol succinate XL (TOPROL-XL) 100 MG 24 hr tablet, Take 1 tablet by mouth Daily., Disp: 90 tablet, Rfl: 3  •  multivitamin with minerals tablet tablet, Take 1 tablet by mouth Daily., Disp: , Rfl:   •  warfarin (COUMADIN) 2.5 MG tablet, TAKE 2 TABLETS DAILY, Disp: 180 tablet, Rfl: 1  •  Diclofenac Sodium (VOLTAREN) 1 % gel gel, Apply 4 g topically to the appropriate area as directed 4 (Four) Times a Day As Needed (pain)., Disp: 150 g, Rfl: 3  Social History:   Social History     Tobacco Use   • Smoking status: Never   • Smokeless tobacco: Never   Substance Use Topics   • Alcohol use: Yes     Alcohol/week: 1.0 standard drink     Types: 1 Glasses of wine per week     Comment: Occ.      Family History:  Family History   Problem Relation Age of Onset   • Osteoarthritis Mother    • Arthritis Mother    • Cancer Mother    • Other Other         Rheumatoid disease    • Cancer Father    • Cancer Sister         Review of Systems : Review of Systems   Constitutional: Positive for malaise/fatigue.   Cardiovascular: Positive for palpitations.   Respiratory: Positive for shortness of breath.    All other systems reviewed and are negative.                 Constitutional:  Temp:  [98.9 °F (37.2 °C)] 98.9 °F (37.2 °C)  Heart Rate:  [] 59  Resp:  [18] 18  BP: (113-129)/(57-83) 113/57    Physical Exam   /57   Pulse 59   Temp 98.9 °F (37.2 °C) (Oral)   Resp 18   Ht 160 cm (63\")   Wt 53.1 kg (117 lb)   SpO2 98%   BMI 20.73 kg/m²   Physical Exam  General:  Appears in no acute distress  Eyes: Sclerae are " anicteric,  conjunctivae are clear   HEENT:  No JVD. Thyroid not visibly enlarged. No mucosal pallor or cyanosis  Respiratory: Respirations regular and unlabored at rest.  Bilaterally good breath sounds with good air entry in all fields. No crackles, rubs or wheezes auscultated  Cardiovascular: S1,S2 Regular rate and rhythm. No murmur, rub or gallop auscultated. No pretibial pitting edema  Gastrointestinal: Abdomen soft, flat, nontender. Bowel sounds present.   Musculoskeletal:  No abnormal movements  Extremities: No digital clubbing or cyanosis  Skin: Color pink. Skin warm and dry to touch. No rashes  No xanthoma  Neuro: Alert and awake, no lateralizing deficits appreciated    Cardiographics  ECG: EKG tracing was  personally reviewed/interpreted by me  ECG 12 Lead Rhythm Change   Preliminary Result   HEART RATE= 55  bpm   RR Interval= 1096  ms   NY Interval= 148  ms   P Horizontal Axis= 4  deg   P Front Axis= 49  deg   QRSD Interval= 81  ms   QT Interval= 398  ms   QRS Axis= 34  deg   T Wave Axis= -77  deg   - ABNORMAL ECG -   Sinus bradycardia   Repol abnrm suggests ischemia, diffuse leads   When compared with ECG of 19-May-2023 8:26:36,   Significant change in rhythm: previously atrial fibrillation   Electronically Signed By:    Date and Time of Study: 2023-05-19 09:39:28      ECG 12 Lead Rhythm Change   Preliminary Result   HEART RATE= 115  bpm   RR Interval= 521  ms   NY Interval=   ms   P Horizontal Axis=   deg   P Front Axis=   deg   QRSD Interval= 74  ms   QT Interval= 295  ms   QRS Axis= 6  deg   T Wave Axis= 206  deg   - ABNORMAL ECG -   Atrial fibrillation   Repol abnrm, severe global ischemia (LM/MVD)   When compared with ECG of 11-May-2023 9:33:46,   Significant change in rhythm: previously sinus   Electronically Signed By:    Date and Time of Study: 2023-05-19 08:26:36          Telemetry: Sinus rhythm    Echocardiogram:   Results for orders placed during the hospital encounter of 02/01/23    Adult  Transthoracic Echo Complete W/ Cont if Necessary Per Protocol    Interpretation Summary  •  Estimated right ventricular systolic pressure from tricuspid regurgitation is mildly elevated (35-45 mmHg).      Normal LV size and contractility EF of 60 to 65%  Normal RV size  Normal atrial size  Pulmonic valve is not well visualized.  Aortic valve, mitral valve, tricuspid valve appears structurally normal, mild tricuspid regurgitation seen.  Calculated RV systolic pressure of 40 mm of.  No pericardial effusion seen.  Proximal aorta appears normal in size.      Imaging  Chest X-ray:   Imaging Results (Last 24 Hours)     ** No results found for the last 24 hours. **          Lab Review: I have reviewed the labs  Results from last 7 days   Lab Units 05/19/23  0848   HSTROP T ng/L 37*         Results from last 7 days   Lab Units 05/19/23  0848   SODIUM mmol/L 138   POTASSIUM mmol/L 4.0   BUN mg/dL 23   CREATININE mg/dL 0.90   CALCIUM mg/dL 10.2             Results from last 7 days   Lab Units 05/19/23  0848   WBC 10*3/mm3 7.20   HEMOGLOBIN g/dL 12.2   HEMATOCRIT % 37.1   PLATELETS 10*3/mm3 200     Results from last 7 days   Lab Units 05/19/23  0848 05/16/23  1021   INR  2.69 2.40*   APTT seconds 37.7*  --              Jose Patiño MD  5/19/2023, 11:39 EDT      EMR Dragon/Transcription:   Dictated utilizing Dragon dictation

## 2023-05-19 NOTE — DISCHARGE INSTRUCTIONS
Follow-up with your primary doctor as well as your cardiologist.  Return to the emergency room for any new or worsening symptoms or if you have any other questions or concerns.

## 2023-05-21 LAB
QT INTERVAL: 295 MS
QT INTERVAL: 398 MS

## 2023-05-24 ENCOUNTER — TELEPHONE (OUTPATIENT)
Dept: CARDIOLOGY | Facility: CLINIC | Age: 83
End: 2023-05-24
Payer: MEDICARE

## 2023-05-24 NOTE — TELEPHONE ENCOUNTER
----- Message from Jt Valdes sent at 5/23/2023  7:55 PM EDT -----  Regarding: Question regarding POCT INR - [facility]  Contact: [phone]  You told me in the ER on Friday,May 19 that you wanted me to see Dr Cruz , Do I make that appointment or will your office set it up for me ? I feel that if your office contacts dr HIGUERA office I can get in there sooner , thank you .

## 2023-05-24 NOTE — PROGRESS NOTES
"Chief Complaint  Hospital Follow Up Visit (ER on 5/19)    Subjective        Jt Valdes presents to [facility]  History of Present Illness    Pt is following up on ER visit on 05/19 for A fib with RVR. She keeps having episodes of this. She saw Dr. Patiño there and he wants her to see Dr. Lloyd. She got in to see his NP on 05/30. She denies dizziness, palpitations, chest pain, SOA. She is eating well, drinking a lot of water. She feels well overall. She thinks the medicine is making her tired. She has cut back on her sweets intake.     Bps at home have been 1teens-140s/70s-80s.     Objective   Vital Signs:  /76 (BP Location: Left arm, Patient Position: Sitting, Cuff Size: Adult)   Pulse 73   Ht 160 cm (63\")   Wt 54.4 kg (120 lb)   SpO2 97%   BMI 21.26 kg/m²   Estimated body mass index is 21.26 kg/m² as calculated from the following:    Height as of this encounter: 160 cm (63\").    Weight as of this encounter: 54.4 kg (120 lb).    BMI is within normal parameters. No other follow-up for BMI required.      Review of Systems   Constitutional: Negative for appetite change, chills, fatigue and fever.   Respiratory: Negative for cough, shortness of breath and wheezing.    Cardiovascular: Negative for chest pain, palpitations and leg swelling.   Gastrointestinal: Negative for abdominal pain, nausea and vomiting.   Musculoskeletal: Negative for gait problem, joint swelling and myalgias.   Skin: Negative for color change, rash and wound.   Neurological: Negative for dizziness, syncope, light-headedness and confusion.   Psychiatric/Behavioral: Negative for agitation, behavioral problems and decreased concentration. The patient is not nervous/anxious.         Physical Exam  Vitals reviewed.   Constitutional:       General: She is not in acute distress.     Appearance: Normal appearance. She is not ill-appearing, toxic-appearing or diaphoretic.   HENT:      Head: " Normocephalic and atraumatic.   Cardiovascular:      Rate and Rhythm: Normal rate and regular rhythm.      Pulses: Normal pulses.      Heart sounds: Normal heart sounds.   Pulmonary:      Effort: Pulmonary effort is normal. No respiratory distress.      Breath sounds: Normal breath sounds.   Musculoskeletal:      Right lower leg: No edema.      Left lower leg: No edema.   Skin:     General: Skin is warm and dry.      Capillary Refill: Capillary refill takes less than 2 seconds.   Neurological:      General: No focal deficit present.      Mental Status: She is alert and oriented to person, place, and time.   Psychiatric:         Mood and Affect: Mood normal.         Behavior: Behavior normal.         Thought Content: Thought content normal.         Judgment: Judgment normal.        Result Review :                Assessment and Plan   Diagnoses and all orders for this visit:    1. Paroxysmal atrial fibrillation (Primary)  Comments:  -continue tikosyn  -follow up with Dr. Lloyd    2. Primary hypertension  Comments:  -continue current medications  -continue keeping track of home BPs once daily             Follow Up   Return if symptoms worsen or fail to improve.  Patient was given instructions and counseling regarding her condition or for health maintenance advice. Please see specific information pulled into the AVS if appropriate.

## 2023-05-25 ENCOUNTER — OFFICE VISIT (OUTPATIENT)
Dept: FAMILY MEDICINE CLINIC | Facility: CLINIC | Age: 83
End: 2023-05-25
Payer: MEDICARE

## 2023-05-25 VITALS
HEIGHT: 63 IN | OXYGEN SATURATION: 97 % | BODY MASS INDEX: 21.26 KG/M2 | DIASTOLIC BLOOD PRESSURE: 76 MMHG | WEIGHT: 120 LBS | HEART RATE: 73 BPM | SYSTOLIC BLOOD PRESSURE: 168 MMHG

## 2023-05-25 DIAGNOSIS — I48.0 PAROXYSMAL ATRIAL FIBRILLATION: Primary | Chronic | ICD-10-CM

## 2023-05-25 DIAGNOSIS — I10 PRIMARY HYPERTENSION: Chronic | ICD-10-CM

## 2023-05-28 ENCOUNTER — APPOINTMENT (OUTPATIENT)
Dept: GENERAL RADIOLOGY | Facility: HOSPITAL | Age: 83
End: 2023-05-28
Payer: MEDICARE

## 2023-05-28 ENCOUNTER — HOSPITAL ENCOUNTER (OUTPATIENT)
Facility: HOSPITAL | Age: 83
Setting detail: OBSERVATION
Discharge: HOME | End: 2023-05-29
Attending: EMERGENCY MEDICINE | Admitting: INTERNAL MEDICINE
Payer: MEDICARE

## 2023-05-28 DIAGNOSIS — R00.2 PALPITATIONS: Primary | ICD-10-CM

## 2023-05-28 DIAGNOSIS — R42 LIGHTHEADED: ICD-10-CM

## 2023-05-28 LAB
ALBUMIN SERPL-MCNC: 4.1 G/DL (ref 3.5–5.2)
ALBUMIN/GLOB SERPL: 1.3 G/DL
ALP SERPL-CCNC: 83 U/L (ref 39–117)
ALT SERPL W P-5'-P-CCNC: 11 U/L (ref 1–33)
ANION GAP SERPL CALCULATED.3IONS-SCNC: 16 MMOL/L (ref 5–15)
AST SERPL-CCNC: 20 U/L (ref 1–32)
BASOPHILS # BLD AUTO: 0 10*3/MM3 (ref 0–0.2)
BASOPHILS NFR BLD AUTO: 0.9 % (ref 0–1.5)
BILIRUB SERPL-MCNC: 0.4 MG/DL (ref 0–1.2)
BUN SERPL-MCNC: 25 MG/DL (ref 8–23)
BUN/CREAT SERPL: 28.7 (ref 7–25)
CALCIUM SPEC-SCNC: 9.6 MG/DL (ref 8.6–10.5)
CHLORIDE SERPL-SCNC: 101 MMOL/L (ref 98–107)
CO2 SERPL-SCNC: 21 MMOL/L (ref 22–29)
CREAT SERPL-MCNC: 0.87 MG/DL (ref 0.57–1)
DEPRECATED RDW RBC AUTO: 48.6 FL (ref 37–54)
EGFRCR SERPLBLD CKD-EPI 2021: 66.2 ML/MIN/1.73
EOSINOPHIL # BLD AUTO: 0.2 10*3/MM3 (ref 0–0.4)
EOSINOPHIL NFR BLD AUTO: 5.4 % (ref 0.3–6.2)
ERYTHROCYTE [DISTWIDTH] IN BLOOD BY AUTOMATED COUNT: 14.7 % (ref 12.3–15.4)
GEN 5 2HR TROPONIN T REFLEX: 28 NG/L
GLOBULIN UR ELPH-MCNC: 3.1 GM/DL
GLUCOSE SERPL-MCNC: 110 MG/DL (ref 65–99)
HCT VFR BLD AUTO: 37.7 % (ref 34–46.6)
HGB BLD-MCNC: 12.2 G/DL (ref 12–15.9)
HOLD SPECIMEN: NORMAL
HOLD SPECIMEN: NORMAL
INR PPP: 2.41 (ref 2–3)
INR PPP: 2.57 (ref 2–3)
LYMPHOCYTES # BLD AUTO: 0.9 10*3/MM3 (ref 0.7–3.1)
LYMPHOCYTES NFR BLD AUTO: 19.1 % (ref 19.6–45.3)
MAGNESIUM SERPL-MCNC: 1.6 MG/DL (ref 1.6–2.4)
MCH RBC QN AUTO: 29.3 PG (ref 26.6–33)
MCHC RBC AUTO-ENTMCNC: 32.4 G/DL (ref 31.5–35.7)
MCV RBC AUTO: 90.3 FL (ref 79–97)
MONOCYTES # BLD AUTO: 0.5 10*3/MM3 (ref 0.1–0.9)
MONOCYTES NFR BLD AUTO: 11 % (ref 5–12)
NEUTROPHILS NFR BLD AUTO: 2.9 10*3/MM3 (ref 1.7–7)
NEUTROPHILS NFR BLD AUTO: 63.6 % (ref 42.7–76)
NRBC BLD AUTO-RTO: 0.5 /100 WBC (ref 0–0.2)
PLATELET # BLD AUTO: 241 10*3/MM3 (ref 140–450)
PMV BLD AUTO: 7.8 FL (ref 6–12)
POTASSIUM SERPL-SCNC: 4 MMOL/L (ref 3.5–5.2)
PROT SERPL-MCNC: 7.2 G/DL (ref 6–8.5)
PROTHROMBIN TIME: 24.5 SECONDS (ref 19.4–28.5)
PROTHROMBIN TIME: 26.1 SECONDS (ref 19.4–28.5)
RBC # BLD AUTO: 4.18 10*6/MM3 (ref 3.77–5.28)
SODIUM SERPL-SCNC: 138 MMOL/L (ref 136–145)
TROPONIN T DELTA: -6 NG/L
TROPONIN T SERPL HS-MCNC: 34 NG/L
TSH SERPL DL<=0.05 MIU/L-ACNC: 1.21 UIU/ML (ref 0.27–4.2)
WBC NRBC COR # BLD: 4.6 10*3/MM3 (ref 3.4–10.8)
WHOLE BLOOD HOLD COAG: NORMAL
WHOLE BLOOD HOLD SPECIMEN: NORMAL

## 2023-05-28 PROCEDURE — 99214 OFFICE O/P EST MOD 30 MIN: CPT | Performed by: INTERNAL MEDICINE

## 2023-05-28 PROCEDURE — G0378 HOSPITAL OBSERVATION PER HR: HCPCS

## 2023-05-28 PROCEDURE — 84484 ASSAY OF TROPONIN QUANT: CPT

## 2023-05-28 PROCEDURE — 85025 COMPLETE CBC W/AUTO DIFF WBC: CPT

## 2023-05-28 PROCEDURE — 85610 PROTHROMBIN TIME: CPT

## 2023-05-28 PROCEDURE — 80053 COMPREHEN METABOLIC PANEL: CPT

## 2023-05-28 PROCEDURE — 93005 ELECTROCARDIOGRAM TRACING: CPT

## 2023-05-28 PROCEDURE — 83735 ASSAY OF MAGNESIUM: CPT

## 2023-05-28 PROCEDURE — 84443 ASSAY THYROID STIM HORMONE: CPT

## 2023-05-28 PROCEDURE — 85610 PROTHROMBIN TIME: CPT | Performed by: INTERNAL MEDICINE

## 2023-05-28 PROCEDURE — 99285 EMERGENCY DEPT VISIT HI MDM: CPT

## 2023-05-28 PROCEDURE — 71045 X-RAY EXAM CHEST 1 VIEW: CPT

## 2023-05-28 PROCEDURE — 36415 COLL VENOUS BLD VENIPUNCTURE: CPT | Performed by: INTERNAL MEDICINE

## 2023-05-28 RX ORDER — BISACODYL 5 MG/1
5 TABLET, DELAYED RELEASE ORAL DAILY PRN
Status: DISCONTINUED | OUTPATIENT
Start: 2023-05-28 | End: 2023-05-29 | Stop reason: HOSPADM

## 2023-05-28 RX ORDER — LEVOTHYROXINE SODIUM 112 UG/1
112 TABLET ORAL DAILY
COMMUNITY

## 2023-05-28 RX ORDER — AMLODIPINE BESYLATE 5 MG/1
5 TABLET ORAL
Status: DISCONTINUED | OUTPATIENT
Start: 2023-05-28 | End: 2023-05-29 | Stop reason: HOSPADM

## 2023-05-28 RX ORDER — METOPROLOL SUCCINATE 50 MG/1
100 TABLET, EXTENDED RELEASE ORAL DAILY
Status: DISCONTINUED | OUTPATIENT
Start: 2023-05-28 | End: 2023-05-29 | Stop reason: HOSPADM

## 2023-05-28 RX ORDER — ONDANSETRON 4 MG/1
4 TABLET, FILM COATED ORAL EVERY 6 HOURS PRN
Status: DISCONTINUED | OUTPATIENT
Start: 2023-05-28 | End: 2023-05-29

## 2023-05-28 RX ORDER — WARFARIN SODIUM 2.5 MG/1
5 TABLET ORAL EVERY EVENING
COMMUNITY

## 2023-05-28 RX ORDER — LEVOTHYROXINE SODIUM 112 UG/1
112 TABLET ORAL DAILY
Status: DISCONTINUED | OUTPATIENT
Start: 2023-05-29 | End: 2023-05-29 | Stop reason: HOSPADM

## 2023-05-28 RX ORDER — ACETAMINOPHEN 160 MG/5ML
650 SOLUTION ORAL EVERY 4 HOURS PRN
Status: DISCONTINUED | OUTPATIENT
Start: 2023-05-28 | End: 2023-05-29 | Stop reason: HOSPADM

## 2023-05-28 RX ORDER — ONDANSETRON 2 MG/ML
4 INJECTION INTRAMUSCULAR; INTRAVENOUS EVERY 6 HOURS PRN
Status: DISCONTINUED | OUTPATIENT
Start: 2023-05-28 | End: 2023-05-29

## 2023-05-28 RX ORDER — SODIUM CHLORIDE 0.9 % (FLUSH) 0.9 %
10 SYRINGE (ML) INJECTION AS NEEDED
Status: DISCONTINUED | OUTPATIENT
Start: 2023-05-28 | End: 2023-05-29 | Stop reason: HOSPADM

## 2023-05-28 RX ORDER — BISACODYL 10 MG
10 SUPPOSITORY, RECTAL RECTAL DAILY PRN
Status: DISCONTINUED | OUTPATIENT
Start: 2023-05-28 | End: 2023-05-29 | Stop reason: HOSPADM

## 2023-05-28 RX ORDER — POLYETHYLENE GLYCOL 3350 17 G/17G
8.5 POWDER, FOR SOLUTION ORAL DAILY
COMMUNITY

## 2023-05-28 RX ORDER — WARFARIN SODIUM 5 MG/1
5 TABLET ORAL
Status: DISCONTINUED | OUTPATIENT
Start: 2023-05-28 | End: 2023-05-29 | Stop reason: HOSPADM

## 2023-05-28 RX ORDER — SODIUM CHLORIDE 0.9 % (FLUSH) 0.9 %
10 SYRINGE (ML) INJECTION EVERY 12 HOURS SCHEDULED
Status: DISCONTINUED | OUTPATIENT
Start: 2023-05-28 | End: 2023-05-29 | Stop reason: HOSPADM

## 2023-05-28 RX ORDER — AMOXICILLIN 250 MG
2 CAPSULE ORAL 2 TIMES DAILY
Status: DISCONTINUED | OUTPATIENT
Start: 2023-05-28 | End: 2023-05-29 | Stop reason: HOSPADM

## 2023-05-28 RX ORDER — DOFETILIDE 0.12 MG/1
125 CAPSULE ORAL EVERY 12 HOURS SCHEDULED
Status: DISCONTINUED | OUTPATIENT
Start: 2023-05-28 | End: 2023-05-29 | Stop reason: HOSPADM

## 2023-05-28 RX ORDER — MULTIPLE VITAMINS W/ MINERALS TAB 9MG-400MCG
1 TAB ORAL DAILY
Status: DISCONTINUED | OUTPATIENT
Start: 2023-05-28 | End: 2023-05-29 | Stop reason: HOSPADM

## 2023-05-28 RX ORDER — ACETAMINOPHEN 325 MG/1
650 TABLET ORAL EVERY 4 HOURS PRN
Status: DISCONTINUED | OUTPATIENT
Start: 2023-05-28 | End: 2023-05-29 | Stop reason: HOSPADM

## 2023-05-28 RX ORDER — CALCIUM CARBONATE 500 MG/1
3 TABLET, CHEWABLE ORAL 3 TIMES DAILY PRN
Status: DISCONTINUED | OUTPATIENT
Start: 2023-05-28 | End: 2023-05-29 | Stop reason: HOSPADM

## 2023-05-28 RX ORDER — CHOLECALCIFEROL (VITAMIN D3) 125 MCG
5 CAPSULE ORAL NIGHTLY PRN
Status: DISCONTINUED | OUTPATIENT
Start: 2023-05-28 | End: 2023-05-29 | Stop reason: HOSPADM

## 2023-05-28 RX ORDER — POLYETHYLENE GLYCOL 3350 17 G/17G
17 POWDER, FOR SOLUTION ORAL DAILY PRN
Status: DISCONTINUED | OUTPATIENT
Start: 2023-05-28 | End: 2023-05-29 | Stop reason: HOSPADM

## 2023-05-28 RX ORDER — ACETAMINOPHEN 650 MG/1
650 SUPPOSITORY RECTAL EVERY 4 HOURS PRN
Status: DISCONTINUED | OUTPATIENT
Start: 2023-05-28 | End: 2023-05-29 | Stop reason: HOSPADM

## 2023-05-28 RX ORDER — LEFLUNOMIDE 20 MG/1
20 TABLET ORAL DAILY
Status: DISCONTINUED | OUTPATIENT
Start: 2023-05-28 | End: 2023-05-29 | Stop reason: HOSPADM

## 2023-05-28 RX ORDER — SODIUM CHLORIDE 9 MG/ML
40 INJECTION, SOLUTION INTRAVENOUS AS NEEDED
Status: DISCONTINUED | OUTPATIENT
Start: 2023-05-28 | End: 2023-05-29 | Stop reason: HOSPADM

## 2023-05-28 RX ADMIN — METOPROLOL SUCCINATE 100 MG: 50 TABLET, EXTENDED RELEASE ORAL at 17:51

## 2023-05-28 RX ADMIN — MULTIPLE VITAMINS W/ MINERALS TAB 1 TABLET: TAB at 17:51

## 2023-05-28 RX ADMIN — WARFARIN SODIUM 5 MG: 5 TABLET ORAL at 18:54

## 2023-05-28 RX ADMIN — AMLODIPINE BESYLATE 5 MG: 5 TABLET ORAL at 17:51

## 2023-05-28 RX ADMIN — LEFLUNOMIDE 20 MG: 20 TABLET ORAL at 17:51

## 2023-05-28 RX ADMIN — DOFETILIDE 125 MCG: 0.12 CAPSULE ORAL at 20:19

## 2023-05-28 RX ADMIN — Medication 10 ML: at 20:21

## 2023-05-28 NOTE — ED NOTES
Nursing report ED to floor  Jt Valdes  82 y.o.  female    HPI:   Chief Complaint   Patient presents with    Rapid Heart Rate       Admitting doctor:   No admitting provider for patient encounter.    Admitting diagnosis:   The primary encounter diagnosis was Palpitations. A diagnosis of Lightheaded was also pertinent to this visit.    Code status:   Current Code Status       Date Active Code Status Order ID Comments User Context       5/28/2023 1335 CPR (Attempt to Resuscitate) [id number]  Alberta Romero MD ED        Question Answer    Code Status (Patient has no pulse and is not breathing) CPR (Attempt to Resuscitate)    Medical Interventions (Patient has pulse or is breathing) Full Support                    Allergies:   Sulfa antibiotics and Macrobid [nitrofurantoin]    Isolation:  No active isolations     Fall Risk:  Fall Risk Assessment was completed, and patient is at low risk for falls.   Predictive Model Details         7 (Low) Factor Value    Calculated 5/28/2023 14:08 Age 82    Risk of Fall Model Musculoskeletal Assessment WDL     Active Peripheral IV Present     Imaging order in this encounter Present     Respiratory Rate 18     Skin Assessment WDL     Magnesium 1.6 mg/dL     Financial Class Medicare     Drug Use No     Chicho Scale not on file     Peripheral Vascular Assessment WDL     Cardiac Assessment X     Diastolic BP 79     Chloride 101 mmol/L     Gastrointestinal Assessment WDL     Albumin 4.1 g/dL     Days after Admission 0.204     Creatinine 0.87 mg/dL     Total Bilirubin 0.4 mg/dL     Calcium 9.6 mg/dL     ALT 11 U/L     Potassium 4 mmol/L         Weight:       05/28/23  0912   Weight: 52.1 kg (114 lb 13.8 oz)       Intake and Output  No intake or output data in the 24 hours ending 05/28/23 1638    Diet:   Dietary Orders (From admission, onward)       Start     Ordered    05/28/23 1507  Diet: Regular/House Diet; Texture: Regular Texture (IDDSI 7); Fluid Consistency: Thin (IDDSI 0)  Diet  Effective Now        References:    Diet Order [facility]   Question Answer Comment   Diets: Regular/House Diet    Texture: Regular Texture (IDDSI 7)    Fluid Consistency: Thin (IDDSI 0)        05/28/23 1506                     Most recent vitals:   Vitals:    05/28/23 1216 05/28/23 1346 05/28/23 1401 05/28/23 1502   BP: 131/69 112/75 135/79 138/72   BP Location:  Left arm Left arm Left arm   Patient Position:  Sitting Sitting Sitting   Pulse: 66 70 67 71   Resp:  13 18 18   Temp:       SpO2: 97% 97% 97% 99%   Weight:       Height:           Active LDAs/IV Access:   Lines, Drains & Airways       Active LDAs       Name Placement date Placement time Site Days    Peripheral IV 05/28/23 0954 Anterior;Right Forearm 05/28/23  0954  Forearm  less than 1                    Skin Condition:   Skin Assessments (last day)       Date/Time Skin WDL    05/28/23 09:55:57 WDL             Labs (abnormal labs have a star):   Labs Reviewed   COMPREHENSIVE METABOLIC PANEL - Abnormal; Notable for the following components:       Result Value    Glucose 110 (*)     BUN 25 (*)     CO2 21.0 (*)     BUN/Creatinine Ratio 28.7 (*)     Anion Gap 16.0 (*)     All other components within normal limits    Narrative:     GFR Normal >60  Chronic Kidney Disease <60  Kidney Failure <15    The GFR formula is only valid for adults with stable renal function between ages 18 and 70.   TROPONIN - Abnormal; Notable for the following components:    HS Troponin T 34 (*)     All other components within normal limits    Narrative:     High Sensitive Troponin T Reference Range:  <10.0 ng/L- Negative Female for AMI  <15.0 ng/L- Negative Male for AMI  >=10 - Abnormal Female indicating possible myocardial injury.  >=15 - Abnormal Male indicating possible myocardial injury.   Clinicians would have to utilize clinical acumen, EKG, Troponin, and serial changes to determine if it is an Acute Myocardial Infarction or myocardial injury due to an underlying chronic  condition.        CBC WITH AUTO DIFFERENTIAL - Abnormal; Notable for the following components:    Lymphocyte % 19.1 (*)     nRBC 0.5 (*)     All other components within normal limits   HIGH SENSITIVITIY TROPONIN T 2HR - Abnormal; Notable for the following components:    HS Troponin T 28 (*)     Troponin T Delta -6 (*)     All other components within normal limits    Narrative:     High Sensitive Troponin T Reference Range:  <10.0 ng/L- Negative Female for AMI  <15.0 ng/L- Negative Male for AMI  >=10 - Abnormal Female indicating possible myocardial injury.  >=15 - Abnormal Male indicating possible myocardial injury.   Clinicians would have to utilize clinical acumen, EKG, Troponin, and serial changes to determine if it is an Acute Myocardial Infarction or myocardial injury due to an underlying chronic condition.        PROTIME-INR - Normal   TSH - Normal   MAGNESIUM - Normal   RAINBOW DRAW    Narrative:     The following orders were created for panel order [place] Draw.  Procedure                               Abnormality         Status                     ---------                               -----------         ------                     Green Top (Gel)[[id number]]                                  Final result               Lavender Top[[id number]]                                     Final result               Gold Top - SST[[id number]]                                   Final result               Light Blue Top[[id number]]                                   Final result                 Please view results for these tests on the individual orders.   GREEN TOP   LAVENDER TOP   GOLD TOP - SST   LIGHT BLUE TOP   CBC AND DIFFERENTIAL    Narrative:     The following orders were created for panel order CBC & Differential.  Procedure                               Abnormality         Status                     ---------                               -----------         ------                     CBC Auto  Differential[[id number]]        Abnormal            Final result                 Please view results for these tests on the individual orders.       LOC: Person, Place, Time, and Situation    Telemetry:  Telemetry    Cardiac Monitoring Ordered: yes    EKG:   ECG 12 Lead Tachycardia   Preliminary Result   HEART RATE= 71  bpm   RR Interval= 840  ms   MO Interval= 153  ms   P Horizontal Axis=   deg   P Front Axis= 64  deg   QRSD Interval= 84  ms   QT Interval= 390  ms   QRS Axis= 34  deg   T Wave Axis= 1  deg   - ABNORMAL ECG -   Sinus arrhythmia   Probable LVH with secondary repol abnrm   Electronically Signed By:    Date and Time of Study: 2023-05-28 09:18:45          Medications Given in the ED:   Medications   sodium chloride 0.9 % flush 10 mL (has no administration in time range)   amLODIPine (NORVASC) tablet 5 mg (5 mg Oral Not Given 5/28/23 1633)   dofetilide (TIKOSYN) capsule 125 mcg (has no administration in time range)   leflunomide (ARAVA) tablet 20 mg (20 mg Oral Not Given 5/28/23 1636)   levothyroxine (SYNTHROID, LEVOTHROID) tablet 112 mcg (has no administration in time range)   metoprolol succinate XL (TOPROL-XL) 24 hr tablet 100 mg (100 mg Oral Not Given 5/28/23 1636)   multivitamin with minerals 1 tablet (1 tablet Oral Not Given 5/28/23 1633)   sodium chloride 0.9 % flush 10 mL (has no administration in time range)   sodium chloride 0.9 % flush 10 mL (has no administration in time range)   sodium chloride 0.9 % infusion 40 mL (has no administration in time range)   Potassium Replacement - Follow Nurse / BPA Driven Protocol (has no administration in time range)   Magnesium Standard Dose Replacement - Follow Nurse / BPA Driven Protocol (has no administration in time range)   Phosphorus Replacement - Follow Nurse / BPA Driven Protocol (has no administration in time range)   Calcium Replacement - Follow Nurse / BPA Driven Protocol (has no administration in time range)   acetaminophen (TYLENOL) tablet 650 mg  (has no administration in time range)     Or   acetaminophen (TYLENOL) 160 MG/5ML solution 650 mg (has no administration in time range)     Or   acetaminophen (TYLENOL) suppository 650 mg (has no administration in time range)   melatonin tablet 5 mg (has no administration in time range)   sennosides-docusate (PERICOLACE) 8.6-50 MG per tablet 2 tablet (has no administration in time range)     And   polyethylene glycol (MIRALAX) packet 17 g (has no administration in time range)     And   bisacodyl (DULCOLAX) EC tablet 5 mg (has no administration in time range)     And   bisacodyl (DULCOLAX) suppository 10 mg (has no administration in time range)   ondansetron (ZOFRAN) tablet 4 mg (has no administration in time range)     Or   ondansetron (ZOFRAN) injection 4 mg (has no administration in time range)   calcium carbonate (TUMS) chewable tablet 500 mg (200 mg elemental) (has no administration in time range)   Pharmacy to dose warfarin (has no administration in time range)   warfarin (COUMADIN) tablet 5 mg (has no administration in time range)       Imaging results:  XR Chest 1 View    Result Date: 5/28/2023  Impression: Mild left lateral basilar airspace opacities, which may be due to atelectasis and/or pneumonia. Electronically Signed: Richelle Ortega  5/28/2023 10:13 AM EDT  Workstation ID: [id number]     Social issues:   Social History     Socioeconomic History    Marital status:     Number of children: 5    Years of education: 12   Tobacco Use    Smoking status: Never    Smokeless tobacco: Never   Vaping Use    Vaping Use: Never used   Substance and Sexual Activity    Alcohol use: Yes     Alcohol/week: 1.0 standard drink     Types: 1 Glasses of wine per week     Comment: Occ.    Drug use: No    Sexual activity: Not Currently         Additional notable assessment information:N/A     Nursing report ED to floor:  Report given to SHARRI Guerrero RN   05/28/23 16:38 EDT

## 2023-05-28 NOTE — H&P
[facility] Medicine Services  History & Physical    Patient Name: Jt Valdes  : 1940  MRN: [id number]  Primary Care Physician:  Dilcia Trinidad MD  Date of admission: 2023  Date and Time of Service: 23 at 1336    Subjective      Chief Complaint: Palpitation    History of Present Illness: Jt Valdes is a 82 y.o. female with history of paroxysmal atrial fibrillation on warfarin, hypertension, hyperlipidemia and hypothyroidism who presented to [facility] on 2023 complaining of palpitation for the past 24 hours.  This is her third visit to the hospital in 1 month in relation to A-fib.  During prior visits she elected to have a Tikosyn instead of ablation; now she feels Tikosyn has failed and she would like to consider ablation.  Cardiology consultation was requested by ED staff      ROS palpitation and light shortness of breath with exertion over the past 24 hours.  No chest pain, cough, sputum, fever, chills, nausea, vomiting, lightheadedness, diaphoresis.  At least 14 systems reviewed.  Relevant information as in HPI    Personal History     Past Medical History:   Diagnosis Date   • Allergic     Sulfa   • Arthritis    • Cataract     Cataracts surgery   • COVID-19    • Degenerative joint disease    • Extremity pain     legs pain   • H/O degenerative disc disease    • History of colonoscopy 2009    last done    • History of echocardiogram 2018    LVH EF 65%. RV OK. LA probably mildly dilated. AV OK. MV OK. Modest TR RVSP 26 or less. Nuclear MPI regadenoson 2018. LV uniform myocardial uptake and wall motion EF 71%.    • History of Holter monitoring 10/08/2018    SR 40-81 58. AF 2.7 hr episode VR . At termination AF 2 3.5-4.0 s pauses with patient possibly dizzy. 205 PAC 42 atrial ashanti several SVT. No ventricular ectopy.    • HL (hearing loss) 2020    Tried hearing aids twice . Didnt help   • HTN (hypertension)    • Hyperlipidemia    •  Hypertensive cardiovascular disease    • Hypothyroidism    • Incontinence    • Leukopenia    • Low back pain    • PAF (paroxysmal atrial fibrillation) 09/2018    [facility] Sept 2018 PAF and HTN. PAFL On bisoprolol and diltiazem bradycardia symptomatic with dizziness. Amiodarone alone Oct 2018 and no episodes of erratic or fast heartbeat or lightheadedness.    • Rheumatoid arthritis     Possible pulmonary involvement    • Staph infection    • Vitamin D deficiency    • White coat syndrome with hypertension        Past Surgical History:   Procedure Laterality Date   • APPENDECTOMY  1974   • BACK SURGERY  2003 2001, 2003   • SPINE SURGERY  2001.    2003   • SUBTOTAL HYSTERECTOMY  1974       Family History: family history includes Arthritis in her mother; Cancer in her father, mother, and sister; Osteoarthritis in her mother; Other in an other family member. Otherwise pertinent FHx was reviewed and not pertinent to current issue.    Social History:  reports that she has never smoked. She has never used smokeless tobacco. She reports current alcohol use of about 1.0 standard drink per week. She reports that she does not use drugs.    Home Medications:  Prior to Admission Medications     Prescriptions Last Dose Informant Patient Reported? Taking?    amLODIPine (NORVASC) 5 MG tablet   No Yes    Take 1 tablet by mouth Daily.    Diclofenac Sodium (VOLTAREN) 1 % gel gel   No Yes    Apply 4 g topically to the appropriate area as directed 4 (Four) Times a Day As Needed (pain).    dofetilide (TIKOSYN) 125 MCG capsule 5/28/2023  No Yes    Take 1 capsule by mouth Every 12 (Twelve) Hours.    leflunomide (ARAVA) 20 MG tablet   No Yes    Take 1 tablet by mouth Daily.    levothyroxine (SYNTHROID, LEVOTHROID) 112 MCG tablet 5/28/2023  Yes Yes    Take 1 tablet by mouth Daily.    metoprolol succinate XL (TOPROL-XL) 100 MG 24 hr tablet   No Yes    Take 1 tablet by mouth Daily.    Multiple Vitamins-Minerals (PRESERVISION AREDS 2 PO)   Yes  Yes    Take 1 tablet by mouth Daily.    multivitamin with minerals tablet tablet   Yes Yes    Take 1 tablet by mouth Daily.    polyethylene glycol (MiraLax) 17 g packet   Yes Yes    Take 8.5 g by mouth Daily.    warfarin (COUMADIN) 2.5 MG tablet 5/27/2023  Yes Yes    Take 2 tablets by mouth Every Evening.            Allergies:  Allergies   Allergen Reactions   • Sulfa Antibiotics Rash   • Macrobid [Nitrofurantoin] Unknown - High Severity       Objective      Vitals:   Temp:  [98.6 °F (37 °C)] 98.6 °F (37 °C)  Heart Rate:  [66-85] 66  Resp:  [18] 18  BP: (125-142)/(69-84) 131/69    Physical Exam   General appearance: Very pleasant female resting in bed, not in distress, daughter Monica at bedside  Mental status: Alert and oriented x3  Head: Normocephalic and atraumatic  Eyes: EOMI, nonicteric  Face: Symmetrical, no lesions  Neck: Supple, no tenderness  CVS: irregular heart sounds, no gallops, no murmurs  Respiration: Unlabored breathing at rest, clear to auscultation  GI: Soft, nondistended, nontender, normal bowel sounds  Lower extremity: No leg edema, no calf tenderness  Skin: Normal color and temperature  Neurology: Moves all extremities, no facial droop, normal speech  Psychiatry: Normal affect, appropriate behavior  Musculoskeletal: Well developed female's, no gross deformities    Result Review    Result Review:  I have personally reviewed the results from the time of this admission to 5/28/2023 13:36 EDT and agree with these findings:  [x]  Laboratory  []  Microbiology  [x]  Radiology  [x]  EKG/Telemetry   []  Cardiology/Vascular   []  Pathology  [x]  Old records  []  Other:      Assessment & Plan        Active Hospital Problems:  Active Hospital Problems    Diagnosis    • **Palpitations    • Long term (current) use of anticoagulants    • Hypothyroidism    • Paroxysmal atrial fibrillation    • Vitamin D deficiency      Plan:   #Paroxysmal atrial fibrillation with RVR  Failed medical therapy due to ongoing  intermittent RVR with symptoms  Admitted for consideration for cardiac ablation  Continue Tikosyn and metoprolol for now  Cardiology consultation initiated by ED staff    #Essential hypertension  Continue home amlodipine and metoprolol    #Chronic anticoagulation for A-fib  INR 2.57  Pharmacy consultation for management    DVT prophylaxis:  Medical DVT prophylaxis orders are present.    CODE STATUS:    Code Status (Patient has no pulse and is not breathing): CPR (Attempt to Resuscitate)  Medical Interventions (Patient has pulse or is breathing): Full Support    Admission Status:  I believe this patient meets Obs status.    I discussed the patient's findings and my recommendations with patient and daughter    Signature: Electronically signed by Alberta Romero MD, 05/28/23, 13:36 EDT.  [facility] Hospitalist Team

## 2023-05-28 NOTE — PROGRESS NOTES
"Pharmacy dosing service  Anticoagulant  Warfarin     Subjective:    Jt Valdes is a 82 y.o.female being continued on warfarin for atrial fibrillation.    INR Goal: 2 - 3  Home medication?: warfarin 5 mg PO daily  Bridge Therapy Present?:  No  Interacting Medications Evaluation (New/Present/Discontinued): leflunomide can enhance warfarin (home med)  Additional Contributing Factors: n/a      Assessment/Plan:    INR therapeutic at time of consult, will continue patient home regimen. Last dose reported 5/27.     Continue to monitor and adjust based on INR.         Date 5/28           INR 2.57           Dose 5 mg               Objective:  [Ht: 160 cm (63\"); Wt: 52.1 kg (114 lb 13.8 oz); BMI: Body mass index is 20.35 kg/m².]    Lab Results   Component Value Date    ALBUMIN 4.1 05/28/2023     Lab Results   Component Value Date    INR 2.57 05/28/2023    INR 2.69 05/19/2023    INR 2.40 (A) 05/16/2023    PROTIME 26.1 05/28/2023    PROTIME 27.2 05/19/2023    PROTIME 18.4 (L) 05/11/2023     Lab Results   Component Value Date    HGB 12.2 05/28/2023    HGB 12.2 05/19/2023    HGB 11.9 (L) 05/11/2023     Lab Results   Component Value Date    HCT 37.7 05/28/2023    HCT 37.1 05/19/2023    HCT 35.5 05/11/2023       Terrie Adame [facility]  05/28/23 13:47 EDT     "

## 2023-05-28 NOTE — CONSULTS
Referring Provider: Flavio Alejo MD    Reason for Consultation: Atrial fibrillation      Patient Care Team:  Dilcia Trinidad MD as PCP - General (Family Medicine)  Roland Benitez MD as Emergency Attending (Family Medicine)  Jose Patiño MD as Consulting Physician (Cardiology)  Aurora Mckeon APRN as Nurse Practitioner (Cardiology)      SUBJECTIVE     Chief Complaint: Palpitations    History of present illness:  Jt Valdes is a 82 y.o. female, patient of Dr. Patiño with paroxysmal atrial fibrillation on warfarin, hypertension, obstructive sleep apnea, hypothyroidism, hypertension who presents to the hospital with complaints of palpitations.  Of note she was recently in the hospital for similar complaints and was started on Tikosyn.  She converted to sinus rhythm.  During assessment in the emergency room she is still in sinus rhythm.  She feels that Tikosyn is not working for her and this is the second time she is in the hospital since started taking Tikosyn for atrial fibrillation.  She denies any chest pain, shortness of breath.  She lives by herself.    Review of systems:    Constitutional: No weakness, fatigue, fever, rigors, chills   Eyes: No vision changes, eye pain   ENT/oropharynx: No difficulty swallowing, sore throat, epistaxis, changes in hearing   Cardiovascular: No chest pain, chest tightness, + palpitations, paroxysmal nocturnal dyspnea, orthopnea, diaphoresis, dizziness / syncopal episode   Respiratory: No shortness of breath, dyspnea on exertion, cough, wheezing, hemoptysis   Gastrointestinal: No abdominal pain, nausea, vomiting, diarrhea, bloody stools   Genitourinary: No hematuria, dysuria   Neurological: No headache, tremors, numbness, one-sided weakness    Musculoskeletal: No cramps, myalgias, joint pain, joint swelling   Integument: No rash, edema        Personal History:      Past Medical History:   Diagnosis Date   • Allergic     Sulfa   • Arthritis    •  Cataract     Cataracts surgery   • COVID-19    • Degenerative joint disease    • Extremity pain     legs pain   • H/O degenerative disc disease    • History of colonoscopy 2009    last done 2009   • History of echocardiogram 09/04/2018    LVH EF 65%. RV OK. LA probably mildly dilated. AV OK. MV OK. Modest TR RVSP 26 or less. Nuclear MPI regadenoson 9/14/2018. LV uniform myocardial uptake and wall motion EF 71%.    • History of Holter monitoring 10/08/2018    SR 40-81 58. AF 2.7 hr episode VR . At termination AF 2 3.5-4.0 s pauses with patient possibly dizzy. 205 PAC 42 atrial ashanti several SVT. No ventricular ectopy.    • HL (hearing loss) 2020    Tried hearing aids twice . Didnt help   • HTN (hypertension)    • Hyperlipidemia    • Hypertensive cardiovascular disease    • Hypothyroidism    • Incontinence    • Leukopenia    • Low back pain    • PAF (paroxysmal atrial fibrillation) 09/2018    [facility] Sept 2018 PAF and HTN. PAFL On bisoprolol and diltiazem bradycardia symptomatic with dizziness. Amiodarone alone Oct 2018 and no episodes of erratic or fast heartbeat or lightheadedness.    • Rheumatoid arthritis     Possible pulmonary involvement    • Staph infection    • Vitamin D deficiency    • White coat syndrome with hypertension        Past Surgical History:   Procedure Laterality Date   • APPENDECTOMY  1974   • BACK SURGERY  2003 2001, 2003   • SPINE SURGERY  2001.    2003   • SUBTOTAL HYSTERECTOMY  1974       Family History   Problem Relation Age of Onset   • Osteoarthritis Mother    • Arthritis Mother    • Cancer Mother    • Other Other         Rheumatoid disease    • Cancer Father    • Cancer Sister        Social History     Tobacco Use   • Smoking status: Never   • Smokeless tobacco: Never   Vaping Use   • Vaping Use: Never used   Substance Use Topics   • Alcohol use: Yes     Alcohol/week: 1.0 standard drink     Types: 1 Glasses of wine per week     Comment: Occ.   • Drug use: No        (Not in a  "hospital admission)       Allergies:     Sulfa antibiotics and Macrobid [nitrofurantoin]    Scheduled Meds:   Continuous Infusions:   PRN Meds:•  [COMPLETED] Insert Peripheral IV **AND** sodium chloride      OBJECTIVE    Vital Signs  Vitals:    05/28/23 1031 05/28/23 1146 05/28/23 1201 05/28/23 1216   BP: 131/70 131/76 142/69 131/69   BP Location: Left arm      Patient Position: Sitting      Pulse: 66 68 67 66   Resp: 18 18     Temp:       SpO2: 98% 97% 97% 97%   Weight:       Height:           Flowsheet Rows    Flowsheet Row First Filed Value   Admission Height 160 cm (63\") Documented at 05/28/2023 0912   Admission Weight 52.1 kg (114 lb 13.8 oz) Documented at 05/28/2023 0912          No intake or output data in the 24 hours ending 05/28/23 1314     Telemetry: Sinus rhythm    Physical Exam:  The patient is alert, oriented and in no distress.  Vital signs as noted above.  Head and neck revealed no carotid bruits or jugular venous distention.  No thyromegaly or lymph adenopathy is present  Lungs clear.  No wheezing.  Breath sounds are normal bilaterally.  Heart normal first and second heart sounds. No murmur.  No precordial rub is present.  No gallop is present.  Abdomen soft and nontender.  No organomegaly is present.  Extremities with good peripheral pulses without any pedal edema.  Skin warm and dry.  Musculoskeletal system is grossly normal.  CNS grossly normal.       Results Review:  I have personally reviewed the results from the time of this admission to 5/28/2023 13:14 EDT and agree with these findings:  []  Laboratory  []  Microbiology  []  Radiology  []  EKG/Telemetry   []  Cardiology/Vascular   []  Pathology  []  Old records  []  Other:    Most notable findings include:     Lab Results (last 24 hours)     Procedure Component Value Units Date/Time    High Sensitivity Troponin T 2Hr [[id number]]  (Abnormal) Collected: 05/28/23 1132    Specimen: Blood Updated: 05/28/23 1158     HS Troponin T 28 ng/L      " Troponin T Delta -6 ng/L     Narrative:      High Sensitive Troponin T Reference Range:  <10.0 ng/L- Negative Female for AMI  <15.0 ng/L- Negative Male for AMI  >=10 - Abnormal Female indicating possible myocardial injury.  >=15 - Abnormal Male indicating possible myocardial injury.   Clinicians would have to utilize clinical acumen, EKG, Troponin, and serial changes to determine if it is an Acute Myocardial Infarction or myocardial injury due to an underlying chronic condition.         Foster Draw [[id number]] Collected: 05/28/23 0935    Specimen: Blood Updated: 05/28/23 1046    Narrative:      The following orders were created for panel order Foster Draw.  Procedure                               Abnormality         Status                     ---------                               -----------         ------                     Green Top (Gel)[[id number]]                                  Final result               Lavender Top[[id number]]                                     Final result               Gold Top - SST[[id number]]                                   Final result               Light Blue Top[[id number]]                                   Final result                 Please view results for these tests on the individual orders.    Light Blue Top [[id number]] Collected: 05/28/23 0935    Specimen: Blood Updated: 05/28/23 1046     Extra Tube Hold for add-ons.     Comment: Auto resulted       Green Top (Gel) [[id number]] Collected: 05/28/23 0935    Specimen: Blood Updated: 05/28/23 1046     Extra Tube Hold for add-ons.     Comment: Auto resulted.       Lavender Top [[id number]] Collected: 05/28/23 0935    Specimen: Blood Updated: 05/28/23 1046     Extra Tube hold for add-on     Comment: Auto resulted       Gold Top - SST [[id number]] Collected: 05/28/23 0935    Specimen: Blood Updated: 05/28/23 1046     Extra Tube Hold for add-ons.     Comment: Auto resulted.       TSH [[id number]]  (Normal) Collected: 05/28/23 0935     Specimen: Blood Updated: 05/28/23 1017     TSH 1.210 uIU/mL     High Sensitivity Troponin T [[id number]]  (Abnormal) Collected: 05/28/23 0935    Specimen: Blood Updated: 05/28/23 1016     HS Troponin T 34 ng/L     Narrative:      High Sensitive Troponin T Reference Range:  <10.0 ng/L- Negative Female for AMI  <15.0 ng/L- Negative Male for AMI  >=10 - Abnormal Female indicating possible myocardial injury.  >=15 - Abnormal Male indicating possible myocardial injury.   Clinicians would have to utilize clinical acumen, EKG, Troponin, and serial changes to determine if it is an Acute Myocardial Infarction or myocardial injury due to an underlying chronic condition.         Magnesium [[id number]]  (Normal) Collected: 05/28/23 0935    Specimen: Blood Updated: 05/28/23 1011     Magnesium 1.6 mg/dL     Comprehensive Metabolic Panel [[id number]]  (Abnormal) Collected: 05/28/23 0935    Specimen: Blood Updated: 05/28/23 1011     Glucose 110 mg/dL      BUN 25 mg/dL      Creatinine 0.87 mg/dL      Sodium 138 mmol/L      Potassium 4.0 mmol/L      Comment: Slight hemolysis detected by analyzer. Results may be affected.        Chloride 101 mmol/L      CO2 21.0 mmol/L      Calcium 9.6 mg/dL      Total Protein 7.2 g/dL      Albumin 4.1 g/dL      ALT (SGPT) 11 U/L      AST (SGOT) 20 U/L      Alkaline Phosphatase 83 U/L      Total Bilirubin 0.4 mg/dL      Globulin 3.1 gm/dL      A/G Ratio 1.3 g/dL      BUN/Creatinine Ratio 28.7     Anion Gap 16.0 mmol/L      eGFR 66.2 mL/min/1.73     Narrative:      GFR Normal >60  Chronic Kidney Disease <60  Kidney Failure <15    The GFR formula is only valid for adults with stable renal function between ages 18 and 70.    Protime-INR [[id number]]  (Normal) Collected: 05/28/23 0935    Specimen: Blood Updated: 05/28/23 0953     Protime 26.1 Seconds      INR 2.57    CBC & Differential [[id number]]  (Abnormal) Collected: 05/28/23 0935    Specimen: Blood Updated: 05/28/23 0946    Narrative:      The following  orders were created for panel order CBC & Differential.  Procedure                               Abnormality         Status                     ---------                               -----------         ------                     CBC Auto Differential[[id number]]        Abnormal            Final result                 Please view results for these tests on the individual orders.    CBC Auto Differential [[id number]]  (Abnormal) Collected: 05/28/23 0935    Specimen: Blood Updated: 05/28/23 0946     WBC 4.60 10*3/mm3      RBC 4.18 10*6/mm3      Hemoglobin 12.2 g/dL      Hematocrit 37.7 %      MCV 90.3 fL      MCH 29.3 pg      MCHC 32.4 g/dL      RDW 14.7 %      RDW-SD 48.6 fl      MPV 7.8 fL      Platelets 241 10*3/mm3      Neutrophil % 63.6 %      Lymphocyte % 19.1 %      Monocyte % 11.0 %      Eosinophil % 5.4 %      Basophil % 0.9 %      Neutrophils, Absolute 2.90 10*3/mm3      Lymphocytes, Absolute 0.90 10*3/mm3      Monocytes, Absolute 0.50 10*3/mm3      Eosinophils, Absolute 0.20 10*3/mm3      Basophils, Absolute 0.00 10*3/mm3      nRBC 0.5 /100 WBC           Imaging Results (Last 24 Hours)     Procedure Component Value Units Date/Time    XR Chest 1 View [[id number]] Collected: 05/28/23 1011     Updated: 05/28/23 1015    Narrative:      XR CHEST 1 VW    Date of Exam: 5/28/2023 9:50 AM EDT    Indication: palpitations    Comparison: AP chest x-ray 5/8/2023, 2 view chest x-ray 3/15/2019    Findings:  There are mild left lateral basilar airspace opacities. Right lung appears grossly clear. No pneumothorax or large pleural effusion is seen. Cardiomediastinal contours appear stable.      Impression:      Impression:  Mild left lateral basilar airspace opacities, which may be due to atelectasis and/or pneumonia.      Electronically Signed: Richelle Ortega    5/28/2023 10:13 AM EDT    Workstation ID: [id number]          LAB RESULTS (LAST 7 DAYS)    CBC  Results from last 7 days   Lab Units 05/28/23  0935   WBC 10*3/mm3 4.60    RBC 10*6/mm3 4.18   HEMOGLOBIN g/dL 12.2   HEMATOCRIT % 37.7   MCV fL 90.3   PLATELETS 10*3/mm3 241       BMP  Results from last 7 days   Lab Units 05/28/23  0935   SODIUM mmol/L 138   POTASSIUM mmol/L 4.0   CHLORIDE mmol/L 101   CO2 mmol/L 21.0*   BUN mg/dL 25*   CREATININE mg/dL 0.87   GLUCOSE mg/dL 110*   MAGNESIUM mg/dL 1.6       CMP   Results from last 7 days   Lab Units 05/28/23  0935   SODIUM mmol/L 138   POTASSIUM mmol/L 4.0   CHLORIDE mmol/L 101   CO2 mmol/L 21.0*   BUN mg/dL 25*   CREATININE mg/dL 0.87   GLUCOSE mg/dL 110*   ALBUMIN g/dL 4.1   BILIRUBIN mg/dL 0.4   ALK PHOS U/L 83   AST (SGOT) U/L 20   ALT (SGPT) U/L 11       BNP        TROPONIN  Results from last 7 days   Lab Units 05/28/23  1132   HSTROP T ng/L 28*       CoAg  Results from last 7 days   Lab Units 05/28/23  0935   INR  2.57       Creatinine Clearance  Estimated Creatinine Clearance: 40.3 mL/min (by C-G formula based on SCr of 0.87 mg/dL).    ABG          Radiology  XR Chest 1 View    Result Date: 5/28/2023  Impression: Mild left lateral basilar airspace opacities, which may be due to atelectasis and/or pneumonia. Electronically Signed: Richelle Ortega  5/28/2023 10:13 AM EDT  Workstation ID: [id number]        EKG  I personally viewed and interpreted the patient's EKG/Telemetry data:  ECG 12 Lead Tachycardia   Preliminary Result   HEART RATE= 71  bpm   RR Interval= 840  ms   IA Interval= 153  ms   P Horizontal Axis=   deg   P Front Axis= 64  deg   QRSD Interval= 84  ms   QT Interval= 390  ms   QRS Axis= 34  deg   T Wave Axis= 1  deg   - ABNORMAL ECG -   Sinus arrhythmia   Probable LVH with secondary repol abnrm   Electronically Signed By:    Date and Time of Study: 2023-05-28 09:18:45            Echocardiogram:    Results for orders placed during the hospital encounter of 02/01/23    Adult Transthoracic Echo Complete W/ Cont if Necessary Per Protocol    Interpretation Summary  •  Estimated right ventricular systolic pressure from tricuspid  regurgitation is mildly elevated (35-45 mmHg).      Normal LV size and contractility EF of 60 to 65%  Normal RV size  Normal atrial size  Pulmonic valve is not well visualized.  Aortic valve, mitral valve, tricuspid valve appears structurally normal, mild tricuspid regurgitation seen.  Calculated RV systolic pressure of 40 mm of.  No pericardial effusion seen.  Proximal aorta appears normal in size.        Stress Test:  Results for orders placed during the hospital encounter of 23    Stress Test With Myocardial Perfusion One Day    Interpretation Summary  LEXISCAN CARDIOLITE REPORT    DATE OF PROCEDURE:  05/10/23    INDICATION FOR PROCEDURE: Atrial fibrillation, shortness of breath    PROCEDURE PERFORMED: Lexiscan Cardiolite    PROCEDURE COMMENTS:    After informed consent was obtained.  Patient's resting heart rate was 79 bpm, resting blood pressure was 154/77, resting EKG revealed sinus rhythm with rate of 79 bpm.  Patient was given 0.4 mg of regadenosine for stress testing.  There was no significant change in heart rate, blood pressure, symptoms with regadenoson injection.  Patient tolerated procedure well.  Complications were none.    NUCLEAR IMAGIN.  There was  uniform uptake of Cardiolite both in resting and post stress images, no ischemia seen.  2.  Gated images reveal  normal LV size and contractility, LVEF of 75%.    CONCLUSION:  1.  Lexiscan Cardiolite with normal perfusion, negative for ischemia.  2.  Normal wall motion.  LVEF of 75%.    RECOMMENDATIONS:    Clinical correlation recommended.      Jose Patiño MD  05/10/23  14:47 EDT        Cardiac Catheterization:  No results found for this or any previous visit.        Other:      ASSESSMENT & PLAN:    Active Problems:    * No active hospital problems. *    Paroxysmal atrial fibrillation  ECG shows sinus rhythm with heart rate of 71 bpm, QTc is 426 ms.  EP consultation for possible ablation.  Continue Tikosyn and obtain daily  ECGs.  [id number] score is 4.  Continue anticoagulation with warfarin with goal INR of 2-3  Restart Toprol-XL for rate control.    Hypertension  Continue amlodipine  Blood pressures currently well controlled    HFpEF  Most recent echocardiogram reviewed showed preserved LV function, mildly elevated RVSP.  She will benefit from Jardiance  Most recent nuclear stress test was negative for ischemia    Chronic kidney disease  We will keep a close eye on renal function.    Obstructive sleep apnea  Emphasized the importance of using CPAP and management of sleep apnea and HFpEF.    Hyperlipidemia  She reports intolerance to statin  Most recent LDL is 134.  She should be started on Repatha or Leqvio.    Dez Amin MD  05/28/23  13:14 EDT

## 2023-05-29 ENCOUNTER — READMISSION MANAGEMENT (OUTPATIENT)
Dept: CALL CENTER | Facility: HOSPITAL | Age: 83
End: 2023-05-29

## 2023-05-29 VITALS
SYSTOLIC BLOOD PRESSURE: 160 MMHG | OXYGEN SATURATION: 97 % | BODY MASS INDEX: 20.55 KG/M2 | WEIGHT: 115.96 LBS | HEIGHT: 63 IN | RESPIRATION RATE: 12 BRPM | TEMPERATURE: 97.7 F | DIASTOLIC BLOOD PRESSURE: 89 MMHG | HEART RATE: 63 BPM

## 2023-05-29 PROCEDURE — G0378 HOSPITAL OBSERVATION PER HR: HCPCS

## 2023-05-29 PROCEDURE — 99214 OFFICE O/P EST MOD 30 MIN: CPT | Performed by: INTERNAL MEDICINE

## 2023-05-29 RX ADMIN — LEFLUNOMIDE 20 MG: 20 TABLET ORAL at 08:15

## 2023-05-29 RX ADMIN — LEVOTHYROXINE SODIUM 112 MCG: 0.11 TABLET ORAL at 08:15

## 2023-05-29 RX ADMIN — METOPROLOL SUCCINATE 100 MG: 50 TABLET, EXTENDED RELEASE ORAL at 08:14

## 2023-05-29 RX ADMIN — AMLODIPINE BESYLATE 5 MG: 5 TABLET ORAL at 08:14

## 2023-05-29 RX ADMIN — MULTIPLE VITAMINS W/ MINERALS TAB 1 TABLET: TAB at 08:15

## 2023-05-29 RX ADMIN — SENNOSIDES AND DOCUSATE SODIUM 1 TABLET: 50; 8.6 TABLET ORAL at 08:14

## 2023-05-29 RX ADMIN — Medication 10 ML: at 08:15

## 2023-05-29 RX ADMIN — DOFETILIDE 125 MCG: 0.12 CAPSULE ORAL at 08:15

## 2023-05-29 NOTE — PLAN OF CARE
Problem: Adult Inpatient Plan of Care  Goal: Plan of Care Review  Outcome: Ongoing, Progressing  Goal: Patient-Specific Goal (Individualized)  Outcome: Ongoing, Progressing  Goal: Absence of Hospital-Acquired Illness or Injury  Outcome: Ongoing, Progressing  Intervention: Identify and Manage Fall Risk  Recent Flowsheet Documentation  Taken 5/29/2023 0400 by Chele Crowley LPN  Safety Promotion/Fall Prevention: safety round/check completed  Taken 5/29/2023 0200 by Chele Crowley LPN  Safety Promotion/Fall Prevention: safety round/check completed  Taken 5/29/2023 0000 by Chele Crowley LPN  Safety Promotion/Fall Prevention: safety round/check completed  Goal: Optimal Comfort and Wellbeing  Outcome: Ongoing, Progressing  Goal: Readiness for Transition of Care  Outcome: Ongoing, Progressing     Problem: Fall Injury Risk  Goal: Absence of Fall and Fall-Related Injury  Outcome: Ongoing, Progressing  Intervention: Promote Injury-Free Environment  Recent Flowsheet Documentation  Taken 5/29/2023 0400 by Chele Crowley LPN  Safety Promotion/Fall Prevention: safety round/check completed  Taken 5/29/2023 0200 by Chele Crowley LPN  Safety Promotion/Fall Prevention: safety round/check completed  Taken 5/29/2023 0000 by Chele Crowley LPN  Safety Promotion/Fall Prevention: safety round/check completed     Problem: Heart Failure Comorbidity  Goal: Maintenance of Heart Failure Symptom Control  Outcome: Ongoing, Progressing   Goal Outcome Evaluation:

## 2023-05-29 NOTE — PLAN OF CARE
Goal Outcome Evaluation:  Plan of Care Reviewed With: patient        Progress: no change  Outcome Evaluation: Patient received am medications, patient is on room air. Patient not having ablation. Cardiology signed off. Hospitalist paged for d/c orders, no new orders at this time.

## 2023-05-29 NOTE — PROGRESS NOTES
"Pharmacy dosing service  Anticoagulant  Warfarin     Subjective:    Jt Valdes is a 82 y.o.female being continued on warfarin for atrial fibrillation.    INR Goal: 2 - 3  Home medication?: warfarin 5 mg PO daily  Bridge Therapy Present?:  No  Interacting Medications Evaluation (New/Present/Discontinued): leflunomide can enhance warfarin (home med)  Additional Contributing Factors: n/a      Assessment/Plan:    INR continues to be therapeutic. Will continue with home dose of warfarin 5 mg daily.     Continue to monitor and adjust based on INR.         Date 5/28 5/29          INR 2.57 2.41          Dose 5 mg 5 mg               Objective:  [Ht: 160 cm (63\"); Wt: 52.6 kg (115 lb 15.4 oz); BMI: Body mass index is 20.54 kg/m².]    Lab Results   Component Value Date    ALBUMIN 4.1 05/28/2023     Lab Results   Component Value Date    INR 2.41 05/28/2023    INR 2.57 05/28/2023    INR 2.69 05/19/2023    PROTIME 24.5 05/28/2023    PROTIME 26.1 05/28/2023    PROTIME 27.2 05/19/2023     Lab Results   Component Value Date    HGB 12.2 05/28/2023    HGB 12.2 05/19/2023    HGB 11.9 (L) 05/11/2023     Lab Results   Component Value Date    HCT 37.7 05/28/2023    HCT 37.1 05/19/2023    HCT 35.5 05/11/2023       Aminata Horton, PharmD  05/29/23 09:38 EDT       "

## 2023-05-29 NOTE — PROGRESS NOTES
Referring Provider: Alberta Romero MD    Reason for follow-up: Atrial fibrillation     Patient Care Team:  Dilcia Trinidad MD as PCP - General (Family Medicine)  Roland Benitez MD as Emergency Attending (Family Medicine)  Jose Patiño MD as Consulting Physician (Cardiology)  Aurora Mckeon APRN as Nurse Practitioner (Cardiology)      SUBJECTIVE  Laying comfortably in bed had no events of atrial fibrillation overnight     ROS  Review of all systems negative except as indicated.    Since I have last seen, the patient has been without any chest discomfort, shortness of breath, palpitations, dizziness or syncope.  Denies having any headache, abdominal pain, nausea, vomiting, diarrhea, constipation, loss of weight or loss of appetite.  Denies having any excessive bruising, hematuria or blood in the stool.  ROS      Personal History:    Past Medical History:   Diagnosis Date   • Allergic     Sulfa   • Arthritis    • Cataract     Cataracts surgery   • COVID-19    • Degenerative joint disease    • Extremity pain     legs pain   • H/O degenerative disc disease    • History of colonoscopy 2009    last done 2009   • History of echocardiogram 09/04/2018    LVH EF 65%. RV OK. LA probably mildly dilated. AV OK. MV OK. Modest TR RVSP 26 or less. Nuclear MPI regadenoson 9/14/2018. LV uniform myocardial uptake and wall motion EF 71%.    • History of Holter monitoring 10/08/2018    SR 40-81 58. AF 2.7 hr episode VR . At termination AF 2 3.5-4.0 s pauses with patient possibly dizzy. 205 PAC 42 atrial ashanti several SVT. No ventricular ectopy.    • HL (hearing loss) 2020    Tried hearing aids twice . Didnt help   • HTN (hypertension)    • Hyperlipidemia    • Hypertensive cardiovascular disease    • Hypothyroidism    • Incontinence    • Leukopenia    • Low back pain    • PAF (paroxysmal atrial fibrillation) 09/2018     [place] Sept 2018 PAF and HTN. PAFL On bisoprolol and diltiazem bradycardia  symptomatic with dizziness. Amiodarone alone Oct 2018 and no episodes of erratic or fast heartbeat or lightheadedness.    • Rheumatoid arthritis     Possible pulmonary involvement    • Staph infection    • Vitamin D deficiency    • White coat syndrome with hypertension        Past Surgical History:   Procedure Laterality Date   • APPENDECTOMY  1974   • BACK SURGERY  2003 2001, 2003   • SPINE SURGERY  2001.    2003   • SUBTOTAL HYSTERECTOMY  1974       Family History   Problem Relation Age of Onset   • Osteoarthritis Mother    • Arthritis Mother    • Cancer Mother    • Other Other         Rheumatoid disease    • Cancer Father    • Cancer Sister        Social History     Tobacco Use   • Smoking status: Never   • Smokeless tobacco: Never   Vaping Use   • Vaping Use: Never used   Substance Use Topics   • Alcohol use: Yes     Alcohol/week: 1.0 standard drink     Types: 1 Glasses of wine per week     Comment: Occ.   • Drug use: No        Medications Prior to Admission   Medication Sig Dispense Refill Last Dose   • amLODIPine (NORVASC) 5 MG tablet Take 1 tablet by mouth Daily. 30 tablet 0    • Diclofenac Sodium (VOLTAREN) 1 % gel gel Apply 4 g topically to the appropriate area as directed 4 (Four) Times a Day As Needed (pain). 150 g 3    • dofetilide (TIKOSYN) 125 MCG capsule Take 1 capsule by mouth Every 12 (Twelve) Hours. 60 capsule 0 5/28/2023   • leflunomide (ARAVA) 20 MG tablet Take 1 tablet by mouth Daily. 90 tablet 0    • levothyroxine (SYNTHROID, LEVOTHROID) 112 MCG tablet Take 1 tablet by mouth Daily.   5/28/2023   • metoprolol succinate XL (TOPROL-XL) 100 MG 24 hr tablet Take 1 tablet by mouth Daily. 90 tablet 3    • Multiple Vitamins-Minerals (PRESERVISION AREDS 2 PO) Take 1 tablet by mouth Daily.      • multivitamin with minerals tablet tablet Take 1 tablet by mouth Daily.      • polyethylene glycol (MiraLax) 17 g packet Take 8.5 g by mouth Daily.      • warfarin (COUMADIN) 2.5 MG tablet Take 2 tablets by  "mouth Every Evening.   5/27/2023       Allergies:  Sulfa antibiotics and Macrobid [nitrofurantoin]    Scheduled Meds:amLODIPine, 5 mg, Oral, Q24H  dofetilide, 125 mcg, Oral, Q12H  leflunomide, 20 mg, Oral, Daily  levothyroxine, 112 mcg, Oral, Daily  metoprolol succinate XL, 100 mg, Oral, Daily  multivitamin with minerals, 1 tablet, Oral, Daily  senna-docusate sodium, 2 tablet, Oral, BID  sodium chloride, 10 mL, Intravenous, Q12H  warfarin, 5 mg, Oral, Daily      Continuous Infusions:Pharmacy to dose warfarin,       PRN Meds:.•  acetaminophen **OR** acetaminophen **OR** acetaminophen  •  senna-docusate sodium **AND** polyethylene glycol **AND** bisacodyl **AND** bisacodyl  •  calcium carbonate  •  Calcium Replacement - Follow Nurse / BPA Driven Protocol  •  Magnesium Standard Dose Replacement - Follow Nurse / BPA Driven Protocol  •  melatonin  •  ondansetron **OR** ondansetron  •  Pharmacy to dose warfarin  •  Phosphorus Replacement - Follow Nurse / BPA Driven Protocol  •  Potassium Replacement - Follow Nurse / BPA Driven Protocol  •  [COMPLETED] Insert Peripheral IV **AND** sodium chloride  •  sodium chloride  •  sodium chloride      OBJECTIVE    Vital Signs  Vitals:    05/28/23 2113 05/29/23 0126 05/29/23 0521 05/29/23 0814   BP: 139/88 143/87 139/77 145/78   BP Location: Right arm Right arm Right arm    Patient Position: Lying Lying Lying    Pulse: 68 77 77 63   Resp: 20 18 15    Temp: 97.8 °F (36.6 °C) 97.3 °F (36.3 °C) 98.2 °F (36.8 °C)    TempSrc: Oral Oral Oral    SpO2: 96% 93% 97%    Weight:   52.6 kg (115 lb 15.4 oz)    Height:           Flowsheet Rows    Flowsheet Row First Filed Value   Admission Height 160 cm (63\") Documented at 05/28/2023 0912   Admission Weight 52.1 kg (114 lb 13.8 oz) Documented at 05/28/2023 0912          No intake or output data in the 24 hours ending 05/29/23 0834       Telemetry: Sinus rhythm    Physical Exam:  The patient is alert, oriented and in no distress.  Vital signs as " noted above.  Head and neck revealed no carotid bruits or jugular venous distention.  No thyromegaly or lymphadenopathy is present  Lungs clear.  No wheezing.  Breath sounds are normal bilaterally.  Heart normal first and second heart sounds.  No murmur. No precordial rub is present.  No gallop is present.  Abdomen soft and nontender.  No organomegaly is present.  Extremities with good peripheral pulses without any pedal edema.  Skin warm and dry.  Musculoskeletal system is grossly normal.  CNS grossly normal.       Results Review:  I have personally reviewed the results from the time of this admission to 5/29/2023 08:34 EDT and agree with these findings:  []  Laboratory  []  Microbiology  []  Radiology  []  EKG/Telemetry   []  Cardiology/Vascular   []  Pathology  []  Old records  []  Other:    Most notable findings include:    Lab Results (last 24 hours)     Procedure Component Value Units Date/Time    Protime-INR [[id number]]  (Normal) Collected: 05/28/23 2250    Specimen: Blood Updated: 05/28/23 2330     Protime 24.5 Seconds      INR 2.41    High Sensitivity Troponin T 2Hr [[id number]]  (Abnormal) Collected: 05/28/23 1132    Specimen: Blood Updated: 05/28/23 1158     HS Troponin T 28 ng/L      Troponin T Delta -6 ng/L     Narrative:      High Sensitive Troponin T Reference Range:  <10.0 ng/L- Negative Female for AMI  <15.0 ng/L- Negative Male for AMI  >=10 - Abnormal Female indicating possible myocardial injury.  >=15 - Abnormal Male indicating possible myocardial injury.   Clinicians would have to utilize clinical acumen, EKG, Troponin, and serial changes to determine if it is an Acute Myocardial Infarction or myocardial injury due to an underlying chronic condition.         [place] Draw [[id number]] Collected: 05/28/23 0935    Specimen: Blood Updated: 05/28/23 1046    Narrative:      The following orders were created for panel order [place] Draw.  Procedure                               Abnormality         Status                      ---------                               -----------         ------                     Green Top (Gel)[[id number]]                                  Final result               Lavender Top[[id number]]                                     Final result               Gold Top - SST[[id number]]                                   Final result               Light Blue Top[[id number]]                                   Final result                 Please view results for these tests on the individual orders.    Light Blue Top [[id number]] Collected: 05/28/23 0935    Specimen: Blood Updated: 05/28/23 1046     Extra Tube Hold for add-ons.     Comment: Auto resulted       Green Top (Gel) [[id number]] Collected: 05/28/23 0935    Specimen: Blood Updated: 05/28/23 1046     Extra Tube Hold for add-ons.     Comment: Auto resulted.       Lavender Top [[id number]] Collected: 05/28/23 0935    Specimen: Blood Updated: 05/28/23 1046     Extra Tube hold for add-on     Comment: Auto resulted       Gold Top - SST [[id number]] Collected: 05/28/23 0935    Specimen: Blood Updated: 05/28/23 1046     Extra Tube Hold for add-ons.     Comment: Auto resulted.       TSH [[id number]]  (Normal) Collected: 05/28/23 0935    Specimen: Blood Updated: 05/28/23 1017     TSH 1.210 uIU/mL     High Sensitivity Troponin T [[id number]]  (Abnormal) Collected: 05/28/23 0935    Specimen: Blood Updated: 05/28/23 1016     HS Troponin T 34 ng/L     Narrative:      High Sensitive Troponin T Reference Range:  <10.0 ng/L- Negative Female for AMI  <15.0 ng/L- Negative Male for AMI  >=10 - Abnormal Female indicating possible myocardial injury.  >=15 - Abnormal Male indicating possible myocardial injury.   Clinicians would have to utilize clinical acumen, EKG, Troponin, and serial changes to determine if it is an Acute Myocardial Infarction or myocardial injury due to an underlying chronic condition.         Magnesium [[id number]]  (Normal) Collected: 05/28/23 0935     Specimen: Blood Updated: 05/28/23 1011     Magnesium 1.6 mg/dL     Comprehensive Metabolic Panel [[id number]]  (Abnormal) Collected: 05/28/23 0935    Specimen: Blood Updated: 05/28/23 1011     Glucose 110 mg/dL      BUN 25 mg/dL      Creatinine 0.87 mg/dL      Sodium 138 mmol/L      Potassium 4.0 mmol/L      Comment: Slight hemolysis detected by analyzer. Results may be affected.        Chloride 101 mmol/L      CO2 21.0 mmol/L      Calcium 9.6 mg/dL      Total Protein 7.2 g/dL      Albumin 4.1 g/dL      ALT (SGPT) 11 U/L      AST (SGOT) 20 U/L      Alkaline Phosphatase 83 U/L      Total Bilirubin 0.4 mg/dL      Globulin 3.1 gm/dL      A/G Ratio 1.3 g/dL      BUN/Creatinine Ratio 28.7     Anion Gap 16.0 mmol/L      eGFR 66.2 mL/min/1.73     Narrative:      GFR Normal >60  Chronic Kidney Disease <60  Kidney Failure <15    The GFR formula is only valid for adults with stable renal function between ages 18 and 70.    Protime-INR [[id number]]  (Normal) Collected: 05/28/23 0935    Specimen: Blood Updated: 05/28/23 0953     Protime 26.1 Seconds      INR 2.57    CBC & Differential [[id number]]  (Abnormal) Collected: 05/28/23 0935    Specimen: Blood Updated: 05/28/23 0946    Narrative:      The following orders were created for panel order CBC & Differential.  Procedure                               Abnormality         Status                     ---------                               -----------         ------                     CBC Auto Differential[[id number]]        Abnormal            Final result                 Please view results for these tests on the individual orders.    CBC Auto Differential [[id number]]  (Abnormal) Collected: 05/28/23 0935    Specimen: Blood Updated: 05/28/23 0946     WBC 4.60 10*3/mm3      RBC 4.18 10*6/mm3      Hemoglobin 12.2 g/dL      Hematocrit 37.7 %      MCV 90.3 fL      MCH 29.3 pg      MCHC 32.4 g/dL      RDW 14.7 %      RDW-SD 48.6 fl      MPV 7.8 fL      Platelets 241 10*3/mm3       Neutrophil % 63.6 %      Lymphocyte % 19.1 %      Monocyte % 11.0 %      Eosinophil % 5.4 %      Basophil % 0.9 %      Neutrophils, Absolute 2.90 10*3/mm3      Lymphocytes, Absolute 0.90 10*3/mm3      Monocytes, Absolute 0.50 10*3/mm3      Eosinophils, Absolute 0.20 10*3/mm3      Basophils, Absolute 0.00 10*3/mm3      nRBC 0.5 /100 WBC           Imaging Results (Last 24 Hours)     Procedure Component Value Units Date/Time    XR Chest 1 View [[id number]] Collected: 05/28/23 1011     Updated: 05/28/23 1015    Narrative:      XR CHEST 1 VW    Date of Exam: 5/28/2023 9:50 AM EDT    Indication: palpitations    Comparison: AP chest x-ray 5/8/2023, 2 view chest x-ray 3/15/2019    Findings:  There are mild left lateral basilar airspace opacities. Right lung appears grossly clear. No pneumothorax or large pleural effusion is seen. Cardiomediastinal contours appear stable.      Impression:      Impression:  Mild left lateral basilar airspace opacities, which may be due to atelectasis and/or pneumonia.      Electronically Signed: Richelle Ortega    5/28/2023 10:13 AM EDT    Workstation ID: [id number]          LAB RESULTS (LAST 7 DAYS)    CBC  Results from last 7 days   Lab Units 05/28/23  0935   WBC 10*3/mm3 4.60   RBC 10*6/mm3 4.18   HEMOGLOBIN g/dL 12.2   HEMATOCRIT % 37.7   MCV fL 90.3   PLATELETS 10*3/mm3 241       BMP  Results from last 7 days   Lab Units 05/28/23  0935   SODIUM mmol/L 138   POTASSIUM mmol/L 4.0   CHLORIDE mmol/L 101   CO2 mmol/L 21.0*   BUN mg/dL 25*   CREATININE mg/dL 0.87   GLUCOSE mg/dL 110*   MAGNESIUM mg/dL 1.6       CMP   Results from last 7 days   Lab Units 05/28/23  0935   SODIUM mmol/L 138   POTASSIUM mmol/L 4.0   CHLORIDE mmol/L 101   CO2 mmol/L 21.0*   BUN mg/dL 25*   CREATININE mg/dL 0.87   GLUCOSE mg/dL 110*   ALBUMIN g/dL 4.1   BILIRUBIN mg/dL 0.4   ALK PHOS U/L 83   AST (SGOT) U/L 20   ALT (SGPT) U/L 11       BNP        TROPONIN  Results from last 7 days   Lab Units 05/28/23  1132   HSTROP T  ng/L 28*       CoAg  Results from last 7 days   Lab Units 05/28/23  2250 05/28/23  0935   INR  2.41 2.57       Creatinine Clearance  Estimated Creatinine Clearance: 40.7 mL/min (by C-G formula based on SCr of 0.87 mg/dL).    ABG        Radiology  XR Chest 1 View    Result Date: 5/28/2023  Impression: Mild left lateral basilar airspace opacities, which may be due to atelectasis and/or pneumonia. Electronically Signed: Richelle Ortega  5/28/2023 10:13 AM EDT  Workstation ID: [id number]        EKG  I personally viewed and interpreted the patient's EKG/Telemetry data:  ECG 12 Lead Tachycardia   Preliminary Result   HEART RATE= 71  bpm   RR Interval= 840  ms   RI Interval= 153  ms   P Horizontal Axis=   deg   P Front Axis= 64  deg   QRSD Interval= 84  ms   QT Interval= 390  ms   QRS Axis= 34  deg   T Wave Axis= 1  deg   - ABNORMAL ECG -   Sinus arrhythmia   Probable LVH with secondary repol abnrm   Electronically Signed By:    Date and Time of Study: 2023-05-28 09:18:45            Echocardiogram:    Results for orders placed during the hospital encounter of 02/01/23    Adult Transthoracic Echo Complete W/ Cont if Necessary Per Protocol    Interpretation Summary  •  Estimated right ventricular systolic pressure from tricuspid regurgitation is mildly elevated (35-45 mmHg).      Normal LV size and contractility EF of 60 to 65%  Normal RV size  Normal atrial size  Pulmonic valve is not well visualized.  Aortic valve, mitral valve, tricuspid valve appears structurally normal, mild tricuspid regurgitation seen.  Calculated RV systolic pressure of 40 mm of.  No pericardial effusion seen.  Proximal aorta appears normal in size.        Stress Test:  Results for orders placed during the hospital encounter of 05/08/23    Stress Test With Myocardial Perfusion One Day    Interpretation Summary  [facility] CARDIOL[facility] REPORT    DATE OF PROCEDURE:  05/10/23    INDICATION FOR PROCEDURE: Atrial fibrillation, shortness of breath    PROCEDURE  PERFORMED: Lexiscan Cardiolite    PROCEDURE COMMENTS:    After informed consent was obtained.  Patient's resting heart rate was 79 bpm, resting blood pressure was 154/77, resting EKG revealed sinus rhythm with rate of 79 bpm.  Patient was given 0.4 mg of regadenosine for stress testing.  There was no significant change in heart rate, blood pressure, symptoms with regadenoson injection.  Patient tolerated procedure well.  Complications were none.    NUCLEAR IMAGIN.  There was  uniform uptake of Cardiolite both in resting and post stress images, no ischemia seen.  2.  Gated images reveal  normal LV size and contractility, LVEF of 75%.    CONCLUSION:  1.  Lexiscan Cardiolite with normal perfusion, negative for ischemia.  2.  Normal wall motion.  LVEF of 75%.    RECOMMENDATIONS:    Clinical correlation recommended.      Jose Patiño MD  05/10/23  14:47 EDT         Cardiac Catheterization:  No results found for this or any previous visit.         Other:         ASSESSMENT & PLAN:    Principal Problem:    Palpitations  Active Problems:    Hypothyroidism    Paroxysmal atrial fibrillation    Vitamin D deficiency    Long term (current) use of anticoagulants      Paroxysmal atrial fibrillation  ECG shows sinus rhythm with heart rate of 71 bpm, QTc is 426 ms.  EP consultation for possible ablation.  She has an appointment with Dr. Lloyd tomorrow  Continue Tikosyn and obtain daily ECGs.  [id number] score is 4.  Continue anticoagulation with warfarin with goal INR of 2-3  Restart Toprol-XL for rate control.  Plan to discharge today with follow-up with Dr. Lloyd to discuss A-fib ablation.  Of note she remained in sinus rhythm throughout the hospital stay     Hypertension  Continue amlodipine  Blood pressures currently well controlled     HFpEF  Most recent echocardiogram reviewed showed preserved LV function, mildly elevated RVSP.  She will benefit from Jardiance  Most recent nuclear stress test was  negative for ischemia     Chronic kidney disease  We will keep a close eye on renal function.     Obstructive sleep apnea  Emphasized the importance of using CPAP and management of sleep apnea and HFpEF.     Hyperlipidemia  She reports intolerance to statin  Most recent LDL is 134.  She should be started on Repatha or Leqvio.    Dez Amin MD  05/29/23  08:34 EDT

## 2023-05-29 NOTE — DISCHARGE SUMMARY
[facility] Medicine Services  Discharge Summary    Date of Service: 23  Patient Name: Jt Valdes  : 1940  MRN: [id number]    Date of Admission: 2023  Discharge Diagnosis: Symptomatic atrial fibrillation  Date of Discharge:  23  Primary Care Physician: Dilcia Trinidad MD      Presenting Problem:   Palpitations [R00.2]  Lightheaded [R42]    Active and Resolved Hospital Problems:  Active Hospital Problems    Diagnosis POA   • **Palpitations [R00.2] Yes   • Paroxysmal atrial fibrillation [I48.0] Yes     Priority: Medium   • Long term (current) use of anticoagulants [Z79.01] Not Applicable   • Hypothyroidism [E03.9] Yes   • Vitamin D deficiency [E55.9] Yes      Resolved Hospital Problems   No resolved problems to display.         Hospital Course     82-year-old female with symptomatic paroxysmal atrial fibrillation that has failed medical therapy.  She was admitted on her third visit this month for consideration for cardiac ablation.  Cardiology evaluated and recommended outpatient follow-up with EP Dr. Lloyd.  She has an appointment to see Dr. Lloyd tomorrow     #Paroxysmal atrial fibrillation with RVR  Symptomatic, failed medical therapy  Admitted for consideration for cardiac ablation  Continue Tikosyn and metoprolol for now  Follow-up with Dr. Lloyd in the office tomorrow     #Essential hypertension  Continue home amlodipine and metoprolol     #Chronic anticoagulation for A-fib  INR 2.41       DISCHARGE Follow Up Recommendations for labs and diagnostics: n/a      Day of Discharge     Vital Signs:  Temp:  [97.3 °F (36.3 °C)-98.2 °F (36.8 °C)] 97.7 °F (36.5 °C)  Heart Rate:  [63-77] 63  Resp:  [12-20] 12  BP: (112-160)/(72-89) 160/89    Physical Exam:  General appearance: Very pleasant female resting in bed surrounded by family members, not in distress  Mental status: Alert and oriented x3  Head: Normocephalic and atraumatic  Eyes: EOMI,  nonicteric  Face: Symmetrical, no lesions  Neck: Supple, no tenderness  CVS: irregular heart sounds, no gallops, no murmurs  Respiration: Unlabored breathing at rest, clear to auscultation  GI: Soft, nondistended, nontender, normal bowel sounds  Lower extremity: No leg edema, no calf tenderness  Skin: Normal color and temperature  Neurology: Moves all extremities, no facial droop, normal speech  Psychiatry: Normal affect, appropriate behavior  Musculoskeletal: Well developed female's, no gross deformities       Pertinent  and/or Most Recent Results     LAB RESULTS:      Lab 05/28/23 2250 05/28/23  0935   WBC  --  4.60   HEMOGLOBIN  --  12.2   HEMATOCRIT  --  37.7   PLATELETS  --  241   NEUTROS ABS  --  2.90   LYMPHS ABS  --  0.90   MONOS ABS  --  0.50   EOS ABS  --  0.20   MCV  --  90.3   PROTIME 24.5 26.1         Lab 05/28/23  0935   SODIUM 138   POTASSIUM 4.0   CHLORIDE 101   CO2 21.0*   ANION GAP 16.0*   BUN 25*   CREATININE 0.87   EGFR 66.2   GLUCOSE 110*   CALCIUM 9.6   MAGNESIUM 1.6   TSH 1.210         Lab 05/28/23  0935   TOTAL PROTEIN 7.2   ALBUMIN 4.1   GLOBULIN 3.1   ALT (SGPT) 11   AST (SGOT) 20   BILIRUBIN 0.4   ALK PHOS 83         Lab 05/28/23 2250 05/28/23  1132 05/28/23  0935   HSTROP T  --  28* 34*   PROTIME 24.5  --  26.1   INR 2.41  --  2.57                 Brief Urine Lab Results  (Last result in the past 365 days)      Color   Clarity   Blood   Leuk Est   Nitrite   Protein   CREAT   Urine HCG        04/26/23 1455 Yellow   Clear   Trace   Large (3+)   Negative   30 mg/dL               Microbiology Results (last 10 days)     ** No results found for the last 240 hours. **          XR Chest 1 View    Result Date: 5/28/2023  Impression: Impression: Mild left lateral basilar airspace opacities, which may be due to atelectasis and/or pneumonia. Electronically Signed: Richelle Ortega  5/28/2023 10:13 AM EDT  Workstation ID: [id number]    XR Chest 1 View    Result Date: 5/8/2023  Impression: Impression: No  active disease. Electronically Signed: Dillon Basurto  5/8/2023 5:47 PM EDT  Workstation ID: [id number]              Results for orders placed during the hospital encounter of 02/01/23    Adult Transthoracic Echo Complete W/ Cont if Necessary Per Protocol    Interpretation Summary  •  Estimated right ventricular systolic pressure from tricuspid regurgitation is mildly elevated (35-45 mmHg).      Normal LV size and contractility EF of 60 to 65%  Normal RV size  Normal atrial size  Pulmonic valve is not well visualized.  Aortic valve, mitral valve, tricuspid valve appears structurally normal, mild tricuspid regurgitation seen.  Calculated RV systolic pressure of 40 mm of.  No pericardial effusion seen.  Proximal aorta appears normal in size.           Consults:   Consults     Date and Time Order Name Status Description    5/28/2023 12:53 PM Hospitalist (on-call MD unless specified)      5/28/2023 12:51 PM Cardiology (on-call MD unless specified) Completed     5/19/2023  9:22 AM Cardiology (on-call MD unless specified) Completed     5/8/2023  9:55 PM Inpatient Cardiology Consult Completed             Discharge Details        Discharge Medications      Continue These Medications      Instructions Start Date   amLODIPine 5 MG tablet  Commonly known as: NORVASC   5 mg, Oral, Every 24 Hours Scheduled      Diclofenac Sodium 1 % gel gel  Commonly known as: VOLTAREN   4 g, Topical, 4 Times Daily PRN      dofetilide 125 MCG capsule  Commonly known as: TIKOSYN   125 mcg, Oral, Every 12 Hours Scheduled      leflunomide 20 MG tablet  Commonly known as: ARAVA   20 mg, Oral, Daily      levothyroxine 112 MCG tablet  Commonly known as: SYNTHROID, LEVOTHROID   112 mcg, Oral, Daily      metoprolol succinate  MG 24 hr tablet  Commonly known as: TOPROL-XL   100 mg, Oral, Daily      MiraLax 17 g packet  Generic drug: polyethylene glycol   8.5 g, Oral, Daily      multivitamin with minerals tablet tablet   1 tablet, Oral, Daily       PRESERVISION AREDS 2 PO   1 tablet, Oral, Daily      warfarin 2.5 MG tablet  Commonly known as: COUMADIN   5 mg, Oral, Every Evening             Allergies   Allergen Reactions   • Sulfa Antibiotics Rash   • Macrobid [Nitrofurantoin] Unknown - High Severity         Discharge Disposition:   Home or Self Care    Diet:  Hospital:  Diet Order   Procedures   • Diet: Regular/House Diet; Texture: Regular Texture (IDDSI 7); Fluid Consistency: Thin (IDDSI 0)         Discharge Activity:   Activity Instructions     Activity as Tolerated              CODE STATUS:  Code Status and Medical Interventions:   Ordered at: 05/28/23 1335     Code Status (Patient has no pulse and is not breathing):    CPR (Attempt to Resuscitate)     Medical Interventions (Patient has pulse or is breathing):    Full Support         Future Appointments   Date Time Provider Department Center   5/30/2023  2:30 PM Asuncion Dutton APRN [facility]   6/5/2023 10:00 AM [facility] NA   10/12/2023 10:00 AM LAB [facility] None   10/19/2023 10:15 AM Lucia Sahu MD [facility] END NA [facility]   5/1/2024 10:50 AM Jose Patiño MD [facility] NA           Time spent on Discharge including face to face service: 35 minutes    Signature: Electronically signed by Alberta Romero MD, 05/29/23, 12:31 EDT.  [facility] Hospitalist Team

## 2023-05-29 NOTE — PROGRESS NOTES
[facility] Medicine Services   Daily Progress Note    Patient Name: Jt Valdes  : 1940  MRN: [id number]  Primary Care Physician:  Dilcia Trinidad MD  Date of admission: 2023  Date and Time of Service: 23    Brief clinical summary:  82-year-old female with symptomatic paroxysmal atrial fibrillation that has failed medical therapy.  She was admitted on her third visit this month for consideration for cardiac ablation    Subjective      Met patient resting in room, surrounded by many family members.  Seems cheerful.  Denies palpitation, chest pain, syncope, shortness of breath or diaphoresis      Objective      Vitals:   Temp:  [97.3 °F (36.3 °C)-98.2 °F (36.8 °C)] 97.7 °F (36.5 °C)  Heart Rate:  [63-77] 63  Resp:  [12-20] 12  BP: (112-160)/(69-89) 160/89    Physical Exam   General appearance: Very pleasant female resting in bed, not in distress  Mental status: Alert and oriented x3  Head: Normocephalic and atraumatic  Eyes: EOMI, nonicteric  Face: Symmetrical, no lesions  Neck: Supple, no tenderness  CVS: irregular heart sounds, no gallops, no murmurs  Respiration: Unlabored breathing at rest, clear to auscultation  GI: Soft, nondistended, nontender, normal bowel sounds  Lower extremity: No leg edema, no calf tenderness  Skin: Normal color and temperature  Neurology: Moves all extremities, no facial droop, normal speech  Psychiatry: Normal affect, appropriate behavior  Musculoskeletal: Well developed female's, no gross deformities     Result Review    Result Review:  I have personally reviewed the results from the time of this admission to 2023 10:44 EDT and agree with these findings:  [x]  Laboratory  []  Microbiology  [x]  Radiology  [x]  EKG/Telemetry   []  Cardiology/Vascular   []  Pathology  []  Old records  []  Other:    Assessment & Plan      amLODIPine, 5 mg, Oral, Q24H  dofetilide, 125 mcg, Oral, Q12H  leflunomide, 20 mg, Oral, Daily  levothyroxine, 112 mcg,  Oral, Daily  metoprolol succinate XL, 100 mg, Oral, Daily  multivitamin with minerals, 1 tablet, Oral, Daily  senna-docusate sodium, 2 tablet, Oral, BID  sodium chloride, 10 mL, Intravenous, Q12H  warfarin, 5 mg, Oral, Daily       Pharmacy to dose warfarin,          Active Hospital Problems:  Active Hospital Problems    Diagnosis    • **Palpitations    • Long term (current) use of anticoagulants    • Hypothyroidism    • Paroxysmal atrial fibrillation    • Vitamin D deficiency      Plan:   #Paroxysmal atrial fibrillation with RVR  Symptomatic, failed medical therapy  Admitted for consideration for cardiac ablation  Continue Tikosyn and metoprolol for now  Cardiology consultation initiated by ED staff     #Essential hypertension  Continue home amlodipine and metoprolol     #Chronic anticoagulation for A-fib  INR 2.41  Pharmacy consultation for management       DVT prophylaxis:  Medical DVT prophylaxis orders are present.    CODE STATUS:    Code Status (Patient has no pulse and is not breathing): CPR (Attempt to Resuscitate)  Medical Interventions (Patient has pulse or is breathing): Full Support      Disposition:  I expect patient to be discharged 5/30    Electronically signed by Alberta Romero MD, 05/29/23, 10:44 EDT.  [facility] Hospitalist Team

## 2023-05-29 NOTE — OUTREACH NOTE
Prep Survey    Flowsheet Row Responses   [facility] patient discharged from? [facility]   Is LACE score < 7 ? No   Eligibility [facility]   Date of Admission 05/28/23   Date of Discharge 05/29/23   Discharge Disposition Home or Self Care   Discharge diagnosis Palpitations   Does the patient have one of the following disease processes/diagnoses(primary or secondary)? Other   Does the patient have Home health ordered? No   Is there a DME ordered? No   Prep survey completed? Yes          Gaye GRAF - Registered Nurse

## 2023-05-30 ENCOUNTER — TRANSITIONAL CARE MANAGEMENT TELEPHONE ENCOUNTER (OUTPATIENT)
Dept: CALL CENTER | Facility: HOSPITAL | Age: 83
End: 2023-05-30

## 2023-05-30 ENCOUNTER — OFFICE VISIT (OUTPATIENT)
Dept: CARDIOLOGY | Facility: CLINIC | Age: 83
End: 2023-05-30

## 2023-05-30 VITALS
HEART RATE: 70 BPM | SYSTOLIC BLOOD PRESSURE: 143 MMHG | DIASTOLIC BLOOD PRESSURE: 80 MMHG | WEIGHT: 118 LBS | BODY MASS INDEX: 20.91 KG/M2 | HEIGHT: 63 IN | OXYGEN SATURATION: 99 %

## 2023-05-30 DIAGNOSIS — R42 DIZZINESS: ICD-10-CM

## 2023-05-30 DIAGNOSIS — Z51.81 VISIT FOR MONITORING TIKOSYN THERAPY: ICD-10-CM

## 2023-05-30 DIAGNOSIS — Z79.899 VISIT FOR MONITORING TIKOSYN THERAPY: ICD-10-CM

## 2023-05-30 DIAGNOSIS — I48.0 PAROXYSMAL ATRIAL FIBRILLATION: Primary | Chronic | ICD-10-CM

## 2023-05-30 RX ORDER — MAGNESIUM OXIDE 400 MG/1
400 TABLET ORAL 2 TIMES DAILY
Qty: 60 TABLET | Refills: 11 | Status: SHIPPED | OUTPATIENT
Start: 2023-05-30

## 2023-05-30 NOTE — PROGRESS NOTES
Cardiology Office Follow Up Visit      Primary Care Provider:  Dilcia Trinidad MD    Reason for f/u:     Hospital Follow-Up AF / tikosyn monitoring      Subjective       History of Present Illness       Jt Valdes is a 82 y.o. female seen in clinic today for hospital follow-up.    Jt Valdes is a 82 y.o. female known to primary cardiologist, Dr. Patiño, with a past cardiac history of atrial fibrillation (anticoagulated with coumadin).  Last nuclear stress test 2021 was normal.  Last 2D echo 2/2023 showed an EF = 60-65% with mild TR.  PMH includes HTN, HLD, CKD stage 3, RA, and hypothyroidism.  Patient was also recently diagnosed with mild SILVERIO and planned to start treatment in the near future.      Patient presented 5/9 with c/o palpitations and found with afib RVR and started on cardizem gtt.  She reported she was first diagnosed with afib in 2018 with hospitalization and required cardioversion.  She states that she is very aware of when she is in afib and reports that she has never gone more than 3 months without being in afib since 2018.  This particular episode persisted more than 24 hours.  Nuclear stress test 5/2023 was negative for ischemia.  EP was consulted for rhythm strategy and started on tikosyn therapy.   However, patient presented to the ER 5/19 and found in recurrent afib.  She converted to SR with cardizem and was not admitted.  Patient presented to the ER 5/28 with palpitations but was found in SR and discharged.    Patient reports that she continues to have intermittent palpitations with SOA and fatigue.  Prior to her last hospitalization she believes she was in afib for more than 24 hours.  She also has periodic episodes of dizziness to the point of presyncope.  She states that she had one of these episodes while in the hospital and was told that her heart rate slowed before she converted to sinus.  Patient verbalizes a desire to get off the tikosyn and proceed with ablation.         ASSESSMENT/PLAN:      Diagnoses and all orders for this visit:    1. Paroxysmal atrial fibrillation (Primary)  -     Mobile Cardiac Outpatient Telemetry; Future  -     Basic Metabolic Panel; Future  -     Magnesium; Future  -     ECG 12 Lead    2. Dizziness  -     Mobile Cardiac Outpatient Telemetry; Future    3. Visit for monitoring Tikosyn therapy  -     Basic Metabolic Panel; Future  -     Magnesium; Future  -     ECG 12 Lead    Other orders  -     magnesium oxide (MAG-OX) 400 MG tablet; Take 1 tablet by mouth 2 (Two) Times a Day.  Dispense: 60 tablet; Refill: 11            MEDICAL DECISION MAKING:    EKG shows SR today.  QT is WNL.  Will place 2 week MCOT monitor for afib burden and eval for bradycardia/post-conversion pauses.  Mg = 1.6 last ER visit and this was not replaced and appears to run low normal on trends from prior hospitalizations.  Will start mag oxide 400 mg PO bid.  Repeat BMP, Mg in one week.  Will continue tikosyn for now and have her follow-up with EP for consideration for ablation.        RTC: Will schedule with Dr. Lloyd within the next 1-3 months.    Past Medical History:   Diagnosis Date   • Allergic     Sulfa   • Arthritis    • Cataract     Cataracts surgery   • COVID-19    • Degenerative joint disease    • Extremity pain     legs pain   • H/O degenerative disc disease    • History of colonoscopy 2009    last done 2009   • History of echocardiogram 09/04/2018    LVH EF 65%. RV OK. LA probably mildly dilated. AV OK. MV OK. Modest TR RVSP 26 or less. Nuclear MPI regadenoson 9/14/2018. LV uniform myocardial uptake and wall motion EF 71%.    • History of Holter monitoring 10/08/2018    SR 40-81 58. AF 2.7 hr episode VR . At termination AF 2 3.5-4.0 s pauses with patient possibly dizzy. 205 PAC 42 atrial ashanti several SVT. No ventricular ectopy.    • HL (hearing loss) 2020    Tried hearing aids twice . Didnt help   • HTN (hypertension)    • Hyperlipidemia    • Hypertensive  cardiovascular disease    • Hypothyroidism    • Incontinence    • Leukopenia    • Low back pain    • PAF (paroxysmal atrial fibrillation) 09/2018    [facility] Sept 2018 PAF and HTN. PAFL On bisoprolol and diltiazem bradycardia symptomatic with dizziness. Amiodarone alone Oct 2018 and no episodes of erratic or fast heartbeat or lightheadedness.    • Rheumatoid arthritis     Possible pulmonary involvement    • Staph infection    • Vitamin D deficiency    • White coat syndrome with hypertension        Past Surgical History:   Procedure Laterality Date   • APPENDECTOMY  1974   • BACK SURGERY  2003 2001, 2003   • SPINE SURGERY  2001.    2003   • SUBTOTAL HYSTERECTOMY  1974         Current Outpatient Medications:   •  Diclofenac Sodium (VOLTAREN) 1 % gel gel, Apply 4 g topically to the appropriate area as directed 4 (Four) Times a Day As Needed (pain)., Disp: 150 g, Rfl: 3  •  dofetilide (TIKOSYN) 125 MCG capsule, Take 1 capsule by mouth Every 12 (Twelve) Hours., Disp: 60 capsule, Rfl: 0  •  leflunomide (ARAVA) 20 MG tablet, Take 1 tablet by mouth Daily., Disp: 90 tablet, Rfl: 0  •  levothyroxine (SYNTHROID, LEVOTHROID) 112 MCG tablet, Take 1 tablet by mouth Daily., Disp: , Rfl:   •  metoprolol succinate XL (TOPROL-XL) 100 MG 24 hr tablet, Take 1 tablet by mouth Daily., Disp: 90 tablet, Rfl: 3  •  Multiple Vitamins-Minerals (PRESERVISION AREDS 2 PO), Take 1 tablet by mouth Daily., Disp: , Rfl:   •  multivitamin with minerals tablet tablet, Take 1 tablet by mouth Daily., Disp: , Rfl:   •  polyethylene glycol (MIRALAX) 17 g packet, Take 8.5 g by mouth Daily., Disp: , Rfl:   •  warfarin (COUMADIN) 2.5 MG tablet, Take 2 tablets by mouth Every Evening., Disp: , Rfl:   •  amLODIPine (NORVASC) 5 MG tablet, Take 1 tablet by mouth Daily., Disp: 90 tablet, Rfl: 1  •  magnesium oxide (MAG-OX) 400 MG tablet, Take 1 tablet by mouth 2 (Two) Times a Day., Disp: 60 tablet, Rfl: 11    Social History     Socioeconomic History   • Marital  "status:    • Number of children: 5   • Years of education: 12   Tobacco Use   • Smoking status: Never   • Smokeless tobacco: Never   Vaping Use   • Vaping Use: Never used   Substance and Sexual Activity   • Alcohol use: Yes     Alcohol/week: 1.0 standard drink     Types: 1 Glasses of wine per week     Comment: Occ.   • Drug use: No   • Sexual activity: Not Currently       Family History   Problem Relation Age of Onset   • Osteoarthritis Mother    • Arthritis Mother    • Cancer Mother    • Other Other         Rheumatoid disease    • Cancer Father    • Cancer Sister        The following portions of the patient's history were reviewed and updated as appropriate: allergies, current medications, past family history, past medical history, past social history, past surgical history and problem list.    ROS  /80   Pulse 70   Ht 160 cm (63\")   Wt 53.5 kg (118 lb)   SpO2 99%   BMI 20.90 kg/m² .  Objective     Physical Exam    Physical Exam:  Neuro:  CV:  Resp:  GI:  Ext:  Pysch: AAOx3, no gross deficits  S1S2 RRR, no murmur  Non-labored, CTA  BS+, abd soft  Pedal pulses palp, no edema  Calm and cooperative       Procedures    EKG ordered by and reviewed by me in office  EKG shows SR with borderline LVH.  QT/QTc = WNL (389, 420 ms).  There is no significant change from prior study.       "

## 2023-05-30 NOTE — OUTREACH NOTE
Call Center TCM Note    Flowsheet Row Responses   [facility] patient discharged from? [facility]   Does the patient have one of the following disease processes/diagnoses(primary or secondary)? Other   TCM attempt successful? Yes  [Daughter Monica]   Call start time 0833   Call end time 0836   Discharge diagnosis Palpitations   Meds reviewed with patient/caregiver? Yes   Does the patient have all medications ordered at discharge? N/A   Is the patient taking all medications as directed (includes completed medication regime)? Yes   Does the patient have an appointment with their PCP within 7 days of discharge? No   Nursing Interventions Patient desires to follow up with specialty only, Routed TCM call to PCP office   Has home health visited the patient within 72 hours of discharge? N/A   Psychosocial issues? No   Did the patient receive a copy of their discharge instructions? Yes   Nursing interventions Reviewed instructions with patient   What is the patient's perception of their health status since discharge? Same   Is the patient/caregiver able to teach back signs and symptoms related to disease process for when to call PCP? Yes   Is the patient/caregiver able to teach back signs and symptoms related to disease process for when to call 911? Yes   Is the patient/caregiver able to teach back the hierarchy of who to call/visit for symptoms/problems? PCP, Specialist, Home health nurse, Urgent Care, ED, 911 Yes   TCM call completed? Yes   Wrap up additional comments Pt reports she is about the same. Pt to see cardio today and wants to wait on seeing PCP at this time.   Call end time 0836          Saumya Delaney RN    5/30/2023, 08:36 EDT

## 2023-05-30 NOTE — CASE MANAGEMENT/SOCIAL WORK
Case Management Discharge Note      Final Note: DC home prior to CM assessment.         Selected Continued Care - Discharged on 5/29/2023 Admission date: 5/28/2023 - Discharge disposition: Home or Self Care            Transportation Services  Private: Car    Final Discharge Disposition Code: 01 - home or self-care

## 2023-05-31 ENCOUNTER — TELEPHONE (OUTPATIENT)
Dept: CARDIOLOGY | Facility: CLINIC | Age: 83
End: 2023-05-31

## 2023-05-31 LAB — QT INTERVAL: 390 MS

## 2023-05-31 RX ORDER — AMLODIPINE BESYLATE 5 MG/1
5 TABLET ORAL
Qty: 90 TABLET | Refills: 1 | Status: SHIPPED | OUTPATIENT
Start: 2023-05-31

## 2023-05-31 NOTE — TELEPHONE ENCOUNTER
Rx Refill Note  Requested Prescriptions      No prescriptions requested or ordered in this encounter      Last office visit with prescribing clinician: 5/8/2023   Last telemedicine visit with prescribing clinician: Visit date not found   Next office visit with prescribing clinician: 5/1/2024                         Would you like a call back once the refill request has been completed: [] Yes [] No    If the office needs to give you a call back, can they leave a voicemail: [] Yes [] No    Marah Carmichael MA  05/31/23, 11:32 EDT

## 2023-05-31 NOTE — TELEPHONE ENCOUNTER
Incoming Refill Request      Medication requested (name and dose): amlodipine 5 mg    [facility]    Additional details provided by patient: 90 days    Best call back number: [phone]    Does the patient have less than a 3 day supply:  [] Yes  [x] No    Anum Cat Rep  05/31/23, 08:58 EDT

## 2023-06-01 ENCOUNTER — TELEPHONE (OUTPATIENT)
Dept: CARDIOLOGY | Facility: CLINIC | Age: 83
End: 2023-06-01

## 2023-06-01 NOTE — TELEPHONE ENCOUNTER
Caller: Jt Valdes    Relationship: Self    Best call back number: [phone]      PATIENT WANTS TO KNOW WHEN SHE WILL SEE DR HARRELL. PATIENT STATES SHE WAS TOLD AN ABLATION WOULD BE SCHEDULED. PATIENT IS ON A MEDICATION THAT ENDS IN 10DAYS. SHE WANT TO KNOW WHERE SHE STANDS IN HER PLAN OF CARE. PLEASE REACH OUT TO PATIENT FOR CONSULTATION.

## 2023-06-02 ENCOUNTER — TELEPHONE (OUTPATIENT)
Dept: CARDIOLOGY | Facility: CLINIC | Age: 83
End: 2023-06-02

## 2023-06-03 ENCOUNTER — LAB (OUTPATIENT)
Dept: LAB | Facility: HOSPITAL | Age: 83
End: 2023-06-03
Payer: MEDICARE

## 2023-06-03 DIAGNOSIS — I48.0 PAROXYSMAL ATRIAL FIBRILLATION: Chronic | ICD-10-CM

## 2023-06-03 DIAGNOSIS — I48.91 ATRIAL FIBRILLATION WITH RVR: ICD-10-CM

## 2023-06-03 DIAGNOSIS — E03.9 HYPOTHYROIDISM (ACQUIRED): ICD-10-CM

## 2023-06-03 DIAGNOSIS — Z79.899 VISIT FOR MONITORING TIKOSYN THERAPY: ICD-10-CM

## 2023-06-03 DIAGNOSIS — Z51.81 VISIT FOR MONITORING TIKOSYN THERAPY: ICD-10-CM

## 2023-06-03 DIAGNOSIS — I10 ESSENTIAL HYPERTENSION: ICD-10-CM

## 2023-06-03 LAB
ALBUMIN SERPL-MCNC: 4.3 G/DL (ref 3.5–5.2)
ALBUMIN/GLOB SERPL: 1.5 G/DL
ALP SERPL-CCNC: 81 U/L (ref 39–117)
ALT SERPL W P-5'-P-CCNC: 13 U/L (ref 1–33)
ANION GAP SERPL CALCULATED.3IONS-SCNC: 10 MMOL/L (ref 5–15)
AST SERPL-CCNC: 25 U/L (ref 1–32)
BASOPHILS # BLD AUTO: 0.05 10*3/MM3 (ref 0–0.2)
BASOPHILS NFR BLD AUTO: 1.1 % (ref 0–1.5)
BILIRUB SERPL-MCNC: 0.4 MG/DL (ref 0–1.2)
BUN SERPL-MCNC: 22 MG/DL (ref 8–23)
BUN/CREAT SERPL: 23.4 (ref 7–25)
CALCIUM SPEC-SCNC: 9.5 MG/DL (ref 8.6–10.5)
CHLORIDE SERPL-SCNC: 103 MMOL/L (ref 98–107)
CO2 SERPL-SCNC: 27 MMOL/L (ref 22–29)
CREAT SERPL-MCNC: 0.94 MG/DL (ref 0.57–1)
DEPRECATED RDW RBC AUTO: 40.6 FL (ref 37–54)
EGFRCR SERPLBLD CKD-EPI 2021: 60.3 ML/MIN/1.73
EOSINOPHIL # BLD AUTO: 0.21 10*3/MM3 (ref 0–0.4)
EOSINOPHIL NFR BLD AUTO: 4.8 % (ref 0.3–6.2)
ERYTHROCYTE [DISTWIDTH] IN BLOOD BY AUTOMATED COUNT: 12.6 % (ref 12.3–15.4)
GLOBULIN UR ELPH-MCNC: 2.9 GM/DL
GLUCOSE SERPL-MCNC: 94 MG/DL (ref 65–99)
HCT VFR BLD AUTO: 35.7 % (ref 34–46.6)
HGB BLD-MCNC: 12 G/DL (ref 12–15.9)
IMM GRANULOCYTES # BLD AUTO: 0.01 10*3/MM3 (ref 0–0.05)
IMM GRANULOCYTES NFR BLD AUTO: 0.2 % (ref 0–0.5)
INR PPP: 2.43 (ref 2–3)
LYMPHOCYTES # BLD AUTO: 0.95 10*3/MM3 (ref 0.7–3.1)
LYMPHOCYTES NFR BLD AUTO: 21.6 % (ref 19.6–45.3)
MAGNESIUM SERPL-MCNC: 2 MG/DL (ref 1.6–2.4)
MCH RBC QN AUTO: 29.6 PG (ref 26.6–33)
MCHC RBC AUTO-ENTMCNC: 33.6 G/DL (ref 31.5–35.7)
MCV RBC AUTO: 88.1 FL (ref 79–97)
MONOCYTES # BLD AUTO: 0.5 10*3/MM3 (ref 0.1–0.9)
MONOCYTES NFR BLD AUTO: 11.4 % (ref 5–12)
NEUTROPHILS NFR BLD AUTO: 2.68 10*3/MM3 (ref 1.7–7)
NEUTROPHILS NFR BLD AUTO: 60.9 % (ref 42.7–76)
NRBC BLD AUTO-RTO: 0 /100 WBC (ref 0–0.2)
PLATELET # BLD AUTO: 256 10*3/MM3 (ref 140–450)
PMV BLD AUTO: 10 FL (ref 6–12)
POTASSIUM SERPL-SCNC: 4.4 MMOL/L (ref 3.5–5.2)
PROT SERPL-MCNC: 7.2 G/DL (ref 6–8.5)
PROTHROMBIN TIME: 24.7 SECONDS (ref 19.4–28.5)
RBC # BLD AUTO: 4.05 10*6/MM3 (ref 3.77–5.28)
SODIUM SERPL-SCNC: 140 MMOL/L (ref 136–145)
WBC NRBC COR # BLD: 4.4 10*3/MM3 (ref 3.4–10.8)

## 2023-06-03 PROCEDURE — 85025 COMPLETE CBC W/AUTO DIFF WBC: CPT

## 2023-06-03 PROCEDURE — 36415 COLL VENOUS BLD VENIPUNCTURE: CPT

## 2023-06-03 PROCEDURE — 83735 ASSAY OF MAGNESIUM: CPT

## 2023-06-03 PROCEDURE — 80053 COMPREHEN METABOLIC PANEL: CPT

## 2023-06-03 PROCEDURE — 85610 PROTHROMBIN TIME: CPT

## 2023-06-05 ENCOUNTER — HOSPITAL ENCOUNTER (OUTPATIENT)
Dept: CARDIOLOGY | Facility: HOSPITAL | Age: 83
Discharge: HOME | End: 2023-06-05
Payer: MEDICARE

## 2023-06-05 ENCOUNTER — OFFICE VISIT (OUTPATIENT)
Dept: CARDIOLOGY | Facility: CLINIC | Age: 83
End: 2023-06-05
Payer: MEDICARE

## 2023-06-05 ENCOUNTER — PREP FOR SURGERY (OUTPATIENT)
Dept: CARDIOLOGY | Facility: CLINIC | Age: 83
End: 2023-06-05

## 2023-06-05 VITALS
HEIGHT: 63 IN | OXYGEN SATURATION: 96 % | SYSTOLIC BLOOD PRESSURE: 147 MMHG | HEART RATE: 61 BPM | WEIGHT: 113 LBS | DIASTOLIC BLOOD PRESSURE: 79 MMHG | BODY MASS INDEX: 20.02 KG/M2 | RESPIRATION RATE: 18 BRPM

## 2023-06-05 DIAGNOSIS — R42 DIZZINESS: ICD-10-CM

## 2023-06-05 DIAGNOSIS — I48.0 PAROXYSMAL ATRIAL FIBRILLATION: Chronic | ICD-10-CM

## 2023-06-05 DIAGNOSIS — I10 PRIMARY HYPERTENSION: ICD-10-CM

## 2023-06-05 DIAGNOSIS — I48.0 PAROXYSMAL ATRIAL FIBRILLATION: Primary | ICD-10-CM

## 2023-06-05 DIAGNOSIS — R00.2 PALPITATIONS: ICD-10-CM

## 2023-06-05 DIAGNOSIS — I11.9 HYPERTENSIVE HEART DISEASE WITHOUT HEART FAILURE: ICD-10-CM

## 2023-06-05 PROCEDURE — 3077F SYST BP >= 140 MM HG: CPT | Performed by: INTERNAL MEDICINE

## 2023-06-05 PROCEDURE — 1159F MED LIST DOCD IN RCRD: CPT | Performed by: INTERNAL MEDICINE

## 2023-06-05 PROCEDURE — 3078F DIAST BP <80 MM HG: CPT | Performed by: INTERNAL MEDICINE

## 2023-06-05 PROCEDURE — 93000 ELECTROCARDIOGRAM COMPLETE: CPT | Performed by: INTERNAL MEDICINE

## 2023-06-05 PROCEDURE — 99214 OFFICE O/P EST MOD 30 MIN: CPT | Performed by: INTERNAL MEDICINE

## 2023-06-05 PROCEDURE — 1160F RVW MEDS BY RX/DR IN RCRD: CPT | Performed by: INTERNAL MEDICINE

## 2023-06-05 RX ORDER — DOFETILIDE 0.12 MG/1
125 CAPSULE ORAL EVERY 12 HOURS SCHEDULED
Qty: 60 CAPSULE | Refills: 1 | Status: SHIPPED | OUTPATIENT
Start: 2023-06-05

## 2023-06-05 RX ORDER — DOFETILIDE 0.12 MG/1
125 CAPSULE ORAL EVERY 12 HOURS SCHEDULED
Qty: 60 CAPSULE | Refills: 1 | Status: SHIPPED | OUTPATIENT
Start: 2023-06-05 | End: 2023-06-05 | Stop reason: SDUPTHER

## 2023-06-05 NOTE — LETTER
June 5, 2023       No Recipients    Patient: Jt Valdes   YOB: 1940   Date of Visit: 6/5/2023       Dear Dr. Marr Recipients:    Thank you for referring Jt Valdes to me for evaluation. Below are the relevant portions of my assessment and plan of care.    If you have questions, please do not hesitate to call me. I look forward to following Jt along with you.         Sincerely,        Dyllan Lloyd MD        CC:   No Recipients    Dyllan Lloyd MD  06/05/23 1403  Sign when Signing Visit  CC--- atrial fibrillation    Sub  82-year-old pleasant patient was seen in the hospital because of recurrent atrial fibrillation.  After discussing therapeutic options patient has opted for Tikosyn and comes in for follow-up.  She has history of rheumatoid arthritis, hypertension and hypothyroidism and chronic kidney disease in the past.      Past Medical History:   Diagnosis Date   • Allergic     Sulfa   • Arthritis    • Cataract     Cataracts surgery   • COVID-19    • Degenerative joint disease    • Extremity pain     legs pain   • H/O degenerative disc disease    • History of colonoscopy 2009    last done 2009   • History of echocardiogram 09/04/2018    LVH EF 65%. RV OK. LA probably mildly dilated. AV OK. MV OK. Modest TR RVSP 26 or less. Nuclear MPI regadenoson 9/14/2018. LV uniform myocardial uptake and wall motion EF 71%.    • History of Holter monitoring 10/08/2018    SR 40-81 58. AF 2.7 hr episode VR . At termination AF 2 3.5-4.0 s pauses with patient possibly dizzy. 205 PAC 42 atrial ashanti several SVT. No ventricular ectopy.    • HL (hearing loss) 2020    Tried hearing aids twice . Didnt help   • HTN (hypertension)    • Hyperlipidemia    • Hypertensive cardiovascular disease    • Hypothyroidism    • Incontinence    • Leukopenia    • Low back pain    • PAF (paroxysmal atrial fibrillation) 09/2018     [facility] Sept 2018 PAF and HTN. PAFL On bisoprolol and diltiazem bradycardia  symptomatic with dizziness. Amiodarone alone Oct 2018 and no episodes of erratic or fast heartbeat or lightheadedness.    • Rheumatoid arthritis     Possible pulmonary involvement    • Staph infection    • Vitamin D deficiency    • White coat syndrome with hypertension      Past Surgical History:   Procedure Laterality Date   • APPENDECTOMY  1974   • BACK SURGERY  2003 2001, 2003   • SPINE SURGERY  2001.    2003   • SUBTOTAL HYSTERECTOMY  1974         Physical Exam    General:      well developed, well nourished, in no acute distress.    Head:      normocephalic and atraumatic.    Eyes:      PERRL/EOM intact, conjunctivae and sclerae clear without nystagmus.    Neck:      no  thyromegaly, trachea central with normal respiratory effort  Lungs:      clear bilaterally to auscultation.    Heart:       regular rate and rhythm, S1, S2 without murmurs, rubs, or gallops  Skin:      intact without lesions or rashes.    Psych:      alert and cooperative; normal mood and affect; normal attention span and concentration.          Assessment and plan    Most recent potassium of 4.4 and creatinine was normal.  Magnesium is normal  Recurrent paroxysmal atrial fibrillation on Tikosyn in sinus rhythm  Essential hypertension not well controlled on home monitoring to be continued and treatment goals for hypertension educated  Hypothyroidism supplemented on levothyroxine  History of rheumatoid arthritis  Medications reviewed and follow-up appointments made  Most recent INR 2.43  Therapeutic options for recurrent breakthrough AF discussed with the patient and family  Tikosyn refilled        ECG 12 Lead    Date/Time: 6/5/2023 1:57 PM  Performed by: Dyllan Lloyd MD  Authorized by: Dyllan Lloyd MD   Comparison: compared with previous ECG   Similar to previous ECG  Rhythm: sinus rhythm  Rate: normal  Conduction: conduction normal         Electronically signed by Dyllan Lloyd MD, 06/05/23, 1:57 PM EDT.

## 2023-06-05 NOTE — PROGRESS NOTES
CC--- atrial fibrillation    Sub  82-year-old pleasant patient was seen in the hospital because of recurrent atrial fibrillation.  After discussing therapeutic options patient has opted for Tikosyn and comes in for follow-up.  She has history of rheumatoid arthritis, hypertension and hypothyroidism and chronic kidney disease in the past.      Past Medical History:   Diagnosis Date   • Allergic     Sulfa   • Arthritis    • Cataract     Cataracts surgery   • COVID-19    • Degenerative joint disease    • Extremity pain     legs pain   • H/O degenerative disc disease    • History of colonoscopy 2009    last done 2009   • History of echocardiogram 09/04/2018    LVH EF 65%. RV OK. LA probably mildly dilated. AV OK. MV OK. Modest TR RVSP 26 or less. Nuclear MPI regadenoson 9/14/2018. LV uniform myocardial uptake and wall motion EF 71%.    • History of Holter monitoring 10/08/2018    SR 40-81 58. AF 2.7 hr episode VR . At termination AF 2 3.5-4.0 s pauses with patient possibly dizzy. 205 PAC 42 atrial ashanti several SVT. No ventricular ectopy.    • HL (hearing loss) 2020    Tried hearing aids twice . Didnt help   • HTN (hypertension)    • Hyperlipidemia    • Hypertensive cardiovascular disease    • Hypothyroidism    • Incontinence    • Leukopenia    • Low back pain    • PAF (paroxysmal atrial fibrillation) 09/2018     [facility] Sept 2018 PAF and HTN. PAFL On bisoprolol and diltiazem bradycardia symptomatic with dizziness. Amiodarone alone Oct 2018 and no episodes of erratic or fast heartbeat or lightheadedness.    • Rheumatoid arthritis     Possible pulmonary involvement    • Staph infection    • Vitamin D deficiency    • White coat syndrome with hypertension      Past Surgical History:   Procedure Laterality Date   • APPENDECTOMY  1974   • BACK SURGERY  2003 2001, 2003   • SPINE SURGERY  2001.    2003   • SUBTOTAL HYSTERECTOMY  1974         Physical Exam    General:      well developed, well nourished, in no acute  distress.    Head:      normocephalic and atraumatic.    Eyes:      PERRL/EOM intact, conjunctivae and sclerae clear without nystagmus.    Neck:      no  thyromegaly, trachea central with normal respiratory effort  Lungs:      clear bilaterally to auscultation.    Heart:       regular rate and rhythm, S1, S2 without murmurs, rubs, or gallops  Skin:      intact without lesions or rashes.    Psych:      alert and cooperative; normal mood and affect; normal attention span and concentration.          Assessment and plan    Most recent potassium of 4.4 and creatinine was normal.  Magnesium is normal  Recurrent paroxysmal atrial fibrillation on Tikosyn in sinus rhythm  Essential hypertension not well controlled on home monitoring to be continued and treatment goals for hypertension educated  Hypothyroidism supplemented on levothyroxine  History of rheumatoid arthritis  Medications reviewed and follow-up appointments made  Most recent INR 2.43  Therapeutic options for recurrent breakthrough AF discussed with the patient and family  Tikosyn refilled        ECG 12 Lead    Date/Time: 6/5/2023 1:57 PM  Performed by: Dyllan Lloyd MD  Authorized by: Dyllan Lloyd MD   Comparison: compared with previous ECG   Similar to previous ECG  Rhythm: sinus rhythm  Rate: normal  Conduction: conduction normal         Electronically signed by Dyllan Lloyd MD, 06/05/23, 1:57 PM EDT.

## 2023-06-06 ENCOUNTER — ANTICOAGULATION VISIT (OUTPATIENT)
Dept: CARDIOLOGY | Facility: CLINIC | Age: 83
End: 2023-06-06
Payer: MEDICARE

## 2023-06-06 DIAGNOSIS — I48.0 PAROXYSMAL ATRIAL FIBRILLATION: Primary | Chronic | ICD-10-CM

## 2023-06-06 DIAGNOSIS — Z79.01 LONG TERM (CURRENT) USE OF ANTICOAGULANTS: Chronic | ICD-10-CM

## 2023-06-07 ENCOUNTER — READMISSION MANAGEMENT (OUTPATIENT)
Dept: CALL CENTER | Facility: HOSPITAL | Age: 83
End: 2023-06-07
Payer: MEDICARE

## 2023-06-07 RX ORDER — DOFETILIDE 0.12 MG/1
CAPSULE ORAL
Qty: 180 CAPSULE | OUTPATIENT
Start: 2023-06-07

## 2023-06-07 NOTE — OUTREACH NOTE
Medical Week 2 Survey      Flowsheet Row Responses   [facility] patient discharged from? [facility]   Does the patient have one of the following disease processes/diagnoses(primary or secondary)? Other   Week 2 attempt successful? Yes   Call start time 1251   Discharge diagnosis Palpitations   Call end time 1254   Meds reviewed with patient/caregiver? Yes   Is the patient taking all medications as directed (includes completed medication regime)? Yes   Comments regarding appointments Anticoagulation apt on 6/28/23   Does the patient have a primary care provider?  Yes   Has the patient kept scheduled appointments due by today? Yes   What is the patient's perception of their health status since discharge? Improving   Week 2 Call Completed? Yes   Graduated Yes   Did the patient feel the follow up calls were helpful during their recovery period? Yes   Graduated/Revoked comments Pt reports she will seek medical attention if any symptoms worsen            Blank [facility] - Registered Nurse

## 2023-06-13 ENCOUNTER — TELEPHONE (OUTPATIENT)
Dept: CARDIOLOGY | Facility: CLINIC | Age: 83
End: 2023-06-13
Payer: MEDICARE

## 2023-06-13 NOTE — TELEPHONE ENCOUNTER
Caller: Jt Valdes    Relationship: Self    Best call back number:     What is the best time to reach you:     Who are you requesting to speak with (clinical staff, provider,  specific staff member): CLINICAL    What was the call regarding: PT IS CALLING TO GET AN UPDATE ON SCHEDULING FOR HER ABLATION. PLEASE GIVE A CALLBACK TO THE PT FOR UPDATES.     Is it okay if the provider responds through [employer]: PREFERS CALL

## 2023-07-31 ENCOUNTER — TELEPHONE (OUTPATIENT)
Dept: CARDIOLOGY | Facility: CLINIC | Age: 83
End: 2023-07-31

## 2023-07-31 NOTE — TELEPHONE ENCOUNTER
Caller: STEFFI    Relationship to patient: SELF    Best call back number: [phone]    Patient is needing: PT HAS AN ABLATION 8/1/23-  SHE WENT TO GET LABS ON SATURDAY AND THERE WERE NO ORDERS IN CHART-  UNABLE TO WARM TRANSFER CALL TO [facility]

## 2023-08-01 ENCOUNTER — LAB (OUTPATIENT)
Dept: LAB | Facility: HOSPITAL | Age: 83
End: 2023-08-01
Payer: MEDICARE

## 2023-08-01 ENCOUNTER — ANESTHESIA EVENT (OUTPATIENT)
Dept: CARDIOLOGY | Facility: HOSPITAL | Age: 83
End: 2023-08-01
Payer: MEDICARE

## 2023-08-01 ENCOUNTER — HOSPITAL ENCOUNTER (OUTPATIENT)
Dept: CARDIOLOGY | Facility: HOSPITAL | Age: 83
Discharge: HOME | End: 2023-08-01
Payer: MEDICARE

## 2023-08-01 ENCOUNTER — HOSPITAL ENCOUNTER (OUTPATIENT)
Facility: HOSPITAL | Age: 83
Discharge: HOME | End: 2023-08-02
Attending: INTERNAL MEDICINE | Admitting: INTERNAL MEDICINE
Payer: MEDICARE

## 2023-08-01 ENCOUNTER — HOSPITAL ENCOUNTER (OUTPATIENT)
Dept: CARDIOLOGY | Facility: HOSPITAL | Age: 83
Setting detail: HOSPITAL OUTPATIENT SURGERY
Discharge: HOME | End: 2023-08-01
Payer: MEDICARE

## 2023-08-01 ENCOUNTER — ANESTHESIA (OUTPATIENT)
Dept: CARDIOLOGY | Facility: HOSPITAL | Age: 83
End: 2023-08-01
Payer: MEDICARE

## 2023-08-01 VITALS
HEART RATE: 73 BPM | WEIGHT: 115 LBS | RESPIRATION RATE: 19 BRPM | HEIGHT: 61 IN | OXYGEN SATURATION: 95 % | DIASTOLIC BLOOD PRESSURE: 73 MMHG | SYSTOLIC BLOOD PRESSURE: 118 MMHG | BODY MASS INDEX: 21.71 KG/M2 | TEMPERATURE: 97.9 F

## 2023-08-01 DIAGNOSIS — E03.9 HYPOTHYROIDISM (ACQUIRED): ICD-10-CM

## 2023-08-01 DIAGNOSIS — Z79.899 VISIT FOR MONITORING TIKOSYN THERAPY: ICD-10-CM

## 2023-08-01 DIAGNOSIS — I48.91 ATRIAL FIBRILLATION WITH RVR: ICD-10-CM

## 2023-08-01 DIAGNOSIS — I10 ESSENTIAL HYPERTENSION: ICD-10-CM

## 2023-08-01 DIAGNOSIS — Z51.81 VISIT FOR MONITORING TIKOSYN THERAPY: ICD-10-CM

## 2023-08-01 DIAGNOSIS — I48.0 PAROXYSMAL ATRIAL FIBRILLATION: Chronic | ICD-10-CM

## 2023-08-01 LAB
ACT BLD: 161 SECONDS (ref 89–137)
ACT BLD: 275 SECONDS (ref 89–137)
ACT BLD: 299 SECONDS (ref 89–137)
ACT BLD: 323 SECONDS (ref 89–137)
ANION GAP SERPL CALCULATED.3IONS-SCNC: 13 MMOL/L (ref 5–15)
BH CV ECHO SHUNT ASSESSMENT PERFORMED (HIDDEN SCRIPTING): 1
BUN SERPL-MCNC: 23 MG/DL (ref 8–23)
BUN/CREAT SERPL: 27.7 (ref 7–25)
CALCIUM SPEC-SCNC: 9.7 MG/DL (ref 8.6–10.5)
CHLORIDE SERPL-SCNC: 104 MMOL/L (ref 98–107)
CO2 SERPL-SCNC: 24 MMOL/L (ref 22–29)
CREAT SERPL-MCNC: 0.83 MG/DL (ref 0.57–1)
EGFRCR SERPLBLD CKD-EPI 2021: 70 ML/MIN/1.73
GLUCOSE SERPL-MCNC: 95 MG/DL (ref 65–99)
INR PPP: 2.47 (ref 2–3)
MAGNESIUM SERPL-MCNC: 1.8 MG/DL (ref 1.6–2.4)
POTASSIUM SERPL-SCNC: 4.3 MMOL/L (ref 3.5–5.2)
PROTHROMBIN TIME: 25.1 SECONDS (ref 19.4–28.5)
SODIUM SERPL-SCNC: 141 MMOL/L (ref 136–145)

## 2023-08-01 PROCEDURE — 93005 ELECTROCARDIOGRAM TRACING: CPT | Performed by: INTERNAL MEDICINE

## 2023-08-01 PROCEDURE — 25010000002 SUGAMMADEX 200 MG/2ML SOLUTION: Performed by: ANESTHESIOLOGIST ASSISTANT

## 2023-08-01 PROCEDURE — C1732 CATH, EP, DIAG/ABL, 3D/VECT: HCPCS | Performed by: INTERNAL MEDICINE

## 2023-08-01 PROCEDURE — C1894 INTRO/SHEATH, NON-LASER: HCPCS | Performed by: INTERNAL MEDICINE

## 2023-08-01 PROCEDURE — 93656 COMPRE EP EVAL ABLTJ ATR FIB: CPT | Performed by: INTERNAL MEDICINE

## 2023-08-01 PROCEDURE — 93655 ICAR CATH ABLTJ DSCRT ARRHYT: CPT | Performed by: INTERNAL MEDICINE

## 2023-08-01 PROCEDURE — 93325 DOPPLER ECHO COLOR FLOW MAPG: CPT

## 2023-08-01 PROCEDURE — C1733 CATH, EP, OTHR THAN COOL-TIP: HCPCS | Performed by: INTERNAL MEDICINE

## 2023-08-01 PROCEDURE — 83735 ASSAY OF MAGNESIUM: CPT

## 2023-08-01 PROCEDURE — 85610 PROTHROMBIN TIME: CPT | Performed by: INTERNAL MEDICINE

## 2023-08-01 PROCEDURE — 96360 HYDRATION IV INFUSION INIT: CPT | Performed by: INTERNAL MEDICINE

## 2023-08-01 PROCEDURE — 36415 COLL VENOUS BLD VENIPUNCTURE: CPT

## 2023-08-01 PROCEDURE — C1759 CATH, INTRA ECHOCARDIOGRAPHY: HCPCS | Performed by: INTERNAL MEDICINE

## 2023-08-01 PROCEDURE — G0378 HOSPITAL OBSERVATION PER HR: HCPCS

## 2023-08-01 PROCEDURE — 80048 BASIC METABOLIC PNL TOTAL CA: CPT | Performed by: INTERNAL MEDICINE

## 2023-08-01 PROCEDURE — 96360 HYDRATION IV INFUSION INIT: CPT

## 2023-08-01 PROCEDURE — 93657 TX L/R ATRIAL FIB ADDL: CPT | Performed by: INTERNAL MEDICINE

## 2023-08-01 PROCEDURE — 25510000001 IOPAMIDOL PER 1 ML: Performed by: INTERNAL MEDICINE

## 2023-08-01 PROCEDURE — 25010000002 FENTANYL CITRATE (PF) 100 MCG/2ML SOLUTION: Performed by: ANESTHESIOLOGIST ASSISTANT

## 2023-08-01 PROCEDURE — C1766 INTRO/SHEATH,STRBLE,NON-PEEL: HCPCS | Performed by: INTERNAL MEDICINE

## 2023-08-01 PROCEDURE — C1769 GUIDE WIRE: HCPCS | Performed by: INTERNAL MEDICINE

## 2023-08-01 PROCEDURE — 25010000002 HEPARIN (PORCINE) PER 1000 UNITS: Performed by: ANESTHESIOLOGIST ASSISTANT

## 2023-08-01 PROCEDURE — 25010000002 PHENYLEPHRINE 10 MG/ML SOLUTION 5 ML VIAL: Performed by: ANESTHESIOLOGIST ASSISTANT

## 2023-08-01 PROCEDURE — 25010000002 PROTAMINE SULFATE PER 10 MG: Performed by: ANESTHESIOLOGIST ASSISTANT

## 2023-08-01 PROCEDURE — C1730 CATH, EP, 19 OR FEW ELECT: HCPCS | Performed by: INTERNAL MEDICINE

## 2023-08-01 PROCEDURE — 93320 DOPPLER ECHO COMPLETE: CPT

## 2023-08-01 PROCEDURE — 25010000002 PROPOFOL 200 MG/20ML EMULSION: Performed by: NURSE ANESTHETIST, CERTIFIED REGISTERED

## 2023-08-01 PROCEDURE — 96374 THER/PROPH/DIAG INJ IV PUSH: CPT

## 2023-08-01 PROCEDURE — 93010 ELECTROCARDIOGRAM REPORT: CPT | Performed by: INTERNAL MEDICINE

## 2023-08-01 PROCEDURE — C1893 INTRO/SHEATH, FIXED,NON-PEEL: HCPCS | Performed by: INTERNAL MEDICINE

## 2023-08-01 PROCEDURE — 85347 COAGULATION TIME ACTIVATED: CPT

## 2023-08-01 PROCEDURE — 25010000002 ONDANSETRON PER 1 MG: Performed by: NURSE ANESTHETIST, CERTIFIED REGISTERED

## 2023-08-01 RX ORDER — LIDOCAINE HYDROCHLORIDE 10 MG/ML
INJECTION, SOLUTION EPIDURAL; INFILTRATION; INTRACAUDAL; PERINEURAL
Status: DISCONTINUED | OUTPATIENT
Start: 2023-08-01 | End: 2023-08-01 | Stop reason: HOSPADM

## 2023-08-01 RX ORDER — AMLODIPINE BESYLATE 5 MG/1
5 TABLET ORAL
Status: DISCONTINUED | OUTPATIENT
Start: 2023-08-02 | End: 2023-08-02 | Stop reason: HOSPADM

## 2023-08-01 RX ORDER — METOPROLOL SUCCINATE 50 MG/1
100 TABLET, EXTENDED RELEASE ORAL DAILY
Status: DISCONTINUED | OUTPATIENT
Start: 2023-08-02 | End: 2023-08-02 | Stop reason: HOSPADM

## 2023-08-01 RX ORDER — PROPOFOL 10 MG/ML
INJECTION, EMULSION INTRAVENOUS AS NEEDED
Status: DISCONTINUED | OUTPATIENT
Start: 2023-08-01 | End: 2023-08-01 | Stop reason: SURG

## 2023-08-01 RX ORDER — FAMOTIDINE 10 MG/ML
INJECTION, SOLUTION INTRAVENOUS AS NEEDED
Status: DISCONTINUED | OUTPATIENT
Start: 2023-08-01 | End: 2023-08-01 | Stop reason: SURG

## 2023-08-01 RX ORDER — LEFLUNOMIDE 20 MG/1
20 TABLET ORAL DAILY
Status: DISCONTINUED | OUTPATIENT
Start: 2023-08-02 | End: 2023-08-02 | Stop reason: HOSPADM

## 2023-08-01 RX ORDER — ACETAMINOPHEN 650 MG/1
650 SUPPOSITORY RECTAL EVERY 4 HOURS PRN
Status: DISCONTINUED | OUTPATIENT
Start: 2023-08-01 | End: 2023-08-02 | Stop reason: HOSPADM

## 2023-08-01 RX ORDER — ONDANSETRON 2 MG/ML
INJECTION INTRAMUSCULAR; INTRAVENOUS AS NEEDED
Status: DISCONTINUED | OUTPATIENT
Start: 2023-08-01 | End: 2023-08-01 | Stop reason: SURG

## 2023-08-01 RX ORDER — WARFARIN SODIUM 5 MG/1
5 TABLET ORAL
Status: DISCONTINUED | OUTPATIENT
Start: 2023-08-01 | End: 2023-08-02 | Stop reason: HOSPADM

## 2023-08-01 RX ORDER — NALOXONE HCL 0.4 MG/ML
0.4 VIAL (ML) INJECTION
Status: DISCONTINUED | OUTPATIENT
Start: 2023-08-01 | End: 2023-08-02 | Stop reason: HOSPADM

## 2023-08-01 RX ORDER — POLYETHYLENE GLYCOL 3350 17 G/17G
8.5 POWDER, FOR SOLUTION ORAL DAILY
Status: DISCONTINUED | OUTPATIENT
Start: 2023-08-02 | End: 2023-08-02 | Stop reason: HOSPADM

## 2023-08-01 RX ORDER — SODIUM CHLORIDE 9 MG/ML
INJECTION, SOLUTION INTRAVENOUS CONTINUOUS PRN
Status: DISCONTINUED | OUTPATIENT
Start: 2023-08-01 | End: 2023-08-01 | Stop reason: SURG

## 2023-08-01 RX ORDER — PHENYLEPHRINE HCL IN 0.9% NACL 1 MG/10 ML
SYRINGE (ML) INTRAVENOUS AS NEEDED
Status: DISCONTINUED | OUTPATIENT
Start: 2023-08-01 | End: 2023-08-01 | Stop reason: SURG

## 2023-08-01 RX ORDER — EPHEDRINE SULFATE 5 MG/ML
INJECTION INTRAVENOUS AS NEEDED
Status: DISCONTINUED | OUTPATIENT
Start: 2023-08-01 | End: 2023-08-01 | Stop reason: SURG

## 2023-08-01 RX ORDER — MORPHINE SULFATE 2 MG/ML
1 INJECTION, SOLUTION INTRAMUSCULAR; INTRAVENOUS EVERY 4 HOURS PRN
Status: DISCONTINUED | OUTPATIENT
Start: 2023-08-01 | End: 2023-08-02 | Stop reason: HOSPADM

## 2023-08-01 RX ORDER — PROTAMINE SULFATE 10 MG/ML
INJECTION, SOLUTION INTRAVENOUS AS NEEDED
Status: DISCONTINUED | OUTPATIENT
Start: 2023-08-01 | End: 2023-08-01 | Stop reason: SURG

## 2023-08-01 RX ORDER — ACETAMINOPHEN 325 MG/1
650 TABLET ORAL EVERY 4 HOURS PRN
Status: DISCONTINUED | OUTPATIENT
Start: 2023-08-01 | End: 2023-08-02 | Stop reason: HOSPADM

## 2023-08-01 RX ORDER — LIDOCAINE HYDROCHLORIDE 20 MG/ML
INJECTION, SOLUTION EPIDURAL; INFILTRATION; INTRACAUDAL; PERINEURAL AS NEEDED
Status: DISCONTINUED | OUTPATIENT
Start: 2023-08-01 | End: 2023-08-01 | Stop reason: SURG

## 2023-08-01 RX ORDER — HYDROCODONE BITARTRATE AND ACETAMINOPHEN 5; 325 MG/1; MG/1
1 TABLET ORAL EVERY 4 HOURS PRN
Status: DISCONTINUED | OUTPATIENT
Start: 2023-08-01 | End: 2023-08-02 | Stop reason: HOSPADM

## 2023-08-01 RX ORDER — LEVOTHYROXINE SODIUM 112 UG/1
112 TABLET ORAL
Status: DISCONTINUED | OUTPATIENT
Start: 2023-08-02 | End: 2023-08-02 | Stop reason: HOSPADM

## 2023-08-01 RX ORDER — HEPARIN SODIUM 1000 [USP'U]/ML
INJECTION, SOLUTION INTRAVENOUS; SUBCUTANEOUS AS NEEDED
Status: DISCONTINUED | OUTPATIENT
Start: 2023-08-01 | End: 2023-08-01 | Stop reason: SURG

## 2023-08-01 RX ORDER — ONDANSETRON 2 MG/ML
4 INJECTION INTRAMUSCULAR; INTRAVENOUS EVERY 6 HOURS PRN
Status: DISCONTINUED | OUTPATIENT
Start: 2023-08-01 | End: 2023-08-02 | Stop reason: HOSPADM

## 2023-08-01 RX ADMIN — Medication 200 MCG: at 14:32

## 2023-08-01 RX ADMIN — PROPOFOL 20 MG: 10 INJECTION, EMULSION INTRAVENOUS at 13:47

## 2023-08-01 RX ADMIN — EPHEDRINE SULFATE 20 MG: 5 INJECTION INTRAVENOUS at 14:35

## 2023-08-01 RX ADMIN — Medication 200 MCG: at 13:42

## 2023-08-01 RX ADMIN — HEPARIN SODIUM 5000 UNITS: 1000 INJECTION, SOLUTION INTRAVENOUS; SUBCUTANEOUS at 15:14

## 2023-08-01 RX ADMIN — Medication 400 MG: at 20:54

## 2023-08-01 RX ADMIN — EPHEDRINE SULFATE 20 MG: 5 INJECTION INTRAVENOUS at 14:39

## 2023-08-01 RX ADMIN — FENTANYL CITRATE 100 MCG: 50 INJECTION, SOLUTION INTRAMUSCULAR; INTRAVENOUS at 13:29

## 2023-08-01 RX ADMIN — Medication 200 MCG: at 14:37

## 2023-08-01 RX ADMIN — HEPARIN SODIUM 1000 UNITS: 1000 INJECTION, SOLUTION INTRAVENOUS; SUBCUTANEOUS at 15:16

## 2023-08-01 RX ADMIN — PROPOFOL 100 MG: 10 INJECTION, EMULSION INTRAVENOUS at 13:34

## 2023-08-01 RX ADMIN — ONDANSETRON 4 MG: 2 INJECTION INTRAMUSCULAR; INTRAVENOUS at 13:32

## 2023-08-01 RX ADMIN — PHENYLEPHRINE HYDROCHLORIDE 1 MCG/KG/MIN: 10 INJECTION INTRAVENOUS at 14:30

## 2023-08-01 RX ADMIN — PHENYLEPHRINE HYDROCHLORIDE 0.5 MCG/KG/MIN: 10 INJECTION INTRAVENOUS at 16:26

## 2023-08-01 RX ADMIN — EPHEDRINE SULFATE 20 MG: 5 INJECTION INTRAVENOUS at 14:43

## 2023-08-01 RX ADMIN — SUGAMMADEX 200 MG: 100 INJECTION, SOLUTION INTRAVENOUS at 16:46

## 2023-08-01 RX ADMIN — SODIUM CHLORIDE: 9 INJECTION, SOLUTION INTRAVENOUS at 14:20

## 2023-08-01 RX ADMIN — HEPARIN SODIUM 2000 UNITS: 1000 INJECTION, SOLUTION INTRAVENOUS; SUBCUTANEOUS at 15:32

## 2023-08-01 RX ADMIN — PROPOFOL 20 MG: 10 INJECTION, EMULSION INTRAVENOUS at 13:42

## 2023-08-01 RX ADMIN — WARFARIN SODIUM 5 MG: 5 TABLET ORAL at 20:53

## 2023-08-01 RX ADMIN — PROPOFOL 20 MG: 10 INJECTION, EMULSION INTRAVENOUS at 13:37

## 2023-08-01 RX ADMIN — ONDANSETRON 4 MG: 2 INJECTION INTRAMUSCULAR; INTRAVENOUS at 16:44

## 2023-08-01 RX ADMIN — LIDOCAINE HYDROCHLORIDE 100 MG: 20 INJECTION, SOLUTION EPIDURAL; INFILTRATION; INTRACAUDAL; PERINEURAL at 13:34

## 2023-08-01 RX ADMIN — HEPARIN SODIUM 1000 UNITS: 1000 INJECTION, SOLUTION INTRAVENOUS; SUBCUTANEOUS at 15:52

## 2023-08-01 RX ADMIN — Medication 100 MCG: at 14:44

## 2023-08-01 RX ADMIN — SODIUM CHLORIDE: 9 INJECTION, SOLUTION INTRAVENOUS at 13:32

## 2023-08-01 RX ADMIN — PROTAMINE SULFATE 50 MG: 10 INJECTION, SOLUTION INTRAVENOUS at 16:42

## 2023-08-01 RX ADMIN — FAMOTIDINE 20 MG: 10 INJECTION INTRAVENOUS at 15:28

## 2023-08-01 RX ADMIN — Medication 100 MCG: at 14:47

## 2023-08-01 RX ADMIN — Medication 100 MCG: at 14:41

## 2023-08-01 NOTE — Clinical Note
A 9 fr sheath was  inserted using micropuncture technique into the right femoral vein. Sheath insertion not delayed.

## 2023-08-01 NOTE — H&P
CC--- atrial fibrillation     Sub  82-year-old pleasant patient was seen in the hospital because of recurrent atrial fibrillation.  After discussing therapeutic options patient has opted for Tikosyn and comes in for follow-up.  She has history of rheumatoid arthritis, hypertension and hypothyroidism and chronic kidney disease in the past.        Medical History        Past Medical History:   Diagnosis Date    Allergic       Sulfa    Arthritis      Cataract       Cataracts surgery    COVID-19      Degenerative joint disease      Extremity pain       legs pain    H/O degenerative disc disease      History of colonoscopy 2009     last done 2009    History of echocardiogram 09/04/2018     LVH EF 65%. RV OK. LA probably mildly dilated. AV OK. MV OK. Modest TR RVSP 26 or less. Nuclear MPI regadenoson 9/14/2018. LV uniform myocardial uptake and wall motion EF 71%.     History of Holter monitoring 10/08/2018     SR 40-81 58. AF 2.7 hr episode VR . At termination AF 2 3.5-4.0 s pauses with patient possibly dizzy. 205 PAC 42 atrial ashanti several SVT. No ventricular ectopy.     HL (hearing loss) 2020     Tried hearing aids twice . Didnt help    HTN (hypertension)      Hyperlipidemia      Hypertensive cardiovascular disease      Hypothyroidism      Incontinence      Leukopenia      Low back pain      PAF (paroxysmal atrial fibrillation) 09/2018      [facility] Sept 2018 PAF and HTN. PAFL On bisoprolol and diltiazem bradycardia symptomatic with dizziness. Amiodarone alone Oct 2018 and no episodes of erratic or fast heartbeat or lightheadedness.     Rheumatoid arthritis       Possible pulmonary involvement     Staph infection      Vitamin D deficiency      White coat syndrome with hypertension           Surgical History         Past Surgical History:   Procedure Laterality Date    APPENDECTOMY   1974    BACK SURGERY   2003 2001, 2003    SPINE SURGERY   2001.    2003    SUBTOTAL HYSTERECTOMY   1974               Physical  Exam     General:      well developed, well nourished, in no acute distress.    Head:      normocephalic and atraumatic.    Eyes:      PERRL/EOM intact, conjunctivae and sclerae clear without nystagmus.    Neck:      no  thyromegaly, trachea central with normal respiratory effort  Lungs:      clear bilaterally to auscultation.    Heart:       regular rate and rhythm, S1, S2 without murmurs, rubs, or gallops  Skin:      intact without lesions or rashes.    Psych:      alert and cooperative; normal mood and affect; normal attention span and concentration.             Assessment and plan     Most recent potassium of 4.4 and creatinine was normal.  Magnesium is normal  Recurrent paroxysmal atrial fibrillation on Tikosyn in sinus rhythm  Essential hypertension not well controlled on home monitoring to be continued and treatment goals for hypertension educated  Hypothyroidism supplemented on levothyroxine  History of rheumatoid arthritis  Medications reviewed and follow-up appointments made  Most recent INR 2.43  Therapeutic options for recurrent breakthrough AF discussed with the patient and family  Tikosyn refilled       Scheduled for AF ablation after discussing options and risks and benefits outcomes educated        Electronically signed by Dyllan Lloyd MD, 08/01/23, 10:30 AM EDT.

## 2023-08-01 NOTE — Clinical Note
A 16 fr sheath was  inserted using micropuncture technique into the right femoral vein. Sheath insertion not delayed.

## 2023-08-02 VITALS
DIASTOLIC BLOOD PRESSURE: 74 MMHG | OXYGEN SATURATION: 94 % | HEART RATE: 67 BPM | RESPIRATION RATE: 19 BRPM | WEIGHT: 119.27 LBS | TEMPERATURE: 97.6 F | BODY MASS INDEX: 22.52 KG/M2 | SYSTOLIC BLOOD PRESSURE: 131 MMHG | HEIGHT: 61 IN

## 2023-08-02 PROBLEM — I48.91 ATRIAL FIBRILLATION: Status: ACTIVE | Noted: 2023-08-02

## 2023-08-02 LAB
INR PPP: 2.43 (ref 2–3)
PROTHROMBIN TIME: 24.7 SECONDS (ref 19.4–28.5)

## 2023-08-02 PROCEDURE — 93005 ELECTROCARDIOGRAM TRACING: CPT | Performed by: INTERNAL MEDICINE

## 2023-08-02 PROCEDURE — 99239 HOSP IP/OBS DSCHRG MGMT >30: CPT | Performed by: NURSE PRACTITIONER

## 2023-08-02 PROCEDURE — G0378 HOSPITAL OBSERVATION PER HR: HCPCS

## 2023-08-02 PROCEDURE — 93010 ELECTROCARDIOGRAM REPORT: CPT | Performed by: INTERNAL MEDICINE

## 2023-08-02 RX ORDER — FENTANYL CITRATE 50 UG/ML
INJECTION, SOLUTION INTRAMUSCULAR; INTRAVENOUS AS NEEDED
Status: DISCONTINUED | OUTPATIENT
Start: 2023-08-01 | End: 2023-08-02 | Stop reason: SURG

## 2023-08-02 RX ADMIN — POLYETHYLENE GLYCOL 3350 9 G: 17 POWDER, FOR SOLUTION ORAL at 08:15

## 2023-08-02 RX ADMIN — LEVOTHYROXINE SODIUM 112 MCG: 0.11 TABLET ORAL at 05:57

## 2023-08-02 RX ADMIN — LEFLUNOMIDE 20 MG: 20 TABLET ORAL at 09:45

## 2023-08-02 RX ADMIN — METOPROLOL SUCCINATE 100 MG: 50 TABLET, EXTENDED RELEASE ORAL at 08:14

## 2023-08-02 RX ADMIN — AMLODIPINE BESYLATE 5 MG: 5 TABLET ORAL at 08:15

## 2023-08-02 RX ADMIN — Medication 400 MG: at 08:15

## 2023-08-02 RX ADMIN — ACETAMINOPHEN 650 MG: 325 TABLET, FILM COATED ORAL at 02:15

## 2023-08-02 NOTE — PLAN OF CARE
Problem: Adult Inpatient Plan of Care  Goal: Plan of Care Review  Outcome: Ongoing, Progressing  Goal: Patient-Specific Goal (Individualized)  Outcome: Ongoing, Progressing  Goal: Absence of Hospital-Acquired Illness or Injury  Outcome: Ongoing, Progressing  Intervention: Identify and Manage Fall Risk  Recent Flowsheet Documentation  Taken 8/2/2023 0400 by Chapito Blackwood RN  Safety Promotion/Fall Prevention:   safety round/check completed   room organization consistent   nonskid shoes/slippers when out of bed   fall prevention program maintained   clutter free environment maintained   assistive device/personal items within reach   activity supervised  Taken 8/2/2023 0200 by Chapito Blackwood RN  Safety Promotion/Fall Prevention:   safety round/check completed   room organization consistent   nonskid shoes/slippers when out of bed   fall prevention program maintained   clutter free environment maintained   assistive device/personal items within reach   activity supervised  Taken 8/2/2023 0000 by Chapito Blackwood RN  Safety Promotion/Fall Prevention:   safety round/check completed   room organization consistent   nonskid shoes/slippers when out of bed   fall prevention program maintained   clutter free environment maintained   assistive device/personal items within reach   activity supervised  Taken 8/1/2023 2200 by Chapito Blackwood RN  Safety Promotion/Fall Prevention:   safety round/check completed   room organization consistent   nonskid shoes/slippers when out of bed   fall prevention program maintained   clutter free environment maintained   assistive device/personal items within reach   activity supervised  Taken 8/1/2023 2000 by Chapito Blackwood RN  Safety Promotion/Fall Prevention:   safety round/check completed   room organization consistent   nonskid shoes/slippers when out of bed   fall prevention program maintained   clutter free environment maintained   assistive device/personal items  within reach   activity supervised  Intervention: Prevent Skin Injury  Recent Flowsheet Documentation  Taken 8/2/2023 0400 by Chapito Blackwood RN  Body Position: position changed independently  Skin Protection:   adhesive use limited   incontinence pads utilized   transparent dressing maintained   tubing/devices free from skin contact  Taken 8/2/2023 0200 by Chapito Blackwood RN  Body Position: position changed independently  Taken 8/2/2023 0000 by Chapito Blackwood RN  Body Position: position changed independently  Skin Protection:   adhesive use limited   incontinence pads utilized   transparent dressing maintained   tubing/devices free from skin contact  Taken 8/1/2023 2200 by Chapito Blackwood RN  Body Position:   position changed independently   supine  Taken 8/1/2023 2000 by Chapito Blackwood RN  Body Position: supine  Skin Protection:   adhesive use limited   incontinence pads utilized   tubing/devices free from skin contact   transparent dressing maintained  Intervention: Prevent and Manage VTE (Venous Thromboembolism) Risk  Recent Flowsheet Documentation  Taken 8/2/2023 0400 by Chapito Blackwood RN  Range of Motion: active ROM (range of motion) encouraged  Taken 8/2/2023 0000 by Chapito Blackwood RN  Range of Motion: active ROM (range of motion) encouraged  Taken 8/1/2023 2330 by Chapito Blackwood RN  Activity Management: ambulated to bathroom  Taken 8/1/2023 2200 by Chapito Blackwood RN  Activity Management: bedrest  Taken 8/1/2023 2000 by Chapito Blackwood RN  Activity Management: bedrest  Range of Motion: active ROM (range of motion) encouraged  Intervention: Prevent Infection  Recent Flowsheet Documentation  Taken 8/2/2023 0400 by Chapito Blackwood RN  Infection Prevention:   single patient room provided   rest/sleep promoted   personal protective equipment utilized   hand hygiene promoted  Taken 8/2/2023 0200 by Chapito Blackwood RN  Infection Prevention:   single patient room  provided   rest/sleep promoted   personal protective equipment utilized   hand hygiene promoted  Taken 8/2/2023 0000 by Chapito Blackwood RN  Infection Prevention:   single patient room provided   rest/sleep promoted   personal protective equipment utilized   hand hygiene promoted  Taken 8/1/2023 2200 by Chapito Blackwood RN  Infection Prevention:   single patient room provided   rest/sleep promoted   personal protective equipment utilized   hand hygiene promoted  Taken 8/1/2023 2000 by Chapito Blackwood RN  Infection Prevention:   single patient room provided   rest/sleep promoted   personal protective equipment utilized   hand hygiene promoted  Goal: Optimal Comfort and Wellbeing  Outcome: Ongoing, Progressing  Intervention: Provide Person-Centered Care  Recent Flowsheet Documentation  Taken 8/1/2023 2000 by Chapito Blackwood RN  Trust Relationship/Rapport:   care explained   questions answered   questions encouraged   reassurance provided   thoughts/feelings acknowledged  Goal: Readiness for Transition of Care  Outcome: Ongoing, Progressing     Problem: Skin Injury Risk Increased  Goal: Skin Health and Integrity  Outcome: Ongoing, Progressing  Intervention: Optimize Skin Protection  Recent Flowsheet Documentation  Taken 8/2/2023 0400 by Chapito Blackwood RN  Head of Bed ([facility]) Positioning: HOB elevated  Skin Protection:   adhesive use limited   incontinence pads utilized   transparent dressing maintained   tubing/devices free from skin contact  Taken 8/2/2023 0200 by Chapito Blackwood RN  Head of Bed ([facility]) Positioning: HOB elevated  Taken 8/2/2023 0000 by Chapito Blackwood RN  Head of Bed ([facility]) Positioning: HOB elevated  Skin Protection:   adhesive use limited   incontinence pads utilized   transparent dressing maintained   tubing/devices free from skin contact  Taken 8/1/2023 2200 by Chapito Blackwood RN  Head of Bed ([facility]) Positioning: HOB elevated  Taken 8/1/2023 2000 by Chapito Blackwood  RN  Head of Bed (HOB) Positioning: HOB elevated  Skin Protection:   adhesive use limited   incontinence pads utilized   tubing/devices free from skin contact   transparent dressing maintained     Problem: Asthma Comorbidity  Goal: Maintenance of Asthma Control  Outcome: Ongoing, Progressing  Intervention: Maintain Asthma Symptom Control  Recent Flowsheet Documentation  Taken 8/2/2023 0400 by Chapito Blackwood RN  Medication Review/Management: medications reviewed  Taken 8/2/2023 0200 by Chapito Blackwood RN  Medication Review/Management: medications reviewed  Taken 8/2/2023 0000 by Chapito Blackwood RN  Medication Review/Management: medications reviewed  Taken 8/1/2023 2200 by Chapito Blackwood RN  Medication Review/Management: medications reviewed  Taken 8/1/2023 2000 by Chapito Blackwood RN  Medication Review/Management: medications reviewed     Problem: Behavioral Health Comorbidity  Goal: Maintenance of Behavioral Health Symptom Control  Outcome: Ongoing, Progressing  Intervention: Maintain Behavioral Health Symptom Control  Recent Flowsheet Documentation  Taken 8/2/2023 0400 by Chapito Blackwood RN  Medication Review/Management: medications reviewed  Taken 8/2/2023 0200 by Chapito Blackwood RN  Medication Review/Management: medications reviewed  Taken 8/2/2023 0000 by Chapito Blackwood RN  Medication Review/Management: medications reviewed  Taken 8/1/2023 2200 by Chapito Blackwood RN  Medication Review/Management: medications reviewed  Taken 8/1/2023 2000 by Chapito Blackwood RN  Medication Review/Management: medications reviewed     Problem: COPD (Chronic Obstructive Pulmonary Disease) Comorbidity  Goal: Maintenance of COPD Symptom Control  Outcome: Ongoing, Progressing  Intervention: Maintain COPD-Symptom Control  Recent Flowsheet Documentation  Taken 8/2/2023 0400 by Chapito Blackwood RN  Medication Review/Management: medications reviewed  Taken 8/2/2023 0200 by Chapito Blackwood  RN  Medication Review/Management: medications reviewed  Taken 8/2/2023 0000 by Chapito Blackwood RN  Medication Review/Management: medications reviewed  Taken 8/1/2023 2200 by Chapito Blackwood RN  Medication Review/Management: medications reviewed  Taken 8/1/2023 2000 by Chapito Blackwood RN  Medication Review/Management: medications reviewed     Problem: Diabetes Comorbidity  Goal: Blood Glucose Level Within Targeted Range  Outcome: Ongoing, Progressing     Problem: Heart Failure Comorbidity  Goal: Maintenance of Heart Failure Symptom Control  Outcome: Ongoing, Progressing  Intervention: Maintain Heart Failure-Management  Recent Flowsheet Documentation  Taken 8/2/2023 0400 by Chapito Blackwood RN  Medication Review/Management: medications reviewed  Taken 8/2/2023 0200 by Chaipto Blackwood RN  Medication Review/Management: medications reviewed  Taken 8/2/2023 0000 by Chapito Blackwood RN  Medication Review/Management: medications reviewed  Taken 8/1/2023 2200 by Chapito Blackwood RN  Medication Review/Management: medications reviewed  Taken 8/1/2023 2000 by Chapito Blackwood RN  Medication Review/Management: medications reviewed     Problem: Hypertension Comorbidity  Goal: Blood Pressure in Desired Range  Outcome: Ongoing, Progressing  Intervention: Maintain Blood Pressure Management  Recent Flowsheet Documentation  Taken 8/2/2023 0400 by Chapito Blackwood RN  Medication Review/Management: medications reviewed  Taken 8/2/2023 0200 by Chapito Blackwood RN  Medication Review/Management: medications reviewed  Taken 8/2/2023 0000 by Chapito Blackwood RN  Medication Review/Management: medications reviewed  Taken 8/1/2023 2200 by Chapito Blackwood RN  Medication Review/Management: medications reviewed  Taken 8/1/2023 2000 by Chapito Blackwood RN  Medication Review/Management: medications reviewed     Problem: Obstructive Sleep Apnea Risk or Actual Comorbidity Management  Goal: Unobstructed  Breathing During Sleep  Outcome: Ongoing, Progressing     Problem: Osteoarthritis Comorbidity  Goal: Maintenance of Osteoarthritis Symptom Control  Outcome: Ongoing, Progressing  Intervention: Maintain Osteoarthritis Symptom Control  Recent Flowsheet Documentation  Taken 8/2/2023 0400 by Chapito Blackwood RN  Medication Review/Management: medications reviewed  Taken 8/2/2023 0200 by Chapito Blackwood RN  Medication Review/Management: medications reviewed  Taken 8/2/2023 0000 by Chapito Blackwood RN  Medication Review/Management: medications reviewed  Taken 8/1/2023 2330 by Chapito Blackwood RN  Activity Management: ambulated to bathroom  Taken 8/1/2023 2200 by Chapito Blackwood RN  Activity Management: bedrest  Medication Review/Management: medications reviewed  Taken 8/1/2023 2000 by Chapito Blackwood RN  Activity Management: bedrest  Medication Review/Management: medications reviewed     Problem: Pain Chronic (Persistent) (Comorbidity Management)  Goal: Acceptable Pain Control and Functional Ability  Outcome: Ongoing, Progressing  Intervention: Manage Persistent Pain  Recent Flowsheet Documentation  Taken 8/2/2023 0400 by Chapito Blackwood RN  Medication Review/Management: medications reviewed  Taken 8/2/2023 0200 by Chapito Blackwood RN  Medication Review/Management: medications reviewed  Taken 8/2/2023 0000 by Chapito Blackwood RN  Medication Review/Management: medications reviewed  Taken 8/1/2023 2200 by Chapito Blackwood RN  Medication Review/Management: medications reviewed  Taken 8/1/2023 2000 by Chapito Blackwood RN  Medication Review/Management: medications reviewed  Intervention: Optimize Psychosocial Wellbeing  Recent Flowsheet Documentation  Taken 8/1/2023 2000 by Chapito Blackwood RN  Diversional Activities: tablet  Family/Support System Care: presence promoted     Problem: Seizure Disorder Comorbidity  Goal: Maintenance of Seizure Control  Outcome: Ongoing, Progressing   Goal  Outcome Evaluation:      Pt had ablation on 8/1, stopcock was removed at 2215 and bedrest ended at 2315. Site is clean, dry, and soft. No presence of bleeding or hematoma. Pt has been in normal sinus rhythm throughout shift. Pt is resting comfortably with no complaints. Plan to discharge today.

## 2023-08-02 NOTE — DISCHARGE SUMMARY
[facility] CARDIOLOGY  [facility]    DISCHARGE SUMMARY      Patient Name: Jt Valdes  :1940  82 y.o.    Date of Admit: 2023  Date of Discharge:  2023    Discharge Diagnosis:  Problems Addressed this Visit          Cardiac and Vasculature    Atrial fibrillation with RVR    Relevant Medications    amLODIPine (NORVASC) tablet 5 mg    metoprolol succinate XL (TOPROL-XL) 24 hr tablet 100 mg    Other Relevant Orders    EP/CRM Study (Completed)     Other Visit Diagnoses       Essential hypertension        Relevant Medications    amLODIPine (NORVASC) tablet 5 mg    metoprolol succinate XL (TOPROL-XL) 24 hr tablet 100 mg    Other Relevant Orders    EP/CRM Study (Completed)    Hypothyroidism (acquired)        Relevant Medications    levothyroxine (SYNTHROID, LEVOTHROID) tablet 112 mcg    metoprolol succinate XL (TOPROL-XL) 24 hr tablet 100 mg    Other Relevant Orders    EP/CRM Study (Completed)          Diagnoses         Codes Comments    Atrial fibrillation with RVR     ICD-10-CM: I48.91  ICD-9-CM: 427.31     Essential hypertension     ICD-10-CM: I10  ICD-9-CM: 401.9     Hypothyroidism (acquired)     ICD-10-CM: E03.9  ICD-9-CM: 244.9             Hospital Course:   82-year-old pleasant patient was seen in the hospital because of recurrent atrial fibrillation.  After discussing therapeutic options patient has opted for Tikosyn and comes in for follow-up.  She has history of rheumatoid arthritis, hypertension and hypothyroidism and chronic kidney disease in the past.     Most recent potassium of 4.4 and creatinine was normal.  Magnesium is normal  Recurrent paroxysmal atrial fibrillation on Tikosyn in sinus rhythm  Essential hypertension not well controlled on home monitoring to be continued and treatment goals for hypertension educated  Hypothyroidism supplemented on levothyroxine  History of rheumatoid arthritis  Medications reviewed and follow-up appointments  made  Most recent INR 2.43  Therapeutic options for recurrent breakthrough AF discussed with the patient and family  Tikosyn refilled        Scheduled for AF ablation after discussing options and risks and benefits outcomes educated    Patient underwent AF ablation on 8/1/23.  She was observed overnight for observation.  Today she feels well.  She denies SOA or chest pain.  She has ambulated without difficulty.  She has no post op groin hematoma.  Patient will continue anticoagulation with coumadin and routine protime checks in Dr. Garcia office.  INR = 2.43.  As d/w Dr. Lloyd, patient will discontinue tikosyn and will be discharged home today.      Procedures Performed  Procedure(s):  Ablation atrial fibrillation, WITH CRYO  Brigido and Magdalena aware    Indications    Atrial fibrillation with RVR [I48.91 (ICD-10-CM)]   Essential hypertension [I10 (ICD-10-CM)]   Hypothyroidism (acquired) [E03.9 (ICD-10-CM)]     Pre Procedure Diagnosis    af       Conclusion    Procedures done  1. Comprehensive EP study with pacing from multiple sites including coronary sinus ,RV and superior vena cava with induction of arrhythmias   2.  Cryo ablation of pulmonary veins with pulmonary vein isolation  3. Intracardiac echocardiography  4.  Fluoroscopy  5. Trans septal  access of left atrium  6. Selective venography of left superior pulmonary vein, left inferior pulmonary vein,  right inferior pulmonary vein and right superior pulmonary vein  7. THREE-DIMENSIONAL MAPPING WITH CARTO SYSTEM  8.  Additional Linear radiofrequency ablation along the mitral valve isthmus for atypical left atrial flutter   9.  Additional ablation along the posterior wall in the left atrial roof for substrate modification for atrial fibrillation  10.  Synchronized cardioversion        Procedure indication---recurrent symptomatic paroxysmal atrial fibrillation and atrial flutter and failed medical therapy        Timeout before procedure, complications none  estimated  blood loss less than 20 cc  Pepcid and Protonix given during procedure           Procedure dictation     After obtaining a valid consent , patient was draped in sterile fashion and placed under  general anesthesia.   Micropuncture kit access was used for accessing the right common femoral vein.  A 16 Barbadian and 9 Barbadian venous sheath were placed in the right common femoral vein an ice catheter in the right atrium.  The 9 Barbadian sheath was exchanged to a long 9 Barbadian sheath because of tortuous venous anatomy  A preface  sheath along with Cleveland needle was used and transseptal access obtained under the guidance of ice catheter.  Heparin was given after transseptal access to keep the ACT above 300.  Multipolar catheter called Octaray was used to map the pulmonary vein antrum  The preface sheath was exchanged over a spiral wire to a flex sheath for cryoablation  The cryo balloon was taken and the assembly was placed inside the long sheath which was placed in the left atrium and the help of a multipolar catheter the balloon was placed into the  left superior pulmonary vein.   Balloon inflation was done with complete occlusion of  vein and cryo ablation was carried till the vein was isolated   The multipolar catheter was then directed into the left inferior  pulmonary vein and balloon was up placed over the  vein with complete occlusion and ablation carried on till the vein was isolated   The right inferior pulmonary vein was then isolated with cryoablation  --while ablating the right inferior pulmonary vein diaphragmatic pacing was done by placing a decapolar catheter in the brachiocephalic vein.   The cryoablation balloon was placed in the right superior pulmonary vein and ablation was carried on till the vein was isolated --this was also done while diaphragmatic pacing.  The details of cryoablation  and the temperature achieved and time to isolation are attached to the chart  After completion of  cryoablation, the multipolar catheter was used to map the left atrium.  The entire left atrial pulmonary vein antrum was completely isolated with cryoablation.  While ablating the left superior pulmonary vein patient went from atrial fibrillation to atrial flutter  ThermoCool Smart touch catheter was taken and additional posterior line was drawn at 40 W in between the inferior pulmonary veins.  The roof of the left atrium was then targeted for ablation and while ablating the flutter terminated.  Reduction of left atrial flutter could be done suggestive of mitral valve isthmus flutter.  A linear ablation was performed in the mitral valve isthmus and the flutter was wobbling between fibrillation and atrial flutter and after completion of a posterior line synchronized cardioversion was done to sinus rhythm.  Repeat pacing did not reinduce arrhythmias.  Catheters removed and the sheaths were removed and a figure-of-eight suture was placed for hemostasis and heparin reversed with protamine           Findings     Patient had   moderate  left atrial enlargement    Patient had a large left superior pulmonary vein and a large left inferior pulmonary vein and  a  large right inferior pulmonary vein and right superior pulmonary vein  Pulmonary vein isolation with cryo ablation done--Post cryoablation mapping of the left atrium revealed complete antral isolation and pulmonary vein isolation also proved by entrance and exit block  Clinical arrhythmias include paroxysmal atrial fibrillation and atypical atrial flutter   While ablating left superior pulmonary vein with cryoablation patient went into atrial fibrillation and after completion of pulmonary vein antral isolation with cryoablation patient was in atrial flutter suggestive of mitral valve isthmus flutter versus left atrial atypical flutter with a cycle length of 270 ms.  While mapping of this arrhythmia patient was wobbling between atrial flutter and atrial fibrillation  and 2 different cycle lengths of flutter were noted at 270 ms and 300 ms.  Additional posterior wall and left atrial roof ablation and mitral valve isthmus flutter ablation was done.  Post ablation synchronized cardioversion to sinus rhythm  Repeat pacing did not reinduce arrhythmias after completion of substrate ablation and pulmonary vein antral isolation.  No pre-excitation  Normal retrograde conduction--retrograde Wenckebach cycle length was 380 milliseconds   Antegrade Wenckebach cycle length was 400 milliseconds  Av gracy ERP is  500/300  VAERP--500/300  No other SVT induced              Plan     Overnight observation   Stop Tikosyn          Consults       No orders found for last 30 day(s).            Pertinent Test Results:   Results from last 7 days   Lab Units 08/01/23  0929   SODIUM mmol/L 141   POTASSIUM mmol/L 4.3   CHLORIDE mmol/L 104   CO2 mmol/L 24.0   BUN mg/dL 23   CREATININE mg/dL 0.83   CALCIUM mg/dL 9.7   GLUCOSE mg/dL 95         @LABRCNT(bnp)@      Results from last 7 days   Lab Units 08/01/23  2342 08/01/23  1905   INR  2.43 2.47     Results from last 7 days   Lab Units 08/01/23  0929   MAGNESIUM mg/dL 1.8           ECHOCARDIOGRAM:    Results for orders placed during the hospital encounter of 02/01/23    Adult Transthoracic Echo Complete W/ Cont if Necessary Per Protocol    Interpretation Summary    Estimated right ventricular systolic pressure from tricuspid regurgitation is mildly elevated (35-45 mmHg).      Normal LV size and contractility EF of 60 to 65%  Normal RV size  Normal atrial size  Pulmonic valve is not well visualized.  Aortic valve, mitral valve, tricuspid valve appears structurally normal, mild tricuspid regurgitation seen.  Calculated RV systolic pressure of 40 mm of.  No pericardial effusion seen.  Proximal aorta appears normal in size.      Physical Exam  Neuro: AAOx3, no gross deficits  CV: S1S2 RRR, no murmur  Resp: non-labored, CTA  GI: BS+, abd soft  Ext: pedal pulses  palp, no edema, right groin without hematoma  Tele: SR with PACs    Condition on Discharge: stable    Discharge Medications     Discharge Medications        Continue These Medications        Instructions Start Date   amLODIPine 5 MG tablet  Commonly known as: NORVASC   5 mg, Oral, Every 24 Hours Scheduled      Diclofenac Sodium 1 % gel gel  Commonly known as: VOLTAREN   4 g, Topical, 4 Times Daily PRN      leflunomide 20 MG tablet  Commonly known as: ARAVA   20 mg, Oral, Daily      levothyroxine 112 MCG tablet  Commonly known as: SYNTHROID, LEVOTHROID   112 mcg, Oral, Daily      magnesium oxide 400 MG tablet  Commonly known as: MAG-OX   400 mg, Oral, 2 Times Daily      metoprolol succinate  MG 24 hr tablet  Commonly known as: TOPROL-XL   100 mg, Oral, Daily      multivitamin with minerals tablet tablet   1 tablet, Oral, Daily      PRESERVISION AREDS 2 PO   1 tablet, Oral, Daily      polyethylene glycol 17 g packet  Commonly known as: MIRALAX   8.5 g, Oral, Daily      warfarin 2.5 MG tablet  Commonly known as: COUMADIN   5 mg, Oral, Every Evening             Stop These Medications      dofetilide 125 MCG capsule  Commonly known as: TIKOSYN              Discharge Diet:     Activity at Discharge:   Activity Instructions       Bathing Restrictions      For one week    Type of Restriction: Bathing    Bathing Restrictions: No Tub Bath    Driving Restrictions      Type of Restriction: Driving    Driving Restrictions: No Driving (Time Limited)    Length: Other    Indicate Length of Restriction: 3 days post heart catheterization    Lifting Restrictions      Type of Restriction: Lifting    Lifting Restrictions: Lifting Restriction (Indicate Limit)    Weight Limit (Pounds): 4    Length of Lifting Restriction: 3 days post heart catherization            Discharge disposition: home    Follow-up Appointments  Future Appointments   Date Time Provider Department Center   8/7/2023 11:45 AM Dyllan Lloyd MD [facility]   8/11/2023 10:00 AM [facility] [place] [facility]   10/12/2023 10:00 AM [facility]  [facility] None   10/19/2023 10:15 AM Lucia Sahu MD [facility]   5/1/2024 10:50 AM Jose Patiño MD [facility]         Test Results Pending at Discharge    Electronically signed by REINA Carver, 08/02/23, 12:41 PM EDT.     Time: > 30 minutes spent on discharge

## 2023-08-02 NOTE — CASE MANAGEMENT/SOCIAL WORK
Discharge Planning Assessment  [facility]     Patient Name: Jt Valdes  MRN: [id number]  Today's Date: 8/2/2023    Admit Date: 8/1/2023    Plan: Routine home.   Discharge Needs Assessment       Row Name 08/02/23 1427       Living Environment    People in Home alone    Current Living Arrangements home    Potentially Unsafe Housing Conditions none    Primary Care Provided by self    Provides Primary Care For no one    Family Caregiver if Needed child(jeniffer), adult    Family Caregiver Names Son, daughters Monica and Anca    Quality of Family Relationships helpful;involved;supportive    Able to Return to Prior Arrangements yes       Resource/Environmental Concerns    Resource/Environmental Concerns none    Transportation Concerns none       Transition Planning    Patient/Family Anticipates Transition to home    Patient/Family Anticipated Services at Transition none    Transportation Anticipated family or friend will provide       Discharge Needs Assessment    Readmission Within the Last 30 Days no previous admission in last 30 days    Equipment Currently Used at Home rollator;cane, straight    Concerns to be Addressed denies needs/concerns at this time    Anticipated Changes Related to Illness none    Equipment Needed After Discharge none    Provided Post Acute Provider List? N/A                   Discharge Plan       Row Name 08/02/23 1428       Plan    Plan Routine home.    Patient/Family in Agreement with Plan yes    Plan Comments Prior to discharge, CM met with patient and son at bedside to discuss dc planning. PCP and pharmacy confirmed, agreeable to meds to bed, reported no trouble affording medications, and declined needs at this time for any DME/HH/PT services. Patient reported she was independent at home by herself, had support from family, and had a rollator and cane to use if needed.    Final Discharge Disposition Code 01 - home or self-care                  Expected Discharge Date and Time       Expected  Discharge Date Expected Discharge Time    Aug 2, 2023            Demographic Summary       Row Name 08/02/23 1426       General Information    Admission Type same day    Referral Source admission list    Reason for Consult discharge planning    Preferred Language English       Contact Information    Permission Granted to Share Info With                    Functional Status       Row Name 08/02/23 1426       Functional Status    Usual Activity Tolerance moderate    Current Activity Tolerance moderate       Functional Status, IADL    Medications independent    Meal Preparation independent    Housekeeping independent    Laundry independent    Shopping assistive equipment and person                Case Management Discharge Note    Selected Continued Care - Discharged on 8/2/2023 Admission date: 8/1/2023 - Discharge disposition: Home or Self Care   Transportation Services  Private: Car    Final Discharge Disposition Code: 01 - home or self-care    Nahomi Tyson RN     Office Phone: [phone]  Office Cell: [phone]

## 2023-08-03 LAB
QT INTERVAL: 279 MS
QT INTERVAL: 394 MS

## 2023-08-07 ENCOUNTER — OFFICE VISIT (OUTPATIENT)
Dept: CARDIOLOGY | Facility: CLINIC | Age: 83
End: 2023-08-07
Payer: MEDICARE

## 2023-08-07 VITALS
BODY MASS INDEX: 21.52 KG/M2 | DIASTOLIC BLOOD PRESSURE: 82 MMHG | SYSTOLIC BLOOD PRESSURE: 174 MMHG | WEIGHT: 114 LBS | HEIGHT: 61 IN | HEART RATE: 78 BPM | OXYGEN SATURATION: 97 %

## 2023-08-07 DIAGNOSIS — I11.9 HYPERTENSIVE HEART DISEASE WITHOUT HEART FAILURE: ICD-10-CM

## 2023-08-07 DIAGNOSIS — Z86.79 STATUS POST ABLATION OF ATRIAL FIBRILLATION: ICD-10-CM

## 2023-08-07 DIAGNOSIS — I48.91 ATRIAL FIBRILLATION WITH RVR: Primary | ICD-10-CM

## 2023-08-07 DIAGNOSIS — R00.2 PALPITATIONS: ICD-10-CM

## 2023-08-07 DIAGNOSIS — Z98.890 STATUS POST ABLATION OF ATRIAL FIBRILLATION: ICD-10-CM

## 2023-08-07 DIAGNOSIS — R06.09 DYSPNEA ON EXERTION: ICD-10-CM

## 2023-08-07 DIAGNOSIS — I10 ESSENTIAL HYPERTENSION: ICD-10-CM

## 2023-08-07 PROCEDURE — 1160F RVW MEDS BY RX/DR IN RCRD: CPT | Performed by: INTERNAL MEDICINE

## 2023-08-07 PROCEDURE — 3077F SYST BP >= 140 MM HG: CPT | Performed by: INTERNAL MEDICINE

## 2023-08-07 PROCEDURE — 93000 ELECTROCARDIOGRAM COMPLETE: CPT | Performed by: INTERNAL MEDICINE

## 2023-08-07 PROCEDURE — 99214 OFFICE O/P EST MOD 30 MIN: CPT | Performed by: INTERNAL MEDICINE

## 2023-08-07 PROCEDURE — 1159F MED LIST DOCD IN RCRD: CPT | Performed by: INTERNAL MEDICINE

## 2023-08-07 PROCEDURE — 3079F DIAST BP 80-89 MM HG: CPT | Performed by: INTERNAL MEDICINE

## 2023-08-07 RX ORDER — LOSARTAN POTASSIUM 50 MG/1
50 TABLET ORAL NIGHTLY
Qty: 90 TABLET | Refills: 1 | Status: SHIPPED | OUTPATIENT
Start: 2023-08-07

## 2023-08-07 RX ORDER — LOSARTAN POTASSIUM 50 MG/1
50 TABLET ORAL NIGHTLY
Qty: 30 TABLET | Refills: 2 | Status: SHIPPED | OUTPATIENT
Start: 2023-08-07 | End: 2023-08-07

## 2023-08-07 NOTE — LETTER
August 7, 2023       No Recipients    Patient: Jt Valdes   YOB: 1940   Date of Visit: 8/7/2023     Dear Dilcia Trinidad MD:       Thank you for referring Jt Valdes to me for evaluation. Below are the relevant portions of my assessment and plan of care.    If you have questions, please do not hesitate to call me. I look forward to following Jt along with you.         Sincerely,        Dyllan Lloyd MD        CC:   No Recipients    Dyllan Lloyd MD  08/07/23 1235  Sign when Signing Visit  CC--- atrial fibrillation    Sub  82-year-old pleasant patient was ambulatory arrhythmia despite Tikosyn.  She underwent complex AF and flutter ablation last week and comes in for follow-up.  She continues to have shortness of breath and is hypertensive and at home.  She has a history of rheumatoid arthritis and hypothyroidism and chronic kidney disease in the past.      Past Medical History:   Diagnosis Date    Allergic     Sulfa    Arthritis     Cataract     Cataracts surgery    COVID-19     Degenerative joint disease     Extremity pain     legs pain    H/O degenerative disc disease     History of colonoscopy 2009    last done 2009    History of echocardiogram 09/04/2018    LVH EF 65%. RV OK. LA probably mildly dilated. AV OK. MV OK. Modest TR RVSP 26 or less. Nuclear MPI regadenoson 9/14/2018. LV uniform myocardial uptake and wall motion EF 71%.     History of Holter monitoring 10/08/2018    SR 40-81 58. AF 2.7 hr episode VR . At termination AF 2 3.5-4.0 s pauses with patient possibly dizzy. 205 PAC 42 atrial ashanti several SVT. No ventricular ectopy.     HL (hearing loss) 2020    Tried hearing aids twice . Didnt help    HTN (hypertension)     Hyperlipidemia     Hypertensive cardiovascular disease     Hypothyroidism     Incontinence     Leukopenia     Low back pain     PAF (paroxysmal atrial fibrillation) 09/2018     Vaughn Sept 2018 PAF and HTN. PAFL On  bisoprolol and diltiazem bradycardia symptomatic with dizziness. Amiodarone alone Oct 2018 and no episodes of erratic or fast heartbeat or lightheadedness.     Rheumatoid arthritis     Possible pulmonary involvement     Staph infection     Vitamin D deficiency     White coat syndrome with hypertension      Past Surgical History:   Procedure Laterality Date    APPENDECTOMY  1974    BACK SURGERY  2003 2001, 2003    SPINE SURGERY  2001.    2003    SUBTOTAL HYSTERECTOMY  1974       Physical Exam    General:      well developed, well nourished, in no acute distress.    Head:      normocephalic and atraumatic.    Eyes:      PERRL/EOM intact, conjunctivae and sclerae clear without nystagmus.    Neck:      no  thyromegaly, trachea central with normal respiratory effort  Lungs:      clear bilaterally to auscultation.    Heart:       regular rate and rhythm, S1, S2 without murmurs, rubs, or gallops  Skin:      intact without lesions or rashes.    Psych:      alert and cooperative; normal mood and affect; normal attention span and concentration.            Assessment and plan    Most recent INR of 2.43 and potassium is normal  Recurrent atrial arrhythmias despite Tikosyn and post AF ablation to sinus rhythm.  Currently off Tikosyn.  Essential hypertension not well-controlled with current intake of amlodipine and metoprolol.  Add losartan 50 mg to be taken at nighttime and check potassium status in 1 to 2 weeks.  Hypothyroidism supplemented with levothyroxine  History of rheumatoid arthritis followed by primary care physician  Prescription for losartan given  Follow-up in 1 month      ECG 12 Lead    Date/Time: 8/7/2023 12:34 PM  Performed by: Dyllan Lloyd MD  Authorized by: Dyllan Lloyd MD   Comparison: compared with previous ECG   Similar to previous ECG  Rhythm: sinus rhythm  Rate: normal  QRS axis: right  Other findings: non-specific ST-T wave changes and left ventricular hypertrophy          Electronically signed by Dyllan Lloyd MD, 08/07/23, 12:34 PM EDT.

## 2023-08-07 NOTE — PROGRESS NOTES
CC--- atrial fibrillation    Sub  82-year-old pleasant patient was ambulatory arrhythmia despite Tikosyn.  She underwent complex AF and flutter ablation last week and comes in for follow-up.  She continues to have shortness of breath and is hypertensive and at home.  She has a history of rheumatoid arthritis and hypothyroidism and chronic kidney disease in the past.      Past Medical History:   Diagnosis Date    Allergic     Sulfa    Arthritis     Cataract     Cataracts surgery    COVID-19     Degenerative joint disease     Extremity pain     legs pain    H/O degenerative disc disease     History of colonoscopy 2009    last done 2009    History of echocardiogram 09/04/2018    LVH EF 65%. RV OK. LA probably mildly dilated. AV OK. MV OK. Modest TR RVSP 26 or less. Nuclear MPI regadenoson 9/14/2018. LV uniform myocardial uptake and wall motion EF 71%.     History of Holter monitoring 10/08/2018    SR 40-81 58. AF 2.7 hr episode VR . At termination AF 2 3.5-4.0 s pauses with patient possibly dizzy. 205 PAC 42 atrial ashanti several SVT. No ventricular ectopy.     HL (hearing loss) 2020    Tried hearing aids twice . Didnt help    HTN (hypertension)     Hyperlipidemia     Hypertensive cardiovascular disease     Hypothyroidism     Incontinence     Leukopenia     Low back pain     PAF (paroxysmal atrial fibrillation) 09/2018     [place] Sept 2018 PAF and HTN. PAFL On bisoprolol and diltiazem bradycardia symptomatic with dizziness. Amiodarone alone Oct 2018 and no episodes of erratic or fast heartbeat or lightheadedness.     Rheumatoid arthritis     Possible pulmonary involvement     Staph infection     Vitamin D deficiency     White coat syndrome with hypertension      Past Surgical History:   Procedure Laterality Date    APPENDECTOMY  1974    BACK SURGERY  2003 2001, 2003    SPINE SURGERY  2001.    2003    SUBTOTAL HYSTERECTOMY  1974       Physical Exam    General:      well developed,  well nourished, in no acute distress.    Head:      normocephalic and atraumatic.    Eyes:      PERRL/EOM intact, conjunctivae and sclerae clear without nystagmus.    Neck:      no  thyromegaly, trachea central with normal respiratory effort  Lungs:      clear bilaterally to auscultation.    Heart:       regular rate and rhythm, S1, S2 without murmurs, rubs, or gallops  Skin:      intact without lesions or rashes.    Psych:      alert and cooperative; normal mood and affect; normal attention span and concentration.            Assessment and plan    Most recent INR of 2.43 and potassium is normal  Recurrent atrial arrhythmias despite Tikosyn and post AF ablation to sinus rhythm.  Currently off Tikosyn.  Essential hypertension not well-controlled with current intake of amlodipine and metoprolol.  Add losartan 50 mg to be taken at nighttime and check potassium status in 1 to 2 weeks.  Hypothyroidism supplemented with levothyroxine  History of rheumatoid arthritis followed by primary care physician  Prescription for losartan given  Follow-up in 1 month      ECG 12 Lead    Date/Time: 8/7/2023 12:34 PM  Performed by: Dyllan Lloyd MD  Authorized by: Dyllan Lloyd MD   Comparison: compared with previous ECG   Similar to previous ECG  Rhythm: sinus rhythm  Rate: normal  QRS axis: right  Other findings: non-specific ST-T wave changes and left ventricular hypertrophy         Electronically signed by Dyllan Lloyd MD, 08/07/23, 12:34 PM EDT.

## 2023-08-10 ENCOUNTER — OFFICE VISIT (OUTPATIENT)
Dept: FAMILY MEDICINE CLINIC | Facility: CLINIC | Age: 83
End: 2023-08-10
Payer: MEDICARE

## 2023-08-10 VITALS
HEART RATE: 70 BPM | DIASTOLIC BLOOD PRESSURE: 72 MMHG | BODY MASS INDEX: 21.45 KG/M2 | WEIGHT: 113.6 LBS | HEIGHT: 61 IN | TEMPERATURE: 98 F | OXYGEN SATURATION: 98 % | SYSTOLIC BLOOD PRESSURE: 126 MMHG

## 2023-08-10 DIAGNOSIS — I48.91 ATRIAL FIBRILLATION, UNSPECIFIED TYPE: Primary | ICD-10-CM

## 2023-08-10 PROBLEM — H47.099 PERIPAPILLARY ATROPHY: Status: ACTIVE | Noted: 2023-05-22

## 2023-08-10 PROBLEM — H04.129 TEAR FILM INSUFFICIENCY: Status: ACTIVE | Noted: 2023-05-22

## 2023-08-10 PROBLEM — Z79.899 OTHER LONG TERM (CURRENT) DRUG THERAPY: Status: ACTIVE | Noted: 2023-05-22

## 2023-08-10 PROBLEM — H35.3190 NONEXUDATIVE AGE-RELATED MACULAR DEGENERATION: Status: ACTIVE | Noted: 2023-05-22

## 2023-08-10 LAB — BH CV ECHO SHUNT ASSESSMENT PERFORMED (HIDDEN SCRIPTING): 1

## 2023-08-10 PROCEDURE — 3078F DIAST BP <80 MM HG: CPT | Performed by: FAMILY MEDICINE

## 2023-08-10 PROCEDURE — 99213 OFFICE O/P EST LOW 20 MIN: CPT | Performed by: FAMILY MEDICINE

## 2023-08-10 PROCEDURE — 3074F SYST BP LT 130 MM HG: CPT | Performed by: FAMILY MEDICINE

## 2023-08-10 NOTE — PROGRESS NOTES
Subjective   Jt Valdes is a 82 y.o. female.     History of Present Illness  For follow-up after recent hospitalization for ablation related to paroxysmal atrial fibrillation  She had been on Tikosyn and that has been stopped     Ms. Jt Valdes is an 82-year-old female who presents today for follow-up after she was hospitalized for an ablation related to paroxysmal atrial fibrillation. Prior to the ablation, she had been on Tikosyn and that has since been stopped.    The patient reports she just had an ablation. She mentions she went to see Dr. WEST on 08/08/2023 and he put her on another blood pressure pill because her blood pressure had been running high. She notes they took her off the losartan when she was back in 05/2023 when she had a flare up with her atrial fibrillation. She mentions she is still on amlodipine 5 mg 1 at night. She notes she is tired and does not have energy. She denies any chest pain or trouble breathing.    The patient reports 6 to 8 months ago when her arthritis was bothering her, her thyroid was going every way. She notes Dr. Patiño wanted her to see an endocrinologist. She mentions she did see Dr. Sahu on [facility]. She notes she has an appointment with Dr. Sahu on 10/2023.    The following portions of the patient's history were reviewed and updated as appropriate: allergies, current medications, past family history, past medical history, past social history, past surgical history, and problem list.  Past Medical History:   Diagnosis Date    Allergic     Sulfa    Arthritis     Cataract     Cataracts surgery    COVID-19     Degenerative joint disease     Extremity pain     legs pain    GERD (gastroesophageal reflux disease)     H/O degenerative disc disease     History of colonoscopy 2009    last done 2009    History of echocardiogram 09/04/2018    LVH EF 65%. RV OK. LA probably mildly dilated. AV OK. MV OK. Modest TR RVSP 26 or less. Nuclear MPI regadenoson 9/14/2018. LV  uniform myocardial uptake and wall motion EF 71%.     History of Holter monitoring 10/08/2018    SR 40-81 58. AF 2.7 hr episode VR . At termination AF 2 3.5-4.0 s pauses with patient possibly dizzy. 205 PAC 42 atrial ashanti several SVT. No ventricular ectopy.     HL (hearing loss) 2020    Tried hearing aids twice . Didnt help    HTN (hypertension)     Hyperlipidemia     Hypertensive cardiovascular disease     Hypothyroidism     Incontinence     Leukopenia     Low back pain     PAF (paroxysmal atrial fibrillation) 09/2018    [facility] Sept 2018 PAF and HTN. PAFL On bisoprolol and diltiazem bradycardia symptomatic with dizziness. Amiodarone alone Oct 2018 and no episodes of erratic or fast heartbeat or lightheadedness.     Rheumatoid arthritis     Possible pulmonary involvement     Staph infection     Vitamin D deficiency     White coat syndrome with hypertension      Past Surgical History:   Procedure Laterality Date    APPENDECTOMY  1974    BACK SURGERY  2003 2001, 2003    CARDIAC ELECTROPHYSIOLOGY PROCEDURE Right 8/1/2023    Procedure: Ablation atrial fibrillation, WITH CRYO  Brigido and Magdalena aware;  Surgeon: Dyllan Lloyd MD;  Location: [facility] INVASIVE LOCATION;  Service: Cardiovascular;  Laterality: Right;    SPINE SURGERY  2001.    2003    SUBTOTAL HYSTERECTOMY  1974     Family History   Problem Relation Age of Onset    Osteoarthritis Mother     Arthritis Mother     Cancer Mother     Other Other         Rheumatoid disease     Cancer Father     Cancer Sister      Social History     Socioeconomic History    Marital status:     Number of children: 5    Years of education: 12   Tobacco Use    Smoking status: Never    Smokeless tobacco: Never   Vaping Use    Vaping Use: Never used   Substance and Sexual Activity    Alcohol use: Yes     Alcohol/week: 1.0 standard drink     Types: 1 Glasses of wine per week     Comment: Occ.    Drug use: No    Sexual activity: Not Currently         Current  "Outpatient Medications:     amLODIPine (NORVASC) 5 MG tablet, Take 1 tablet by mouth Daily., Disp: 90 tablet, Rfl: 1    Diclofenac Sodium (VOLTAREN) 1 % gel gel, Apply 4 g topically to the appropriate area as directed 4 (Four) Times a Day As Needed (pain)., Disp: 150 g, Rfl: 3    leflunomide (ARAVA) 20 MG tablet, Take 1 tablet by mouth Daily., Disp: 90 tablet, Rfl: 0    levothyroxine (SYNTHROID, LEVOTHROID) 112 MCG tablet, Take 1 tablet by mouth Daily., Disp: , Rfl:     losartan (COZAAR) 50 MG tablet, TAKE 1 TABLET BY MOUTH EVERY NIGHT, Disp: 90 tablet, Rfl: 1    magnesium oxide (MAG-OX) 400 MG tablet, Take 1 tablet by mouth 2 (Two) Times a Day., Disp: 60 tablet, Rfl: 11    metoprolol succinate XL (TOPROL-XL) 100 MG 24 hr tablet, Take 1 tablet by mouth Daily., Disp: 90 tablet, Rfl: 3    Multiple Vitamins-Minerals (PRESERVISION AREDS 2 PO), Take 1 tablet by mouth Daily., Disp: , Rfl:     multivitamin with minerals tablet tablet, Take 1 tablet by mouth Daily., Disp: , Rfl:     polyethylene glycol (MIRALAX) 17 g packet, Take 8.5 g by mouth Daily., Disp: , Rfl:     warfarin (COUMADIN) 2.5 MG tablet, Take 2 tablets by mouth Every Evening., Disp: , Rfl:     Review of Systems  A review of systems was performed, and the pertinent positives are noted in the HPI.    /72 (BP Location: Left arm, Patient Position: Sitting, Cuff Size: Adult)   Pulse 70   Temp 98 øF (36.7 øC) (Temporal)   Ht 154.9 cm (61\")   Wt 51.5 kg (113 lb 9.6 oz)   SpO2 98%   BMI 21.46 kg/mý       Objective   Physical Exam  Vitals and nursing note reviewed.   Constitutional:       Appearance: Normal appearance. She is normal weight.   Cardiovascular:      Rate and Rhythm: Normal rate and regular rhythm.      Heart sounds: Normal heart sounds.   Pulmonary:      Effort: Pulmonary effort is normal.      Breath sounds: Normal breath sounds.   Musculoskeletal:      Right lower leg: No edema.      Left lower leg: No edema.   Neurological:      Mental " Status: She is alert.   Psychiatric:         Mood and Affect: Mood normal.     Lab Results   Component Value Date    TSH 1.210 05/28/2023     Lab Results   Component Value Date    WBC 4.40 06/03/2023    HGB 12.0 06/03/2023    HCT 35.7 06/03/2023    MCV 88.1 06/03/2023     06/03/2023     Lab Results   Component Value Date    GLUCOSE 95 08/01/2023    BUN 23 08/01/2023    CREATININE 0.83 08/01/2023    EGFR 70.0 08/01/2023    BCR 27.7 (H) 08/01/2023    K 4.3 08/01/2023    CO2 24.0 08/01/2023    CALCIUM 9.7 08/01/2023    ALBUMIN 4.3 06/03/2023    BILITOT 0.4 06/03/2023    AST 25 06/03/2023    ALT 13 06/03/2023     Lab Results   Component Value Date    CHOL 221 (H) 05/08/2023    TRIG 177 (H) 05/08/2023    HDL 56 05/08/2023     (H) 05/08/2023         Assessment & Plan   Problems Addressed this Visit          Cardiac and Vasculature    Atrial fibrillation - Primary     Diagnoses         Codes Comments    Atrial fibrillation, unspecified type    -  Primary ICD-10-CM: I48.91  ICD-9-CM: 427.31             1. Atrial fibrillation  - She is status post ablation and doing well.  - She will keep her follow-up with cardiology.               Transcribed from ambient dictation for Dilcia Trinidad MD by Beatris Terrazas.  08/10/23   14:26 EDT    Patient or patient representative verbalized consent to the visit recording.  I have personally performed the services described in this document as transcribed by the above individual, and it is both accurate and complete.

## 2023-08-11 ENCOUNTER — ANTICOAGULATION VISIT (OUTPATIENT)
Dept: CARDIOLOGY | Facility: CLINIC | Age: 83
End: 2023-08-11
Payer: MEDICARE

## 2023-08-11 VITALS
HEART RATE: 68 BPM | SYSTOLIC BLOOD PRESSURE: 164 MMHG | DIASTOLIC BLOOD PRESSURE: 77 MMHG | WEIGHT: 113 LBS | BODY MASS INDEX: 21.35 KG/M2

## 2023-08-11 DIAGNOSIS — I48.0 PAROXYSMAL ATRIAL FIBRILLATION: Primary | Chronic | ICD-10-CM

## 2023-08-11 DIAGNOSIS — Z79.01 LONG TERM (CURRENT) USE OF ANTICOAGULANTS: Chronic | ICD-10-CM

## 2023-08-11 LAB — INR PPP: 2.6 (ref 2–3)

## 2023-08-11 PROCEDURE — 85610 PROTHROMBIN TIME: CPT | Performed by: INTERNAL MEDICINE

## 2023-08-11 PROCEDURE — 36416 COLLJ CAPILLARY BLOOD SPEC: CPT | Performed by: INTERNAL MEDICINE

## 2023-08-24 ENCOUNTER — LAB (OUTPATIENT)
Dept: LAB | Facility: HOSPITAL | Age: 83
End: 2023-08-24
Payer: MEDICARE

## 2023-08-24 DIAGNOSIS — I48.91 ATRIAL FIBRILLATION WITH RVR: ICD-10-CM

## 2023-08-24 LAB
ANION GAP SERPL CALCULATED.3IONS-SCNC: 13 MMOL/L (ref 5–15)
BUN SERPL-MCNC: 29 MG/DL (ref 8–23)
BUN/CREAT SERPL: 30.5 (ref 7–25)
CALCIUM SPEC-SCNC: 9.5 MG/DL (ref 8.6–10.5)
CHLORIDE SERPL-SCNC: 102 MMOL/L (ref 98–107)
CO2 SERPL-SCNC: 24 MMOL/L (ref 22–29)
CREAT SERPL-MCNC: 0.95 MG/DL (ref 0.57–1)
EGFRCR SERPLBLD CKD-EPI 2021: 59.6 ML/MIN/1.73
GLUCOSE SERPL-MCNC: 106 MG/DL (ref 65–99)
POTASSIUM SERPL-SCNC: 4.4 MMOL/L (ref 3.5–5.2)
SODIUM SERPL-SCNC: 139 MMOL/L (ref 136–145)

## 2023-08-24 PROCEDURE — 36415 COLL VENOUS BLD VENIPUNCTURE: CPT

## 2023-08-24 PROCEDURE — 80048 BASIC METABOLIC PNL TOTAL CA: CPT

## 2023-09-01 ENCOUNTER — OFFICE VISIT (OUTPATIENT)
Dept: CARDIOLOGY | Facility: CLINIC | Age: 83
End: 2023-09-01
Payer: MEDICARE

## 2023-09-01 VITALS
OXYGEN SATURATION: 93 % | SYSTOLIC BLOOD PRESSURE: 130 MMHG | HEART RATE: 75 BPM | DIASTOLIC BLOOD PRESSURE: 72 MMHG | BODY MASS INDEX: 21.34 KG/M2 | WEIGHT: 113 LBS | HEIGHT: 61 IN

## 2023-09-01 DIAGNOSIS — Z86.79 STATUS POST ABLATION OF ATRIAL FIBRILLATION: ICD-10-CM

## 2023-09-01 DIAGNOSIS — R06.09 DYSPNEA ON EXERTION: ICD-10-CM

## 2023-09-01 DIAGNOSIS — I48.0 PAROXYSMAL ATRIAL FIBRILLATION: Primary | ICD-10-CM

## 2023-09-01 DIAGNOSIS — I10 ESSENTIAL HYPERTENSION: ICD-10-CM

## 2023-09-01 DIAGNOSIS — I11.9 HYPERTENSIVE HEART DISEASE WITHOUT HEART FAILURE: ICD-10-CM

## 2023-09-01 DIAGNOSIS — Z98.890 STATUS POST ABLATION OF ATRIAL FIBRILLATION: ICD-10-CM

## 2023-09-01 PROCEDURE — 1159F MED LIST DOCD IN RCRD: CPT | Performed by: INTERNAL MEDICINE

## 2023-09-01 PROCEDURE — 3078F DIAST BP <80 MM HG: CPT | Performed by: INTERNAL MEDICINE

## 2023-09-01 PROCEDURE — 3075F SYST BP GE 130 - 139MM HG: CPT | Performed by: INTERNAL MEDICINE

## 2023-09-01 PROCEDURE — 93000 ELECTROCARDIOGRAM COMPLETE: CPT | Performed by: INTERNAL MEDICINE

## 2023-09-01 PROCEDURE — 99214 OFFICE O/P EST MOD 30 MIN: CPT | Performed by: INTERNAL MEDICINE

## 2023-09-01 PROCEDURE — 1160F RVW MEDS BY RX/DR IN RCRD: CPT | Performed by: INTERNAL MEDICINE

## 2023-09-01 NOTE — PROGRESS NOTES
CC--- atrial fibrillation    Sub  82-year-old pleasant patient has recurrent AF despite Tikosyn and underwent complex AF and flutter ablation on August 1, 2023 and comes in for follow-up.  She has history of hypertension and history of rheumatoid arthritis and hypothyroidism and chronic kidney disease in the past.        Past Medical History:   Diagnosis Date    Allergic     Sulfa    Arthritis     Cataract     Cataracts surgery    COVID-19     Degenerative joint disease     Extremity pain     legs pain    H/O degenerative disc disease     History of colonoscopy 2009    last done 2009    History of echocardiogram 09/04/2018    LVH EF 65%. RV OK. LA probably mildly dilated. AV OK. MV OK. Modest TR RVSP 26 or less. Nuclear MPI regadenoson 9/14/2018. LV uniform myocardial uptake and wall motion EF 71%.     History of Holter monitoring 10/08/2018    SR 40-81 58. AF 2.7 hr episode VR . At termination AF 2 3.5-4.0 s pauses with patient possibly dizzy. 205 PAC 42 atrial ashanti several SVT. No ventricular ectopy.     HL (hearing loss) 2020    Tried hearing aids twice . Didnt help    HTN (hypertension)     Hyperlipidemia     Hypertensive cardiovascular disease     Hypothyroidism     Incontinence     Leukopenia     Low back pain     PAF (paroxysmal atrial fibrillation) 09/2018     [facility] Sept 2018 PAF and HTN. PAFL On bisoprolol and diltiazem bradycardia symptomatic with dizziness. Amiodarone alone Oct 2018 and no episodes of erratic or fast heartbeat or lightheadedness.     Rheumatoid arthritis     Possible pulmonary involvement     Staph infection     Vitamin D deficiency     White coat syndrome with hypertension      Past Surgical History:   Procedure Laterality Date    APPENDECTOMY  1974    BACK SURGERY  2003 2001, 2003    SPINE SURGERY  2001.    2003    SUBTOTAL HYSTERECTOMY  1974         Physical Exam    General:      well developed, well nourished, in no acute distress.    Head:       normocephalic and atraumatic.    Eyes:      PERRL/EOM intact, conjunctivae and sclerae clear without nystagmus.    Neck:      no  thyromegaly, trachea central with normal respiratory effort  Lungs:      clear bilaterally to auscultation.    Heart:       regular rate and rhythm, S1, S2 without murmurs, rubs, or gallops  Skin:      intact without lesions or rashes.    Psych:      alert and cooperative; normal mood and affect; normal attention span and concentration.                Assessment and plan  Most recent potassium of 4.2 with normal creatinine and INR is 2.6  Hypertension much better optimized after adding losartan and also patient on amlodipine and metoprolol.  Difficult to control atrial arrhythmia post ablation and remains in sinus rhythm currently off Tikosyn  Hypothyroidism supplemented and followed by primary care physician on levothyroxine  History of rheumatoid arthritis followed by primary care physician  Medications reviewed and follow-up appointments made    Patient clinically improved after the current regimen and hypertension much better controlled.  Continue current management      ECG 12 Lead    Date/Time: 9/1/2023 2:07 PM  Performed by: Dyllan Lloyd MD  Authorized by: Dyllan Lloyd MD   Comparison: compared with previous ECG   Similar to previous ECG  Rhythm: sinus rhythm  Rate: normal  Other findings: non-specific ST-T wave changes and left ventricular hypertrophy         Electronically signed by Dyllan Lloyd MD, 09/01/23, 2:06 PM EDT.

## 2023-09-01 NOTE — LETTER
September 1, 2023       No Recipients    Patient: Jt Valdes   YOB: 1940   Date of Visit: 9/1/2023     Dear Dilcia Trinidad MD:       Thank you for referring Jt Valdes to me for evaluation. Below are the relevant portions of my assessment and plan of care.    If you have questions, please do not hesitate to call me. I look forward to following Jt along with you.         Sincerely,        Dyllan Lloyd MD        CC:   No Recipients    Dyllan Lloyd MD  09/01/23 1407  Sign when Signing Visit  CC--- atrial fibrillation    Sub  82-year-old pleasant patient has recurrent AF despite Tikosyn and underwent complex AF and flutter ablation on August 1, 2023 and comes in for follow-up.  She has history of hypertension and history of rheumatoid arthritis and hypothyroidism and chronic kidney disease in the past.        Past Medical History:   Diagnosis Date    Allergic     Sulfa    Arthritis     Cataract     Cataracts surgery    COVID-19     Degenerative joint disease     Extremity pain     legs pain    H/O degenerative disc disease     History of colonoscopy 2009    last done 2009    History of echocardiogram 09/04/2018    LVH EF 65%. RV OK. LA probably mildly dilated. AV OK. MV OK. Modest TR RVSP 26 or less. Nuclear MPI regadenoson 9/14/2018. LV uniform myocardial uptake and wall motion EF 71%.     History of Holter monitoring 10/08/2018    SR 40-81 58. AF 2.7 hr episode VR . At termination AF 2 3.5-4.0 s pauses with patient possibly dizzy. 205 PAC 42 atrial ashanti several SVT. No ventricular ectopy.     HL (hearing loss) 2020    Tried hearing aids twice . Didnt help    HTN (hypertension)     Hyperlipidemia     Hypertensive cardiovascular disease     Hypothyroidism     Incontinence     Leukopenia     Low back pain     PAF (paroxysmal atrial fibrillation) 09/2018     [facility] Sept 2018 PAF and HTN. PAFL On bisoprolol and diltiazem bradycardia symptomatic  with dizziness. Amiodarone alone Oct 2018 and no episodes of erratic or fast heartbeat or lightheadedness.     Rheumatoid arthritis     Possible pulmonary involvement     Staph infection     Vitamin D deficiency     White coat syndrome with hypertension      Past Surgical History:   Procedure Laterality Date    APPENDECTOMY  1974    BACK SURGERY  2003 2001, 2003    SPINE SURGERY  2001.    2003    SUBTOTAL HYSTERECTOMY  1974         Physical Exam    General:      well developed, well nourished, in no acute distress.    Head:      normocephalic and atraumatic.    Eyes:      PERRL/EOM intact, conjunctivae and sclerae clear without nystagmus.    Neck:      no  thyromegaly, trachea central with normal respiratory effort  Lungs:      clear bilaterally to auscultation.    Heart:       regular rate and rhythm, S1, S2 without murmurs, rubs, or gallops  Skin:      intact without lesions or rashes.    Psych:      alert and cooperative; normal mood and affect; normal attention span and concentration.                Assessment and plan  Most recent potassium of 4.2 with normal creatinine and INR is 2.6  Hypertension much better optimized after adding losartan and also patient on amlodipine and metoprolol.  Difficult to control atrial arrhythmia post ablation and remains in sinus rhythm currently off Tikosyn  Hypothyroidism supplemented and followed by primary care physician on levothyroxine  History of rheumatoid arthritis followed by primary care physician  Medications reviewed and follow-up appointments made    Patient clinically improved after the current regimen and hypertension much better controlled.  Continue current management      ECG 12 Lead    Date/Time: 9/1/2023 2:07 PM  Performed by: Dyllan Lloyd MD  Authorized by: Dyllan Lloyd MD   Comparison: compared with previous ECG   Similar to previous ECG  Rhythm: sinus rhythm  Rate: normal  Other findings: non-specific ST-T wave changes and left  ventricular hypertrophy         Electronically signed by Dyllan Lloyd MD, 09/01/23, 2:06 PM EDT.

## 2023-09-15 ENCOUNTER — ANTICOAGULATION VISIT (OUTPATIENT)
Dept: CARDIOLOGY | Facility: CLINIC | Age: 83
End: 2023-09-15
Payer: MEDICARE

## 2023-09-15 VITALS
DIASTOLIC BLOOD PRESSURE: 85 MMHG | BODY MASS INDEX: 22.11 KG/M2 | WEIGHT: 117 LBS | HEART RATE: 73 BPM | SYSTOLIC BLOOD PRESSURE: 171 MMHG

## 2023-09-15 DIAGNOSIS — I48.0 PAROXYSMAL ATRIAL FIBRILLATION: Primary | Chronic | ICD-10-CM

## 2023-09-15 DIAGNOSIS — Z79.01 LONG TERM (CURRENT) USE OF ANTICOAGULANTS: Chronic | ICD-10-CM

## 2023-09-15 LAB — INR PPP: 2.8 (ref 2–3)

## 2023-09-15 PROCEDURE — 36416 COLLJ CAPILLARY BLOOD SPEC: CPT | Performed by: INTERNAL MEDICINE

## 2023-09-15 PROCEDURE — 85610 PROTHROMBIN TIME: CPT | Performed by: INTERNAL MEDICINE

## 2023-09-26 ENCOUNTER — TELEPHONE (OUTPATIENT)
Dept: FAMILY MEDICINE CLINIC | Facility: CLINIC | Age: 83
End: 2023-09-26

## 2023-09-26 DIAGNOSIS — G47.33 OSA (OBSTRUCTIVE SLEEP APNEA): Primary | ICD-10-CM

## 2023-09-26 NOTE — TELEPHONE ENCOUNTER
Caller: Jt Valdes    Relationship: Self    Best call back number: [phone]    What is the medical concern/diagnosis: SLEEPING MACHINE     What specialty or service is being requested: SLEEP MEDICINE     What is the provider, practice or medical service name: Joseph F. Seipel, MD    What is the office location: [address] [place] [address]     What is the office phone number: [phone]    NOTES-PATIENT HEARD DR HOYOS CAN DO AUTHORIZATION FOR HER SLEEP MACHINE, PATIENT WANTS TO KNOW IF THIS IS TRUE.

## 2023-09-26 NOTE — TELEPHONE ENCOUNTER
Patient states that she is not needing any supplies that she is wanting a referral to Dr. Seipel office. She said she is not liking her Doctor now and she called Dr. Seipel office and they said they would need her PCP to send them a referral for her to be seen there.   ccpd finally finished from last nights treatment due to machine alarming throughout the treatment. Aseptic technique used. dresing changed tonight. 2235ml net removed. 09/14/21 1900   Vitals   BP (!) 146/91   Temp 97.7 °F (36.5 °C)   Temp Source Oral   Pulse 100   Resp 16   SpO2 94 %   Weight 170 lb 13.7 oz (77.5 kg)   Peritoneal Dialysis Catheter Left lower abdomen   Placement Date/Time: 10/31/20 0704   Catheter Location: Left lower abdomen   Status Deaccessed   Site Condition No Complications   Dressing Status Clean;Dry; Intact; Changed   Dressing Gauze   Cycler   Ultrafiltration (UF) (mL) 2235 mL

## 2023-10-10 RX ORDER — WARFARIN SODIUM 2.5 MG/1
5 TABLET ORAL DAILY
Qty: 180 TABLET | Refills: 0 | Status: SHIPPED | OUTPATIENT
Start: 2023-10-10

## 2023-10-12 ENCOUNTER — LAB (OUTPATIENT)
Dept: LAB | Facility: HOSPITAL | Age: 83
End: 2023-10-12
Payer: MEDICARE

## 2023-10-12 DIAGNOSIS — E03.9 HYPOTHYROIDISM, UNSPECIFIED TYPE: ICD-10-CM

## 2023-10-12 LAB
T4 FREE SERPL-MCNC: 1.71 NG/DL (ref 0.93–1.7)
TSH SERPL DL<=0.05 MIU/L-ACNC: 0.45 UIU/ML (ref 0.27–4.2)

## 2023-10-12 PROCEDURE — 36415 COLL VENOUS BLD VENIPUNCTURE: CPT

## 2023-10-12 PROCEDURE — 84443 ASSAY THYROID STIM HORMONE: CPT

## 2023-10-12 PROCEDURE — 84439 ASSAY OF FREE THYROXINE: CPT

## 2023-10-16 ENCOUNTER — OFFICE VISIT (OUTPATIENT)
Dept: ENDOCRINOLOGY | Facility: CLINIC | Age: 83
End: 2023-10-16
Payer: MEDICARE

## 2023-10-16 VITALS
HEIGHT: 61 IN | OXYGEN SATURATION: 95 % | BODY MASS INDEX: 21.52 KG/M2 | SYSTOLIC BLOOD PRESSURE: 135 MMHG | DIASTOLIC BLOOD PRESSURE: 75 MMHG | HEART RATE: 85 BPM | WEIGHT: 114 LBS

## 2023-10-16 DIAGNOSIS — E03.9 ACQUIRED HYPOTHYROIDISM: Primary | ICD-10-CM

## 2023-10-16 PROCEDURE — 1159F MED LIST DOCD IN RCRD: CPT | Performed by: INTERNAL MEDICINE

## 2023-10-16 PROCEDURE — 3078F DIAST BP <80 MM HG: CPT | Performed by: INTERNAL MEDICINE

## 2023-10-16 PROCEDURE — 3075F SYST BP GE 130 - 139MM HG: CPT | Performed by: INTERNAL MEDICINE

## 2023-10-16 PROCEDURE — 1160F RVW MEDS BY RX/DR IN RCRD: CPT | Performed by: INTERNAL MEDICINE

## 2023-10-16 PROCEDURE — 99214 OFFICE O/P EST MOD 30 MIN: CPT | Performed by: INTERNAL MEDICINE

## 2023-10-16 RX ORDER — LEVOTHYROXINE SODIUM 0.1 MG/1
TABLET ORAL
Qty: 30 TABLET | Refills: 6 | Status: SHIPPED | OUTPATIENT
Start: 2023-10-16

## 2023-10-16 NOTE — PATIENT INSTRUCTIONS
Decrease levothyroxine to 100 mcg p.o. daily  Check TSH and free T4 in 6 weeks and before follow-up in 6 months.

## 2023-10-16 NOTE — PROGRESS NOTES
Endocrine Progress Note Outpatient     Patient Care Team:  Dilcia Trinidad MD as PCP - General (Family Medicine)  Roland Benitez MD as Emergency Attending (Family Medicine)  Jose Patiño MD as Consulting Physician (Cardiology)  Aurora Mckeon APRN as Nurse Practitioner (Cardiology)    Chief Complaint: Follow-up hypothyroidism    HPI: This is an 83-year-old female with history of hypothyroidism is here for follow-up.  She is accompanied today with her dauther.  She is on levothyroxine 112 mcg p.o. daily.  She is complaining of fatigue and tiredness and cold all the time.  She is taking her thyroid medication on regular basis.    Past Medical History:   Diagnosis Date    Allergic     Sulfa    Arthritis     Cataract     Cataracts surgery    COVID-19     Degenerative joint disease     Extremity pain     legs pain    GERD (gastroesophageal reflux disease)     H/O degenerative disc disease     History of colonoscopy 2009    last done 2009    History of echocardiogram 09/04/2018    LVH EF 65%. RV OK. LA probably mildly dilated. AV OK. MV OK. Modest TR RVSP 26 or less. Nuclear MPI regadenoson 9/14/2018. LV uniform myocardial uptake and wall motion EF 71%.     History of Holter monitoring 10/08/2018    SR 40-81 58. AF 2.7 hr episode VR . At termination AF 2 3.5-4.0 s pauses with patient possibly dizzy. 205 PAC 42 atrial ashanti several SVT. No ventricular ectopy.     HL (hearing loss) 2020    Tried hearing aids twice . Didnt help    HTN (hypertension)     Hyperlipidemia     Hypertensive cardiovascular disease     Hypothyroidism     Incontinence     Leukopenia     Low back pain     PAF (paroxysmal atrial fibrillation) 09/2018     [facility] Sept 2018 PAF and HTN. PAFL On bisoprolol and diltiazem bradycardia symptomatic with dizziness. Amiodarone alone Oct 2018 and no episodes of erratic or fast heartbeat or lightheadedness.     Rheumatoid arthritis     Possible pulmonary involvement     Staph  infection     Vitamin D deficiency     White coat syndrome with hypertension        Social History     Socioeconomic History    Marital status:     Number of children: 5    Years of education: 12   Tobacco Use    Smoking status: Never    Smokeless tobacco: Never   Vaping Use    Vaping Use: Never used   Substance and Sexual Activity    Alcohol use: Yes     Alcohol/week: 1.0 standard drink of alcohol     Types: 1 Glasses of wine per week     Comment: Occ.    Drug use: No    Sexual activity: Not Currently       Family History   Problem Relation Age of Onset    Osteoarthritis Mother     Arthritis Mother     Cancer Mother     Other Other         Rheumatoid disease     Cancer Father     Cancer Sister        Allergies   Allergen Reactions    Sulfa Antibiotics Rash    Macrobid [Nitrofurantoin] Unknown - High Severity       ROS:   Constitutional: Admit fatigue, tiredness.    Eyes:  Denies change in visual acuity   HENT:  Denies nasal congestion or sore throat   Respiratory: denies cough, shortness of breath.   Cardiovascular:  denies chest pain, edema   GI:  Denies abdominal pain, nausea, vomiting.   Musculoskeletal:  Denies back pain or joint pain   Integument:  Denies dry skin and rash   Neurologic:  Denies headache, focal weakness or sensory changes   Endocrine:  Denies polyuria or polydipsia   Psychiatric:  Denies depression or anxiety      Vitals:    10/16/23 1259   BP: 135/75   Pulse: 85   SpO2: 95%     Body mass index is 21.54 kg/m².     Physical Exam:  GEN: NAD, conversant  EYES: EOMI,   NECK: no thyromegaly  CV: RRR, n  LUNG: CTA  PSYCH: AOX3, appropriate mood, affect normal      Results Review:     I reviewed the patient's new clinical results.    Lab Results   Component Value Date    HGBA1C 5.2 03/09/2020      Lab Results   Component Value Date    GLUCOSE 106 (H) 08/24/2023    BUN 29 (H) 08/24/2023    CREATININE 0.95 08/24/2023    EGFRIFNONA 58 (L) 12/20/2021    EGFRIFAFRI 57 (L) 04/27/2017    BCR 30.5 (H)  08/24/2023    K 4.4 08/24/2023    CO2 24.0 08/24/2023    CALCIUM 9.5 08/24/2023    ALBUMIN 4.3 06/03/2023    LABIL2 1.4 06/04/2019    AST 25 06/03/2023    ALT 13 06/03/2023    CHOL 221 (H) 05/08/2023    TRIG 177 (H) 05/08/2023     (H) 05/08/2023    HDL 56 05/08/2023     Lab Results   Component Value Date    TSH 0.446 10/12/2023    FREET4 1.71 (H) 10/12/2023         Medication Review: Reviewed.       Current Outpatient Medications:     Abatacept (ORENCIA IV), Infuse  into a venous catheter., Disp: , Rfl:     amLODIPine (NORVASC) 5 MG tablet, Take 1 tablet by mouth Daily., Disp: 90 tablet, Rfl: 1    Diclofenac Sodium (VOLTAREN) 1 % gel gel, Apply 4 g topically to the appropriate area as directed 4 (Four) Times a Day As Needed (pain)., Disp: 150 g, Rfl: 3    leflunomide (ARAVA) 20 MG tablet, Take 1 tablet by mouth Daily., Disp: 90 tablet, Rfl: 0    levothyroxine (SYNTHROID, LEVOTHROID) 112 MCG tablet, Take 1 tablet by mouth Daily., Disp: , Rfl:     losartan (COZAAR) 50 MG tablet, TAKE 1 TABLET BY MOUTH EVERY NIGHT, Disp: 90 tablet, Rfl: 1    magnesium oxide (MAG-OX) 400 MG tablet, Take 1 tablet by mouth 2 (Two) Times a Day., Disp: 60 tablet, Rfl: 11    metoprolol succinate XL (TOPROL-XL) 100 MG 24 hr tablet, Take 1 tablet by mouth Daily., Disp: 90 tablet, Rfl: 3    Multiple Vitamins-Minerals (PRESERVISION AREDS 2 PO), Take 1 tablet by mouth Daily., Disp: , Rfl:     multivitamin with minerals tablet tablet, Take 1 tablet by mouth Daily., Disp: , Rfl:     polyethylene glycol (MIRALAX) 17 g packet, Take 8.5 g by mouth Daily., Disp: , Rfl:     warfarin (COUMADIN) 2.5 MG tablet, TAKE 2 TABLETS DAILY, Disp: 180 tablet, Rfl: 0      Assessment and plan:  Hypothyroidism: Uncontrolled with TSH on the low side but free T4 high.  Will reduce levothyroxine to 100 mcg p.o. daily and recheck TSH and free T4 in 6 weeks.  We will see her back in 6 months.           Lucia Sahu MD FACE.

## 2023-10-17 RX ORDER — AMLODIPINE BESYLATE 5 MG/1
5 TABLET ORAL DAILY
Qty: 90 TABLET | Refills: 1 | Status: SHIPPED | OUTPATIENT
Start: 2023-10-17

## 2023-10-17 NOTE — TELEPHONE ENCOUNTER
Rx Refill Note  Requested Prescriptions     Pending Prescriptions Disp Refills    amLODIPine (NORVASC) 5 MG tablet [Pharmacy Med Name: AMLODIPINE BESYLATE TABS 5MG] 90 tablet 3     Sig: TAKE 1 TABLET DAILY      Last office visit with prescribing clinician: 5/8/2023   Last telemedicine visit with prescribing clinician: Visit date not found   Next office visit with prescribing clinician: 5/1/2024                         Would you like a call back once the refill request has been completed: [] Yes [] No    If the office needs to give you a call back, can they leave a voicemail: [] Yes [] No    Marah Carmichael MA  10/17/23, 11:46 EDT

## 2023-10-20 ENCOUNTER — ANTICOAGULATION VISIT (OUTPATIENT)
Dept: CARDIOLOGY | Facility: CLINIC | Age: 83
End: 2023-10-20
Payer: MEDICARE

## 2023-10-20 VITALS — DIASTOLIC BLOOD PRESSURE: 69 MMHG | SYSTOLIC BLOOD PRESSURE: 159 MMHG | HEART RATE: 68 BPM

## 2023-10-20 DIAGNOSIS — Z79.01 LONG TERM (CURRENT) USE OF ANTICOAGULANTS: Chronic | ICD-10-CM

## 2023-10-20 DIAGNOSIS — I48.0 PAROXYSMAL ATRIAL FIBRILLATION: Primary | Chronic | ICD-10-CM

## 2023-10-20 LAB — INR PPP: 2.5 (ref 2–3)

## 2023-10-20 PROCEDURE — 36416 COLLJ CAPILLARY BLOOD SPEC: CPT | Performed by: INTERNAL MEDICINE

## 2023-10-20 PROCEDURE — 85610 PROTHROMBIN TIME: CPT | Performed by: INTERNAL MEDICINE

## 2023-11-06 NOTE — PROGRESS NOTES
"Chief Complaint  NEW PATIENT (SILVERIO)    Clyde Valdes presents to [facility] NEUROLOGY  History of Present Illness  New silverio patient currently on cpap, patient states she doesn't think she is really benefiting from pap therapy due to fatigue,dry mouth, patient also thinks she is getting too much air. Patient has been on pap therapy x's 6 months. She uses a [facility]FM and goes through [employer] for supplies.    Sleep testing history:    On NPSG at [facility] , 3/23/23 patient had Mild obstructive sleep apnea syndrome with apnea-hypopnea index of 12.2 per sleep hour, minimum SpO2 of 83%    PAP download:  The patient is on PAP therapy at 5-10 cm/H2O.   Data indicates Excellent compliance. With 87% usage for more than 4 hours with an average usage of 4 hours 46 minutes. AHI down to 13 .  Average pressures 8.1.  Average large leak 0sec.     The patient's hypersomnia has resolved       [place] Sleepiness Scale:  Sitting and reading 1 WatchingTV 1  Sitting, inactive, in a public place 0  As a passenger in a car for 1 hour w/o a break  0  Lying down to rest in the afternoon  1  Sitting and talking to someone  0  Sitting quietly after a lunch  1  In a car, while stopped for traffic or a light  0  Total 4      Pt afib controlled with, ablation procedure     Review of Systems   HENT:  Positive for hearing loss and tinnitus.    Endocrine: Positive for cold intolerance.   Musculoskeletal:  Positive for gait problem.   All other systems reviewed and are negative.        Objective   Vital Signs:   /72   Pulse 81   Resp 16   Ht 154.9 cm (61\")   Wt 52.2 kg (115 lb)   BMI 21.73 kg/m²     Physical Exam  Vitals reviewed.   Constitutional:       Appearance: Normal appearance.   Pulmonary:      Effort: Pulmonary effort is normal. No respiratory distress.   Neurological:      General: No focal deficit present.      Mental Status: She is alert and oriented to person, place, and time.   Psychiatric:         " Mood and Affect: Mood normal.      Result Review :                 Assessment and Plan    Diagnoses and all orders for this visit:    1. SILVERIO (obstructive sleep apnea) (Primary)      Mild silverio, CPAP is not tolerated and ahi is not improved      Will order CPAP study for bipap titration        Follow Up   Return for Follow Up visit 30 to 90 days after obtaining PAP.    Patient was given instructions and counseling regarding her condition or for health maintenance advice. Please see specific information pulled into the AVS if appropriate.       This document has been electronically signed by Joseph Seipel, MD on November 8, 2023 15:04 EST

## 2023-11-08 ENCOUNTER — OFFICE VISIT (OUTPATIENT)
Dept: NEUROLOGY | Facility: CLINIC | Age: 83
End: 2023-11-08
Payer: MEDICARE

## 2023-11-08 VITALS
BODY MASS INDEX: 21.71 KG/M2 | RESPIRATION RATE: 16 BRPM | SYSTOLIC BLOOD PRESSURE: 177 MMHG | WEIGHT: 115 LBS | HEART RATE: 81 BPM | HEIGHT: 61 IN | DIASTOLIC BLOOD PRESSURE: 72 MMHG

## 2023-11-08 DIAGNOSIS — G47.33 OSA (OBSTRUCTIVE SLEEP APNEA): Primary | ICD-10-CM

## 2023-11-08 PROCEDURE — 1159F MED LIST DOCD IN RCRD: CPT | Performed by: PSYCHIATRY & NEUROLOGY

## 2023-11-08 PROCEDURE — 1160F RVW MEDS BY RX/DR IN RCRD: CPT | Performed by: PSYCHIATRY & NEUROLOGY

## 2023-11-08 PROCEDURE — 3077F SYST BP >= 140 MM HG: CPT | Performed by: PSYCHIATRY & NEUROLOGY

## 2023-11-08 PROCEDURE — 3078F DIAST BP <80 MM HG: CPT | Performed by: PSYCHIATRY & NEUROLOGY

## 2023-11-08 PROCEDURE — 99204 OFFICE O/P NEW MOD 45 MIN: CPT | Performed by: PSYCHIATRY & NEUROLOGY

## 2023-11-09 ENCOUNTER — PATIENT ROUNDING (BHMG ONLY) (OUTPATIENT)
Dept: NEUROLOGY | Facility: CLINIC | Age: 83
End: 2023-11-09
Payer: MEDICARE

## 2023-11-22 ENCOUNTER — ANTICOAGULATION VISIT (OUTPATIENT)
Dept: CARDIOLOGY | Facility: CLINIC | Age: 83
End: 2023-11-22
Payer: MEDICARE

## 2023-11-22 VITALS
DIASTOLIC BLOOD PRESSURE: 83 MMHG | HEART RATE: 71 BPM | WEIGHT: 120.4 LBS | SYSTOLIC BLOOD PRESSURE: 171 MMHG | BODY MASS INDEX: 22.75 KG/M2

## 2023-11-22 DIAGNOSIS — Z79.01 LONG TERM (CURRENT) USE OF ANTICOAGULANTS: Chronic | ICD-10-CM

## 2023-11-22 DIAGNOSIS — I48.0 PAROXYSMAL ATRIAL FIBRILLATION: Primary | Chronic | ICD-10-CM

## 2023-11-22 LAB — INR PPP: 2.2 (ref 2–3)

## 2023-11-22 PROCEDURE — 36416 COLLJ CAPILLARY BLOOD SPEC: CPT | Performed by: INTERNAL MEDICINE

## 2023-11-22 PROCEDURE — 85610 PROTHROMBIN TIME: CPT | Performed by: INTERNAL MEDICINE

## 2023-11-27 ENCOUNTER — LAB (OUTPATIENT)
Dept: LAB | Facility: HOSPITAL | Age: 83
End: 2023-11-27
Payer: MEDICARE

## 2023-11-27 DIAGNOSIS — E03.9 HYPOTHYROIDISM, UNSPECIFIED TYPE: ICD-10-CM

## 2023-11-27 DIAGNOSIS — E03.9 ACQUIRED HYPOTHYROIDISM: ICD-10-CM

## 2023-11-27 LAB
T4 FREE SERPL-MCNC: 1.31 NG/DL (ref 0.93–1.7)
TSH SERPL DL<=0.05 MIU/L-ACNC: 1.67 UIU/ML (ref 0.27–4.2)

## 2023-11-27 PROCEDURE — 84439 ASSAY OF FREE THYROXINE: CPT

## 2023-11-27 PROCEDURE — 36415 COLL VENOUS BLD VENIPUNCTURE: CPT

## 2023-11-27 PROCEDURE — 84443 ASSAY THYROID STIM HORMONE: CPT

## 2023-12-14 ENCOUNTER — TELEPHONE (OUTPATIENT)
Dept: ENDOCRINOLOGY | Facility: CLINIC | Age: 83
End: 2023-12-14

## 2023-12-14 NOTE — TELEPHONE ENCOUNTER
Caller: Jt Valdes    Relationship: Self    Best call back number: [phone]    Requested Prescriptions:   levothyroxine (SYNTHROID, LEVOTHROID) 100 MCG tablet [4423] (Order [id number])     Pharmacy where request should be sent:    [facility] - [address] [phone] [facility] [phone]      Last office visit with prescribing clinician: 10/16/2023     Next office visit with prescribing clinician: 5/16/2024     Additional details provided by patient: [facility] TOLD PATIENT TO REACH OUT TO OFFICE FOR PRESCRIPTION.  NEEDS TO BE A 90 DAY SUPPLY.     Does the patient have less than a 3 day supply:  [] Yes  [x] No    Would you like a call back once the refill request has been completed: [] Yes [x] No    If the office needs to give you a call back, can they leave a voicemail: [] Yes [x] No

## 2023-12-19 ENCOUNTER — ANTICOAGULATION VISIT (OUTPATIENT)
Dept: CARDIOLOGY | Facility: CLINIC | Age: 83
End: 2023-12-19
Payer: MEDICARE

## 2023-12-19 VITALS — DIASTOLIC BLOOD PRESSURE: 72 MMHG | HEART RATE: 67 BPM | SYSTOLIC BLOOD PRESSURE: 156 MMHG

## 2023-12-19 DIAGNOSIS — I48.0 PAROXYSMAL ATRIAL FIBRILLATION: Primary | Chronic | ICD-10-CM

## 2023-12-19 DIAGNOSIS — Z79.01 LONG TERM (CURRENT) USE OF ANTICOAGULANTS: Chronic | ICD-10-CM

## 2023-12-19 LAB — INR PPP: 2.5 (ref 2–3)

## 2023-12-19 PROCEDURE — 36416 COLLJ CAPILLARY BLOOD SPEC: CPT | Performed by: INTERNAL MEDICINE

## 2023-12-19 PROCEDURE — 85610 PROTHROMBIN TIME: CPT | Performed by: INTERNAL MEDICINE

## 2024-01-08 DIAGNOSIS — I48.0 PAROXYSMAL ATRIAL FIBRILLATION: ICD-10-CM

## 2024-01-08 DIAGNOSIS — Z79.01 LONG TERM (CURRENT) USE OF ANTICOAGULANTS: ICD-10-CM

## 2024-01-08 RX ORDER — WARFARIN SODIUM 2.5 MG/1
TABLET ORAL
Qty: 180 TABLET | Refills: 0 | Status: SHIPPED | OUTPATIENT
Start: 2024-01-08

## 2024-01-08 RX ORDER — METOPROLOL SUCCINATE 100 MG/1
TABLET, EXTENDED RELEASE ORAL
Qty: 90 TABLET | Refills: 3 | Status: SHIPPED | OUTPATIENT
Start: 2024-01-08

## 2024-01-08 NOTE — TELEPHONE ENCOUNTER
Rx Refill Note  Requested Prescriptions     Pending Prescriptions Disp Refills    warfarin (COUMADIN) 2.5 MG tablet [Pharmacy Med Name: WARFARIN TABS 2.5MG] 180 tablet 0     Sig: Take 2 tabs, by mouth, daily or as directed.    metoprolol succinate XL (TOPROL-XL) 100 MG 24 hr tablet [Pharmacy Med Name: METOPROLOL SUCCINATE ER TABS 100MG] 90 tablet 3     Sig: Take 1 tab, by mouth, daily.    Last INR 12/19/23  Last office visit with prescribing clinician: 5/8/2023   Last telemedicine visit with prescribing clinician: Visit date not found   Next office visit with prescribing clinician: 5/1/2024                         Would you like a call back once the refill request has been completed: [] Yes [] No    If the office needs to give you a call back, can they leave a voicemail: [] Yes [] No    Veena Lee RN  01/08/24, 09:28 EST

## 2024-01-10 ENCOUNTER — OFFICE VISIT (OUTPATIENT)
Dept: CARDIOLOGY | Facility: CLINIC | Age: 84
End: 2024-01-10
Payer: MEDICARE

## 2024-01-10 VITALS
WEIGHT: 115 LBS | DIASTOLIC BLOOD PRESSURE: 81 MMHG | SYSTOLIC BLOOD PRESSURE: 136 MMHG | HEIGHT: 61 IN | BODY MASS INDEX: 21.71 KG/M2 | HEART RATE: 64 BPM | OXYGEN SATURATION: 97 %

## 2024-01-10 DIAGNOSIS — I11.9 HYPERTENSIVE HEART DISEASE WITHOUT HEART FAILURE: ICD-10-CM

## 2024-01-10 DIAGNOSIS — R00.2 PALPITATIONS: ICD-10-CM

## 2024-01-10 DIAGNOSIS — Z98.890 STATUS POST ABLATION OF ATRIAL FIBRILLATION: ICD-10-CM

## 2024-01-10 DIAGNOSIS — I10 ESSENTIAL HYPERTENSION: ICD-10-CM

## 2024-01-10 DIAGNOSIS — Z86.79 STATUS POST ABLATION OF ATRIAL FIBRILLATION: ICD-10-CM

## 2024-01-10 DIAGNOSIS — I48.0 PAROXYSMAL ATRIAL FIBRILLATION: Primary | ICD-10-CM

## 2024-01-10 PROCEDURE — 3075F SYST BP GE 130 - 139MM HG: CPT | Performed by: INTERNAL MEDICINE

## 2024-01-10 PROCEDURE — 1159F MED LIST DOCD IN RCRD: CPT | Performed by: INTERNAL MEDICINE

## 2024-01-10 PROCEDURE — 99214 OFFICE O/P EST MOD 30 MIN: CPT | Performed by: INTERNAL MEDICINE

## 2024-01-10 PROCEDURE — 3079F DIAST BP 80-89 MM HG: CPT | Performed by: INTERNAL MEDICINE

## 2024-01-10 PROCEDURE — 93000 ELECTROCARDIOGRAM COMPLETE: CPT | Performed by: INTERNAL MEDICINE

## 2024-01-10 PROCEDURE — 1160F RVW MEDS BY RX/DR IN RCRD: CPT | Performed by: INTERNAL MEDICINE

## 2024-01-10 NOTE — PROGRESS NOTES
CC--- atrial fibrillation    Sub  83-year-old pleasant patient have recurrent AF despite Tikosyn underwent complex AF and flutter ablation in August 2023 and comes in for follow-up.  She has history of rheumatoid arthritis, hypothyroidism and chronic kidney disease in the past.          Past Medical History:   Diagnosis Date    Allergic     Sulfa    Arthritis     Cataract     Cataracts surgery    COVID-19     Degenerative joint disease     Extremity pain     legs pain    H/O degenerative disc disease     History of colonoscopy 2009    last done 2009    History of echocardiogram 09/04/2018    LVH EF 65%. RV OK. LA probably mildly dilated. AV OK. MV OK. Modest TR RVSP 26 or less. Nuclear MPI regadenoson 9/14/2018. LV uniform myocardial uptake and wall motion EF 71%.     History of Holter monitoring 10/08/2018    SR 40-81 58. AF 2.7 hr episode VR . At termination AF 2 3.5-4.0 s pauses with patient possibly dizzy. 205 PAC 42 atrial ashanti several SVT. No ventricular ectopy.     HL (hearing loss) 2020    Tried hearing aids twice . Didnt help    HTN (hypertension)     Hyperlipidemia     Hypertensive cardiovascular disease     Hypothyroidism     Incontinence     Leukopenia     Low back pain     PAF (paroxysmal atrial fibrillation) 09/2018     [facility] Sept 2018 PAF and HTN. PAFL On bisoprolol and diltiazem bradycardia symptomatic with dizziness. Amiodarone alone Oct 2018 and no episodes of erratic or fast heartbeat or lightheadedness.     Rheumatoid arthritis     Possible pulmonary involvement     Staph infection     Vitamin D deficiency     White coat syndrome with hypertension      Past Surgical History:   Procedure Laterality Date    APPENDECTOMY  1974    BACK SURGERY  2003 2001, 2003    SPINE SURGERY  2001.    2003    SUBTOTAL HYSTERECTOMY  1974           Physical Exam    General:      well developed, well nourished, in no acute distress.    Head:      normocephalic and atraumatic.    Eyes:      PERRL/EOM  intact, conjunctivae and sclerae clear without nystagmus.    Neck:      no  thyromegaly, trachea central with normal respiratory effort  Lungs:      clear bilaterally to auscultation.    Heart:       regular rate and rhythm, S1, S2 without murmurs, rubs, or gallops  Skin:      intact without lesions or rashes.    Psych:      alert and cooperative; normal mood and affect; normal attention span and concentration.                  Assessment and plan    Difficult to control atrial arrhythmia post ablation in sinus rhythm  Hypertension well optimized with a combination of losartan, amlodipine and metoprolol  Hypothyroidism supplemented with levothyroxine  Patient anticoagulated with warfarin  History of rheumatoid arthritis followed by primary care physician  Medications reviewed and follow-up appointments made  HB--11 grams, PLTS--normal, Cr--1.10, K is normal    ECG 12 Lead    Date/Time: 1/10/2024 1:51 PM  Performed by: Dyllan Lloyd MD    Authorized by: Dyllan Lloyd MD  Comparison: compared with previous ECG   Similar to previous ECG  Rhythm: sinus rhythm  Rate: normal  Conduction: incomplete right bundle branch block  Other findings: left ventricular hypertrophy      Electronically signed by Dyllan Lloyd MD, 01/10/24, 1:51 PM EST.

## 2024-01-10 NOTE — LETTER
January 10, 2024       No Recipients    Patient: Jt Valdes   YOB: 1940   Date of Visit: 1/10/2024     Dear Dilcia Trinidad MD:       Thank you for referring Jt Valdes to me for evaluation. Below are the relevant portions of my assessment and plan of care.    If you have questions, please do not hesitate to call me. I look forward to following Jt along with you.         Sincerely,        Dyllan Lloyd MD        CC:   No Recipients    Dyllan Lloyd MD  01/10/24 1357  Sign when Signing Visit  CC--- atrial fibrillation    Sub  83-year-old pleasant patient have recurrent AF despite Tikosyn underwent complex AF and flutter ablation in August 2023 and comes in for follow-up.  She has history of rheumatoid arthritis, hypothyroidism and chronic kidney disease in the past.          Past Medical History:   Diagnosis Date   • Allergic     Sulfa   • Arthritis    • Cataract     Cataracts surgery   • COVID-19    • Degenerative joint disease    • Extremity pain     legs pain   • H/O degenerative disc disease    • History of colonoscopy 2009    last done 2009   • History of echocardiogram 09/04/2018    LVH EF 65%. RV OK. LA probably mildly dilated. AV OK. MV OK. Modest TR RVSP 26 or less. Nuclear MPI regadenoson 9/14/2018. LV uniform myocardial uptake and wall motion EF 71%.    • History of Holter monitoring 10/08/2018    SR 40-81 58. AF 2.7 hr episode VR . At termination AF 2 3.5-4.0 s pauses with patient possibly dizzy. 205 PAC 42 atrial ashanti several SVT. No ventricular ectopy.    • HL (hearing loss) 2020    Tried hearing aids twice . Didnt help   • HTN (hypertension)    • Hyperlipidemia    • Hypertensive cardiovascular disease    • Hypothyroidism    • Incontinence    • Leukopenia    • Low back pain    • PAF (paroxysmal atrial fibrillation) 09/2018     [facility] Sept 2018 PAF and HTN. PAFL On bisoprolol and diltiazem bradycardia symptomatic with dizziness. Amiodarone alone  Oct 2018 and no episodes of erratic or fast heartbeat or lightheadedness.    • Rheumatoid arthritis     Possible pulmonary involvement    • Staph infection    • Vitamin D deficiency    • White coat syndrome with hypertension      Past Surgical History:   Procedure Laterality Date   • APPENDECTOMY  1974   • BACK SURGERY  2003 2001, 2003   • SPINE SURGERY  2001.    2003   • SUBTOTAL HYSTERECTOMY  1974           Physical Exam    General:      well developed, well nourished, in no acute distress.    Head:      normocephalic and atraumatic.    Eyes:      PERRL/EOM intact, conjunctivae and sclerae clear without nystagmus.    Neck:      no  thyromegaly, trachea central with normal respiratory effort  Lungs:      clear bilaterally to auscultation.    Heart:       regular rate and rhythm, S1, S2 without murmurs, rubs, or gallops  Skin:      intact without lesions or rashes.    Psych:      alert and cooperative; normal mood and affect; normal attention span and concentration.                  Assessment and plan    Difficult to control atrial arrhythmia post ablation in sinus rhythm  Hypertension well optimized with a combination of losartan, amlodipine and metoprolol  Hypothyroidism supplemented with levothyroxine  Patient anticoagulated with warfarin  History of rheumatoid arthritis followed by primary care physician  Medications reviewed and follow-up appointments made  HB--11 grams, PLTS--normal, Cr--1.10, K is normal    ECG 12 Lead    Date/Time: 1/10/2024 1:51 PM  Performed by: Dyllan Lloyd MD    Authorized by: Dyllan Lloyd MD  Comparison: compared with previous ECG   Similar to previous ECG  Rhythm: sinus rhythm  Rate: normal  Conduction: incomplete right bundle branch block  Other findings: left ventricular hypertrophy      Electronically signed by Dyllan Lloyd MD, 01/10/24, 1:51 PM EST.

## 2024-01-22 ENCOUNTER — TELEPHONE (OUTPATIENT)
Dept: CARDIOLOGY | Facility: CLINIC | Age: 84
End: 2024-01-22
Payer: MEDICARE

## 2024-01-22 NOTE — TELEPHONE ENCOUNTER
FACILITY: [facility]    DR: Bong Ramsey  PHONE: [phone]  FAX: [phone]  PROCEDURE: Left wrist extensor tenosynovectomy of the 3rd and 4th compartments  SCHEDULED: 02-29-24   MEDS TO HOLD:

## 2024-01-23 ENCOUNTER — ANTICOAGULATION VISIT (OUTPATIENT)
Dept: CARDIOLOGY | Facility: CLINIC | Age: 84
End: 2024-01-23
Payer: MEDICARE

## 2024-01-23 VITALS
HEART RATE: 67 BPM | DIASTOLIC BLOOD PRESSURE: 71 MMHG | BODY MASS INDEX: 21.92 KG/M2 | SYSTOLIC BLOOD PRESSURE: 168 MMHG | WEIGHT: 116 LBS

## 2024-01-23 DIAGNOSIS — I48.0 PAROXYSMAL ATRIAL FIBRILLATION: Primary | Chronic | ICD-10-CM

## 2024-01-23 DIAGNOSIS — Z79.01 LONG TERM (CURRENT) USE OF ANTICOAGULANTS: Chronic | ICD-10-CM

## 2024-01-23 LAB — INR PPP: 2.3 (ref 2–3)

## 2024-01-23 PROCEDURE — 85610 PROTHROMBIN TIME: CPT | Performed by: INTERNAL MEDICINE

## 2024-01-23 PROCEDURE — 36416 COLLJ CAPILLARY BLOOD SPEC: CPT | Performed by: INTERNAL MEDICINE

## 2024-02-09 RX ORDER — LOSARTAN POTASSIUM 50 MG/1
50 TABLET ORAL NIGHTLY
Qty: 90 TABLET | Refills: 1 | Status: SHIPPED | OUTPATIENT
Start: 2024-02-09

## 2024-02-13 DIAGNOSIS — E03.9 ACQUIRED HYPOTHYROIDISM: Primary | ICD-10-CM

## 2024-02-13 DIAGNOSIS — E03.9 HYPOTHYROIDISM, UNSPECIFIED TYPE: ICD-10-CM

## 2024-02-13 RX ORDER — LEVOTHYROXINE SODIUM 0.1 MG/1
TABLET ORAL
Qty: 90 TABLET | Refills: 3 | Status: SHIPPED | OUTPATIENT
Start: 2024-02-13

## 2024-02-14 ENCOUNTER — TELEPHONE (OUTPATIENT)
Dept: CARDIOLOGY | Facility: CLINIC | Age: 84
End: 2024-02-14
Payer: MEDICARE

## 2024-03-05 ENCOUNTER — ANTICOAGULATION VISIT (OUTPATIENT)
Dept: CARDIOLOGY | Facility: CLINIC | Age: 84
End: 2024-03-05
Payer: MEDICARE

## 2024-03-05 VITALS
HEART RATE: 70 BPM | DIASTOLIC BLOOD PRESSURE: 79 MMHG | SYSTOLIC BLOOD PRESSURE: 161 MMHG | WEIGHT: 115 LBS | BODY MASS INDEX: 21.73 KG/M2

## 2024-03-05 DIAGNOSIS — Z79.01 LONG TERM (CURRENT) USE OF ANTICOAGULANTS: Chronic | ICD-10-CM

## 2024-03-05 DIAGNOSIS — I48.0 PAROXYSMAL ATRIAL FIBRILLATION: Primary | Chronic | ICD-10-CM

## 2024-03-05 LAB — INR PPP: 1.4 (ref 2–3)

## 2024-03-05 PROCEDURE — 36416 COLLJ CAPILLARY BLOOD SPEC: CPT | Performed by: INTERNAL MEDICINE

## 2024-03-05 PROCEDURE — 85610 PROTHROMBIN TIME: CPT | Performed by: INTERNAL MEDICINE

## 2024-03-21 ENCOUNTER — ANTICOAGULATION VISIT (OUTPATIENT)
Dept: CARDIOLOGY | Facility: CLINIC | Age: 84
End: 2024-03-21
Payer: MEDICARE

## 2024-03-21 VITALS
DIASTOLIC BLOOD PRESSURE: 81 MMHG | HEART RATE: 67 BPM | SYSTOLIC BLOOD PRESSURE: 159 MMHG | WEIGHT: 117 LBS | BODY MASS INDEX: 22.11 KG/M2

## 2024-03-21 DIAGNOSIS — Z79.01 LONG TERM (CURRENT) USE OF ANTICOAGULANTS: Chronic | ICD-10-CM

## 2024-03-21 DIAGNOSIS — I48.0 PAROXYSMAL ATRIAL FIBRILLATION: Primary | Chronic | ICD-10-CM

## 2024-03-21 DIAGNOSIS — I48.0 PAROXYSMAL ATRIAL FIBRILLATION: Chronic | ICD-10-CM

## 2024-03-21 DIAGNOSIS — I10 PRIMARY HYPERTENSION: Chronic | ICD-10-CM

## 2024-03-21 DIAGNOSIS — E78.5 HYPERLIPIDEMIA, UNSPECIFIED HYPERLIPIDEMIA TYPE: Primary | Chronic | ICD-10-CM

## 2024-03-21 LAB — INR PPP: 2.5 (ref 2–3)

## 2024-03-21 PROCEDURE — 36416 COLLJ CAPILLARY BLOOD SPEC: CPT | Performed by: INTERNAL MEDICINE

## 2024-03-21 PROCEDURE — 85610 PROTHROMBIN TIME: CPT | Performed by: INTERNAL MEDICINE

## 2024-04-08 DIAGNOSIS — I48.0 PAROXYSMAL ATRIAL FIBRILLATION: Primary | ICD-10-CM

## 2024-04-08 DIAGNOSIS — Z79.01 LONG TERM (CURRENT) USE OF ANTICOAGULANTS: ICD-10-CM

## 2024-04-08 RX ORDER — WARFARIN SODIUM 2.5 MG/1
TABLET ORAL
Qty: 180 TABLET | Refills: 3 | Status: SHIPPED | OUTPATIENT
Start: 2024-04-08

## 2024-04-08 NOTE — TELEPHONE ENCOUNTER
Rx Refill Note  Requested Prescriptions     Pending Prescriptions Disp Refills    warfarin (COUMADIN) 2.5 MG tablet [Pharmacy Med Name: WARFARIN TABS 2.5MG] 180 tablet 3     Sig: Take 2 tabs, by mouth, daily or as directed.    Last INR 3/21/24  Last office visit with prescribing clinician: 5/8/2023   Last telemedicine visit with prescribing clinician: Visit date not found   Next office visit with prescribing clinician: 5/1/2024                         Would you like a call back once the refill request has been completed: [] Yes [] No    If the office needs to give you a call back, can they leave a voicemail: [] Yes [] No    Veena Lee RN  04/08/24, 12:18 EDT

## 2024-04-15 RX ORDER — AMLODIPINE BESYLATE 5 MG/1
5 TABLET ORAL DAILY
Qty: 90 TABLET | Refills: 0 | Status: SHIPPED | OUTPATIENT
Start: 2024-04-15

## 2024-04-15 NOTE — TELEPHONE ENCOUNTER
Rx Refill Note  Requested Prescriptions     Pending Prescriptions Disp Refills    amLODIPine (NORVASC) 5 MG tablet [Pharmacy Med Name: AMLODIPINE BESYLATE TABS 5MG] 90 tablet 3     Sig: TAKE 1 TABLET DAILY      Last office visit with prescribing clinician: 5/8/2023   Last telemedicine visit with prescribing clinician: Visit date not found   Next office visit with prescribing clinician: 5/1/2024                         Would you like a call back once the refill request has been completed: [] Yes [] No    If the office needs to give you a call back, can they leave a voicemail: [] Yes [] No    Marah Carmichael MA  04/15/24, 11:08 EDT

## 2024-04-22 DIAGNOSIS — E78.5 HYPERLIPIDEMIA, UNSPECIFIED HYPERLIPIDEMIA TYPE: Primary | Chronic | ICD-10-CM

## 2024-04-23 ENCOUNTER — LAB (OUTPATIENT)
Dept: FAMILY MEDICINE CLINIC | Facility: CLINIC | Age: 84
End: 2024-04-23
Payer: MEDICARE

## 2024-04-23 LAB
ALBUMIN SERPL-MCNC: 4.1 G/DL (ref 3.5–5.2)
ALBUMIN/GLOB SERPL: 1.7 G/DL
ALP SERPL-CCNC: 78 U/L (ref 39–117)
ALT SERPL W P-5'-P-CCNC: 9 U/L (ref 1–33)
ANION GAP SERPL CALCULATED.3IONS-SCNC: 11.9 MMOL/L (ref 5–15)
AST SERPL-CCNC: 17 U/L (ref 1–32)
BILIRUB SERPL-MCNC: 0.6 MG/DL (ref 0–1.2)
BUN SERPL-MCNC: 25 MG/DL (ref 8–23)
BUN/CREAT SERPL: 31.3 (ref 7–25)
CALCIUM SPEC-SCNC: 9.3 MG/DL (ref 8.6–10.5)
CHLORIDE SERPL-SCNC: 104 MMOL/L (ref 98–107)
CHOLEST SERPL-MCNC: 189 MG/DL (ref 0–200)
CO2 SERPL-SCNC: 22.1 MMOL/L (ref 22–29)
CREAT SERPL-MCNC: 0.8 MG/DL (ref 0.57–1)
EGFRCR SERPLBLD CKD-EPI 2021: 72.8 ML/MIN/1.73
GLOBULIN UR ELPH-MCNC: 2.4 GM/DL
GLUCOSE SERPL-MCNC: 95 MG/DL (ref 65–99)
HDLC SERPL-MCNC: 51 MG/DL (ref 40–60)
LDLC SERPL CALC-MCNC: 115 MG/DL (ref 0–100)
LDLC/HDLC SERPL: 2.19 {RATIO}
POTASSIUM SERPL-SCNC: 4 MMOL/L (ref 3.5–5.2)
PROT SERPL-MCNC: 6.5 G/DL (ref 6–8.5)
SODIUM SERPL-SCNC: 138 MMOL/L (ref 136–145)
TRIGL SERPL-MCNC: 131 MG/DL (ref 0–150)
VLDLC SERPL-MCNC: 23 MG/DL (ref 5–40)

## 2024-04-23 PROCEDURE — 80053 COMPREHEN METABOLIC PANEL: CPT | Performed by: INTERNAL MEDICINE

## 2024-04-23 PROCEDURE — 80061 LIPID PANEL: CPT | Performed by: INTERNAL MEDICINE

## 2024-05-01 ENCOUNTER — ANTICOAGULATION VISIT (OUTPATIENT)
Dept: CARDIOLOGY | Facility: CLINIC | Age: 84
End: 2024-05-01
Payer: MEDICARE

## 2024-05-01 ENCOUNTER — OFFICE VISIT (OUTPATIENT)
Dept: CARDIOLOGY | Facility: CLINIC | Age: 84
End: 2024-05-01
Payer: MEDICARE

## 2024-05-01 VITALS — WEIGHT: 112 LBS | HEIGHT: 61 IN | BODY MASS INDEX: 21.14 KG/M2

## 2024-05-01 VITALS
SYSTOLIC BLOOD PRESSURE: 174 MMHG | WEIGHT: 112 LBS | BODY MASS INDEX: 21.16 KG/M2 | DIASTOLIC BLOOD PRESSURE: 74 MMHG | HEART RATE: 70 BPM

## 2024-05-01 DIAGNOSIS — Z78.9 STATIN INTOLERANCE: ICD-10-CM

## 2024-05-01 DIAGNOSIS — I48.0 PAROXYSMAL ATRIAL FIBRILLATION: Primary | ICD-10-CM

## 2024-05-01 DIAGNOSIS — Z79.01 LONG TERM (CURRENT) USE OF ANTICOAGULANTS: Chronic | ICD-10-CM

## 2024-05-01 DIAGNOSIS — E78.5 DYSLIPIDEMIA: ICD-10-CM

## 2024-05-01 DIAGNOSIS — N18.30 STAGE 3 CHRONIC KIDNEY DISEASE, UNSPECIFIED WHETHER STAGE 3A OR 3B CKD: ICD-10-CM

## 2024-05-01 DIAGNOSIS — I11.9 HYPERTENSIVE HEART DISEASE WITHOUT HEART FAILURE: ICD-10-CM

## 2024-05-01 DIAGNOSIS — I48.0 PAROXYSMAL ATRIAL FIBRILLATION: Primary | Chronic | ICD-10-CM

## 2024-05-01 LAB — INR PPP: 2.3 (ref 0.9–1.1)

## 2024-05-01 PROCEDURE — 85610 PROTHROMBIN TIME: CPT | Performed by: INTERNAL MEDICINE

## 2024-05-01 PROCEDURE — 36416 COLLJ CAPILLARY BLOOD SPEC: CPT | Performed by: INTERNAL MEDICINE

## 2024-05-01 RX ORDER — AMLODIPINE BESYLATE 10 MG/1
10 TABLET ORAL DAILY
Qty: 90 TABLET | Refills: 3 | Status: SHIPPED | OUTPATIENT
Start: 2024-05-01

## 2024-05-01 NOTE — PROGRESS NOTES
Subjective:     Encounter Date:05/01/2024      Patient ID: Jt Valdes is a 83 y.o. female.    Chief Complaint and history of present illness:    Follow-up for A-fib, anticoagulation, hypertension/hypertensive cardiovascular disease        History of Present Illness:       Ms. Jt Valdes has PMH of      Hypertension/hypertensive cardiovascular disease    Paroxysmal atrial fibrillation, noncompliant on Xarelto, now on coumadin, ablation  8/1/2023  [id number] SCORE   [id number] Score: 4 (5/1/2024 11:25 AM)  [id number] Score: 4 (2/1/2023 11:51 AM)   (Age greater than 75, female gender, hypertension)    Hyperlipidemia  SILVERIO, noncompliant on CPAP  Hypothyroidism  Whitecoat hypertension  History of leukopenia, staph infection  Rheumatoid arthritis, possible pulmonary involved ,DJD, DDD  Partial hysterectomy, appendectomy, back surgery  Allergies/intolerance to sulfa-rash  Non-smoker     Here for follow-up.  Patient denies any chest pain or shortness of breath.    Patient's arterial blood pressure is 174/74 and heart rate 70 bpm.  Patient states she has whitecoat hypertension.          Echocardiogram 2/1/2023 revealed normal LV systolic function of 60 to 65% normal RVSP was 40  Lexiscan Cardiolite negative for ischemia 5/10/2023 with EF of 75%  Transesophageal echo 8/1/2023 reveals EF of 61 to 65% with mild concentric LVH, mild left atrial enlargement     Patient had labs done 6/4/2019 which showed normal CBC CMP and TSH.  Labs from 3/9/2020 reveal TSH 1.47, cholesterol 202, triglycerides 102, HDL 58, , CMP with a cr 1.06, glucose of 111, A1c of 5.2, normal CBC.  Lipid profile 3/9/2020 with cholesterol 202 triglycerides 102 HDL 58 , CMP with a BUN/creatinine of 21 INR 1.06 and GFR 50.  Hemoglobin A1c 5.2  Labs from 3-2-21 revealed normal CRP TSH and free T4.  CMP with BUN/creatinine of 43/1.32 and GFR of 39.  Labs from 6-21 reveal BUN/creatinine of 34/1.0 EGFR 49, glucose 150.   Labs from 2/18/2022 revealed TSH of 7.8, normal FT BN 4.  Labs from 3/10/2020 reveal INR of 2.1.  Labs from 7/12/2022 revealed low TSH at 0.235 and elevated FT4 at 1.77..  Labs from 6/14/2022 reveal lipid profile with cholesterol 174, triglycerides 109, HDL 59, LDL 95.  Normal CMP. INR 2/1/2023 is 2.5.  Labs from 3/29/2023 reveal an INR of 2.5, CMP with a glucose of 110.  Cholesterol 208, Tri 84, HDL 62, proBNP normal at 609..  Labs from 4/26/2023 reveal CBC with a hemoglobin of 11.5.  5/9/2023 glucose 138 BUN 44 creatinine 1.23 hemoglobin 11.7.  INR done 5/1/2024 r was 2.3.     Reviewed previous records:    Echo 09/04/2018  LVH EF 65%. RV OK.  LA probably mildly dilated.  AV OK.  MV OK.  Modest TR RVSP 26 or less.Holter monitor 9/17-9/20/2021 was unremarkable.  Lexiscan Cardiolite 4/8/2021 were negative for ischemia  Echo 2/1/2023 reveals EF 60 to 65% PA systolic pressure of 40 mmHg             Assessment:     Paroxysmal atrial fibrillation, ablation  Paroxysmal A. Fib, long-term anticoagulation with warfarin  Hypertension, history of whitecoat hypertension  CKD 3B  SILVERIO  Hyperlipidemia  Hypothyroidism     Plan:       Patient was seen by .  Patient is complaining that she does not want to see the cardiologist.  Blood pressure is up will increase amlodipine to 10 mg.  We will continue metoprolol succinate 100 mg, losartan, amlodipine, warfarin as tolerated.        Patient had echocardiogram 2/1/2023 reviewed/interpreted by me which reveals normal LV systolic function with concentric LVH and left atrial enlargement consistent with hypertensive cardiovascular disease.  Patient has high [id number]-UPYr score due to female gender, age >75,, hypertension making it 4, would benefit from long-term anticoagulation, patient could not afford NOACs is on warfarin.  We will follow-up in INR clinic to keep PT/INR between 2 and 3.    Advised statins.  Patient was previously intolerant to statins.  Will follow-up with  nurse practitioner and check labs before visit in 3 months to recheck blood pressure and follow-up with me in 1 year or sooner if needed.    Patient is having issues with weight loss advised her to follow-up with PMD ASAP.         Procedures  EKG done 1/10/2024 reviewed/interpreted by me reveals sinus rhythm with a rate of 64 bpm with LVH    Copied text in this portion of the note has been reviewed and is accurate as of 5/1/2024  The following portions of the patient's history were reviewed and updated as appropriate: allergies, current medications, past family history, past medical history, past social history, past surgical history and problem list.    Assessment:         MDM       Diagnosis Plan   1. Paroxysmal atrial fibrillation  Comprehensive Metabolic Panel    Lipid Panel      2. Hypertensive heart disease without heart failure  Comprehensive Metabolic Panel    Lipid Panel      3. Dyslipidemia  Comprehensive Metabolic Panel    Lipid Panel      4. Stage 3 chronic kidney disease, unspecified whether stage 3a or 3b CKD  Comprehensive Metabolic Panel    Lipid Panel      5. Statin intolerance               Plan:               Past Medical History:  Past Medical History:   Diagnosis Date    Allergic     Sulfa    Arthritis     Cataract     Cataracts surgery    COVID-19     Degenerative joint disease     Extremity pain     legs pain    GERD (gastroesophageal reflux disease)     H/O degenerative disc disease     History of colonoscopy 2009    last done 2009    History of echocardiogram 09/04/2018    LVH EF 65%. RV OK. LA probably mildly dilated. AV OK. MV OK. Modest TR RVSP 26 or less. Nuclear MPI regadenoson 9/14/2018. LV uniform myocardial uptake and wall motion EF 71%.     History of Holter monitoring 10/08/2018    SR 40-81 58. AF 2.7 hr episode VR . At termination AF 2 3.5-4.0 s pauses with patient possibly dizzy. 205 PAC 42 atrial ashanti several SVT. No ventricular ectopy.     HL (hearing loss) 2020    Tried  hearing aids twice . Didnt help    HTN (hypertension)     Hyperlipidemia     Hypertensive cardiovascular disease     Hypothyroidism     Incontinence     Leukopenia     Low back pain     PAF (paroxysmal atrial fibrillation) 09/2018    [facility] Sept 2018 PAF and HTN. PAFL On bisoprolol and diltiazem bradycardia symptomatic with dizziness. Amiodarone alone Oct 2018 and no episodes of erratic or fast heartbeat or lightheadedness.     Rheumatoid arthritis     Possible pulmonary involvement     Staph infection     Vitamin D deficiency     White coat syndrome with hypertension      Past Surgical History:  Past Surgical History:   Procedure Laterality Date    APPENDECTOMY  1974    BACK SURGERY  2003 2001, 2003    CARDIAC ELECTROPHYSIOLOGY PROCEDURE Right 08/01/2023    Procedure: Ablation atrial fibrillation, WITH CRYO  Brigido and Magdalena aware;  Surgeon: Dyllan Lloyd MD;  Location: [facility] INVASIVE LOCATION;  Service: Cardiovascular;  Laterality: Right;    GANGLION CYST EXCISION      SPINE SURGERY  2001.    2003    SUBTOTAL HYSTERECTOMY  1974      Allergies:  Allergies   Allergen Reactions    Sulfa Antibiotics Rash    Macrobid [Nitrofurantoin] Unknown - High Severity     Home Meds:  Current Meds:     Current Outpatient Medications:     Abatacept (ORENCIA IV), Infuse  into a venous catheter., Disp: , Rfl:     amLODIPine (NORVASC) 10 MG tablet, Take 1 tablet by mouth Daily., Disp: 90 tablet, Rfl: 3    Diclofenac Sodium (VOLTAREN) 1 % gel gel, Apply 4 g topically to the appropriate area as directed 4 (Four) Times a Day As Needed (pain)., Disp: 150 g, Rfl: 3    leflunomide (ARAVA) 20 MG tablet, Take 1 tablet by mouth Daily., Disp: 90 tablet, Rfl: 0    levothyroxine (SYNTHROID, LEVOTHROID) 100 MCG tablet, Take 1 tab po daily, Disp: 90 tablet, Rfl: 3    losartan (COZAAR) 50 MG tablet, TAKE 1 TABLET BY MOUTH EVERY NIGHT, Disp: 90 tablet, Rfl: 1    magnesium oxide (MAG-OX) 400 MG tablet, Take 1 tablet by mouth 2 (Two)  "Times a Day., Disp: 60 tablet, Rfl: 11    metoprolol succinate XL (TOPROL-XL) 100 MG 24 hr tablet, Take 1 tab, by mouth, daily., Disp: 90 tablet, Rfl: 3    Multiple Vitamins-Minerals (PRESERVISION AREDS 2 PO), Take 1 tablet by mouth Daily., Disp: , Rfl:     multivitamin with minerals tablet tablet, Take 1 tablet by mouth Daily., Disp: , Rfl:     polyethylene glycol (MIRALAX) 17 g packet, Take 8.5 g by mouth Daily., Disp: , Rfl:     warfarin (COUMADIN) 2.5 MG tablet, Take 2 tabs, by mouth, daily or as directed., Disp: 180 tablet, Rfl: 3  Social History:   Social History     Tobacco Use    Smoking status: Never    Smokeless tobacco: Never   Substance Use Topics    Alcohol use: Yes     Alcohol/week: 1.0 standard drink of alcohol     Types: 1 Glasses of wine per week     Comment: Occ.      Family History:  Family History   Problem Relation Age of Onset    Osteoarthritis Mother     Arthritis Mother     Cancer Mother     Other Other         Rheumatoid disease     Cancer Father     Cancer Sister               Review of Systems   Constitutional: Negative for malaise/fatigue.   Cardiovascular:  Negative for chest pain, leg swelling and palpitations.   Respiratory:  Negative for shortness of breath.    Skin:  Negative for rash.   Neurological:  Negative for dizziness, light-headedness and numbness.     All other systems are negative         Objective:     Physical Exam  Ht 154.9 cm (61\")   Wt 50.8 kg (112 lb)   BMI 21.16 kg/m²   General:  Appears in no acute distress  Eyes: Sclera is anicteric,  conjunctiva is clear   HEENT:  No JVD.  No carotid bruits  Respiratory: Respirations regular and unlabored at rest.  Clear to auscultation  Cardiovascular: S1,S2 Regular rate and rhythm. .   Extremities: No digital clubbing or cyanosis, no edema  Skin: Color pink. Skin warm and dry to touch. No rashes  No xanthoma  Neuro: Alert and awake.    Lab Reviewed:         Jose Patiño MD  5/1/2024 11:39 EDT      EMR " "Dragon/Transcription:   \"Dictated utilizing Dragon dictation\".        "

## 2024-05-09 ENCOUNTER — LAB (OUTPATIENT)
Dept: LAB | Facility: HOSPITAL | Age: 84
End: 2024-05-09
Payer: MEDICARE

## 2024-05-09 DIAGNOSIS — E03.9 ACQUIRED HYPOTHYROIDISM: ICD-10-CM

## 2024-05-09 LAB
T4 FREE SERPL-MCNC: 1.51 NG/DL (ref 0.93–1.7)
TSH SERPL DL<=0.05 MIU/L-ACNC: 0.96 UIU/ML (ref 0.27–4.2)

## 2024-05-09 PROCEDURE — 36415 COLL VENOUS BLD VENIPUNCTURE: CPT

## 2024-05-09 PROCEDURE — 84439 ASSAY OF FREE THYROXINE: CPT

## 2024-05-09 PROCEDURE — 84443 ASSAY THYROID STIM HORMONE: CPT

## 2024-05-16 ENCOUNTER — OFFICE VISIT (OUTPATIENT)
Dept: ENDOCRINOLOGY | Facility: CLINIC | Age: 84
End: 2024-05-16
Payer: MEDICARE

## 2024-05-16 ENCOUNTER — LAB (OUTPATIENT)
Dept: LAB | Facility: HOSPITAL | Age: 84
End: 2024-05-16
Payer: MEDICARE

## 2024-05-16 VITALS
HEIGHT: 61 IN | SYSTOLIC BLOOD PRESSURE: 140 MMHG | DIASTOLIC BLOOD PRESSURE: 60 MMHG | WEIGHT: 114 LBS | BODY MASS INDEX: 21.52 KG/M2 | HEART RATE: 75 BPM

## 2024-05-16 DIAGNOSIS — E55.9 VITAMIN D DEFICIENCY: Chronic | ICD-10-CM

## 2024-05-16 DIAGNOSIS — R53.83 FATIGUE, UNSPECIFIED TYPE: ICD-10-CM

## 2024-05-16 DIAGNOSIS — E03.9 HYPOTHYROIDISM, UNSPECIFIED TYPE: ICD-10-CM

## 2024-05-16 DIAGNOSIS — E03.9 ACQUIRED HYPOTHYROIDISM: Primary | ICD-10-CM

## 2024-05-16 DIAGNOSIS — E03.9 ACQUIRED HYPOTHYROIDISM: ICD-10-CM

## 2024-05-16 LAB
BASOPHILS # BLD AUTO: 0.07 10*3/MM3 (ref 0–0.2)
BASOPHILS NFR BLD AUTO: 1.3 % (ref 0–1.5)
DEPRECATED RDW RBC AUTO: 37.3 FL (ref 37–54)
EOSINOPHIL # BLD AUTO: 0.32 10*3/MM3 (ref 0–0.4)
EOSINOPHIL NFR BLD AUTO: 5.8 % (ref 0.3–6.2)
ERYTHROCYTE [DISTWIDTH] IN BLOOD BY AUTOMATED COUNT: 11.8 % (ref 12.3–15.4)
HCT VFR BLD AUTO: 32.2 % (ref 34–46.6)
HGB BLD-MCNC: 10.9 G/DL (ref 12–15.9)
IMM GRANULOCYTES # BLD AUTO: 0.01 10*3/MM3 (ref 0–0.05)
IMM GRANULOCYTES NFR BLD AUTO: 0.2 % (ref 0–0.5)
LYMPHOCYTES # BLD AUTO: 0.8 10*3/MM3 (ref 0.7–3.1)
LYMPHOCYTES NFR BLD AUTO: 14.5 % (ref 19.6–45.3)
MCH RBC QN AUTO: 30.1 PG (ref 26.6–33)
MCHC RBC AUTO-ENTMCNC: 33.9 G/DL (ref 31.5–35.7)
MCV RBC AUTO: 89 FL (ref 79–97)
MONOCYTES # BLD AUTO: 0.62 10*3/MM3 (ref 0.1–0.9)
MONOCYTES NFR BLD AUTO: 11.3 % (ref 5–12)
NEUTROPHILS NFR BLD AUTO: 3.69 10*3/MM3 (ref 1.7–7)
NEUTROPHILS NFR BLD AUTO: 66.9 % (ref 42.7–76)
NRBC BLD AUTO-RTO: 0 /100 WBC (ref 0–0.2)
PLATELET # BLD AUTO: 200 10*3/MM3 (ref 140–450)
PMV BLD AUTO: 10.6 FL (ref 6–12)
RBC # BLD AUTO: 3.62 10*6/MM3 (ref 3.77–5.28)
WBC NRBC COR # BLD AUTO: 5.51 10*3/MM3 (ref 3.4–10.8)

## 2024-05-16 PROCEDURE — 3078F DIAST BP <80 MM HG: CPT | Performed by: INTERNAL MEDICINE

## 2024-05-16 PROCEDURE — 36415 COLL VENOUS BLD VENIPUNCTURE: CPT

## 2024-05-16 PROCEDURE — 3077F SYST BP >= 140 MM HG: CPT | Performed by: INTERNAL MEDICINE

## 2024-05-16 PROCEDURE — 99214 OFFICE O/P EST MOD 30 MIN: CPT | Performed by: INTERNAL MEDICINE

## 2024-05-16 PROCEDURE — 1160F RVW MEDS BY RX/DR IN RCRD: CPT | Performed by: INTERNAL MEDICINE

## 2024-05-16 PROCEDURE — 85025 COMPLETE CBC W/AUTO DIFF WBC: CPT

## 2024-05-16 PROCEDURE — 1159F MED LIST DOCD IN RCRD: CPT | Performed by: INTERNAL MEDICINE

## 2024-05-16 RX ORDER — ERGOCALCIFEROL (VITAMIN D2) 50 MCG
2000 CAPSULE ORAL DAILY
Qty: 100 CAPSULE | Refills: 4 | Status: SHIPPED | OUTPATIENT
Start: 2024-05-16

## 2024-05-16 RX ORDER — MAGNESIUM 200 MG
1000 TABLET ORAL DAILY
Qty: 100 EACH | Refills: 4 | Status: SHIPPED | OUTPATIENT
Start: 2024-05-16

## 2024-05-16 RX ORDER — LEVOTHYROXINE SODIUM 0.1 MG/1
TABLET ORAL
Qty: 90 TABLET | Refills: 3 | Status: SHIPPED | OUTPATIENT
Start: 2024-05-16

## 2024-05-16 NOTE — PROGRESS NOTES
Endocrine Progress Note Outpatient     Patient Care Team:  Dilcia Trinidad MD as PCP - General (Family Medicine)  Roland Benitez MD as Emergency Attending (Family Medicine)  Jose Patiño MD as Consulting Physician (Cardiology)  Aurora Mckeon APRN as Nurse Practitioner (Cardiology)    Chief Complaint: Follow-up hypothyroidism    HPI: This is an 83-year-old female with history of hypothyroidism is here for follow-up.  She is accompanied today with her dauther.  She is on levothyroxine 100 mcg p.o. daily.  She is overall feeling better since the dose was reduced from 112 mcg to 100 mcg but she is having some fatigue and tiredness and feeling cold.  She is taking her thyroid medications on regular basis.    Past Medical History:   Diagnosis Date    Allergic     Sulfa    Arthritis     Cataract     Cataracts surgery    COVID-19     Degenerative joint disease     Extremity pain     legs pain    GERD (gastroesophageal reflux disease)     H/O degenerative disc disease     History of colonoscopy 2009    last done 2009    History of echocardiogram 09/04/2018    LVH EF 65%. RV OK. LA probably mildly dilated. AV OK. MV OK. Modest TR RVSP 26 or less. Nuclear MPI regadenoson 9/14/2018. LV uniform myocardial uptake and wall motion EF 71%.     History of Holter monitoring 10/08/2018    SR 40-81 58. AF 2.7 hr episode VR . At termination AF 2 3.5-4.0 s pauses with patient possibly dizzy. 205 PAC 42 atrial ashanti several SVT. No ventricular ectopy.     HL (hearing loss) 2020    Tried hearing aids twice . Didnt help    HTN (hypertension)     Hyperlipidemia     Hypertensive cardiovascular disease     Hypothyroidism     Incontinence     Leukopenia     Low back pain     PAF (paroxysmal atrial fibrillation) 09/2018     [facility] Sept 2018 PAF and HTN. PAFL On bisoprolol and diltiazem bradycardia symptomatic with dizziness. Amiodarone alone Oct 2018 and no episodes of erratic or fast heartbeat or  lightheadedness.     Rheumatoid arthritis     Possible pulmonary involvement     Staph infection     Vitamin D deficiency     White coat syndrome with hypertension        Social History     Socioeconomic History    Marital status:     Number of children: 5    Years of education: 12   Tobacco Use    Smoking status: Never    Smokeless tobacco: Never   Vaping Use    Vaping status: Never Used   Substance and Sexual Activity    Alcohol use: Yes     Alcohol/week: 1.0 standard drink of alcohol     Types: 1 Glasses of wine per week     Comment: Occ.    Drug use: No    Sexual activity: Not Currently       Family History   Problem Relation Age of Onset    Osteoarthritis Mother     Arthritis Mother     Cancer Mother     Other Other         Rheumatoid disease     Cancer Father     Cancer Sister        Allergies   Allergen Reactions    Sulfa Antibiotics Rash    Macrobid [Nitrofurantoin] Unknown - High Severity       ROS:   Constitutional: Admit fatigue, tiredness.    Eyes:  Denies change in visual acuity   HENT:  Denies nasal congestion or sore throat   Respiratory: denies cough, shortness of breath.   Cardiovascular:  denies chest pain, edema   GI:  Denies abdominal pain, nausea, vomiting.   Musculoskeletal:  Denies back pain or joint pain   Integument:  Denies dry skin and rash   Neurologic:  Denies headache, focal weakness or sensory changes   Endocrine:  Denies polyuria or polydipsia   Psychiatric:  Denies depression or anxiety      Vitals:    05/16/24 1045   BP: 140/60   Pulse: 75     Body mass index is 21.54 kg/m².     Physical Exam:  GEN: NAD, conversant  EYES: EOMI,   NECK: no thyromegaly  CV: RRR, n  LUNG: CTA  PSYCH: AOX3, appropriate mood, affect normal      Results Review:     I reviewed the patient's new clinical results.    Lab Results   Component Value Date    HGBA1C 5.2 03/09/2020      Lab Results   Component Value Date    GLUCOSE 95 04/23/2024    BUN 25 (H) 04/23/2024    CREATININE 0.80 04/23/2024     EGFRIFNONA 58 (L) 12/20/2021    EGFRIFAFRI 57 (L) 04/27/2017    BCR 31.3 (H) 04/23/2024    K 4.0 04/23/2024    CO2 22.1 04/23/2024    CALCIUM 9.3 04/23/2024    ALBUMIN 4.1 04/23/2024    LABIL2 1.4 06/04/2019    AST 17 04/23/2024    ALT 9 04/23/2024    CHOL 189 04/23/2024    TRIG 131 04/23/2024     (H) 04/23/2024    HDL 51 04/23/2024     Lab Results   Component Value Date    TSH 0.958 05/09/2024    FREET4 1.51 05/09/2024         Medication Review: Reviewed.       Current Outpatient Medications:     Abatacept (ORENCIA IV), Infuse  into a venous catheter., Disp: , Rfl:     amLODIPine (NORVASC) 10 MG tablet, Take 1 tablet by mouth Daily., Disp: 90 tablet, Rfl: 3    Diclofenac Sodium (VOLTAREN) 1 % gel gel, Apply 4 g topically to the appropriate area as directed 4 (Four) Times a Day As Needed (pain)., Disp: 150 g, Rfl: 3    leflunomide (ARAVA) 20 MG tablet, Take 1 tablet by mouth Daily., Disp: 90 tablet, Rfl: 0    levothyroxine (SYNTHROID, LEVOTHROID) 100 MCG tablet, Take 1 tab po daily, Disp: 90 tablet, Rfl: 3    losartan (COZAAR) 50 MG tablet, TAKE 1 TABLET BY MOUTH EVERY NIGHT, Disp: 90 tablet, Rfl: 1    magnesium oxide (MAG-OX) 400 MG tablet, Take 1 tablet by mouth 2 (Two) Times a Day., Disp: 60 tablet, Rfl: 11    metoprolol succinate XL (TOPROL-XL) 100 MG 24 hr tablet, Take 1 tab, by mouth, daily., Disp: 90 tablet, Rfl: 3    Multiple Vitamins-Minerals (PRESERVISION AREDS 2 PO), Take 1 tablet by mouth Daily., Disp: , Rfl:     multivitamin with minerals tablet tablet, Take 1 tablet by mouth Daily., Disp: , Rfl:     polyethylene glycol (MIRALAX) 17 g packet, Take 8.5 g by mouth Daily., Disp: , Rfl:     warfarin (COUMADIN) 2.5 MG tablet, Take 2 tabs, by mouth, daily or as directed., Disp: 180 tablet, Rfl: 3      Assessment and plan:  Hypothyroidism: Uncontrolled with normal TSH and free T4, will continue levothyroxine at 100 mcg p.o. daily and follow labs.    Fatigue: Will check CBC, she did have elevated BUN so  she probably may have a little bit of dehydration and vascular increase may be fluid intake.  Also will add vitamin D supplementation.    Assessment and plan from October 16, 2023 reviewed and updated.           Lucia Sahu MD FACE.

## 2024-05-16 NOTE — PATIENT INSTRUCTIONS
Continue levothyroxine 100 mcg p.o. daily  Start vitamin D 2000 units p.o. daily  Start vitamin B12 1000 mcg sublingual daily  Check CBC today  Rest of the labs before follow-up in 1 year.   SUBJECTIVE:   Hilario presents today for follow up of chronic conditions, as below.     #HTN  - on losartan 100mg, HCTZ 25mg daily, amlodipine 5mg BID (unclear if taking as directed, wife manages the meds)  - thinks he can feel when his BP is high, notices a temporal headache. Occurs 3-4x/week, without reliable timing within the day. Generally improves with rest and de-stress.   - no lightheadedness, dizziness, pre-syncope.     #CAD s/p stent x4 12/2019  - will occasionally feel a deep substernal chest pain or L upper extremity numbness/tingling, which improves with a nap. Has taken the nitroglycerin about once a month.   - plavix  - ASA 81  - nitroglycerin prn  - metoprolol succinate 100mg   - imdur 30mg   - recent echo with LVEF 60%    #Dizziness/Pains  - Occassional dizziness when stretching in bed and turning side to side.   - prior CTA (10/30/20) showed mid tortuosity and stenosis of R vertebral artery with moderate atherosclerosis of L vertebral, without significant stenosis or atherosclerosis of carotid arteries.  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 He's concerned about possible polycythemia.   - possible restless legs, needing to shake his leg to fall asleep.   - lightning bolt pains in face, legs, arm intermittently  - restarted smoking a few months ago.        #Depression  - stopped cymbalta 11/5/20, started on sertraline 50mg nightly, increased to 100mg 12/7/20  - Sleeps 8-3 or 4am with poor energy through the day  Recent PHQ 2/9 Score    PHQ 2:  Date Adult PHQ 2 Score Adult PHQ 2 Interpretation   12/21/2020 4 Further screening needed       PHQ 9:  Date Adult PHQ 9 Score Adult PHQ 9 Interpretation   12/21/2020 23 Severe Depression     #CANDIE  - does not wearCPAP - did not like the mask, and felt like he couldn't sleep because he sleeps on his side. No longer has the machine        I  have reviewed the patient's medications and allergies, past medical, surgical, social and family history, updating these as appropriate.  See History section of the EMR for a display of this information.    ROS: The patient denies symptoms of:  GENERAL: fever, night sweats and fatigue  EYES: visual blurring and double vision  EARS: decreased auditory acuity, ringing or tinnitus in the ears and pain in the ears  NECK: stiffness or pain in the neck and limited range of motion in the neck  CARDIORESPIRATORY: palpitations, edema, cough, shortness of breath, orthopnea and claudication  GENITOURINARY/MALE: frequency, dysuria and hematuria  NEUROLOGIC: weakness, numbness, speech disturbance and trouble with balance or coordination  MUSCULOSKELETAL: joint stiffness, joint swelling and muscle pain  PSYCHIATRIC: suicide ideation, hopelessness and symptoms of anxiety, feeling jittery, panicky    OBJECTIVE: Examination of the patient reveals:   VITALS:    Visit Vitals  BP (!) 164/100 (BP Location: LUE - Left upper extremity, Patient Position: Sitting, Cuff Size: Large Adult)   Pulse 93   Wt 95.8 kg   SpO2 94%   BMI 27.86 kg/m²       GENERAL: appears older than stated age, is in no apparent distress, is well developed and well nourished  LYMPH NODES: no cervical adenopathy  SKIN no skin rashes and no atypical appearing skin lesions, fingers with black substance on tips (appears consistent with grease, likely from work as a )  HEAD: normocephalic  EYES: pupils are equal and reactive to light and accomodation, sclera and conjunctiva are normal and lids and lashes are normal  MOUTH/THROAT: oropharynx appears normal, soft palate and uvula are normal and oral mucosa normal  CHEST: +pectus excavatum, respiratory effort is not labored  LUNGS: lungs are clear to auscultation with normal inspiratory/expiratory sounds, no rales, no rhonchi and no wheezes  HEART: normal rate and rhythm, S1 and S2 normal, no S3 or S4, no murmurs and  no extra heart sounds  ABDOMEN: abdomen is soft, normal active bowel sounds and nontender  NEUROLOGIC: gait and station normal and no tremor noted  EXTREMITIES: no clubbing, no cyanosis, no edema and normal muscle tone and development bilaterally      ASSESSMENT / PLAN:   #HTN, stage 2, poorly controlled.   - on losartan 100mg, HCTZ 25mg daily, amlodipine 5mg BID (unclear if taking as directed, wife manages the meds)  - will clarify medications with wife  - renin/aldosterone level  - will consider spironolactone  - would benefit from follow up with his cardiologist, but is hesitant as the office is in a hospital and is concerned about risk of COVID exposure     #CAD s/p stent x4 12/2019  - +atypical anginal symptoms  - recent echo with LVEF 60% without wall-motion abnormalities  - plavix (continuing beyond 1 year due to TIA)  - ASA 81  - nitroglycerin prn  - metoprolol succinate 100mg     - increase to 200mg for HTN control  - imdur 30mg    - increase to 60mg (improve HTN control)    #Dizziness/neurolopathic pain/parasthesia  - Given position-related dizziness, concerned for possible symptomatic vertebral artery stenosis, but was recently seen on CTA and only had moderate disease.   - L upper extremity numbness/tingling concerning for possible anginal pain or radiculopathy related to known degenerative damage to cervical spine  - no further imaging or intervention indicated for cervical spine at this time.   - will discuss possible pain clinic referral at follow up visit.     #pruritis  - persistent pruritis could be related to tobacco use. Also concerned for overall nervous system up-regulation, given several categories of pain described, and increased symptomatology before bed.   - Difficult to assess possibility of PV, given chronically high HGB and tobacco use  - encouraged tobacco cessation.   - repeat CBC    #Depression, severe  - stopped cymbalta 11/5/20, started on sertraline 50mg nightly, increased to 100mg  12/7/20. Continue for now    #Insomnia  #possible restless less  - shaking of legs at night possibly consistent with restless legs  - start gabapentin 300mg nightly  - will obtain ferritin    Follow up in 1 month to review effects of medication changes.       Patient seen and discussed with Dr. Renny Vaca MD   PGY-4, Internal Medicine-Pediatrics  [facility]

## 2024-05-17 DIAGNOSIS — D64.9 ANEMIA, UNSPECIFIED TYPE: Primary | ICD-10-CM

## 2024-05-20 ENCOUNTER — TELEPHONE (OUTPATIENT)
Dept: ENDOCRINOLOGY | Facility: CLINIC | Age: 84
End: 2024-05-20

## 2024-05-20 NOTE — TELEPHONE ENCOUNTER
Caller: STEFFI SEVILLA    Relationship to patient: SELF    Best call back number: [phone]    Patient is needing: PATIENTS INSURANCE WONT COVER VITAMIN B12 AND D, BECAUSE YOU CAN GET THEM OVER THE COUNTER. SHE WANTS TO KNOW IF THAT IS OKAY TO GRAB OVER THE COUNTER AND WHAT STRENGTH SHE NEEDS FOR THEM. PLEASE ADVISE

## 2024-05-22 ENCOUNTER — TELEPHONE (OUTPATIENT)
Dept: ENDOCRINOLOGY | Facility: CLINIC | Age: 84
End: 2024-05-22
Payer: MEDICARE

## 2024-05-22 NOTE — TELEPHONE ENCOUNTER
"\"DR. OROZCO WILL NO LONGER BE WITH [facility] AFTER JULY 29, 2024, IS PT OK WITH SEEING ANOTHER PROVIDER\"     This was routed back to me on hematology referral, please advise if its ok to see another provider at that facility or would you like pt to go somewhere else? Thanks in advance   "

## 2024-05-28 ENCOUNTER — CONSULT (OUTPATIENT)
Dept: ONCOLOGY | Facility: CLINIC | Age: 84
End: 2024-05-28
Payer: MEDICARE

## 2024-05-28 ENCOUNTER — LAB (OUTPATIENT)
Dept: LAB | Facility: HOSPITAL | Age: 84
End: 2024-05-28
Payer: MEDICARE

## 2024-05-28 VITALS
WEIGHT: 110.8 LBS | OXYGEN SATURATION: 98 % | SYSTOLIC BLOOD PRESSURE: 155 MMHG | HEIGHT: 61 IN | BODY MASS INDEX: 20.92 KG/M2 | DIASTOLIC BLOOD PRESSURE: 77 MMHG | HEART RATE: 67 BPM

## 2024-05-28 DIAGNOSIS — M05.79 RHEUMATOID ARTHRITIS INVOLVING MULTIPLE SITES WITH POSITIVE RHEUMATOID FACTOR: ICD-10-CM

## 2024-05-28 DIAGNOSIS — I48.91 ATRIAL FIBRILLATION, UNSPECIFIED TYPE: ICD-10-CM

## 2024-05-28 DIAGNOSIS — D64.9 ANEMIA, UNSPECIFIED TYPE: Primary | ICD-10-CM

## 2024-05-28 DIAGNOSIS — D64.9 ANEMIA, UNSPECIFIED TYPE: ICD-10-CM

## 2024-05-28 DIAGNOSIS — K64.9 HEMORRHOIDS, UNSPECIFIED HEMORRHOID TYPE: ICD-10-CM

## 2024-05-28 LAB
BASOPHILS # BLD AUTO: 0.07 10*3/MM3 (ref 0–0.2)
BASOPHILS NFR BLD AUTO: 1.4 % (ref 0–1.5)
CRP SERPL-MCNC: <0.3 MG/DL (ref 0–0.5)
DEPRECATED RDW RBC AUTO: 44.2 FL (ref 37–54)
EOSINOPHIL # BLD AUTO: 0.32 10*3/MM3 (ref 0–0.4)
EOSINOPHIL NFR BLD AUTO: 6.5 % (ref 0.3–6.2)
ERYTHROCYTE [DISTWIDTH] IN BLOOD BY AUTOMATED COUNT: 13.2 % (ref 12.3–15.4)
ERYTHROCYTE [SEDIMENTATION RATE] IN BLOOD: 23 MM/HR (ref 0–30)
FERRITIN SERPL-MCNC: 382 NG/ML (ref 13–150)
HCT VFR BLD AUTO: 37.6 % (ref 34–46.6)
HGB BLD-MCNC: 12.1 G/DL (ref 12–15.9)
IRON 24H UR-MRATE: 112 MCG/DL (ref 37–145)
IRON SATN MFR SERPL: 32 % (ref 20–50)
LYMPHOCYTES # BLD AUTO: 0.89 10*3/MM3 (ref 0.7–3.1)
LYMPHOCYTES NFR BLD AUTO: 18 % (ref 19.6–45.3)
MCH RBC QN AUTO: 30.8 PG (ref 26.6–33)
MCHC RBC AUTO-ENTMCNC: 32.2 G/DL (ref 31.5–35.7)
MCV RBC AUTO: 95.7 FL (ref 79–97)
MONOCYTES # BLD AUTO: 0.53 10*3/MM3 (ref 0.1–0.9)
MONOCYTES NFR BLD AUTO: 10.7 % (ref 5–12)
NEUTROPHILS NFR BLD AUTO: 3.13 10*3/MM3 (ref 1.7–7)
NEUTROPHILS NFR BLD AUTO: 63.4 % (ref 42.7–76)
PLATELET # BLD AUTO: 191 10*3/MM3 (ref 140–450)
PMV BLD AUTO: 9.3 FL (ref 6–12)
RBC # BLD AUTO: 3.93 10*6/MM3 (ref 3.77–5.28)
RETICS # AUTO: 0.04 10*6/MM3 (ref 0.02–0.13)
RETICS/RBC NFR AUTO: 0.96 % (ref 0.7–1.9)
TIBC SERPL-MCNC: 350 MCG/DL (ref 298–536)
TRANSFERRIN SERPL-MCNC: 235 MG/DL (ref 200–360)
TSH SERPL DL<=0.05 MIU/L-ACNC: 0.97 UIU/ML (ref 0.27–4.2)
VIT B12 BLD-MCNC: 1274 PG/ML (ref 211–946)
WBC NRBC COR # BLD AUTO: 4.94 10*3/MM3 (ref 3.4–10.8)

## 2024-05-28 PROCEDURE — 85652 RBC SED RATE AUTOMATED: CPT | Performed by: STUDENT IN AN ORGANIZED HEALTH CARE EDUCATION/TRAINING PROGRAM

## 2024-05-28 PROCEDURE — 84466 ASSAY OF TRANSFERRIN: CPT | Performed by: STUDENT IN AN ORGANIZED HEALTH CARE EDUCATION/TRAINING PROGRAM

## 2024-05-28 PROCEDURE — 85025 COMPLETE CBC W/AUTO DIFF WBC: CPT

## 2024-05-28 PROCEDURE — 36415 COLL VENOUS BLD VENIPUNCTURE: CPT

## 2024-05-28 PROCEDURE — 83540 ASSAY OF IRON: CPT | Performed by: STUDENT IN AN ORGANIZED HEALTH CARE EDUCATION/TRAINING PROGRAM

## 2024-05-28 PROCEDURE — 86140 C-REACTIVE PROTEIN: CPT | Performed by: STUDENT IN AN ORGANIZED HEALTH CARE EDUCATION/TRAINING PROGRAM

## 2024-05-28 PROCEDURE — 84443 ASSAY THYROID STIM HORMONE: CPT | Performed by: STUDENT IN AN ORGANIZED HEALTH CARE EDUCATION/TRAINING PROGRAM

## 2024-05-28 PROCEDURE — 82607 VITAMIN B-12: CPT | Performed by: STUDENT IN AN ORGANIZED HEALTH CARE EDUCATION/TRAINING PROGRAM

## 2024-05-28 PROCEDURE — 82728 ASSAY OF FERRITIN: CPT | Performed by: STUDENT IN AN ORGANIZED HEALTH CARE EDUCATION/TRAINING PROGRAM

## 2024-05-28 PROCEDURE — 85045 AUTOMATED RETICULOCYTE COUNT: CPT | Performed by: STUDENT IN AN ORGANIZED HEALTH CARE EDUCATION/TRAINING PROGRAM

## 2024-05-28 NOTE — PROGRESS NOTES
HEMATOLOGY ONCOLOGY OUTPATIENT FOLLOW UP       Patient name: Jt Valdes  : 1940  MRN: [id number]  Primary Care Physician: Dilcia Trinidad MD  Referring Physician: Lucia Sahu MD  Reason For Consult:         History of Present Illness:  Patient is a 83 year old female who has been referred to us for further evaluation of her anemia.  Recent labs are as follows:    24:   CBC: 5.5/10.9/32.2/200 (MCV 89, MCH 30.1, RDW 11,8)    24:  CMP:unremarkable    On lab review, patient appears to have mild normocytic anemia intermittently since  with abiodun Hb 11.2 in 2022.    She has underlying history of RA and she is on  Leflunamide as well as abatacept for the same.  Also has Afib and is currently on Warfarin for last 3 years, had ablation done in the past.   DOAC therapy not considered per patient due to financial reasons.    Stated that her last colonoscopy was about 5-6 years ago and was reported normal except for some benign polyps,. She is not scheudled for a follow up study given her advanced age.    She also has history of  Occasional hemorrhoids bleeding with every other day mild spotting, She denied any heavy rectal bleeding or hospitalization for the same,    She has Just been started on oral B12 supplement by her PCP.      Subjective:  Patient is mostly asymptomatic at this time. Reported having some fatigue but she thinks this is expected with age. Still mostly independent in AdLs.    Past Medical History:   Diagnosis Date    Allergic     Sulfa    Arthritis     Cataract     Cataracts surgery    COVID-19     Degenerative joint disease     Extremity pain     legs pain    GERD (gastroesophageal reflux disease)     H/O degenerative disc disease     History of colonoscopy     last done     History of echocardiogram 2018    LVH EF 65%. RV OK. LA probably mildly dilated. AV OK. MV OK. Modest TR RVSP 26 or less. Nuclear MPI regadenoson 2018. LV uniform  myocardial uptake and wall motion EF 71%.     History of Holter monitoring 10/08/2018    SR 40-81 58. AF 2.7 hr episode VR . At termination AF 2 3.5-4.0 s pauses with patient possibly dizzy. 205 PAC 42 atrial ashanti several SVT. No ventricular ectopy.     HL (hearing loss) 2020    Tried hearing aids twice . Didnt help    HTN (hypertension)     Hyperlipidemia     Hypertensive cardiovascular disease     Hypothyroidism     Incontinence     Leukopenia     Low back pain     PAF (paroxysmal atrial fibrillation) 09/2018    [facility] Sept 2018 PAF and HTN. PAFL On bisoprolol and diltiazem bradycardia symptomatic with dizziness. Amiodarone alone Oct 2018 and no episodes of erratic or fast heartbeat or lightheadedness.     Rheumatoid arthritis     Possible pulmonary involvement     Staph infection     Vitamin D deficiency     White coat syndrome with hypertension        Past Surgical History:   Procedure Laterality Date    APPENDECTOMY  1974    BACK SURGERY  2003 2001, 2003    CARDIAC ELECTROPHYSIOLOGY PROCEDURE Right 08/01/2023    Procedure: Ablation atrial fibrillation, WITH CRYO  Brigido and Magdalena aware;  Surgeon: Dyllan Lloyd MD;  Location: [facility] INVASIVE LOCATION;  Service: Cardiovascular;  Laterality: Right;    CYST REMOVAL Left     on hand    GANGLION CYST EXCISION      SPINE SURGERY  2001.    2003    SUBTOTAL HYSTERECTOMY  1974         Current Outpatient Medications:     Abatacept (ORENCIA IV), Infuse  into a venous catheter., Disp: , Rfl:     amLODIPine (NORVASC) 10 MG tablet, Take 1 tablet by mouth Daily., Disp: 90 tablet, Rfl: 3    Cyanocobalamin (Vitamin B-12) 1000 MCG sublingual tablet, Place 1 tablet under the tongue Daily., Disp: 100 each, Rfl: 4    Diclofenac Sodium (VOLTAREN) 1 % gel gel, Apply 4 g topically to the appropriate area as directed 4 (Four) Times a Day As Needed (pain)., Disp: 150 g, Rfl: 3    leflunomide (ARAVA) 20 MG tablet, Take 1 tablet by mouth Daily., Disp: 90 tablet, Rfl:  0    levothyroxine (SYNTHROID, LEVOTHROID) 100 MCG tablet, Take 1 tab po daily, Disp: 90 tablet, Rfl: 3    losartan (COZAAR) 50 MG tablet, TAKE 1 TABLET BY MOUTH EVERY NIGHT, Disp: 90 tablet, Rfl: 1    magnesium oxide (MAG-OX) 400 MG tablet, Take 1 tablet by mouth 2 (Two) Times a Day., Disp: 60 tablet, Rfl: 11    metoprolol succinate XL (TOPROL-XL) 100 MG 24 hr tablet, Take 1 tab, by mouth, daily., Disp: 90 tablet, Rfl: 3    Multiple Vitamins-Minerals (PRESERVISION AREDS 2 PO), Take 1 tablet by mouth Daily., Disp: , Rfl:     polyethylene glycol (MIRALAX) 17 g packet, Take 8.5 g by mouth Daily., Disp: , Rfl:     Vitamin D, Ergocalciferol, 50 MCG (2000 UT) capsule, Take 2,000 Units by mouth Daily., Disp: 100 capsule, Rfl: 4    warfarin (COUMADIN) 2.5 MG tablet, Take 2 tabs, by mouth, daily or as directed., Disp: 180 tablet, Rfl: 3    Allergies   Allergen Reactions    Sulfa Antibiotics Rash    Macrobid [Nitrofurantoin] Unknown - High Severity       Family History   Problem Relation Age of Onset    Osteoarthritis Mother     Arthritis Mother     Cancer Mother     Other Other         Rheumatoid disease     Cancer Father     Cancer Sister        Cancer-related family history includes Cancer in her father, mother, and sister.    Social History     Tobacco Use    Smoking status: Never    Smokeless tobacco: Never   Vaping Use    Vaping status: Never Used   Substance Use Topics    Alcohol use: Yes     Alcohol/week: 1.0 standard drink of alcohol     Types: 1 Glasses of wine per week     Comment: Occ.    Drug use: No     Social History     Social History Narrative    Not on file        Visit date not found.    ROS:   Review of Systems   Constitutional:  Positive for fatigue.   HENT: Negative.     Eyes: Negative.    Respiratory: Negative.     Cardiovascular: Negative.    Gastrointestinal: Negative.    Endocrine: Negative.    Genitourinary: Negative.    Musculoskeletal: Negative.    Skin: Negative.    Allergic/Immunologic:  "Negative.    Neurological: Negative.    Hematological: Negative.    Psychiatric/Behavioral: Negative.         Objective:  Vital signs:  Vitals:    05/28/24 1247   BP: 155/77   Pulse: 67   SpO2: 98%   Weight: 50.3 kg (110 lb 12.8 oz)   Height: 154.9 cm (61\")   PainSc: 0-No pain     Body mass index is 20.94 kg/m².  ECOG  (1) Restricted in physically strenuous activity, ambulatory and able to do work of light nature    Physical Exam:   Physical Exam  Constitutional:       Appearance: Normal appearance. She is normal weight.   HENT:      Head: Normocephalic and atraumatic.      Right Ear: External ear normal.      Left Ear: External ear normal.      Nose: Nose normal.      Mouth/Throat:      Mouth: Mucous membranes are moist.      Pharynx: Oropharynx is clear.   Eyes:      Extraocular Movements: Extraocular movements intact.      Conjunctiva/sclera: Conjunctivae normal.      Pupils: Pupils are equal, round, and reactive to light.   Cardiovascular:      Rate and Rhythm: Normal rate.      Pulses: Normal pulses.   Pulmonary:      Effort: Pulmonary effort is normal.   Abdominal:      General: Abdomen is flat.      Palpations: Abdomen is soft.   Musculoskeletal:         General: Normal range of motion.      Cervical back: Normal range of motion and neck supple.   Skin:     General: Skin is warm.   Neurological:      Mental Status: She is alert.   Psychiatric:         Mood and Affect: Mood normal.         Behavior: Behavior normal.         Thought Content: Thought content normal.         Judgment: Judgment normal.         Lab Results - Last 18 Months   Lab Units 05/28/24  1354 05/16/24  1116 06/03/23  0903   WBC 10*3/mm3 4.94 5.51 4.40   HEMOGLOBIN g/dL 12.1 10.9* 12.0   HEMATOCRIT % 37.6 32.2* 35.7   PLATELETS 10*3/mm3 191 200 256   MCV fL 95.7 89.0 88.1     Lab Results - Last 18 Months   Lab Units 04/23/24  0908 08/24/23  0952 08/01/23  0929 06/03/23  0903 05/28/23  0935   SODIUM mmol/L 138 139 141 140 138   POTASSIUM " "mmol/L 4.0 4.4 4.3 4.4 4.0   CHLORIDE mmol/L 104 102 104 103 101   CO2 mmol/L 22.1 24.0 24.0 27.0 21.0*   BUN mg/dL 25* 29* 23 22 25*   CREATININE mg/dL 0.80 0.95 0.83 0.94 0.87   CALCIUM mg/dL 9.3 9.5 9.7 9.5 9.6   BILIRUBIN mg/dL 0.6  --   --  0.4 0.4   ALK PHOS U/L 78  --   --  81 83   ALT (SGPT) U/L 9  --   --  13 11   AST (SGOT) U/L 17  --   --  25 20   GLUCOSE mg/dL 95 106* 95 94 110*       Lab Results   Component Value Date    GLUCOSE 95 04/23/2024    BUN 25 (H) 04/23/2024    CREATININE 0.80 04/23/2024    EGFRIFNONA 58 (L) 12/20/2021    EGFRIFAFRI 57 (L) 04/27/2017    BCR 31.3 (H) 04/23/2024    K 4.0 04/23/2024    CO2 22.1 04/23/2024    CALCIUM 9.3 04/23/2024    ALBUMIN 4.1 04/23/2024    LABIL2 1.4 06/04/2019    AST 17 04/23/2024    ALT 9 04/23/2024       Lab Results - Last 18 Months   Lab Units 05/01/24  1109 03/21/24  1022 03/05/24  1041 05/28/23  0935 05/19/23  0848   INR  2.30* 2.50 1.40*   < > 2.69   APTT seconds  --   --   --   --  37.7*    < > = values in this interval not displayed.       Lab Results   Component Value Date    IRON 112 05/28/2024    TIBC 350 05/28/2024    FERRITIN 382.00 (H) 05/28/2024       Lab Results   Component Value Date    FOLATE >20.00 06/14/2022       No results found for: \"OCCULTBLD\"    No results found for: \"RETICCTPCT\"  Lab Results   Component Value Date    SYNGSXOQ53 1,274 (H) 05/28/2024     No results found for: \"SPEP\", \"UPEP\"  Uric Acid   Date Value Ref Range Status   11/04/2016 4.0 2.5 - 7.0 mg/dL Final     Lab Results   Component Value Date    SOFIA NEGATIVE 06/04/2019    SEDRATE 23 05/28/2024     No results found for: \"FIBRINOGEN\", \"HAPTOGLOBIN\"  Lab Results   Component Value Date    PTT 37.7 (L) 05/19/2023    INR 2.30 (A) 05/01/2024     No results found for: \"\"  No results found for: \"CEA\"  No components found for: \"CA-19-9\"  No results found for: \"PSA\"  Lab Results   Component Value Date    SEDRATE 6 03/02/2021       Assessment & Plan     Normocytic " anemia:  -outside labs reviewed as above.  -Repeat CBC today showed normal hb/hct 12.1/37.6.  -additional workup including Iron panel, ferritin, Retic counts, ESR,CRP, plasma cell disorder workup reported negative.  -Intermittent anemia may be secondary to chronic inflammation with Rheumatoid arthritis vs Sec to Abatacept.   -No further interventions indicated at this time. Continue to monitor.  -Soluble transferin receptor level pending. If high will consider oral iron therapy    Hemorrhoidal bleeding:  Mild as per patient. Continue to monitor.  -will refer for CRS eval if confirmed to have iron deficiency anemia      Afib:  Patient on warfarin. Management per cardiology.    Follow up in 2 weeks to discuss labs results. Sooner as needed      Thank you very much for providing the opportunity to participate in this patient’s care. Please do not hesitate to call if there are any other questions.

## 2024-05-29 ENCOUNTER — PATIENT ROUNDING (BHMG ONLY) (OUTPATIENT)
Dept: ONCOLOGY | Facility: CLINIC | Age: 84
End: 2024-05-29
Payer: MEDICARE

## 2024-05-29 LAB
ALBUMIN SERPL ELPH-MCNC: 4 G/DL (ref 2.9–4.4)
ALBUMIN/GLOB SERPL: 1.5 {RATIO} (ref 0.7–1.7)
ALPHA1 GLOB SERPL ELPH-MCNC: 0.3 G/DL (ref 0–0.4)
ALPHA2 GLOB SERPL ELPH-MCNC: 1.1 G/DL (ref 0.4–1)
B-GLOBULIN SERPL ELPH-MCNC: 0.9 G/DL (ref 0.7–1.3)
GAMMA GLOB SERPL ELPH-MCNC: 0.5 G/DL (ref 0.4–1.8)
GLOBULIN SER-MCNC: 2.8 G/DL (ref 2.2–3.9)
IGA SERPL-MCNC: 104 MG/DL (ref 64–422)
IGG SERPL-MCNC: 691 MG/DL (ref 586–1602)
IGM SERPL-MCNC: 41 MG/DL (ref 26–217)
INTERPRETATION SERPL IEP-IMP: ABNORMAL
KAPPA LC FREE SER-MCNC: 19 MG/L (ref 3.3–19.4)
KAPPA LC FREE/LAMBDA FREE SER: 1.28 {RATIO} (ref 0.26–1.65)
LABORATORY COMMENT REPORT: ABNORMAL
LAMBDA LC FREE SERPL-MCNC: 14.9 MG/L (ref 5.7–26.3)
M PROTEIN SERPL ELPH-MCNC: ABNORMAL G/DL
PROT SERPL-MCNC: 6.8 G/DL (ref 6–8.5)

## 2024-05-29 NOTE — PROGRESS NOTES
May 29, 2024    Hello, may I speak with Jt Valdes?    My name is Verena Antonio      I am  with [facility]  [address] [place] [address] [phone].    Before we get started may I verify your date of birth? 1940    I am calling to officially welcome you to our practice and ask about your recent visit. Is this a good time to talk? no    Tell me about your visit with us. What things went well?  A My Chart message was sent to the patient.         We're always looking for ways to make our patients' experiences even better. Do you have recommendations on ways we may improve?  no    Overall were you satisfied with your first visit to our practice? yes       I appreciate you taking the time to speak with me today. Is there anything else I can do for you? no      Thank you, and have a great day.

## 2024-06-03 LAB — STFR SERPL-SCNC: 17.4 NMOL/L (ref 12.2–27.3)

## 2024-06-04 ENCOUNTER — ANTICOAGULATION VISIT (OUTPATIENT)
Dept: CARDIOLOGY | Facility: CLINIC | Age: 84
End: 2024-06-04
Payer: MEDICARE

## 2024-06-04 VITALS
WEIGHT: 111 LBS | BODY MASS INDEX: 20.97 KG/M2 | HEART RATE: 67 BPM | SYSTOLIC BLOOD PRESSURE: 176 MMHG | DIASTOLIC BLOOD PRESSURE: 79 MMHG

## 2024-06-04 DIAGNOSIS — I48.0 PAROXYSMAL ATRIAL FIBRILLATION: Primary | Chronic | ICD-10-CM

## 2024-06-04 DIAGNOSIS — Z79.01 LONG TERM (CURRENT) USE OF ANTICOAGULANTS: Chronic | ICD-10-CM

## 2024-06-04 LAB — INR PPP: 2.6 (ref 2–3)

## 2024-06-04 PROCEDURE — 85610 PROTHROMBIN TIME: CPT | Performed by: INTERNAL MEDICINE

## 2024-06-04 PROCEDURE — 36416 COLLJ CAPILLARY BLOOD SPEC: CPT | Performed by: INTERNAL MEDICINE

## 2024-06-07 NOTE — PROGRESS NOTES
HEMATOLOGY ONCOLOGY OUTPATIENT FOLLOW UP       Patient name: Jt Valdes  : 1940  MRN: [id number]  Primary Care Physician: Dilcia Trinidad MD  Referring Physician: Dilcia Trinidad*  Reason For Consult:         History of Present Illness:  Patient is a 83 year old female who has been referred to us for further evaluation of her anemia.  Recent labs are as follows:    24:   CBC: 5.5/10.9/32.2/200 (MCV 89, MCH 30.1, RDW 11,8)    24:  CMP:unremarkable    On lab review, patient appears to have mild normocytic anemia intermittently since  with abiodun Hb 11.2 in 2022.    She has underlying history of RA and she is on  Leflunamide as well as abatacept for the same.  Also has Afib and is currently on Warfarin for last 3 years, had ablation done in the past.   DOAC therapy not considered per patient due to financial reasons.    Stated that her last colonoscopy was about 5-6 years ago and was reported normal except for some benign polyps,. She is not scheudled for a follow up study given her advanced age.    She also has history of  Occasional hemorrhoids bleeding with every other day mild spotting, She denied any heavy rectal bleeding or hospitalization for the same,    She has Just been started on oral B12 supplement by her PCP.      Patient is mostly asymptomatic at this time. Reported having some fatigue but she thinks this is expected with age. Still mostly independent in AdLs.      Subjective:  Patient seen today for follow up on lab results. No new complaints.    Past Medical History:   Diagnosis Date    Allergic     Sulfa    Arthritis     Cataract     Cataracts surgery    COVID-19     Degenerative joint disease     Extremity pain     legs pain    GERD (gastroesophageal reflux disease)     H/O degenerative disc disease     History of colonoscopy     last done     History of echocardiogram 2018    LVH EF 65%. RV OK. LA probably mildly dilated. AV OK. MV OK.  Modest TR RVSP 26 or less. Nuclear MPI regadenoson 9/14/2018. LV uniform myocardial uptake and wall motion EF 71%.     History of Holter monitoring 10/08/2018    SR 40-81 58. AF 2.7 hr episode VR . At termination AF 2 3.5-4.0 s pauses with patient possibly dizzy. 205 PAC 42 atrial ashanti several SVT. No ventricular ectopy.     HL (hearing loss) 2020    Tried hearing aids twice . Didnt help    HTN (hypertension)     Hyperlipidemia     Hypertensive cardiovascular disease     Hypothyroidism     Incontinence     Leukopenia     Low back pain     PAF (paroxysmal atrial fibrillation) 09/2018    [facility] Sept 2018 PAF and HTN. PAFL On bisoprolol and diltiazem bradycardia symptomatic with dizziness. Amiodarone alone Oct 2018 and no episodes of erratic or fast heartbeat or lightheadedness.     Rheumatoid arthritis     Possible pulmonary involvement     Staph infection     Vitamin D deficiency     White coat syndrome with hypertension        Past Surgical History:   Procedure Laterality Date    APPENDECTOMY  1974    BACK SURGERY  2003 2001, 2003    CARDIAC ELECTROPHYSIOLOGY PROCEDURE Right 08/01/2023    Procedure: Ablation atrial fibrillation, WITH CRYO  Brigido and Magdalena aware;  Surgeon: Dyllan Lloyd MD;  Location: [facility] INVASIVE LOCATION;  Service: Cardiovascular;  Laterality: Right;    CYST REMOVAL Left     on hand    GANGLION CYST EXCISION      SPINE SURGERY  2001.    2003    SUBTOTAL HYSTERECTOMY  1974         Current Outpatient Medications:     Abatacept (ORENCIA IV), Infuse  into a venous catheter., Disp: , Rfl:     amLODIPine (NORVASC) 10 MG tablet, Take 1 tablet by mouth Daily., Disp: 90 tablet, Rfl: 3    Cyanocobalamin (Vitamin B-12) 1000 MCG sublingual tablet, Place 1 tablet under the tongue Daily., Disp: 100 each, Rfl: 4    Diclofenac Sodium (VOLTAREN) 1 % gel gel, Apply 4 g topically to the appropriate area as directed 4 (Four) Times a Day As Needed (pain)., Disp: 150 g, Rfl: 3    leflunomide  (ARAVA) 20 MG tablet, Take 1 tablet by mouth Daily., Disp: 90 tablet, Rfl: 0    levothyroxine (SYNTHROID, LEVOTHROID) 100 MCG tablet, Take 1 tab po daily, Disp: 90 tablet, Rfl: 3    losartan (COZAAR) 50 MG tablet, TAKE 1 TABLET BY MOUTH EVERY NIGHT, Disp: 90 tablet, Rfl: 1    magnesium oxide (MAG-OX) 400 MG tablet, Take 1 tablet by mouth 2 (Two) Times a Day., Disp: 60 tablet, Rfl: 11    metoprolol succinate XL (TOPROL-XL) 100 MG 24 hr tablet, Take 1 tab, by mouth, daily., Disp: 90 tablet, Rfl: 3    Multiple Vitamins-Minerals (PRESERVISION AREDS 2 PO), Take 1 tablet by mouth Daily., Disp: , Rfl:     polyethylene glycol (MIRALAX) 17 g packet, Take 8.5 g by mouth Daily., Disp: , Rfl:     Vitamin D, Ergocalciferol, 50 MCG (2000 UT) capsule, Take 2,000 Units by mouth Daily., Disp: 100 capsule, Rfl: 4    warfarin (COUMADIN) 2.5 MG tablet, Take 2 tabs, by mouth, daily or as directed., Disp: 180 tablet, Rfl: 3    Allergies   Allergen Reactions    Sulfa Antibiotics Rash    Macrobid [Nitrofurantoin] Unknown - High Severity       Family History   Problem Relation Age of Onset    Osteoarthritis Mother     Arthritis Mother     Cancer Mother     Other Other         Rheumatoid disease     Cancer Father     Cancer Sister        Cancer-related family history includes Cancer in her father, mother, and sister.    Social History     Tobacco Use    Smoking status: Never    Smokeless tobacco: Never   Vaping Use    Vaping status: Never Used   Substance Use Topics    Alcohol use: Yes     Alcohol/week: 1.0 standard drink of alcohol     Types: 1 Glasses of wine per week     Comment: Occ.    Drug use: No     Social History     Social History Narrative    Not on file        Visit date not found.    ROS:   Review of Systems   Constitutional:  Positive for fatigue.   HENT: Negative.     Eyes: Negative.    Respiratory: Negative.     Cardiovascular: Negative.    Gastrointestinal: Negative.    Endocrine: Negative.    Genitourinary: Negative.   "  Musculoskeletal: Negative.    Skin: Negative.    Allergic/Immunologic: Negative.    Neurological: Negative.    Hematological: Negative.    Psychiatric/Behavioral: Negative.         Objective:  Vital signs:  Vitals:    06/11/24 1326   BP: 160/82   Pulse: 68   SpO2: 98%   Weight: 49.6 kg (109 lb 6.4 oz)   Height: 154.9 cm (61\")   PainSc: 0-No pain       Body mass index is 20.67 kg/m².  ECOG  (1) Restricted in physically strenuous activity, ambulatory and able to do work of light nature    Physical Exam:   Physical Exam  Constitutional:       Appearance: Normal appearance. She is normal weight.   HENT:      Head: Normocephalic and atraumatic.      Right Ear: External ear normal.      Left Ear: External ear normal.      Nose: Nose normal.      Mouth/Throat:      Mouth: Mucous membranes are moist.      Pharynx: Oropharynx is clear.   Eyes:      Extraocular Movements: Extraocular movements intact.      Conjunctiva/sclera: Conjunctivae normal.      Pupils: Pupils are equal, round, and reactive to light.   Cardiovascular:      Rate and Rhythm: Normal rate.      Pulses: Normal pulses.   Pulmonary:      Effort: Pulmonary effort is normal.   Abdominal:      General: Abdomen is flat.      Palpations: Abdomen is soft.   Musculoskeletal:         General: Normal range of motion.      Cervical back: Normal range of motion and neck supple.   Skin:     General: Skin is warm.   Neurological:      Mental Status: She is alert.   Psychiatric:         Mood and Affect: Mood normal.         Behavior: Behavior normal.         Thought Content: Thought content normal.         Judgment: Judgment normal.         Lab Results - Last 18 Months   Lab Units 05/28/24  1354 05/16/24  1116 06/03/23  0903   WBC 10*3/mm3 4.94 5.51 4.40   HEMOGLOBIN g/dL 12.1 10.9* 12.0   HEMATOCRIT % 37.6 32.2* 35.7   PLATELETS 10*3/mm3 191 200 256   MCV fL 95.7 89.0 88.1     Lab Results - Last 18 Months   Lab Units 04/23/24  0908 08/24/23  0952 08/01/23  0929 " "06/03/23  0903 05/28/23  0935   SODIUM mmol/L 138 139 141 140 138   POTASSIUM mmol/L 4.0 4.4 4.3 4.4 4.0   CHLORIDE mmol/L 104 102 104 103 101   CO2 mmol/L 22.1 24.0 24.0 27.0 21.0*   BUN mg/dL 25* 29* 23 22 25*   CREATININE mg/dL 0.80 0.95 0.83 0.94 0.87   CALCIUM mg/dL 9.3 9.5 9.7 9.5 9.6   BILIRUBIN mg/dL 0.6  --   --  0.4 0.4   ALK PHOS U/L 78  --   --  81 83   ALT (SGPT) U/L 9  --   --  13 11   AST (SGOT) U/L 17  --   --  25 20   GLUCOSE mg/dL 95 106* 95 94 110*       Lab Results   Component Value Date    GLUCOSE 95 04/23/2024    BUN 25 (H) 04/23/2024    CREATININE 0.80 04/23/2024    EGFRIFNONA 58 (L) 12/20/2021    EGFRIFAFRI 57 (L) 04/27/2017    BCR 31.3 (H) 04/23/2024    K 4.0 04/23/2024    CO2 22.1 04/23/2024    CALCIUM 9.3 04/23/2024    PROTENTOTREF 6.8 05/28/2024    ALBUMIN 4.0 05/28/2024    LABIL2 1.5 05/28/2024    AST 17 04/23/2024    ALT 9 04/23/2024       Lab Results - Last 18 Months   Lab Units 06/04/24  1110 05/01/24  1109 03/21/24  1022 05/28/23  0935 05/19/23  0848   INR  2.60 2.30* 2.50   < > 2.69   APTT seconds  --   --   --   --  37.7*    < > = values in this interval not displayed.       Lab Results   Component Value Date    IRON 112 05/28/2024    TIBC 350 05/28/2024    FERRITIN 382.00 (H) 05/28/2024       Lab Results   Component Value Date    FOLATE >20.00 06/14/2022       No results found for: \"OCCULTBLD\"    Lab Results   Component Value Date    RETICCTPCT 0.96 05/28/2024     Lab Results   Component Value Date    AVWASHBG71 1,274 (H) 05/28/2024     No results found for: \"SPEP\", \"UPEP\"  Uric Acid   Date Value Ref Range Status   11/04/2016 4.0 2.5 - 7.0 mg/dL Final     Lab Results   Component Value Date    SOFIA NEGATIVE 06/04/2019    SEDRATE 23 05/28/2024     No results found for: \"FIBRINOGEN\", \"HAPTOGLOBIN\"  Lab Results   Component Value Date    PTT 37.7 (L) 05/19/2023    INR 2.60 06/04/2024     No results found for: \"\"  No results found for: \"CEA\"  No components found for: \"CA-19-9\"  No " "results found for: \"PSA\"  Lab Results   Component Value Date    SEDRATE 23 05/28/2024       Assessment & Plan     Normocytic anemia:  -outside labs reviewed as above.  -Repeat CBC today showed normal hb/hct 12.1/37.6.  -additional workup including Iron panel, ferritin, Retic counts, ESR,CRP, plasma cell disorder workup reported negative.  -Intermittent anemia may be secondary to chronic inflammation with Rheumatoid arthritis vs Sec to Abatacept.   -No further interventions indicated at this time. Continue to monitor.  -Soluble transferin receptor level WNL.  No evidence of iron deficiency anemia.  -Discussed results in detail with patient.    Hemorrhoidal bleeding:  Patient reported improvement in symptoms. Continue to monitor.  Offered CRS referral to the patient, she declined at this time.      Afib:  Patient on warfarin. Management per cardiology.    Follow up as needed. Advised patient to follow with PCP for CBC monitoring.      Thank you very much for providing the opportunity to participate in this patient’s care. Please do not hesitate to call if there are any other questions.  "

## 2024-06-11 ENCOUNTER — OFFICE VISIT (OUTPATIENT)
Dept: ONCOLOGY | Facility: CLINIC | Age: 84
End: 2024-06-11
Payer: MEDICARE

## 2024-06-11 VITALS
HEART RATE: 68 BPM | HEIGHT: 61 IN | DIASTOLIC BLOOD PRESSURE: 82 MMHG | SYSTOLIC BLOOD PRESSURE: 160 MMHG | OXYGEN SATURATION: 98 % | BODY MASS INDEX: 20.65 KG/M2 | WEIGHT: 109.4 LBS

## 2024-06-11 DIAGNOSIS — D64.9 ANEMIA, UNSPECIFIED TYPE: ICD-10-CM

## 2024-06-11 DIAGNOSIS — K64.9 HEMORRHOIDS, UNSPECIFIED HEMORRHOID TYPE: ICD-10-CM

## 2024-06-11 DIAGNOSIS — M05.79 RHEUMATOID ARTHRITIS INVOLVING MULTIPLE SITES WITH POSITIVE RHEUMATOID FACTOR: Primary | ICD-10-CM

## 2024-06-11 PROCEDURE — 3077F SYST BP >= 140 MM HG: CPT | Performed by: STUDENT IN AN ORGANIZED HEALTH CARE EDUCATION/TRAINING PROGRAM

## 2024-06-11 PROCEDURE — 99213 OFFICE O/P EST LOW 20 MIN: CPT | Performed by: STUDENT IN AN ORGANIZED HEALTH CARE EDUCATION/TRAINING PROGRAM

## 2024-06-11 PROCEDURE — 3079F DIAST BP 80-89 MM HG: CPT | Performed by: STUDENT IN AN ORGANIZED HEALTH CARE EDUCATION/TRAINING PROGRAM

## 2024-06-11 PROCEDURE — 1126F AMNT PAIN NOTED NONE PRSNT: CPT | Performed by: STUDENT IN AN ORGANIZED HEALTH CARE EDUCATION/TRAINING PROGRAM

## 2024-07-16 ENCOUNTER — ANTICOAGULATION VISIT (OUTPATIENT)
Dept: CARDIOLOGY | Facility: CLINIC | Age: 84
End: 2024-07-16
Payer: MEDICARE

## 2024-07-16 VITALS
DIASTOLIC BLOOD PRESSURE: 66 MMHG | SYSTOLIC BLOOD PRESSURE: 157 MMHG | BODY MASS INDEX: 20.97 KG/M2 | HEART RATE: 64 BPM | WEIGHT: 111 LBS

## 2024-07-16 DIAGNOSIS — I48.0 PAROXYSMAL ATRIAL FIBRILLATION: Primary | Chronic | ICD-10-CM

## 2024-07-16 DIAGNOSIS — Z79.01 LONG TERM (CURRENT) USE OF ANTICOAGULANTS: Chronic | ICD-10-CM

## 2024-07-16 LAB — INR PPP: 2.4 (ref 0.9–1.1)

## 2024-07-16 PROCEDURE — 36416 COLLJ CAPILLARY BLOOD SPEC: CPT | Performed by: INTERNAL MEDICINE

## 2024-07-16 PROCEDURE — 85610 PROTHROMBIN TIME: CPT | Performed by: INTERNAL MEDICINE

## 2024-07-23 ENCOUNTER — TELEPHONE (OUTPATIENT)
Dept: CARDIOLOGY | Facility: CLINIC | Age: 84
End: 2024-07-23
Payer: MEDICARE

## 2024-07-23 NOTE — TELEPHONE ENCOUNTER
Caller: Jt Valdes    Relationship: Self    Best call back number: [phone]    Which medication are you concerned about: MECLIZINE 25 MG    Who prescribed you this medication: LEISA GARDUNO    When did you start taking this medication: 2 YEARS     What are your concerns: PATIENT STATES SHE HAS HAD SOME DIZZINESS AND WANTS TO KNOW IF THE MECLIZINE WILL INTERFERE WITH HER WARFARIN IF SHE TAKES THEM TOGETHER.     How long have you had these concerns: YESTERDAY AND TODAY

## 2024-07-23 NOTE — TELEPHONE ENCOUNTER
Called advised pt there is not a drug-drug interaction with Warfarin and Meclizine.      Last INR in office was 7/16/824 result was 2.4

## 2024-07-25 ENCOUNTER — LAB (OUTPATIENT)
Dept: LAB | Facility: HOSPITAL | Age: 84
End: 2024-07-25
Payer: MEDICARE

## 2024-07-25 DIAGNOSIS — E78.5 DYSLIPIDEMIA: ICD-10-CM

## 2024-07-25 DIAGNOSIS — I48.0 PAROXYSMAL ATRIAL FIBRILLATION: ICD-10-CM

## 2024-07-25 DIAGNOSIS — N18.30 STAGE 3 CHRONIC KIDNEY DISEASE, UNSPECIFIED WHETHER STAGE 3A OR 3B CKD: ICD-10-CM

## 2024-07-25 DIAGNOSIS — I11.9 HYPERTENSIVE HEART DISEASE WITHOUT HEART FAILURE: ICD-10-CM

## 2024-07-25 LAB
ALBUMIN SERPL-MCNC: 4.5 G/DL (ref 3.5–5.2)
ALBUMIN/GLOB SERPL: 1.7 G/DL
ALP SERPL-CCNC: 83 U/L (ref 39–117)
ALT SERPL W P-5'-P-CCNC: 9 U/L (ref 1–33)
ANION GAP SERPL CALCULATED.3IONS-SCNC: 9.8 MMOL/L (ref 5–15)
AST SERPL-CCNC: 21 U/L (ref 1–32)
BILIRUB SERPL-MCNC: 0.5 MG/DL (ref 0–1.2)
BUN SERPL-MCNC: 29 MG/DL (ref 8–23)
BUN/CREAT SERPL: 26.4 (ref 7–25)
CALCIUM SPEC-SCNC: 9.8 MG/DL (ref 8.6–10.5)
CHLORIDE SERPL-SCNC: 103 MMOL/L (ref 98–107)
CHOLEST SERPL-MCNC: 207 MG/DL (ref 0–200)
CO2 SERPL-SCNC: 24.2 MMOL/L (ref 22–29)
CREAT SERPL-MCNC: 1.1 MG/DL (ref 0.57–1)
EGFRCR SERPLBLD CKD-EPI 2021: 49.7 ML/MIN/1.73
GLOBULIN UR ELPH-MCNC: 2.6 GM/DL
GLUCOSE SERPL-MCNC: 104 MG/DL (ref 65–99)
HDLC SERPL-MCNC: 60 MG/DL (ref 40–60)
LDLC SERPL CALC-MCNC: 122 MG/DL (ref 0–100)
LDLC/HDLC SERPL: 1.97 {RATIO}
POTASSIUM SERPL-SCNC: 4.6 MMOL/L (ref 3.5–5.2)
PROT SERPL-MCNC: 7.1 G/DL (ref 6–8.5)
SODIUM SERPL-SCNC: 137 MMOL/L (ref 136–145)
TRIGL SERPL-MCNC: 143 MG/DL (ref 0–150)
VLDLC SERPL-MCNC: 25 MG/DL (ref 5–40)

## 2024-07-25 PROCEDURE — 36415 COLL VENOUS BLD VENIPUNCTURE: CPT

## 2024-07-25 PROCEDURE — 80053 COMPREHEN METABOLIC PANEL: CPT

## 2024-07-25 PROCEDURE — 80061 LIPID PANEL: CPT

## 2024-08-01 NOTE — PROGRESS NOTES
Subjective:     Encounter Date:08/02/2024      Patient ID: Jt Valdes is a 83 y.o. female.    Chief Complaint:3 month follow up with INR     History of Present Illness    Ms. Jt Valdes has PMH of      Hypertension/hypertensive cardiovascular disease     Paroxysmal atrial fibrillation, noncompliant on Xarelto, now on coumadin, ablation  8/1/2023  [id number] SCORE   [id number] Score: 4 (5/1/2024 11:25 AM)   (Age greater than 75, female gender, hypertension)     Hyperlipidemia  SILVERIO, noncompliant on CPAP  Hypothyroidism  Whitecoat hypertension  History of leukopenia, staph infection  Rheumatoid arthritis, possible pulmonary involved ,DJD, DDD  Partial hysterectomy, appendectomy, back surgery  Allergies/intolerance to sulfa-rash  Non-smoker    Is here for 3-month follow-up for hypertension, paroxysmal A-fib and INR check.  Patient denies any chest pain shortness of breath.  She does have lower extremity edema today which is new for her however she does report that she ate broccoli soup yesterday as well and has summer sausage.  She was also not able to elevate her feet in her recliner as her electric was out in her work recliner only reclines with electricity.    Blood pressure today is elevated 159/68 no left arm 164/76 in the right arm heart rate 70 weight 213 pounds      Lab Review:     Labs from 7/25/2024 potassium 4.6 BUN 29 creatinine 1.10 EGFR 49.7 total cholesterol 207 HDL 60     INR today 2.5    The following portions of the patient's history were reviewed and updated as appropriate: allergies, current medications, past family history, past medical history, past social history, past surgical history, and problem list.    Review of Systems   Constitutional: Negative for malaise/fatigue.   Cardiovascular:  Positive for leg swelling (Ankle). Negative for chest pain, dyspnea on exertion and palpitations.   Respiratory:  Negative for cough and shortness of breath.     Gastrointestinal:  Negative for abdominal pain, nausea and vomiting.   Neurological:  Negative for dizziness, focal weakness, headaches, light-headedness and numbness.   All other systems reviewed and are negative.    Past Medical History:   Diagnosis Date    Allergic     Sulfa    Arthritis     Cataract     Cataracts surgery    COVID-19     Degenerative joint disease     Extremity pain     legs pain    GERD (gastroesophageal reflux disease)     H/O degenerative disc disease     History of colonoscopy 2009    last done 2009    History of echocardiogram 09/04/2018    LVH EF 65%. RV OK. LA probably mildly dilated. AV OK. MV OK. Modest TR RVSP 26 or less. Nuclear MPI regadenoson 9/14/2018. LV uniform myocardial uptake and wall motion EF 71%.     History of Holter monitoring 10/08/2018    SR 40-81 58. AF 2.7 hr episode VR . At termination AF 2 3.5-4.0 s pauses with patient possibly dizzy. 205 PAC 42 atrial ashanti several SVT. No ventricular ectopy.     HL (hearing loss) 2020    Tried hearing aids twice . Didnt help    HTN (hypertension)     Hyperlipidemia     Hypertensive cardiovascular disease     Hypothyroidism     Incontinence     Leukopenia     Low back pain     PAF (paroxysmal atrial fibrillation) 09/2018    [facility] Sept 2018 PAF and HTN. PAFL On bisoprolol and diltiazem bradycardia symptomatic with dizziness. Amiodarone alone Oct 2018 and no episodes of erratic or fast heartbeat or lightheadedness.     Rheumatoid arthritis     Possible pulmonary involvement     Staph infection     Vitamin D deficiency     White coat syndrome with hypertension      Past Surgical History:   Procedure Laterality Date    APPENDECTOMY  1974    BACK SURGERY  2003 2001, 2003    CARDIAC ELECTROPHYSIOLOGY PROCEDURE Right 08/01/2023    Procedure: Ablation atrial fibrillation, WITH CRYO  Brigido and Magdalena aware;  Surgeon: Dyllan Lloyd MD;  Location: [facility] INVASIVE LOCATION;  Service: Cardiovascular;  Laterality: Right;  "   CYST REMOVAL Left     on hand    GANGLION CYST EXCISION      SPINE SURGERY  2001.    2003    SUBTOTAL HYSTERECTOMY  1974     /76 (BP Location: Right arm, Patient Position: Sitting, Cuff Size: Adult)   Pulse 70   Ht 154.9 cm (61\")   Wt 51.3 kg (113 lb)   BMI 21.35 kg/m²   Family History   Problem Relation Age of Onset    Osteoarthritis Mother     Arthritis Mother     Cancer Mother     Other Other         Rheumatoid disease     Cancer Father     Cancer Sister        Current Outpatient Medications:     Abatacept (ORENCIA IV), Infuse  into a venous catheter., Disp: , Rfl:     amLODIPine (NORVASC) 10 MG tablet, Take 1 tablet by mouth Daily., Disp: 90 tablet, Rfl: 3    Cyanocobalamin (Vitamin B-12) 1000 MCG sublingual tablet, Place 1 tablet under the tongue Daily., Disp: 100 each, Rfl: 4    Diclofenac Sodium (VOLTAREN) 1 % gel gel, Apply 4 g topically to the appropriate area as directed 4 (Four) Times a Day As Needed (pain)., Disp: 150 g, Rfl: 3    leflunomide (ARAVA) 20 MG tablet, Take 1 tablet by mouth Daily., Disp: 90 tablet, Rfl: 0    levothyroxine (SYNTHROID, LEVOTHROID) 100 MCG tablet, Take 1 tab po daily, Disp: 90 tablet, Rfl: 3    losartan (COZAAR) 50 MG tablet, Take 2 tablets by mouth Every Night., Disp: , Rfl:     magnesium oxide (MAG-OX) 400 MG tablet, Take 1 tablet by mouth 2 (Two) Times a Day., Disp: 60 tablet, Rfl: 11    metoprolol succinate XL (TOPROL-XL) 100 MG 24 hr tablet, Take 1 tab, by mouth, daily., Disp: 90 tablet, Rfl: 3    Multiple Vitamins-Minerals (PRESERVISION AREDS 2 PO), Take 1 tablet by mouth Daily., Disp: , Rfl:     polyethylene glycol (MIRALAX) 17 g packet, Take 8.5 g by mouth Daily., Disp: , Rfl:     Vitamin D, Ergocalciferol, 50 MCG (2000 UT) capsule, Take 2,000 Units by mouth Daily., Disp: 100 capsule, Rfl: 4    warfarin (COUMADIN) 2.5 MG tablet, Take 2 tabs, by mouth, daily or as directed., Disp: 180 tablet, Rfl: 3  Allergies   Allergen Reactions    Sulfa Antibiotics Rash "    Macrobid [Nitrofurantoin] Unknown - High Severity     Social History     Socioeconomic History    Marital status:     Number of children: 5    Years of education: 12   Tobacco Use    Smoking status: Never     Passive exposure: Past    Smokeless tobacco: Never   Vaping Use    Vaping status: Never Used   Substance and Sexual Activity    Alcohol use: Yes     Alcohol/week: 1.0 standard drink of alcohol     Types: 1 Glasses of wine per week     Comment: Occ.    Drug use: No    Sexual activity: Not Currently                Objective:     Vitals reviewed.   Constitutional:       Appearance: Healthy appearance. Not in distress. Frail.   Neck:      Vascular: No JVR. JVD normal.   Pulmonary:      Effort: Pulmonary effort is normal.      Breath sounds: Normal breath sounds. No wheezing. No rhonchi. No rales.   Chest:      Chest wall: Not tender to palpatation.   Cardiovascular:      PMI at left midclavicular line. Normal rate. Regular rhythm. Normal S1. Normal S2.       Murmurs: There is no murmur.      No gallop.  No click. No rub.   Pulses:     Intact distal pulses.   Edema:     Ankle: bilateral trace edema of the ankle.  Abdominal:      General: Bowel sounds are normal.      Palpations: Abdomen is soft.      Tenderness: There is no abdominal tenderness.   Musculoskeletal: Normal range of motion.         General: No tenderness. Skin:     General: Skin is warm and dry.   Neurological:      General: No focal deficit present.      Mental Status: Alert and oriented to person, place and time.       Procedures                  Assessment:     MDM       Diagnosis Plan   1. Primary hypertension        2. Paroxysmal atrial fibrillation        3. Hypertensive heart disease without heart failure        4. Long term (current) use of anticoagulants                       Plan:   Chart reviewed  Labs reviewed  Reviewed hypertensive cardiovascular disease and renal dysfunction  Will increase losartan today to 100 mg  Advised  patient we will need to recheck her labs in 2 weeks  She reports that she has an appointment with PCP on 8/14 and will have them check her labs    She wants to monitor the swelling in her legs as she knows that she ate a lot of salt yesterday  I advised if there is no improvement over the weekend please call on Monday for a diuretic      - Low Salt (sodium) diet         Monitor blood pressure at home  Continue amlodipine 10 mg daily losartan 100 mg daily metoprolol 100 mg daily  Continue current dose of warfarin    Repeat INR next scheduled appointment in 1 month    Follow-up with Dr. Patiño in May or with me sooner as needed    Electronically signed by REINA Maier, 08/02/24, 10:54 AM EDT.      This document is intended for medical expert use only.  Reading of this document by patients and/or patient's family without participating medical staff guidance may result in misinterpretation and unintended morbidity. Any interpretation of such data is the responsibility of the patient and/or family member responsible for the patient in concert with their primary or specialist providers, not to be left for sources of online search as such as [employer], [employer] or similar queries.  Relying on these approaches to knowledge may result in misinterpretation, misguided goals of care and even death should patient or family members try recommendations outside of the realm of professional medical care in a supervised inpatient environment.

## 2024-08-02 ENCOUNTER — ANTICOAGULATION VISIT (OUTPATIENT)
Dept: CARDIOLOGY | Facility: CLINIC | Age: 84
End: 2024-08-02
Payer: MEDICARE

## 2024-08-02 ENCOUNTER — OFFICE VISIT (OUTPATIENT)
Dept: CARDIOLOGY | Facility: CLINIC | Age: 84
End: 2024-08-02
Payer: MEDICARE

## 2024-08-02 VITALS
WEIGHT: 113 LBS | DIASTOLIC BLOOD PRESSURE: 76 MMHG | HEART RATE: 70 BPM | BODY MASS INDEX: 21.34 KG/M2 | HEIGHT: 61 IN | SYSTOLIC BLOOD PRESSURE: 164 MMHG

## 2024-08-02 VITALS
HEART RATE: 70 BPM | DIASTOLIC BLOOD PRESSURE: 68 MMHG | WEIGHT: 113 LBS | BODY MASS INDEX: 21.35 KG/M2 | SYSTOLIC BLOOD PRESSURE: 159 MMHG

## 2024-08-02 DIAGNOSIS — Z79.01 LONG TERM (CURRENT) USE OF ANTICOAGULANTS: Chronic | ICD-10-CM

## 2024-08-02 DIAGNOSIS — I10 PRIMARY HYPERTENSION: Primary | Chronic | ICD-10-CM

## 2024-08-02 DIAGNOSIS — I48.0 PAROXYSMAL ATRIAL FIBRILLATION: Chronic | ICD-10-CM

## 2024-08-02 DIAGNOSIS — I48.0 PAROXYSMAL ATRIAL FIBRILLATION: Primary | Chronic | ICD-10-CM

## 2024-08-02 DIAGNOSIS — I11.9 HYPERTENSIVE HEART DISEASE WITHOUT HEART FAILURE: ICD-10-CM

## 2024-08-02 LAB — INR PPP: 2.5 (ref 2–3)

## 2024-08-02 PROCEDURE — 85610 PROTHROMBIN TIME: CPT | Performed by: INTERNAL MEDICINE

## 2024-08-02 PROCEDURE — 36416 COLLJ CAPILLARY BLOOD SPEC: CPT | Performed by: INTERNAL MEDICINE

## 2024-08-02 RX ORDER — LOSARTAN POTASSIUM 50 MG/1
100 TABLET ORAL NIGHTLY
Start: 2024-08-02

## 2024-08-02 RX ORDER — LOSARTAN POTASSIUM 50 MG/1
50 TABLET ORAL NIGHTLY
Qty: 90 TABLET | Refills: 3 | Status: SHIPPED | OUTPATIENT
Start: 2024-08-02 | End: 2024-08-02

## 2024-08-14 ENCOUNTER — OFFICE VISIT (OUTPATIENT)
Dept: FAMILY MEDICINE CLINIC | Facility: CLINIC | Age: 84
End: 2024-08-14
Payer: MEDICARE

## 2024-08-14 VITALS
HEART RATE: 69 BPM | OXYGEN SATURATION: 97 % | BODY MASS INDEX: 21.14 KG/M2 | DIASTOLIC BLOOD PRESSURE: 52 MMHG | WEIGHT: 112 LBS | HEIGHT: 61 IN | SYSTOLIC BLOOD PRESSURE: 118 MMHG

## 2024-08-14 DIAGNOSIS — E03.9 ACQUIRED HYPOTHYROIDISM: ICD-10-CM

## 2024-08-14 DIAGNOSIS — D64.9 NORMOCYTIC ANEMIA: Primary | Chronic | ICD-10-CM

## 2024-08-14 DIAGNOSIS — M65.30 TRIGGER FINGER OF RIGHT HAND, UNSPECIFIED FINGER: ICD-10-CM

## 2024-08-14 PROCEDURE — 3078F DIAST BP <80 MM HG: CPT | Performed by: FAMILY MEDICINE

## 2024-08-14 PROCEDURE — 3074F SYST BP LT 130 MM HG: CPT | Performed by: FAMILY MEDICINE

## 2024-08-14 PROCEDURE — 99214 OFFICE O/P EST MOD 30 MIN: CPT | Performed by: FAMILY MEDICINE

## 2024-08-14 PROCEDURE — 1126F AMNT PAIN NOTED NONE PRSNT: CPT | Performed by: FAMILY MEDICINE

## 2024-08-14 RX ORDER — LEVOTHYROXINE SODIUM 0.1 MG/1
TABLET ORAL
Qty: 90 TABLET | Refills: 3 | Status: SHIPPED | OUTPATIENT
Start: 2024-08-14

## 2024-08-14 NOTE — PROGRESS NOTES
Clyde Valdes is a 83 y.o. female.     History of Present Illness  The patient presents with questions about her thyroid, normocytic anemia, and a trigger finger. She is accompanied by an adult female.    Her thyroid levels have been stable recently, with only minor fluctuations. She has been on the same dosage of medication for some time, which was recently reduced from 112 to 100. She is also under the care of a cardiologist and a rheumatologist.     She is currently receiving infusions for her arthritis, which she finds effective. She experiences back pain, which she attributes to two back surgeries performed approximately 20 years ago. She was informed that further surgery would be beneficial, but she has declined this option. She sought pain management for her back pain about 4 years ago, but found it unhelpful. She experiences shortness of breath, which is not a new symptom. She also reports a clicking sound when she walks down the [place].     Her losartan dosage was increased from 50 mg to 100 mg by Dr. Patiño's nurse practitioner, who also suggested that the losartan might be affecting her kidney function. She had lab work done on 07/25/2024. She has discontinued her B12 supplement due to high levels.    She reports that her right fourth finger often locks up. She is left-handed and has noticed a knot on her finger. She is unsure if this is related to her arthritis or carpal tunnel syndrome. The discomfort is more pronounced at night. Occasionally, she struggles to straighten her finger. She was advised to consider an injection for her finger. She had a ganglion cyst removed 3 to 4 months ago by a doctor at [facility]       The following portions of the patient's history were reviewed and updated as appropriate: allergies, current medications, past family history, past medical history, past social history, past surgical history, and problem list.  Past Medical History:   Diagnosis Date     Allergic     Sulfa    Arthritis     Cataract     Cataracts surgery    COVID-19     Degenerative joint disease     Extremity pain     legs pain    GERD (gastroesophageal reflux disease)     H/O degenerative disc disease     History of colonoscopy 2009    last done 2009    History of echocardiogram 09/04/2018    LVH EF 65%. RV OK. LA probably mildly dilated. AV OK. MV OK. Modest TR RVSP 26 or less. Nuclear MPI regadenoson 9/14/2018. LV uniform myocardial uptake and wall motion EF 71%.     History of Holter monitoring 10/08/2018    SR 40-81 58. AF 2.7 hr episode VR . At termination AF 2 3.5-4.0 s pauses with patient possibly dizzy. 205 PAC 42 atrial ashanti several SVT. No ventricular ectopy.     HL (hearing loss) 2020    Tried hearing aids twice . Didnt help    HTN (hypertension)     Hyperlipidemia     Hypertensive cardiovascular disease     Hypothyroidism     Incontinence     Leukopenia     Low back pain     PAF (paroxysmal atrial fibrillation) 09/2018    [facility] Sept 2018 PAF and HTN. PAFL On bisoprolol and diltiazem bradycardia symptomatic with dizziness. Amiodarone alone Oct 2018 and no episodes of erratic or fast heartbeat or lightheadedness.     Rheumatoid arthritis     Possible pulmonary involvement     Staph infection     Vitamin D deficiency     White coat syndrome with hypertension      Past Surgical History:   Procedure Laterality Date    APPENDECTOMY  1974    BACK SURGERY  2003 2001, 2003    CARDIAC ELECTROPHYSIOLOGY PROCEDURE Right 08/01/2023    Procedure: Ablation atrial fibrillation, WITH CRYO  Brigido and Magdalena aware;  Surgeon: Dyllan Lloyd MD;  Location: [facility] INVASIVE LOCATION;  Service: Cardiovascular;  Laterality: Right;    CYST REMOVAL Left     on hand    GANGLION CYST EXCISION      SPINE SURGERY  2001.    2003    SUBTOTAL HYSTERECTOMY  1974     Family History   Problem Relation Age of Onset    Osteoarthritis Mother     Arthritis Mother     Cancer Mother     Other Other       "   Rheumatoid disease     Cancer Father     Cancer Sister      Social History     Socioeconomic History    Marital status:     Number of children: 5    Years of education: 12   Tobacco Use    Smoking status: Never     Passive exposure: Past    Smokeless tobacco: Never   Vaping Use    Vaping status: Never Used   Substance and Sexual Activity    Alcohol use: Yes     Alcohol/week: 1.0 standard drink of alcohol     Types: 1 Glasses of wine per week     Comment: Occ.    Drug use: Never    Sexual activity: Not Currently         Current Outpatient Medications:     Abatacept (ORENCIA IV), Infuse  into a venous catheter., Disp: , Rfl:     amLODIPine (NORVASC) 10 MG tablet, Take 1 tablet by mouth Daily., Disp: 90 tablet, Rfl: 3    Diclofenac Sodium (VOLTAREN) 1 % gel gel, Apply 4 g topically to the appropriate area as directed 4 (Four) Times a Day As Needed (pain)., Disp: 150 g, Rfl: 3    leflunomide (ARAVA) 20 MG tablet, Take 1 tablet by mouth Daily., Disp: 90 tablet, Rfl: 0    levothyroxine (SYNTHROID, LEVOTHROID) 100 MCG tablet, Take 1 tab po daily, Disp: 90 tablet, Rfl: 3    losartan (COZAAR) 50 MG tablet, Take 2 tablets by mouth Every Night., Disp: , Rfl:     magnesium oxide (MAG-OX) 400 MG tablet, Take 1 tablet by mouth 2 (Two) Times a Day., Disp: 60 tablet, Rfl: 11    metoprolol succinate XL (TOPROL-XL) 100 MG 24 hr tablet, Take 1 tab, by mouth, daily., Disp: 90 tablet, Rfl: 3    Multiple Vitamins-Minerals (PRESERVISION AREDS 2 PO), Take 1 tablet by mouth Daily., Disp: , Rfl:     polyethylene glycol (MIRALAX) 17 g packet, Take 8.5 g by mouth Daily., Disp: , Rfl:     Vitamin D, Ergocalciferol, 50 MCG (2000 UT) capsule, Take 2,000 Units by mouth Daily., Disp: 100 capsule, Rfl: 4    warfarin (COUMADIN) 2.5 MG tablet, Take 2 tabs, by mouth, daily or as directed., Disp: 180 tablet, Rfl: 3    Review of Systems  ROS done and noted in HPI    /52   Pulse 69   Ht 154.9 cm (61\")   Wt 50.8 kg (112 lb)   SpO2 97%  "  BMI 21.16 kg/m²   BMI is within normal parameters. No other follow-up for BMI required.       Objective   Physical Exam  Vitals and nursing note reviewed.   Constitutional:       Appearance: Normal appearance. She is normal weight.   Cardiovascular:      Rate and Rhythm: Normal rate and regular rhythm.      Heart sounds: Normal heart sounds.   Pulmonary:      Effort: Pulmonary effort is normal.      Breath sounds: Normal breath sounds.   Musculoskeletal:      Cervical back: Neck supple.      Right lower leg: No edema.      Left lower leg: No edema.      Comments: Trigger finger: right 3rd and 4th fingers (4th is the worst)   Lymphadenopathy:      Cervical: No cervical adenopathy.   Neurological:      Mental Status: She is alert.       Physical Exam  Vital Signs  Blood pressure reading is 118/52.       Results  Laboratory Studies  Thyroid levels are stable.     Lab Results   Component Value Date    TSH 0.972 05/28/2024     Lab Results   Component Value Date    WBC 4.94 05/28/2024    HGB 12.1 05/28/2024    HCT 37.6 05/28/2024    MCV 95.7 05/28/2024     05/28/2024         Assessment & Plan   Problems Addressed this Visit          Endocrine and Metabolic    Acquired hypothyroidism    Relevant Medications    levothyroxine (SYNTHROID, LEVOTHROID) 100 MCG tablet       Hematology and Neoplasia    Normocytic anemia - Primary (Chronic)     Other Visit Diagnoses       Trigger finger of right hand, unspecified finger        Relevant Orders    Ambulatory Referral to Orthopedic Surgery (Completed)          Diagnoses         Codes Comments    Normocytic anemia    -  Primary ICD-10-CM: D64.9  ICD-9-CM: 285.9     Acquired hypothyroidism     ICD-10-CM: E03.9  ICD-9-CM: 244.9     Trigger finger of right hand, unspecified finger     ICD-10-CM: M65.30  ICD-9-CM: 727.03           Assessment & Plan  1. Normocytic Anemia.  Her mild anemia is likely due to arthritis and the associated medications. Adequate hydration was emphasized  to support kidney function. B12 supplementation was stopped due to high levels, and future monitoring will determine if a lower dose or alternate-day regimen is needed. If kidney function remains a concern, a referral to a nephrologist will be made.    2. Hypothyroidism.  Thyroid levels are stable. She will continue on her current dose of 100 mcg of levothyroxine. A refill for her thyroid medication will be sent to [facility].    3. Trigger Finger.  She experiences locking and discomfort in her right fourth finger, which worsens at night. A referral to [facility] for further evaluation and treatment4    4. Hypertension.  Her losartan dosage was recently increased from 50 mg to 100 mg by her nurse practitioner. She was advised to maintain adequate water intake to support kidney function.    Follow-up  She is scheduled for a follow-up visit on 11/26/2024.            Patient or patient representative verbalized consent for the use of Ambient Listening during the visit with  Dilcia Trinidad MD for chart documentation. 8/14/2024  13:10 EDT

## 2024-09-11 ENCOUNTER — ANTICOAGULATION VISIT (OUTPATIENT)
Dept: CARDIOLOGY | Facility: CLINIC | Age: 84
End: 2024-09-11
Payer: MEDICARE

## 2024-09-11 VITALS
WEIGHT: 113 LBS | BODY MASS INDEX: 21.35 KG/M2 | DIASTOLIC BLOOD PRESSURE: 75 MMHG | SYSTOLIC BLOOD PRESSURE: 153 MMHG | HEART RATE: 70 BPM | OXYGEN SATURATION: 98 %

## 2024-09-11 DIAGNOSIS — I48.0 PAROXYSMAL ATRIAL FIBRILLATION: Primary | Chronic | ICD-10-CM

## 2024-09-11 DIAGNOSIS — Z79.01 LONG TERM (CURRENT) USE OF ANTICOAGULANTS: Chronic | ICD-10-CM

## 2024-09-11 LAB — INR PPP: 2.8 (ref 2–3)

## 2024-09-11 PROCEDURE — 36416 COLLJ CAPILLARY BLOOD SPEC: CPT | Performed by: INTERNAL MEDICINE

## 2024-09-11 PROCEDURE — 85610 PROTHROMBIN TIME: CPT | Performed by: INTERNAL MEDICINE

## 2024-09-23 ENCOUNTER — PATIENT MESSAGE (OUTPATIENT)
Dept: CARDIOLOGY | Facility: CLINIC | Age: 84
End: 2024-09-23
Payer: MEDICARE

## 2024-09-24 RX ORDER — LOSARTAN POTASSIUM 50 MG/1
100 TABLET ORAL NIGHTLY
Qty: 180 TABLET | Refills: 3
Start: 2024-09-24

## 2024-10-09 ENCOUNTER — TELEPHONE (OUTPATIENT)
Dept: CARDIOLOGY | Facility: CLINIC | Age: 84
End: 2024-10-09
Payer: MEDICARE

## 2024-10-09 DIAGNOSIS — E03.9 ACQUIRED HYPOTHYROIDISM: ICD-10-CM

## 2024-10-09 RX ORDER — LOSARTAN POTASSIUM 50 MG/1
100 TABLET ORAL NIGHTLY
Qty: 180 TABLET | Refills: 1 | Status: SHIPPED | OUTPATIENT
Start: 2024-10-09

## 2024-10-09 RX ORDER — LOSARTAN POTASSIUM 50 MG/1
100 TABLET ORAL NIGHTLY
Qty: 60 TABLET | Refills: 0 | Status: SHIPPED | OUTPATIENT
Start: 2024-10-09 | End: 2024-10-09 | Stop reason: SDUPTHER

## 2024-10-09 RX ORDER — LOSARTAN POTASSIUM 50 MG/1
100 TABLET ORAL NIGHTLY
Qty: 180 TABLET | Refills: 1 | OUTPATIENT
Start: 2024-10-09

## 2024-10-09 NOTE — TELEPHONE ENCOUNTER
Rx Refill Note  Requested Prescriptions     Signed Prescriptions Disp Refills    losartan (COZAAR) 50 MG tablet 60 tablet 0     Sig: Take 2 tablets by mouth Every Night.     Authorizing Provider: SANDRA NEWSOME     Ordering User: LORIN EMERY      Last office visit with prescribing clinician: 5/1/2024   Last telemedicine visit with prescribing clinician: Visit date not found   Next office visit with prescribing clinician: 5/1/2025                         Would you like a call back once the refill request has been completed: [] Yes [] No    If the office needs to give you a call back, can they leave a voicemail: [] Yes [] No    Lorin Emery MA  10/09/24, 14:45 EDT

## 2024-10-09 NOTE — TELEPHONE ENCOUNTER
Incoming Refill Request      Medication requested (name and dose): Losartan 50     Pharmacy where request should be sent: [facility]     Additional details provided by patient: Appears rx was sent to [facility] but they tell pt they have not rec'd. She would like rx called into [facility] since she only has 1 day left.     Best call back number: [phone]    Does the patient have less than a 3 day supply:  [x] Yes  [] No

## 2024-10-16 ENCOUNTER — ANTICOAGULATION VISIT (OUTPATIENT)
Dept: CARDIOLOGY | Facility: CLINIC | Age: 84
End: 2024-10-16
Payer: MEDICARE

## 2024-10-16 VITALS
SYSTOLIC BLOOD PRESSURE: 149 MMHG | DIASTOLIC BLOOD PRESSURE: 66 MMHG | HEART RATE: 63 BPM | BODY MASS INDEX: 21.16 KG/M2 | WEIGHT: 112 LBS

## 2024-10-16 DIAGNOSIS — Z79.01 LONG TERM (CURRENT) USE OF ANTICOAGULANTS: Chronic | ICD-10-CM

## 2024-10-16 DIAGNOSIS — I48.0 PAROXYSMAL ATRIAL FIBRILLATION: Primary | Chronic | ICD-10-CM

## 2024-10-16 LAB — INR PPP: 2.6 (ref 2–3)

## 2024-10-16 PROCEDURE — 85610 PROTHROMBIN TIME: CPT | Performed by: INTERNAL MEDICINE

## 2024-10-16 PROCEDURE — 36416 COLLJ CAPILLARY BLOOD SPEC: CPT | Performed by: INTERNAL MEDICINE

## 2024-11-20 ENCOUNTER — ANTICOAGULATION VISIT (OUTPATIENT)
Dept: CARDIOLOGY | Facility: CLINIC | Age: 84
End: 2024-11-20
Payer: MEDICARE

## 2024-11-20 VITALS — SYSTOLIC BLOOD PRESSURE: 166 MMHG | HEART RATE: 70 BPM | DIASTOLIC BLOOD PRESSURE: 75 MMHG

## 2024-11-20 DIAGNOSIS — Z79.01 LONG TERM (CURRENT) USE OF ANTICOAGULANTS: Chronic | ICD-10-CM

## 2024-11-20 DIAGNOSIS — I48.0 PAROXYSMAL ATRIAL FIBRILLATION: Primary | Chronic | ICD-10-CM

## 2024-11-20 LAB — INR PPP: 2.4 (ref 2–3)

## 2024-11-20 PROCEDURE — 85610 PROTHROMBIN TIME: CPT | Performed by: INTERNAL MEDICINE

## 2024-11-20 PROCEDURE — 36416 COLLJ CAPILLARY BLOOD SPEC: CPT | Performed by: INTERNAL MEDICINE

## 2024-11-26 ENCOUNTER — LAB (OUTPATIENT)
Dept: FAMILY MEDICINE CLINIC | Facility: CLINIC | Age: 84
End: 2024-11-26
Payer: MEDICARE

## 2024-11-26 ENCOUNTER — OFFICE VISIT (OUTPATIENT)
Dept: FAMILY MEDICINE CLINIC | Facility: CLINIC | Age: 84
End: 2024-11-26
Payer: MEDICARE

## 2024-11-26 VITALS
BODY MASS INDEX: 21.9 KG/M2 | HEART RATE: 68 BPM | DIASTOLIC BLOOD PRESSURE: 86 MMHG | OXYGEN SATURATION: 97 % | WEIGHT: 116 LBS | HEIGHT: 61 IN | SYSTOLIC BLOOD PRESSURE: 190 MMHG

## 2024-11-26 DIAGNOSIS — Z00.00 ENCOUNTER FOR ANNUAL WELLNESS EXAM IN MEDICARE PATIENT: Primary | ICD-10-CM

## 2024-11-26 DIAGNOSIS — E03.9 ACQUIRED HYPOTHYROIDISM: ICD-10-CM

## 2024-11-26 DIAGNOSIS — Z78.0 POSTMENOPAUSAL STATUS: ICD-10-CM

## 2024-11-26 LAB — TSH SERPL DL<=0.05 MIU/L-ACNC: 2.05 UIU/ML (ref 0.27–4.2)

## 2024-11-26 PROCEDURE — 3077F SYST BP >= 140 MM HG: CPT | Performed by: FAMILY MEDICINE

## 2024-11-26 PROCEDURE — 36415 COLL VENOUS BLD VENIPUNCTURE: CPT

## 2024-11-26 PROCEDURE — 3079F DIAST BP 80-89 MM HG: CPT | Performed by: FAMILY MEDICINE

## 2024-11-26 PROCEDURE — G0439 PPPS, SUBSEQ VISIT: HCPCS | Performed by: FAMILY MEDICINE

## 2024-11-26 PROCEDURE — 1159F MED LIST DOCD IN RCRD: CPT | Performed by: FAMILY MEDICINE

## 2024-11-26 PROCEDURE — 1160F RVW MEDS BY RX/DR IN RCRD: CPT | Performed by: FAMILY MEDICINE

## 2024-11-26 PROCEDURE — 99213 OFFICE O/P EST LOW 20 MIN: CPT | Performed by: FAMILY MEDICINE

## 2024-11-26 PROCEDURE — 84443 ASSAY THYROID STIM HORMONE: CPT | Performed by: FAMILY MEDICINE

## 2024-11-26 PROCEDURE — 1126F AMNT PAIN NOTED NONE PRSNT: CPT | Performed by: FAMILY MEDICINE

## 2024-11-26 NOTE — ASSESSMENT & PLAN NOTE
Encouraged her to continue to use her walker or cane.  I encouraged her to take her time.  I discussed the RSV vaccine.  Encouraged her to stay socially active

## 2024-11-26 NOTE — PROGRESS NOTES
Subjective   The ABCs of the Annual Wellness Visit  Medicare Wellness Visit      Jt Valdes is a 84 y.o. patient who presents for a Medicare Wellness Visit.    The following portions of the patient's history were reviewed and   updated as appropriate: allergies, current medications, past family history, past medical history, past social history, past surgical history, and problem list.    Compared to one year ago, the patient's physical   health is worse.  Compared to one year ago, the patient's mental   health is the same.    Recent Hospitalizations:  She was not admitted to the hospital during the last year.     Current Medical Providers:  Patient Care Team:  Dilcia Trinidad MD as PCP - General (Family Medicine)  Roland Benitez MD as Emergency Attending (Family Medicine)  Jose Patiño MD as Consulting Physician (Cardiology)  Aurora Mckeon APRN as Nurse Practitioner (Cardiology)  Nitish Nichole MD as Consulting Physician (Hematology and Oncology)    Outpatient Medications Prior to Visit   Medication Sig Dispense Refill    Abatacept (ORENCIA IV) Infuse  into a venous catheter.      amLODIPine (NORVASC) 10 MG tablet Take 1 tablet by mouth Daily. 90 tablet 3    Diclofenac Sodium (VOLTAREN) 1 % gel gel Apply 4 g topically to the appropriate area as directed 4 (Four) Times a Day As Needed (pain). 150 g 3    leflunomide (ARAVA) 20 MG tablet Take 1 tablet by mouth Daily. 90 tablet 0    levothyroxine (SYNTHROID, LEVOTHROID) 100 MCG tablet Take 1 tab po daily 90 tablet 3    losartan (COZAAR) 50 MG tablet Take 2 tablets by mouth Every Night. 180 tablet 1    magnesium oxide (MAG-OX) 400 MG tablet Take 1 tablet by mouth 2 (Two) Times a Day. 60 tablet 11    metoprolol succinate XL (TOPROL-XL) 100 MG 24 hr tablet Take 1 tab, by mouth, daily. 90 tablet 3    Multiple Vitamins-Minerals (PRESERVISION AREDS 2 PO) Take 1 tablet by mouth Daily.      polyethylene glycol (MIRALAX) 17 g packet Take  8.5 g by mouth Daily.      Vitamin D, Ergocalciferol, 50 MCG (2000 UT) capsule Take 2,000 Units by mouth Daily. 100 capsule 4    warfarin (COUMADIN) 2.5 MG tablet Take 2 tabs, by mouth, daily or as directed. 180 tablet 3     No facility-administered medications prior to visit.     No opioid medication identified on active medication list. I have reviewed chart for other potential  high risk medication/s and harmful drug interactions in the elderly.      Aspirin is not on active medication list.  Aspirin use is not indicated based on review of current medical condition/s. Risk of harm outweighs potential benefits.  .    Patient Active Problem List   Diagnosis    Degenerative joint disease    Elevated blood pressure reading without diagnosis of hypertension    Hypertensive cardiovascular disease    Fatigue    Gastroesophageal reflux disease    Hyperlipidemia    Primary hypertension    Acquired hypothyroidism    Occlusion and stenosis of unspecified carotid artery    Paroxysmal atrial fibrillation    Rheumatoid arthritis    Encounter for annual wellness exam in Medicare patient    Vitamin D deficiency    Drusen of macula of both eyes    Peripapillary atrophy of both eyes    Pseudophakia of both eyes    PVD (posterior vitreous detachment), right eye    Long term (current) use of anticoagulants    COVID    Normocytic anemia    Presbyopia    Atrial fibrillation with RVR    Visit for monitoring Tikosyn therapy    Palpitations    Dizziness    AF (atrial fibrillation)    Atrial fibrillation    Nonexudative age-related macular degeneration    Tear film insufficiency    Other long term (current) drug therapy    Peripapillary atrophy    SILVERIO (obstructive sleep apnea)     Advance Care Planning Advance Directive is on file.  ACP discussion was held with the patient during this visit. Patient has an advance directive in EMR which is still valid.             Objective   Vitals:    11/26/24 1204   BP: (!) 190/86   BP Location: Left  "arm   Patient Position: Sitting   Cuff Size: Adult   Pulse: 68   SpO2: 97%   Weight: 52.6 kg (116 lb)   Height: 154.9 cm (61\")   PainSc: 0-No pain       Estimated body mass index is 21.92 kg/m² as calculated from the following:    Height as of this encounter: 154.9 cm (61\").    Weight as of this encounter: 52.6 kg (116 lb).    BMI is within normal parameters. No other follow-up for BMI required.       Does the patient have evidence of cognitive impairment? No   MMSE done                                                                                              Health  Risk Assessment    Smoking Status:  Social History     Tobacco Use   Smoking Status Never    Passive exposure: Past   Smokeless Tobacco Never     Alcohol Consumption:  Social History     Substance and Sexual Activity   Alcohol Use Yes    Alcohol/week: 1.0 standard drink of alcohol    Types: 1 Glasses of wine per week    Comment: Occ.       Fall Risk Screen  STEADI Fall Risk Assessment was completed, and patient is at LOW risk for falls.Assessment completed on:2024    Depression Screening   Little interest or pleasure in doing things? Not at all   Feeling down, depressed, or hopeless? Several days   PHQ-2 Total Score 1      Health Habits and Functional and Cognitive Screenin/19/2024     8:31 AM   Functional & Cognitive Status   Do you have difficulty preparing food and eating? No    Do you have difficulty bathing yourself, getting dressed or grooming yourself? No    Do you have difficulty using the toilet? No    Do you have difficulty moving around from place to place? Yes    Do you have trouble with steps or getting out of a bed or a chair? Yes    Current Diet Well Balanced Diet    Dental Exam Up to date    Eye Exam Up to date    Exercise (times per week) 0 times per week    Current Exercises Include No Regular Exercise    Do you need help using the phone?  No    Are you deaf or do you have serious difficulty hearing?  Yes    Do " you need help to go to places out of walking distance? Yes    Do you need help shopping? No    Do you need help preparing meals?  No    Do you need help with housework?  No    Do you need help with laundry? No    Do you need help taking your medications? No    Do you need help managing money? No    Do you ever drive or ride in a car without wearing a seat belt? No    Have you felt unusual stress, anger or loneliness in the last month? Yes    Who do you live with? Alone    If you need help, do you have trouble finding someone available to you? No    Have you been bothered in the last four weeks by sexual problems? No    Do you have difficulty concentrating, remembering or making decisions? No        Patient-reported           Age-appropriate Screening Schedule:  Refer to the list below for future screening recommendations based on patient's age, sex and/or medical conditions. Orders for these recommended tests are listed in the plan section. The patient has been provided with a written plan.    Health Maintenance List  Health Maintenance   Topic Date Due    RSV Vaccine - Adults (1 - 1-dose 75+ series) Never done    DXA SCAN  03/29/2023    LIPID PANEL  07/25/2025    ANNUAL WELLNESS VISIT  11/26/2025    TDAP/TD VACCINES (2 - Td or Tdap) 10/04/2027    COVID-19 Vaccine  Completed    INFLUENZA VACCINE  Completed    Pneumococcal Vaccine 65+  Completed    ZOSTER VACCINE  Completed                                                                                                                                                CMS Preventative Services Quick Reference  Risk Factors Identified During Encounter  Immunizations Discussed/Encouraged: RSV (Respiratory Syncytial Virus)    The above risks/problems have been discussed with the patient.  Pertinent information has been shared with the patient in the After Visit Summary.  An After Visit Summary and PPPS were made available to the patient.    Follow Up:   Next Medicare  "Wellness visit to be scheduled in 1 year.         Additional E&M Note during same encounter follows:  Patient has additional, significant, and separately identifiable condition(s)/problem(s) that require work above and beyond the Medicare Wellness Visit     Chief Complaint  Medicare Wellness-subsequent (Wants thyroid level checked) and Arthritis (Hand swelling in knuckles. )    Subjective   HPI  Jt is also being seen today for additional medical problem/s: follow up on thyroid  She feels a little unsteady at times and uses her walker, cane, furniture and does well.  She plays games to keep her mentally sharp.  She has family coming over to help her make Bharath candy  She is still seeing her specialist  She says her blood pressure usually runs better but it is today but had some pain in her hand this morning which she thinks contributed to the elevation    Review of Systems   Constitutional: Negative.    Respiratory: Negative.     Cardiovascular: Negative.    Musculoskeletal:  Positive for arthralgias.              Objective   Vital Signs:  BP (!) 190/86 (BP Location: Left arm, Patient Position: Sitting, Cuff Size: Adult)   Pulse 68   Ht 154.9 cm (61\")   Wt 52.6 kg (116 lb)   SpO2 97%   BMI 21.92 kg/m²   Physical Exam  Vitals and nursing note reviewed.   Constitutional:       Appearance: Normal appearance. She is normal weight.   Cardiovascular:      Rate and Rhythm: Normal rate and regular rhythm.      Heart sounds: Normal heart sounds.   Pulmonary:      Effort: Pulmonary effort is normal.      Breath sounds: Normal breath sounds.   Musculoskeletal:      Right lower leg: No edema.      Left lower leg: No edema.   Neurological:      Mental Status: She is alert.      Comments: Using walker and ambulating well                       Assessment and Plan            Encounter for annual wellness exam in Medicare patient  Encouraged her to continue to use her walker or cane.  I encouraged her to take her time.  " I discussed the RSV vaccine.  Encouraged her to stay socially active       Acquired hypothyroidism  She will continue her current dose of levothyroxine  Orders:    TSH; Future    Postmenopausal status    Orders:    DEXA Bone Density Axial; Future    Cardiology follows her blood pressure       Follow Up   Return in about 1 year (around 11/26/2025) for Medicare Wellness.  Patient was given instructions and counseling regarding her condition or for health maintenance advice. Please see specific information pulled into the AVS if appropriate.

## 2024-12-15 ENCOUNTER — PATIENT MESSAGE (OUTPATIENT)
Dept: FAMILY MEDICINE CLINIC | Facility: CLINIC | Age: 84
End: 2024-12-15
Payer: MEDICARE

## 2024-12-15 RX ORDER — FAMOTIDINE 40 MG/1
40 TABLET, FILM COATED ORAL DAILY
Qty: 90 TABLET | Refills: 0 | Status: SHIPPED | OUTPATIENT
Start: 2024-12-15

## 2024-12-18 ENCOUNTER — ANTICOAGULATION VISIT (OUTPATIENT)
Dept: CARDIOLOGY | Facility: CLINIC | Age: 84
End: 2024-12-18
Payer: MEDICARE

## 2024-12-18 VITALS
WEIGHT: 113 LBS | SYSTOLIC BLOOD PRESSURE: 136 MMHG | HEART RATE: 66 BPM | DIASTOLIC BLOOD PRESSURE: 62 MMHG | BODY MASS INDEX: 21.35 KG/M2

## 2024-12-18 DIAGNOSIS — I48.0 PAROXYSMAL ATRIAL FIBRILLATION: Primary | Chronic | ICD-10-CM

## 2024-12-18 DIAGNOSIS — Z79.01 LONG TERM (CURRENT) USE OF ANTICOAGULANTS: Chronic | ICD-10-CM

## 2024-12-18 LAB — INR PPP: 2.4 (ref 0.9–1.1)

## 2024-12-18 PROCEDURE — 36416 COLLJ CAPILLARY BLOOD SPEC: CPT | Performed by: INTERNAL MEDICINE

## 2024-12-18 PROCEDURE — 85610 PROTHROMBIN TIME: CPT | Performed by: INTERNAL MEDICINE

## 2025-01-02 DIAGNOSIS — I48.0 PAROXYSMAL ATRIAL FIBRILLATION: ICD-10-CM

## 2025-01-02 DIAGNOSIS — Z79.01 LONG TERM (CURRENT) USE OF ANTICOAGULANTS: ICD-10-CM

## 2025-01-02 RX ORDER — METOPROLOL SUCCINATE 100 MG/1
TABLET, EXTENDED RELEASE ORAL
Qty: 90 TABLET | Refills: 1 | Status: SHIPPED | OUTPATIENT
Start: 2025-01-02

## 2025-01-02 NOTE — TELEPHONE ENCOUNTER
Rx Refill Note  Requested Prescriptions     Pending Prescriptions Disp Refills    metoprolol succinate XL (TOPROL-XL) 100 MG 24 hr tablet [Pharmacy Med Name: METOPROLOL SUCCINATE ER TABS 100MG] 90 tablet 3     Sig: TAKE 1 TABLET DAILY      Last office visit with prescribing clinician: 5/1/2024   Last telemedicine visit with prescribing clinician: Visit date not found   Next office visit with prescribing clinician: 5/1/2025                         Would you like a call back once the refill request has been completed: [] Yes [] No    If the office needs to give you a call back, can they leave a voicemail: [] Yes [] No    Marah Carmichael MA  01/02/25, 08:49 EST

## 2025-01-16 ENCOUNTER — PATIENT OUTREACH (OUTPATIENT)
Dept: CASE MANAGEMENT | Facility: CLINIC | Age: 85
End: 2025-01-16
Payer: MEDICARE

## 2025-01-16 ENCOUNTER — TELEPHONE (OUTPATIENT)
Dept: CASE MANAGEMENT | Facility: CLINIC | Age: 85
End: 2025-01-16
Payer: MEDICARE

## 2025-01-16 DIAGNOSIS — I48.91 ATRIAL FIBRILLATION, UNSPECIFIED TYPE: Primary | ICD-10-CM

## 2025-01-16 NOTE — OUTREACH NOTE
[facility] [place] [facility]    [address] [place] [address]    Phone:  [phone]    Fax:  [phone]       Thank You for choosing us for your health care visit. We are glad to serve you and happy to provide you with this summary of your visit. Please help us to ensure we have accurate records. If you find anything that needs to be changed, please let our staff know as soon as possible.          Your Demographic Information     Patient Name Sex Jey Lanier Male 1967       Ethnic Group Patient Race    Not of  or  Origin White      Your Visit Details     Date & Time Provider Department    2017 9:50 AM Wilner Munson,  [facility] [place]      Your Upcoming Appointment*(Max 10)      11:45 AM CST   Transitional Care Management with Taqueria Lowery MD   [facility] ([facility] [place])    [address] [place] [address] [address] [place] [address]   [phone]              Your To Do List     Follow-Up    Return if symptoms worsen or fail to improve.      Conditions Discussed Today or Order-Related Diagnoses        Comments    Surgical follow-up care    -  Primary     Upper GI bleed         Gastric ulcer with hemorrhage, chronic or unspecified           Your Vitals Were     BP Pulse Temp Height Weight BMI    130/82 76 98.2 °F (36.8 °C) 5' 11\" (1.803 m) 172 lb (78 kg) 23.99 kg/m2    Smoking Status                   Never Smoker           Medications Prescribed or Re-Ordered Today     pantoprazole (PROTONIX) 40 MG tablet    Sig - Route: Take 1 tablet by mouth 2 times daily. - Oral    Class: Eprescribe    Pharmacy: [id number] - [place] - [address]  #: [phone]    Cosign for Ordering: Required by Wilner Munson, DO      Your Current Medications Are        Disp Refills Start End    pantoprazole (PROTONIX) 40 MG tablet  AMBULATORY CASE MANAGEMENT NOTE    Names and Relationships of Patient/Support Persons: Contact: Jt Valdes; Relationship: Self -     Patient Outreach  Patient returned ACM call and CCM was explained and offered. Patient declined at this time but will keep ACM contact information and reach out if she changes mind.       Suha FONTANEZ  Ambulatory Case Management    1/16/2025, 14:48 EST   90 tablet 1 2/22/2017     Sig - Route: Take 1 tablet by mouth 2 times daily. - Oral    Class: Eprescribe    Cosign for Ordering: Required by Wilner Munson DO    sucralfate (CARAFATE) 1 GM/10ML suspension 1200 mL 0 2/16/2017     Sig - Route: Take 10 mLs by mouth every 6 hours. - Oral    Ferrous Gluconate 325 (36 FE) MG Tab 30 tablet 0 2/16/2017     Sig - Route: Take 1 tablet by mouth daily. - Oral      Allergies     No Known Allergies      Problem List as of 2/22/2017     UGI bleed    Acute blood loss anemia    Macrocytosis    Von Willebrand disease    Gastric ulcer with hemorrhage            Patient Instructions     None

## 2025-01-16 NOTE — TELEPHONE ENCOUNTER
ACM attempted proactive Outreach call. No answer left voicemail for patient to return call at [phone]

## 2025-01-21 ENCOUNTER — ANTICOAGULATION VISIT (OUTPATIENT)
Dept: CARDIOLOGY | Facility: CLINIC | Age: 85
End: 2025-01-21
Payer: MEDICARE

## 2025-01-21 VITALS
WEIGHT: 114 LBS | HEART RATE: 66 BPM | SYSTOLIC BLOOD PRESSURE: 170 MMHG | BODY MASS INDEX: 21.54 KG/M2 | DIASTOLIC BLOOD PRESSURE: 75 MMHG

## 2025-01-21 DIAGNOSIS — I48.0 PAROXYSMAL ATRIAL FIBRILLATION: Primary | Chronic | ICD-10-CM

## 2025-01-21 DIAGNOSIS — Z79.01 LONG TERM (CURRENT) USE OF ANTICOAGULANTS: Chronic | ICD-10-CM

## 2025-01-21 LAB — INR PPP: 2.6 (ref 0.9–1.1)

## 2025-01-21 PROCEDURE — 36416 COLLJ CAPILLARY BLOOD SPEC: CPT | Performed by: INTERNAL MEDICINE

## 2025-01-21 PROCEDURE — 85610 PROTHROMBIN TIME: CPT | Performed by: INTERNAL MEDICINE

## 2025-02-25 ENCOUNTER — ANTICOAGULATION VISIT (OUTPATIENT)
Dept: CARDIOLOGY | Facility: CLINIC | Age: 85
End: 2025-02-25
Payer: MEDICARE

## 2025-02-25 VITALS
WEIGHT: 116 LBS | DIASTOLIC BLOOD PRESSURE: 68 MMHG | SYSTOLIC BLOOD PRESSURE: 166 MMHG | HEART RATE: 67 BPM | BODY MASS INDEX: 21.92 KG/M2

## 2025-02-25 DIAGNOSIS — I48.0 PAROXYSMAL ATRIAL FIBRILLATION: Primary | Chronic | ICD-10-CM

## 2025-02-25 DIAGNOSIS — Z79.01 LONG TERM (CURRENT) USE OF ANTICOAGULANTS: Chronic | ICD-10-CM

## 2025-02-25 LAB — INR PPP: 2.1 (ref 0.9–1.1)

## 2025-02-25 PROCEDURE — 36416 COLLJ CAPILLARY BLOOD SPEC: CPT | Performed by: INTERNAL MEDICINE

## 2025-02-25 PROCEDURE — 85610 PROTHROMBIN TIME: CPT | Performed by: INTERNAL MEDICINE

## 2025-03-07 ENCOUNTER — TELEPHONE (OUTPATIENT)
Dept: FAMILY MEDICINE CLINIC | Facility: CLINIC | Age: 85
End: 2025-03-07

## 2025-03-07 ENCOUNTER — OFFICE VISIT (OUTPATIENT)
Dept: FAMILY MEDICINE CLINIC | Facility: CLINIC | Age: 85
End: 2025-03-07
Payer: MEDICARE

## 2025-03-07 VITALS
DIASTOLIC BLOOD PRESSURE: 78 MMHG | WEIGHT: 114 LBS | HEART RATE: 59 BPM | SYSTOLIC BLOOD PRESSURE: 152 MMHG | BODY MASS INDEX: 21.52 KG/M2 | OXYGEN SATURATION: 99 % | HEIGHT: 61 IN

## 2025-03-07 DIAGNOSIS — N12 PYELONEPHRITIS: Primary | ICD-10-CM

## 2025-03-07 LAB
BILIRUB BLD-MCNC: NEGATIVE MG/DL
CLARITY, POC: CLEAR
COLOR UR: YELLOW
EXPIRATION DATE: ABNORMAL
GLUCOSE UR STRIP-MCNC: NEGATIVE MG/DL
KETONES UR QL: NEGATIVE
LEUKOCYTE EST, POC: ABNORMAL
Lab: ABNORMAL
NITRITE UR-MCNC: NEGATIVE MG/ML
PH UR: 6 [PH] (ref 5–8)
PROT UR STRIP-MCNC: ABNORMAL MG/DL
RBC # UR STRIP: ABNORMAL /UL
SP GR UR: 1.01 (ref 1–1.03)
UROBILINOGEN UR QL: NORMAL

## 2025-03-07 PROCEDURE — 81003 URINALYSIS AUTO W/O SCOPE: CPT | Performed by: FAMILY MEDICINE

## 2025-03-07 PROCEDURE — 3077F SYST BP >= 140 MM HG: CPT | Performed by: FAMILY MEDICINE

## 2025-03-07 PROCEDURE — 99213 OFFICE O/P EST LOW 20 MIN: CPT | Performed by: FAMILY MEDICINE

## 2025-03-07 PROCEDURE — 1160F RVW MEDS BY RX/DR IN RCRD: CPT | Performed by: FAMILY MEDICINE

## 2025-03-07 PROCEDURE — 1126F AMNT PAIN NOTED NONE PRSNT: CPT | Performed by: FAMILY MEDICINE

## 2025-03-07 PROCEDURE — 3078F DIAST BP <80 MM HG: CPT | Performed by: FAMILY MEDICINE

## 2025-03-07 PROCEDURE — 1159F MED LIST DOCD IN RCRD: CPT | Performed by: FAMILY MEDICINE

## 2025-03-07 RX ORDER — CEFDINIR 300 MG/1
300 CAPSULE ORAL 2 TIMES DAILY
Qty: 14 CAPSULE | Refills: 0 | Status: SHIPPED | OUTPATIENT
Start: 2025-03-07 | End: 2025-03-14

## 2025-03-07 NOTE — PROGRESS NOTES
Subjective   Jt Valdes is a 84 y.o. female.     History of Present Illness  The patient presents for evaluation of right flank pain.    She has been experiencing persistent right flank pain for the past 2 weeks, which she first noticed during a nocturnal bathroom visit. The pain, described as sharp, was initially constant throughout the day but has since become intermittent. This morning, the pain recurred but subsequently subsided. She quantifies the pain as a 9 or 10 on a scale of 1 to 10. She reports no fever or chills but admits to feeling nauseous upon waking, which has affected her appetite. Despite this, she managed to consume breakfast and lunch. The pain is exacerbated by car travel, particularly over bumps, and necessitates a bent posture when walking to the bathroom. She reports no dysuria or bowel irregularities. She recalls a fall on 02/01/2025, during which she landed on her face, resulting in a bruise and a cut above her eyebrow. She also reports poor balance. She recently returned from a vacation in [place], where she may have been dehydrated due to inadequate water intake. The pain is exacerbated by car travel, particularly over bumps, and necessitates a bent posture when walking to the bathroom.    ALLERGIES  The patient is allergic to SULFA and MACROBID.       The following portions of the patient's history were reviewed and updated as appropriate: allergies, current medications, past family history, past medical history, past social history, past surgical history, and problem list.  Past Medical History:   Diagnosis Date    Allergic     Sulfa    Arthritis     Cataract     Cataracts surgery    COVID-19     Degenerative joint disease     Extremity pain     legs pain    GERD (gastroesophageal reflux disease)     H/O degenerative disc disease     History of colonoscopy 2009    last done 2009    History of echocardiogram 09/04/2018    LVH EF 65%. RV OK. LA probably mildly dilated. AV OK. MV OK.  Modest TR RVSP 26 or less. Nuclear MPI regadenoson 9/14/2018. LV uniform myocardial uptake and wall motion EF 71%.     History of Holter monitoring 10/08/2018    SR 40-81 58. AF 2.7 hr episode VR . At termination AF 2 3.5-4.0 s pauses with patient possibly dizzy. 205 PAC 42 atrial ashanti several SVT. No ventricular ectopy.     HL (hearing loss) 2020    Tried hearing aids twice . Didnt help    HTN (hypertension)     Hyperlipidemia     Hypertensive cardiovascular disease     Hypothyroidism     Incontinence     Leukopenia     Low back pain     PAF (paroxysmal atrial fibrillation) 09/2018    [facility] Sept 2018 PAF and HTN. PAFL On bisoprolol and diltiazem bradycardia symptomatic with dizziness. Amiodarone alone Oct 2018 and no episodes of erratic or fast heartbeat or lightheadedness.     Rheumatoid arthritis     Possible pulmonary involvement     Staph infection     Vitamin D deficiency     White coat syndrome with hypertension      Past Surgical History:   Procedure Laterality Date    APPENDECTOMY  1974    BACK SURGERY  2003 2001, 2003    CARDIAC CATHETERIZATION      CARDIAC ELECTROPHYSIOLOGY PROCEDURE Right 08/01/2023    Procedure: Ablation atrial fibrillation, WITH CRYO  Brigido and Magdalena aware;  Surgeon: Dyllan Lloyd MD;  Location: [facility] INVASIVE LOCATION;  Service: Cardiovascular;  Laterality: Right;    CYST REMOVAL Left     on hand    GANGLION CYST EXCISION      SPINE SURGERY  2001.    2003    SUBTOTAL HYSTERECTOMY  1974     Family History   Problem Relation Age of Onset    Osteoarthritis Mother     Arthritis Mother     Cancer Mother     Other Other         Rheumatoid disease     Cancer Father     Cancer Sister      Social History     Socioeconomic History    Marital status:     Number of children: 5    Years of education: 12   Tobacco Use    Smoking status: Never     Passive exposure: Past    Smokeless tobacco: Never   Vaping Use    Vaping status: Never Used   Substance and Sexual  "Activity    Alcohol use: Yes     Alcohol/week: 1.0 standard drink of alcohol     Types: 1 Glasses of wine per week     Comment: Occ.    Drug use: Never    Sexual activity: Not Currently         Current Outpatient Medications:     Abatacept (ORENCIA IV), Infuse  into a venous catheter., Disp: , Rfl:     amLODIPine (NORVASC) 10 MG tablet, Take 1 tablet by mouth Daily., Disp: 90 tablet, Rfl: 3    Diclofenac Sodium (VOLTAREN) 1 % gel gel, Apply 4 g topically to the appropriate area as directed 4 (Four) Times a Day As Needed (pain)., Disp: 150 g, Rfl: 3    famotidine (Pepcid) 40 MG tablet, Take 1 tablet by mouth Daily., Disp: 90 tablet, Rfl: 0    leflunomide (ARAVA) 20 MG tablet, Take 1 tablet by mouth Daily., Disp: 90 tablet, Rfl: 0    levothyroxine (SYNTHROID, LEVOTHROID) 100 MCG tablet, Take 1 tab po daily, Disp: 90 tablet, Rfl: 3    losartan (COZAAR) 50 MG tablet, Take 2 tablets by mouth Every Night., Disp: 180 tablet, Rfl: 1    magnesium oxide (MAG-OX) 400 MG tablet, Take 1 tablet by mouth 2 (Two) Times a Day., Disp: 60 tablet, Rfl: 11    metoprolol succinate XL (TOPROL-XL) 100 MG 24 hr tablet, TAKE 1 TABLET DAILY, Disp: 90 tablet, Rfl: 1    Multiple Vitamins-Minerals (PRESERVISION AREDS 2 PO), Take 1 tablet by mouth Daily., Disp: , Rfl:     polyethylene glycol (MIRALAX) 17 g packet, Take 8.5 g by mouth Daily., Disp: , Rfl:     Vitamin D, Ergocalciferol, 50 MCG (2000 UT) capsule, Take 2,000 Units by mouth Daily., Disp: 100 capsule, Rfl: 4    warfarin (COUMADIN) 2.5 MG tablet, Take 2 tabs, by mouth, daily or as directed., Disp: 180 tablet, Rfl: 3    cefdinir (OMNICEF) 300 MG capsule, Take 1 capsule by mouth 2 (Two) Times a Day for 7 days., Disp: 14 capsule, Rfl: 0    Review of Systems  ROS done and noted in HPI    /78   Pulse 59   Ht 154.9 cm (61\")   Wt 51.7 kg (114 lb)   SpO2 99%   BMI 21.54 kg/m²   BMI is within normal parameters. No other follow-up for BMI required.       Objective   Physical " Exam  Vitals and nursing note reviewed.   Constitutional:       Appearance: Normal appearance. She is normal weight.   Cardiovascular:      Rate and Rhythm: Normal rate and regular rhythm.      Heart sounds: Normal heart sounds.   Pulmonary:      Effort: Pulmonary effort is normal.      Breath sounds: Normal breath sounds.   Abdominal:      General: Abdomen is flat. Bowel sounds are normal.      Palpations: Abdomen is soft.      Tenderness: There is no abdominal tenderness. There is no right CVA tenderness or left CVA tenderness.   Skin:     Coloration: Skin is not jaundiced.   Neurological:      Mental Status: She is alert.       Physical Exam  Lungs were auscultated.  Abdominal exam was performed.    Vital Signs  Blood pressure is 152/78.       Results  Laboratory Studies  Brief Urine Lab Results  (Last result in the past 365 days)        Color   Clarity   Blood   Leuk Est   Nitrite   Protein   CREAT   Urine HCG        03/07/25 1618 Yellow   Clear   Large   Large (3+)   Negative   Trace                        Assessment & Plan   Problems Addressed this Visit    None  Visit Diagnoses         Pyelonephritis    -  Primary    Relevant Medications    cefdinir (OMNICEF) 300 MG capsule    Other Relevant Orders    POC Urinalysis Dipstick, Automated (Completed)          Diagnoses         Codes Comments      Pyelonephritis    -  Primary ICD-10-CM: N12  ICD-9-CM: 590.80           Assessment & Plan  1. Right flank pain.  This is presumptive pyelonephritis.  She was initially unable to provide a sufficient sample to send for culture but she is going to try again.  The presence of blood and pus in the urine indicates a possible kidney infection. Although a kidney stone is a differential diagnosis, the symptoms do not align with this condition. A prescription for Omnicef will be sent to [facility]. She is advised to apply heat to the affected area and maintain high water intake to help flush the kidneys. If symptoms worsen, such  as severe pain, vomiting, or fever, she should seek immediate medical attention at the emergency room.            Patient or patient representative verbalized consent for the use of Ambient Listening during the visit with  Dilcia Trinidad MD for chart documentation. 3/8/2025  16:08 EST

## 2025-03-07 NOTE — TELEPHONE ENCOUNTER
She needs to be seen here if anyone has an opening or the [facility] or the ER  If the pain is severe or she has fever, go to the ER

## 2025-03-07 NOTE — TELEPHONE ENCOUNTER
Caller: Jt Valdes    Relationship: Self    Best call back number: [phone]     What is the best time to reach you: AFTER 1PM    What was the call regarding: PATIENT STATED THAT FOR ALMOST TWO WEEKS, SHE HAS BEEN EXPERIENCING PAIN IN HER RIGHT SIDE TOWARDS HER BACK THAT HAS BEEN ON AND OFF. PATIENT STATED THAT IT IS BOTHERING HER TODAY. PATIENT IS CALLING TO SEE WHAT DR. DUSTIN HOYOS WOULD SUGGEST THAT SHE DO. PLEASE ADVISE.

## 2025-03-11 ENCOUNTER — PATIENT MESSAGE (OUTPATIENT)
Dept: FAMILY MEDICINE CLINIC | Facility: CLINIC | Age: 85
End: 2025-03-11
Payer: MEDICARE

## 2025-03-11 ENCOUNTER — LAB (OUTPATIENT)
Dept: FAMILY MEDICINE CLINIC | Facility: CLINIC | Age: 85
End: 2025-03-11
Payer: MEDICARE

## 2025-03-11 DIAGNOSIS — R31.9 HEMATURIA, UNSPECIFIED TYPE: ICD-10-CM

## 2025-03-11 DIAGNOSIS — R82.81 PYURIA: Primary | ICD-10-CM

## 2025-03-11 DIAGNOSIS — R82.81 PYURIA: ICD-10-CM

## 2025-03-11 LAB
BILIRUB UR QL STRIP: NEGATIVE
CLARITY UR: CLEAR
COLOR UR: YELLOW
GLUCOSE UR STRIP-MCNC: NEGATIVE MG/DL
HGB UR QL STRIP.AUTO: NEGATIVE
HOLD SPECIMEN: NORMAL
KETONES UR QL STRIP: NEGATIVE
LEUKOCYTE ESTERASE UR QL STRIP.AUTO: NEGATIVE
NITRITE UR QL STRIP: NEGATIVE
PH UR STRIP.AUTO: 6.5 [PH] (ref 5–8)
PROT UR QL STRIP: NEGATIVE
SP GR UR STRIP: 1.01 (ref 1–1.03)
UROBILINOGEN UR QL STRIP: NORMAL

## 2025-03-11 PROCEDURE — 81003 URINALYSIS AUTO W/O SCOPE: CPT | Performed by: FAMILY MEDICINE

## 2025-03-12 ENCOUNTER — HOSPITAL ENCOUNTER (OUTPATIENT)
Dept: CT IMAGING | Facility: HOSPITAL | Age: 85
Discharge: HOME | End: 2025-03-12
Admitting: FAMILY MEDICINE
Payer: MEDICARE

## 2025-03-12 DIAGNOSIS — R31.9 HEMATURIA, UNSPECIFIED TYPE: ICD-10-CM

## 2025-03-12 DIAGNOSIS — R10.9 FLANK PAIN: ICD-10-CM

## 2025-03-12 PROCEDURE — 74176 CT ABD & PELVIS W/O CONTRAST: CPT

## 2025-03-13 RX ORDER — LOSARTAN POTASSIUM 50 MG/1
100 TABLET ORAL NIGHTLY
Qty: 180 TABLET | Refills: 1 | Status: SHIPPED | OUTPATIENT
Start: 2025-03-13

## 2025-03-13 NOTE — TELEPHONE ENCOUNTER
Rx Refill Note  Requested Prescriptions     Pending Prescriptions Disp Refills    losartan (COZAAR) 50 MG tablet [Pharmacy Med Name: LOSARTAN TABS 50MG] 180 tablet 3     Sig: TAKE 2 TABLETS EVERY NIGHT      Last office visit with prescribing clinician: 5/1/2024   Last telemedicine visit with prescribing clinician: Visit date not found   Next office visit with prescribing clinician: 5/1/2025                         Would you like a call back once the refill request has been completed: [] Yes [] No    If the office needs to give you a call back, can they leave a voicemail: [] Yes [] No    Marah Carmichael MA  03/13/25, 09:28 EDT

## 2025-04-01 ENCOUNTER — ANTICOAGULATION VISIT (OUTPATIENT)
Dept: CARDIOLOGY | Facility: CLINIC | Age: 85
End: 2025-04-01
Payer: MEDICARE

## 2025-04-01 VITALS
SYSTOLIC BLOOD PRESSURE: 163 MMHG | DIASTOLIC BLOOD PRESSURE: 75 MMHG | BODY MASS INDEX: 21.35 KG/M2 | HEART RATE: 67 BPM | WEIGHT: 113 LBS

## 2025-04-01 DIAGNOSIS — I48.0 PAROXYSMAL ATRIAL FIBRILLATION: Primary | Chronic | ICD-10-CM

## 2025-04-01 DIAGNOSIS — Z79.01 LONG TERM (CURRENT) USE OF ANTICOAGULANTS: Chronic | ICD-10-CM

## 2025-04-01 LAB — INR PPP: 2.9 (ref 0.9–1.1)

## 2025-04-01 PROCEDURE — 85610 PROTHROMBIN TIME: CPT | Performed by: INTERNAL MEDICINE

## 2025-04-01 PROCEDURE — 36416 COLLJ CAPILLARY BLOOD SPEC: CPT | Performed by: INTERNAL MEDICINE

## 2025-04-02 DIAGNOSIS — I48.0 PAROXYSMAL ATRIAL FIBRILLATION: ICD-10-CM

## 2025-04-02 DIAGNOSIS — Z79.01 LONG TERM (CURRENT) USE OF ANTICOAGULANTS: ICD-10-CM

## 2025-04-02 RX ORDER — WARFARIN SODIUM 2.5 MG/1
TABLET ORAL
Qty: 180 TABLET | Refills: 3 | Status: SHIPPED | OUTPATIENT
Start: 2025-04-02

## 2025-04-02 NOTE — TELEPHONE ENCOUNTER
Warfarin 2.5 mg  Take 2 tabs, by mouth, daily or as directed. Script sent to [facility] for refill.  dvLPN

## 2025-04-08 RX ORDER — AMLODIPINE BESYLATE 10 MG/1
10 TABLET ORAL DAILY
Qty: 90 TABLET | Refills: 0 | Status: SHIPPED | OUTPATIENT
Start: 2025-04-08

## 2025-04-08 NOTE — TELEPHONE ENCOUNTER
Rx Refill Note  Requested Prescriptions     Pending Prescriptions Disp Refills    amLODIPine (NORVASC) 10 MG tablet [Pharmacy Med Name: AMLODIPINE BESYLATE TABS 10MG] 90 tablet 3     Sig: TAKE 1 TABLET DAILY      Last office visit with prescribing clinician: 5/1/2024   Last telemedicine visit with prescribing clinician: Visit date not found   Next office visit with prescribing clinician: 5/1/2025                         Would you like a call back once the refill request has been completed: [] Yes [] No    If the office needs to give you a call back, can they leave a voicemail: [] Yes [] No    Marah Carmichael MA  04/08/25, 10:39 EDT

## 2025-05-01 ENCOUNTER — OFFICE VISIT (OUTPATIENT)
Dept: CARDIOLOGY | Facility: CLINIC | Age: 85
End: 2025-05-01
Payer: MEDICARE

## 2025-05-01 ENCOUNTER — ANTICOAGULATION VISIT (OUTPATIENT)
Dept: CARDIOLOGY | Facility: CLINIC | Age: 85
End: 2025-05-01
Payer: MEDICARE

## 2025-05-01 VITALS
BODY MASS INDEX: 21.35 KG/M2 | DIASTOLIC BLOOD PRESSURE: 74 MMHG | WEIGHT: 113 LBS | HEART RATE: 71 BPM | SYSTOLIC BLOOD PRESSURE: 169 MMHG

## 2025-05-01 VITALS
DIASTOLIC BLOOD PRESSURE: 81 MMHG | WEIGHT: 113 LBS | HEIGHT: 61 IN | BODY MASS INDEX: 21.34 KG/M2 | SYSTOLIC BLOOD PRESSURE: 166 MMHG | OXYGEN SATURATION: 99 % | HEART RATE: 67 BPM

## 2025-05-01 DIAGNOSIS — E78.5 DYSLIPIDEMIA: ICD-10-CM

## 2025-05-01 DIAGNOSIS — I48.0 PAROXYSMAL ATRIAL FIBRILLATION: ICD-10-CM

## 2025-05-01 DIAGNOSIS — I11.9 HYPERTENSIVE HEART DISEASE WITHOUT HEART FAILURE: Primary | ICD-10-CM

## 2025-05-01 DIAGNOSIS — Z78.9 STATIN INTOLERANCE: ICD-10-CM

## 2025-05-01 DIAGNOSIS — N18.30 STAGE 3 CHRONIC KIDNEY DISEASE, UNSPECIFIED WHETHER STAGE 3A OR 3B CKD: ICD-10-CM

## 2025-05-01 DIAGNOSIS — Z79.01 LONG TERM (CURRENT) USE OF ANTICOAGULANTS: ICD-10-CM

## 2025-05-01 DIAGNOSIS — Z79.01 LONG TERM (CURRENT) USE OF ANTICOAGULANTS: Chronic | ICD-10-CM

## 2025-05-01 DIAGNOSIS — I48.0 PAROXYSMAL ATRIAL FIBRILLATION: Primary | Chronic | ICD-10-CM

## 2025-05-01 LAB — INR PPP: 2.2 (ref 0.9–1.1)

## 2025-05-01 PROCEDURE — 36416 COLLJ CAPILLARY BLOOD SPEC: CPT | Performed by: INTERNAL MEDICINE

## 2025-05-01 PROCEDURE — 85610 PROTHROMBIN TIME: CPT | Performed by: INTERNAL MEDICINE

## 2025-05-01 RX ORDER — LOSARTAN POTASSIUM 100 MG/1
100 TABLET ORAL NIGHTLY
Qty: 90 TABLET | Refills: 3 | Status: SHIPPED | OUTPATIENT
Start: 2025-05-01

## 2025-05-01 RX ORDER — EZETIMIBE 10 MG/1
10 TABLET ORAL DAILY
Qty: 90 TABLET | Refills: 3 | Status: SHIPPED | OUTPATIENT
Start: 2025-05-01

## 2025-05-01 RX ORDER — HYDROCHLOROTHIAZIDE 12.5 MG/1
12.5 CAPSULE ORAL DAILY
Qty: 30 CAPSULE | Refills: 11 | Status: SHIPPED | OUTPATIENT
Start: 2025-05-01

## 2025-05-01 NOTE — PROGRESS NOTES
Subjective:     Encounter Date:05/01/2025      Patient ID: Jt Valdes is a 84 y.o. female.    Chief Complaint and history of present illness:       Follow-up for A-fib, anticoagulation, hypertension/hypertensive cardiovascular disease        History of Present Illness:       Ms. tJ Valdes has PMH of      Hypertension/hypertensive cardiovascular disease     Paroxysmal atrial fibrillation, noncompliant on Xarelto, now on coumadin, ablation  8/1/2023  [id number] SCORE   NVL0KO4-ZIWg Score: 4 (5/1/2025 12:53 PM)  LZU8HO1-WGIz Score: 4 (5/1/2024 11:25 AM)  PKW9FO5-YXQy Score: 4 (2/1/2023 11:51 AM)   (Age greater than 75, female gender, hypertension)     Hyperlipidemia  SILVERIO, noncompliant on CPAP  Hypothyroidism  Whitecoat hypertension  History of leukopenia, staph infection  Rheumatoid arthritis, possible pulmonary involved ,DJD, DDD  Partial hysterectomy, appendectomy, back surgery  Allergies/intolerance to sulfa-rash  Non-smoker     Here for follow-up.  Patient denies any chest pain or shortness of breath.     Patient's arterial blood pressure is 169/74, heart rate 71, O2 sat of 99% on room air.        Echocardiogram 2/1/2023 revealed normal LV systolic function of 60 to 65% normal RVSP was 40  Lexiscan Cardiolite negative for ischemia 5/10/2023 with EF of 75%  Transesophageal echo 8/1/2023 reveals EF of 61 to 65% with mild concentric LVH, mild left atrial enlargement     Patient had labs done 6/4/2019 which showed normal CBC CMP and TSH.  Labs from 3/9/2020 reveal TSH 1.47, cholesterol 202, triglycerides 102, HDL 58, , CMP with a cr 1.06, glucose of 111, A1c of 5.2, normal CBC.  Lipid profile 3/9/2020 with cholesterol 202 triglycerides 102 HDL 58 , CMP with a BUN/creatinine of 21 INR 1.06 and GFR 50.  Hemoglobin A1c 5.2  Labs from 3-2-21 revealed normal CRP TSH and free T4.  CMP with BUN/creatinine of 43/1.32 and GFR of 39.  Labs from 6-21 reveal BUN/creatinine of 34/1.0 EGFR  49, glucose 150.  Labs from 2/18/2022 revealed TSH of 7.8, normal FT BN 4.  Labs from 3/10/2020 reveal INR of 2.1.  Labs from 7/12/2022 revealed low TSH at 0.235 and elevated FT4 at 1.77..  Labs from 6/14/2022 reveal lipid profile with cholesterol 174, triglycerides 109, HDL 59, LDL 95.  Labs from 7/25/2024 reveal lipid profile with cholesterol 207, triglycerides 143, HDL 60, .  Labs from 4/2/2025 reveal ESR elevated at 73.  CMP with normal BUN/creatinine of 26/0.93 and a potassium of 4.3.  CBC with a hemoglobin of 11.5.    Reviewed previous records:    Echo 09/04/2018  LVH EF 65%. RV OK.  LA probably mildly dilated.  AV OK.  MV OK.  Modest TR RVSP 26 or less.Holter monitor 9/17-9/20/2021 was unremarkable.  Lexiscan Cardiolite 4/8/2021 were negative for ischemia  Echo 2/1/2023 reveals EF 60 to 65% PA systolic pressure of 40 mmHg  Transesophageal echo 8/1/2023 EF of 61 to 65% with mild concentric LVH and mild left atrial enlargement  Ablation 8/1/2023          Assessment:     Paroxysmal atrial fibrillation, ablation  Paroxysmal A. Fib, long-term anticoagulation with warfarin  Hypertension, history of whitecoat hypertension  CKD 3B  SILVERIO  Hyperlipidemia  Hypothyroidism     Plan:     Reviewed EKG results with patient.  Patient is statin intolerant.  Reviewed lipid profile will add Zetia.  Patient's blood pressure is elevated.  Her losartan was recently increased to 100 continues to have elevated systolics.  Will add low-dose hydrochlorothiazide at 12.5.  Will check labs and cholesterol and follow-up.  Follow-up with EP .  Patient has elevated CNH8OY3-XJAe score due to age over 75, hypertension, female gender making it for, will benefit from long-term anticoagulation to prevent thromboembolic events.  Continue warfarin to keep INR between 2 and 3.  Today's INR is 2.2.    Follow-up with PMD for weight loss and other issues including arthritis and elevated ESR           ECG 12 Lead    Date/Time: 5/1/2025  12:53 PM  Performed by: Jose Patiño MD    Authorized by: Jose Patiño MD  Comparison: compared with previous ECG from 1/10/2024  Comparison to previous ECG: EKG done today reviewed/interpreted by me revealed sinus rhythm at the rate of 64 bpm with nonspecific ST-T changes in lateral leads due to LVH, no significant change compared to EKG from 1/10/2024          Copied text in this portion of the note has been reviewed and is accurate as of 5/1/2025  The following portions of the patient's history were reviewed and updated as appropriate: allergies, current medications, past family history, past medical history, past social history, past surgical history and problem list.    Assessment:         MDM       Diagnosis Plan   1. Hypertensive heart disease without heart failure  Basic Metabolic Panel    Lipid Panel      2. Paroxysmal atrial fibrillation  Basic Metabolic Panel    Lipid Panel      3. Long term (current) use of anticoagulants  Basic Metabolic Panel    Lipid Panel      4. Dyslipidemia  Basic Metabolic Panel    Lipid Panel      5. Stage 3 chronic kidney disease, unspecified whether stage 3a or 3b CKD  Basic Metabolic Panel    Lipid Panel      6. Statin intolerance               Plan:               Past Medical History:  Past Medical History:   Diagnosis Date    Abnormal ECG     Allergic     Sulfa    Arrhythmia     Arthritis     Cataract     Cataracts surgery    Chronic kidney disease 2021    High creatin number    COVID-19     Degenerative joint disease     Difficulty walking     Extremity pain     legs pain    GERD (gastroesophageal reflux disease)     H/O degenerative disc disease     Heart valve disease     History of colonoscopy 2009    last done 2009    History of echocardiogram 09/04/2018    LVH EF 65%. RV OK. LA probably mildly dilated. AV OK. MV OK. Modest TR RVSP 26 or less. Nuclear MPI regadenoson 9/14/2018. LV uniform myocardial uptake and wall motion EF 71%.     History of  Holter monitoring 10/08/2018    SR 40-81 58. AF 2.7 hr episode VR . At termination AF 2 3.5-4.0 s pauses with patient possibly dizzy. 205 PAC 42 atrial ashanti several SVT. No ventricular ectopy.     HL (hearing loss) 2020    Tried hearing aids twice . Didnt help    HTN (hypertension)     Hyperlipidemia     Hypertensive cardiovascular disease     Hypothyroidism     Incontinence     Leukopenia     Low back pain     PAF (paroxysmal atrial fibrillation) 09/2018    [facility] Sept 2018 PAF and HTN. PAFL On bisoprolol and diltiazem bradycardia symptomatic with dizziness. Amiodarone alone Oct 2018 and no episodes of erratic or fast heartbeat or lightheadedness.     Rheumatoid arthritis     Possible pulmonary involvement     Sleep apnea April 2023    Staph infection     Vision loss     Wear glasses    Vitamin D deficiency     White coat syndrome with hypertension      Past Surgical History:  Past Surgical History:   Procedure Laterality Date    ABLATION OF DYSRHYTHMIC FOCUS  8/1/2023    APPENDECTOMY  1974    BACK SURGERY  2003 2001, 2003    CARDIAC CATHETERIZATION      CARDIAC ELECTROPHYSIOLOGY PROCEDURE Right 08/01/2023    Procedure: Ablation atrial fibrillation, WITH CRYO  Brigido and Magdalena aware;  Surgeon: Dyllan Lloyd MD;  Location: [facility] INVASIVE LOCATION;  Service: Cardiovascular;  Laterality: Right;    CYST REMOVAL Left     on hand    GANGLION CYST EXCISION      SPINE SURGERY  2001.    2003    SUBTOTAL HYSTERECTOMY  1974      Allergies:  Allergies   Allergen Reactions    Sulfa Antibiotics Rash    Macrobid [Nitrofurantoin] Unknown - High Severity     Home Meds:  Current Meds:     Current Outpatient Medications:     Abatacept (ORENCIA IV), Infuse  into a venous catheter., Disp: , Rfl:     amLODIPine (NORVASC) 10 MG tablet, TAKE 1 TABLET DAILY, Disp: 90 tablet, Rfl: 0    Diclofenac Sodium (VOLTAREN) 1 % gel gel, Apply 4 g topically to the appropriate area as directed 4 (Four) Times a Day As Needed  (pain)., Disp: 150 g, Rfl: 3    famotidine (Pepcid) 40 MG tablet, Take 1 tablet by mouth Daily., Disp: 90 tablet, Rfl: 0    leflunomide (ARAVA) 20 MG tablet, Take 1 tablet by mouth Daily., Disp: 90 tablet, Rfl: 0    levothyroxine (SYNTHROID, LEVOTHROID) 100 MCG tablet, Take 1 tab po daily, Disp: 90 tablet, Rfl: 3    losartan (COZAAR) 100 MG tablet, Take 1 tablet by mouth Every Night., Disp: 90 tablet, Rfl: 3    magnesium oxide (MAG-OX) 400 MG tablet, Take 1 tablet by mouth 2 (Two) Times a Day., Disp: 60 tablet, Rfl: 11    metoprolol succinate XL (TOPROL-XL) 100 MG 24 hr tablet, TAKE 1 TABLET DAILY, Disp: 90 tablet, Rfl: 1    Multiple Vitamins-Minerals (PRESERVISION AREDS 2 PO), Take 1 tablet by mouth Daily., Disp: , Rfl:     polyethylene glycol (MIRALAX) 17 g packet, Take 8.5 g by mouth Daily., Disp: , Rfl:     Vitamin D, Ergocalciferol, 50 MCG (2000 UT) capsule, Take 2,000 Units by mouth Daily., Disp: 100 capsule, Rfl: 4    warfarin (COUMADIN) 2.5 MG tablet, TAKE 2 TABLETS DAILY OR AS DIRECTED, Disp: 180 tablet, Rfl: 3    ezetimibe (ZETIA) 10 MG tablet, Take 1 tablet by mouth Daily., Disp: 90 tablet, Rfl: 3    hydroCHLOROthiazide (MICROZIDE) 12.5 MG capsule, Take 1 capsule by mouth Daily., Disp: 30 capsule, Rfl: 11  Social History:   Social History     Tobacco Use    Smoking status: Never     Passive exposure: Past    Smokeless tobacco: Never   Substance Use Topics    Alcohol use: Yes     Alcohol/week: 1.0 standard drink of alcohol     Types: 1 Glasses of wine per week     Comment: Occ.      Family History:  Family History   Problem Relation Age of Onset    Osteoarthritis Mother     Arthritis Mother     Cancer Mother     Other Other         Rheumatoid disease     Cancer Father     Cancer Sister               Review of Systems   Cardiovascular:  Negative for chest pain, leg swelling and palpitations.   Respiratory:  Negative for shortness of breath.    Neurological:  Negative for dizziness and numbness.     All  "other systems are negative         Objective:     Physical Exam  /81 (BP Location: Left arm, Patient Position: Sitting, Cuff Size: Adult)   Pulse 67   Ht 154.9 cm (61\")   Wt 51.3 kg (113 lb)   SpO2 99%   BMI 21.35 kg/m²   General:  Appears in no acute distress  Eyes: Sclera is anicteric,  conjunctiva is clear   HEENT:  No JVD.  No carotid bruits  Respiratory: Respirations regular and unlabored at rest.  Clear to auscultation  Cardiovascular: S1,S2 Regular rate and rhythm. No murmur, rub or gallop auscultated.   Extremities: No digital clubbing or cyanosis, no edema  Skin: Color pink. Skin warm and dry to touch. No rashes  No xanthoma  Neuro: Alert and awake.    Lab Reviewed:         Jose Patiño MD  5/1/2025 12:57 EDT      EMR Dragon/Transcription:   \"Dictated utilizing Dragon dictation\".        "

## 2025-05-01 NOTE — PROGRESS NOTES
Patient's INR- 2.2, within therapeutic range. Continue current dosage and recheck in 1 month. dvLPN

## 2025-06-05 ENCOUNTER — ANTICOAGULATION VISIT (OUTPATIENT)
Dept: CARDIOLOGY | Facility: CLINIC | Age: 85
End: 2025-06-05
Payer: MEDICARE

## 2025-06-05 VITALS
SYSTOLIC BLOOD PRESSURE: 122 MMHG | WEIGHT: 112 LBS | HEART RATE: 67 BPM | DIASTOLIC BLOOD PRESSURE: 74 MMHG | BODY MASS INDEX: 21.16 KG/M2

## 2025-06-05 DIAGNOSIS — I48.0 PAROXYSMAL ATRIAL FIBRILLATION: Primary | Chronic | ICD-10-CM

## 2025-06-05 DIAGNOSIS — Z79.01 LONG TERM (CURRENT) USE OF ANTICOAGULANTS: Chronic | ICD-10-CM

## 2025-06-05 LAB — INR PPP: 2.5 (ref 0.9–1.1)

## 2025-06-05 PROCEDURE — 36416 COLLJ CAPILLARY BLOOD SPEC: CPT | Performed by: INTERNAL MEDICINE

## 2025-06-05 PROCEDURE — 85610 PROTHROMBIN TIME: CPT | Performed by: INTERNAL MEDICINE

## 2025-06-05 NOTE — PROGRESS NOTES
Patient's INR- 2.5, within therapeutic range. Continue current dosage and recheck in 1 month. dvLPN

## 2025-07-08 ENCOUNTER — PATIENT MESSAGE (OUTPATIENT)
Dept: CARDIOLOGY | Facility: CLINIC | Age: 85
End: 2025-07-08
Payer: MEDICARE

## 2025-07-16 ENCOUNTER — PATIENT MESSAGE (OUTPATIENT)
Dept: FAMILY MEDICINE CLINIC | Facility: CLINIC | Age: 85
End: 2025-07-16
Payer: MEDICARE

## 2025-07-16 DIAGNOSIS — E03.9 ACQUIRED HYPOTHYROIDISM: ICD-10-CM

## 2025-07-16 DIAGNOSIS — E55.9 VITAMIN D DEFICIENCY: Primary | Chronic | ICD-10-CM

## 2025-07-17 ENCOUNTER — LAB (OUTPATIENT)
Dept: FAMILY MEDICINE CLINIC | Facility: CLINIC | Age: 85
End: 2025-07-17
Payer: MEDICARE

## 2025-07-17 ENCOUNTER — PATIENT MESSAGE (OUTPATIENT)
Dept: FAMILY MEDICINE CLINIC | Facility: CLINIC | Age: 85
End: 2025-07-17
Payer: MEDICARE

## 2025-07-17 DIAGNOSIS — I11.9 HYPERTENSIVE HEART DISEASE WITHOUT HEART FAILURE: ICD-10-CM

## 2025-07-17 DIAGNOSIS — E78.5 DYSLIPIDEMIA: ICD-10-CM

## 2025-07-17 DIAGNOSIS — N18.30 STAGE 3 CHRONIC KIDNEY DISEASE, UNSPECIFIED WHETHER STAGE 3A OR 3B CKD: ICD-10-CM

## 2025-07-17 DIAGNOSIS — Z79.01 LONG TERM (CURRENT) USE OF ANTICOAGULANTS: ICD-10-CM

## 2025-07-17 DIAGNOSIS — E55.9 VITAMIN D DEFICIENCY: Chronic | ICD-10-CM

## 2025-07-17 DIAGNOSIS — I48.0 PAROXYSMAL ATRIAL FIBRILLATION: ICD-10-CM

## 2025-07-17 DIAGNOSIS — E03.9 ACQUIRED HYPOTHYROIDISM: ICD-10-CM

## 2025-07-17 LAB
25(OH)D3 SERPL-MCNC: 70.1 NG/ML (ref 30–100)
ANION GAP SERPL CALCULATED.3IONS-SCNC: 15 MMOL/L (ref 5–15)
BUN SERPL-MCNC: 27 MG/DL (ref 8–23)
BUN/CREAT SERPL: 28.1 (ref 7–25)
CALCIUM SPEC-SCNC: 9.4 MG/DL (ref 8.6–10.5)
CHLORIDE SERPL-SCNC: 103 MMOL/L (ref 98–107)
CHOLEST SERPL-MCNC: 180 MG/DL (ref 0–200)
CO2 SERPL-SCNC: 21 MMOL/L (ref 22–29)
CREAT SERPL-MCNC: 0.96 MG/DL (ref 0.57–1)
EGFRCR SERPLBLD CKD-EPI 2021: 58.1 ML/MIN/1.73
GLUCOSE SERPL-MCNC: 100 MG/DL (ref 65–99)
HDLC SERPL-MCNC: 64 MG/DL (ref 40–60)
LDLC SERPL CALC-MCNC: 98 MG/DL (ref 0–100)
LDLC/HDLC SERPL: 1.49 {RATIO}
POTASSIUM SERPL-SCNC: 4.3 MMOL/L (ref 3.5–5.2)
SODIUM SERPL-SCNC: 139 MMOL/L (ref 136–145)
TRIGL SERPL-MCNC: 103 MG/DL (ref 0–150)
TSH SERPL DL<=0.05 MIU/L-ACNC: 1.41 UIU/ML (ref 0.27–4.2)
VLDLC SERPL-MCNC: 18 MG/DL (ref 5–40)

## 2025-07-17 PROCEDURE — 82306 VITAMIN D 25 HYDROXY: CPT | Performed by: FAMILY MEDICINE

## 2025-07-17 PROCEDURE — 84443 ASSAY THYROID STIM HORMONE: CPT | Performed by: INTERNAL MEDICINE

## 2025-07-17 PROCEDURE — 80048 BASIC METABOLIC PNL TOTAL CA: CPT | Performed by: INTERNAL MEDICINE

## 2025-07-17 PROCEDURE — 36415 COLL VENOUS BLD VENIPUNCTURE: CPT

## 2025-07-17 PROCEDURE — 80061 LIPID PANEL: CPT | Performed by: INTERNAL MEDICINE

## 2025-07-18 RX ORDER — LEVOTHYROXINE SODIUM 100 UG/1
TABLET ORAL
Qty: 90 TABLET | Refills: 3 | Status: SHIPPED | OUTPATIENT
Start: 2025-07-18

## 2025-07-24 ENCOUNTER — ANTICOAGULATION VISIT (OUTPATIENT)
Dept: CARDIOLOGY | Facility: CLINIC | Age: 85
End: 2025-07-24
Payer: MEDICARE

## 2025-07-24 ENCOUNTER — OFFICE VISIT (OUTPATIENT)
Dept: CARDIOLOGY | Facility: CLINIC | Age: 85
End: 2025-07-24
Payer: MEDICARE

## 2025-07-24 VITALS
HEIGHT: 61 IN | DIASTOLIC BLOOD PRESSURE: 75 MMHG | HEART RATE: 66 BPM | BODY MASS INDEX: 20.96 KG/M2 | WEIGHT: 111 LBS | OXYGEN SATURATION: 98 % | SYSTOLIC BLOOD PRESSURE: 154 MMHG

## 2025-07-24 VITALS
SYSTOLIC BLOOD PRESSURE: 154 MMHG | DIASTOLIC BLOOD PRESSURE: 75 MMHG | WEIGHT: 111 LBS | HEART RATE: 66 BPM | BODY MASS INDEX: 20.97 KG/M2

## 2025-07-24 DIAGNOSIS — Z79.01 LONG TERM (CURRENT) USE OF ANTICOAGULANTS: ICD-10-CM

## 2025-07-24 DIAGNOSIS — Z79.01 LONG TERM (CURRENT) USE OF ANTICOAGULANTS: Chronic | ICD-10-CM

## 2025-07-24 DIAGNOSIS — I11.9 HYPERTENSIVE HEART DISEASE WITHOUT HEART FAILURE: Primary | ICD-10-CM

## 2025-07-24 DIAGNOSIS — I10 PRIMARY HYPERTENSION: Chronic | ICD-10-CM

## 2025-07-24 DIAGNOSIS — I48.0 PAROXYSMAL ATRIAL FIBRILLATION: ICD-10-CM

## 2025-07-24 DIAGNOSIS — E78.2 MIXED HYPERLIPIDEMIA: Chronic | ICD-10-CM

## 2025-07-24 DIAGNOSIS — I48.0 PAROXYSMAL ATRIAL FIBRILLATION: Primary | Chronic | ICD-10-CM

## 2025-07-24 LAB — INR PPP: 2.9 (ref 2–3)

## 2025-07-24 PROCEDURE — 36416 COLLJ CAPILLARY BLOOD SPEC: CPT | Performed by: INTERNAL MEDICINE

## 2025-07-24 PROCEDURE — 85610 PROTHROMBIN TIME: CPT | Performed by: INTERNAL MEDICINE

## 2025-07-24 RX ORDER — WARFARIN SODIUM 2.5 MG/1
TABLET ORAL
Qty: 180 TABLET | Refills: 3 | Status: SHIPPED | OUTPATIENT
Start: 2025-07-24

## 2025-07-24 RX ORDER — AMLODIPINE BESYLATE 10 MG/1
10 TABLET ORAL DAILY
Qty: 90 TABLET | Refills: 3 | Status: SHIPPED | OUTPATIENT
Start: 2025-07-24

## 2025-07-24 RX ORDER — ATORVASTATIN CALCIUM 20 MG/1
20 TABLET, FILM COATED ORAL DAILY
Qty: 90 TABLET | Refills: 3 | Status: SHIPPED | OUTPATIENT
Start: 2025-07-24

## 2025-07-24 RX ORDER — METOPROLOL SUCCINATE 100 MG/1
TABLET, EXTENDED RELEASE ORAL
Qty: 90 TABLET | Refills: 3 | Status: SHIPPED | OUTPATIENT
Start: 2025-07-24

## 2025-07-24 NOTE — PROGRESS NOTES
Subjective:     Encounter Date:07/24/2025        Patient ID: Jt Valdes is a 84 y.o. female.    Chief Complaint:3 month follow up with INR     History of Present Illness    Ms. Jt Valdes has PMH of      Hypertension/hypertensive cardiovascular disease     Paroxysmal atrial fibrillation, noncompliant on Xarelto, now on coumadin, ablation  8/1/2023  [id number] SCORE   [id number] Score: 4 (5/1/2024 11:25 AM)   (Age greater than 75, female gender, hypertension)     Hyperlipidemia  SILVERIO, previously noncompliant on CPAP  Hypothyroidism  Whitecoat hypertension  History of leukopenia, staph infection  Rheumatoid arthritis, possible pulmonary involved ,DJD, DDD  Partial hysterectomy, appendectomy, back surgery  Allergies/intolerance to sulfa-rash  Non-smoker    Here for 3 month follow up and INR check.  Patient denies any chest pain, shortness of breath or lower extremity edema.  Patient reports her Zetia is expensive, wants go back on statin.  She thinks she was taken off for pain but cannot remember.      Blood pressure in office today is 154/75 HR 66 oxygen 98% on room air.  Weight 111 lbs     PT/INR today is 2.9/30.1  patient intrusted to eat leafy greens   She reports having her  Orencia infusion yesterday which she thinks increases her blood pressure and INR.     Lab Review:     Labs from 7/25/2024 potassium 4.6 BUN 29 creatinine 1.10 EGFR 49.7 total cholesterol 207 HDL 60     7/17/2025: potassium 4.3 bun 27 creatinine 0.96 TSH normal, HDL 64 LDL 98    The following portions of the patient's history were reviewed and updated as appropriate: allergies, current medications, past family history, past medical history, past social history, past surgical history, and problem list.    Review of Systems   Constitutional: Negative for malaise/fatigue.   Cardiovascular:  Negative for chest pain, dyspnea on exertion, leg swelling (Ankle) and palpitations.   Respiratory:  Negative for cough and  shortness of breath.    Gastrointestinal:  Negative for abdominal pain, nausea and vomiting.   Neurological:  Negative for dizziness, focal weakness, headaches, light-headedness and numbness.   All other systems reviewed and are negative.    Past Medical History:   Diagnosis Date    Abnormal ECG     Allergic     Sulfa    Arrhythmia     Arthritis     Cataract     Cataracts surgery    Chronic kidney disease 2021    High creatin number    COVID-19     Degenerative joint disease     Difficulty walking     Extremity pain     legs pain    GERD (gastroesophageal reflux disease)     H/O degenerative disc disease     Heart valve disease     History of colonoscopy 2009    last done 2009    History of echocardiogram 09/04/2018    LVH EF 65%. RV OK. LA probably mildly dilated. AV OK. MV OK. Modest TR RVSP 26 or less. Nuclear MPI regadenoson 9/14/2018. LV uniform myocardial uptake and wall motion EF 71%.     History of Holter monitoring 10/08/2018    SR 40-81 58. AF 2.7 hr episode VR . At termination AF 2 3.5-4.0 s pauses with patient possibly dizzy. 205 PAC 42 atrial ashanti several SVT. No ventricular ectopy.     HL (hearing loss) 2020    Tried hearing aids twice . Didnt help    HTN (hypertension)     Hyperlipidemia     Hypertensive cardiovascular disease     Hypothyroidism     Incontinence     Leukopenia     Low back pain     PAF (paroxysmal atrial fibrillation) 09/2018     [place] Sept 2018 PAF and HTN. PAFL On bisoprolol and diltiazem bradycardia symptomatic with dizziness. Amiodarone alone Oct 2018 and no episodes of erratic or fast heartbeat or lightheadedness.     Rheumatoid arthritis     Possible pulmonary involvement     Sleep apnea April 2023    Staph infection     Vision loss     Wear glasses    Vitamin D deficiency     White coat syndrome with hypertension      Past Surgical History:   Procedure Laterality Date    ABLATION OF DYSRHYTHMIC FOCUS  8/1/2023    APPENDECTOMY  1974    BACK SURGERY  2003 2001, 2003  "   CARDIAC CATHETERIZATION      CARDIAC ELECTROPHYSIOLOGY PROCEDURE Right 08/01/2023    Procedure: Ablation atrial fibrillation, WITH CRYO  Brigido and Magdalena aware;  Surgeon: Dyllan Lloyd MD;  Location: [facility] CATH INVASIVE LOCATION;  Service: Cardiovascular;  Laterality: Right;    CYST REMOVAL Left     on hand    GANGLION CYST EXCISION      SPINE SURGERY  2001.    2003    SUBTOTAL HYSTERECTOMY  1974     /75   Pulse 66   Ht 154.9 cm (61\")   Wt 50.3 kg (111 lb)   SpO2 98%   BMI 20.97 kg/m²   Family History   Problem Relation Age of Onset    Osteoarthritis Mother     Arthritis Mother     Cancer Mother     Other Other         Rheumatoid disease     Cancer Father     Cancer Sister        Current Outpatient Medications:     amLODIPine (NORVASC) 10 MG tablet, Take 1 tablet by mouth Daily., Disp: 90 tablet, Rfl: 3    Diclofenac Sodium (VOLTAREN) 1 % gel gel, Apply 4 g topically to the appropriate area as directed 4 (Four) Times a Day As Needed (pain)., Disp: 150 g, Rfl: 3    famotidine (Pepcid) 40 MG tablet, Take 1 tablet by mouth Daily., Disp: 90 tablet, Rfl: 0    leflunomide (ARAVA) 20 MG tablet, Take 1 tablet by mouth Daily., Disp: 90 tablet, Rfl: 0    levothyroxine (SYNTHROID, LEVOTHROID) 100 MCG tablet, Take 1 tab po daily, Disp: 90 tablet, Rfl: 3    losartan (COZAAR) 100 MG tablet, Take 1 tablet by mouth Every Night., Disp: 90 tablet, Rfl: 3    magnesium oxide (MAG-OX) 400 MG tablet, Take 1 tablet by mouth 2 (Two) Times a Day., Disp: 60 tablet, Rfl: 11    metoprolol succinate XL (TOPROL-XL) 100 MG 24 hr tablet, TAKE 1 TABLET DAILY, Disp: 90 tablet, Rfl: 3    Multiple Vitamins-Minerals (PRESERVISION AREDS 2 PO), Take 1 tablet by mouth Daily., Disp: , Rfl:     polyethylene glycol (MIRALAX) 17 g packet, Take 8.5 g by mouth Daily., Disp: , Rfl:     Vitamin D, Ergocalciferol, 50 MCG (2000 UT) capsule, Take 2,000 Units by mouth Daily., Disp: 100 capsule, Rfl: 4    warfarin (COUMADIN) 2.5 MG tablet, TAKE 2 " TABLETS DAILY OR AS DIRECTED, Disp: 180 tablet, Rfl: 3    Abatacept (ORENCIA IV), Infuse  into a venous catheter., Disp: , Rfl:     atorvastatin (LIPITOR) 20 MG tablet, Take 1 tablet by mouth Daily., Disp: 90 tablet, Rfl: 3  Allergies   Allergen Reactions    Sulfa Antibiotics Rash    Macrobid [Nitrofurantoin] Unknown - High Severity     Social History     Socioeconomic History    Marital status:     Number of children: 5    Years of education: 12   Tobacco Use    Smoking status: Never     Passive exposure: Past    Smokeless tobacco: Never   Vaping Use    Vaping status: Never Used   Substance and Sexual Activity    Alcohol use: Yes     Alcohol/week: 1.0 standard drink of alcohol     Types: 1 Glasses of wine per week     Comment: Occ.    Drug use: Never    Sexual activity: Not Currently                Objective:     Vitals reviewed.   Constitutional:       Appearance: Healthy appearance. Not in distress. Frail.   Neck:      Vascular: No JVR. JVD normal.   Pulmonary:      Effort: Pulmonary effort is normal.      Breath sounds: Normal breath sounds. No wheezing. No rhonchi. No rales.   Chest:      Chest wall: Not tender to palpatation.   Cardiovascular:      PMI at left midclavicular line. Normal rate. Regular rhythm. Normal S1. Normal S2.       Murmurs: There is no murmur.      No gallop.  No click. No rub.   Pulses:     Intact distal pulses.   Edema:     Ankle: bilateral trace edema of the ankle.  Abdominal:      General: Bowel sounds are normal.      Palpations: Abdomen is soft.      Tenderness: There is no abdominal tenderness.   Musculoskeletal: Normal range of motion.         General: No tenderness. Skin:     General: Skin is warm and dry.   Neurological:      General: No focal deficit present.      Mental Status: Alert and oriented to person, place and time.       Procedures                  Assessment:     [facility]       Diagnosis Plan   1. Hypertensive heart disease without heart failure        2. Paroxysmal  atrial fibrillation  metoprolol succinate XL (TOPROL-XL) 100 MG 24 hr tablet    warfarin (COUMADIN) 2.5 MG tablet      3. Long term (current) use of anticoagulants  metoprolol succinate XL (TOPROL-XL) 100 MG 24 hr tablet    warfarin (COUMADIN) 2.5 MG tablet      4. Mixed hyperlipidemia        5. Primary hypertension                         Plan:   Chart reviewed  Labs reviewed  Reviewed hypertension and medications   Continue amlodipine 10mg daily, losartan 100mg daily,  metoprolol 100mg daily   Continue monitoring blood pressure closely at home   Call if consistently over 140 systolic.     Patient stopped HCTZ on her own   - Low Salt (sodium) diet     Patient reports zetia is expensive.  Will change back to statin   Reviewed statins with patient.  Uncertain why statins were stopped ?  Joint pain but patient has RA as well.   Atorvastatin ordered.       Continue current dose of warfarin    Repeat INR next scheduled appointment in 1 month    Follow-up with Dr. Patiño as scheduled or with me sooner as needed    Electronically signed by REINA Maier, 07/26/25, 5:22 PM EDT.        This document is intended for medical expert use only.  Reading of this document by patients and/or patient's family without participating medical staff guidance may result in misinterpretation and unintended morbidity. Any interpretation of such data is the responsibility of the patient and/or family member responsible for the patient in concert with their primary or specialist providers, not to be left for sources of online search as such as [employer], [employer] or similar queries.  Relying on these approaches to knowledge may result in misinterpretation, misguided goals of care and even death should patient or family members try recommendations outside of the realm of professional medical care in a supervised inpatient environment.

## 2025-07-24 NOTE — PROGRESS NOTES
Pts INR was 2.9, the upper limits of range. No reports of bruising or bleeding, CPM and increase green leafy vegs in diet. Recheck in a month. RaquelN

## 2025-08-27 ENCOUNTER — ANTICOAGULATION VISIT (OUTPATIENT)
Dept: CARDIOLOGY | Facility: CLINIC | Age: 85
End: 2025-08-27
Payer: MEDICARE

## 2025-08-27 VITALS
BODY MASS INDEX: 20.6 KG/M2 | SYSTOLIC BLOOD PRESSURE: 146 MMHG | WEIGHT: 109 LBS | DIASTOLIC BLOOD PRESSURE: 56 MMHG | HEART RATE: 65 BPM

## 2025-08-27 DIAGNOSIS — I48.0 PAROXYSMAL ATRIAL FIBRILLATION: Primary | Chronic | ICD-10-CM

## 2025-08-27 DIAGNOSIS — Z79.01 LONG TERM (CURRENT) USE OF ANTICOAGULANTS: Chronic | ICD-10-CM

## 2025-08-27 LAB — INR PPP: 3.5 (ref 2–3)

## 2025-08-27 PROCEDURE — 85610 PROTHROMBIN TIME: CPT | Performed by: INTERNAL MEDICINE

## 2025-08-27 PROCEDURE — 36416 COLLJ CAPILLARY BLOOD SPEC: CPT | Performed by: INTERNAL MEDICINE

## (undated) DEVICE — CATH ABL ACHIEVE MP 3.3F 20MM 165CM

## (undated) DEVICE — Device: Brand: TORAYGUIDE GUIDEWIRE

## (undated) DEVICE — ELECTRD DEFIB M/FUNC PROPADZ RADIOL 2PK

## (undated) DEVICE — CABL CONN CATH EP UMB 48IN

## (undated) DEVICE — Device: Brand: REFERENCE PATCH CARTO 3

## (undated) DEVICE — OCTA,PERSEID,2-2-2-2-2,F-CURVE: Brand: OCTARAY MAPPING CATHETER

## (undated) DEVICE — ST ACC MICROPUNCTURE STFF/CANN PLAT/TP 4F 21G 40CM

## (undated) DEVICE — CABL CONN CATH EP COAXL UMB 72IN

## (undated) DEVICE — TBG IV DRIP CHAMBER MACRO SGL 72IN

## (undated) DEVICE — INTERFACE CABLE: Brand: CARTO 3 SYSTEM ECO INTERFACE CABLE

## (undated) DEVICE — ST INTRO W/GW J/TIP .038IN 16F 13CM

## (undated) DEVICE — Device: Brand: NRG TRANSSEPTAL NEEDLE

## (undated) DEVICE — CABL CATH ABLAT ACHIEVE 196CM 1P/U

## (undated) DEVICE — PROVE COVER: Brand: UNBRANDED

## (undated) DEVICE — Device: Brand: WEBSTER CS

## (undated) DEVICE — Device: Brand: CARTO 3

## (undated) DEVICE — PK TRY HEART CATH 50

## (undated) DEVICE — PREF.GUIDING SHEATH W/MULT.CRV: Brand: PREFACE

## (undated) DEVICE — Device: Brand: SMARTABLATE

## (undated) DEVICE — Device: Brand: RFP-100A CONNECTOR CABLE

## (undated) DEVICE — TBG PRESS/MONITOR FIX M/F LL A/ 24IN STRL

## (undated) DEVICE — CATH ABL ARCTIC FRNT ADV 10.5F3.5X28MM

## (undated) DEVICE — Device: Brand: THERMOCOOL SMARTTOUCH SF

## (undated) DEVICE — SHEATH FLXCATH STEER 12FR

## (undated) DEVICE — PAD E/S GRND SGL/FOIL 9FT/CORD DISP

## (undated) DEVICE — BLANKT WARM UNDER/BDY FUL/ACC A/ 90X206CM

## (undated) DEVICE — PINNACLE INTRODUCER SHEATH: Brand: PINNACLE

## (undated) DEVICE — Device: Brand: SOUNDSTAR